# Patient Record
Sex: MALE | Race: WHITE | NOT HISPANIC OR LATINO | Employment: OTHER | URBAN - METROPOLITAN AREA
[De-identification: names, ages, dates, MRNs, and addresses within clinical notes are randomized per-mention and may not be internally consistent; named-entity substitution may affect disease eponyms.]

---

## 2017-01-04 ENCOUNTER — NURSE TRIAGE (OUTPATIENT)
Dept: ADMINISTRATIVE | Facility: CLINIC | Age: 82
End: 2017-01-04

## 2017-01-04 ENCOUNTER — TELEPHONE (OUTPATIENT)
Dept: INTERNAL MEDICINE | Facility: CLINIC | Age: 82
End: 2017-01-04

## 2017-01-04 ENCOUNTER — OFFICE VISIT (OUTPATIENT)
Dept: INTERNAL MEDICINE | Facility: CLINIC | Age: 82
End: 2017-01-04
Payer: MEDICARE

## 2017-01-04 VITALS
BODY MASS INDEX: 27.67 KG/M2 | SYSTOLIC BLOOD PRESSURE: 110 MMHG | DIASTOLIC BLOOD PRESSURE: 60 MMHG | HEART RATE: 50 BPM | HEIGHT: 68 IN | OXYGEN SATURATION: 98 % | WEIGHT: 182.56 LBS

## 2017-01-04 DIAGNOSIS — Z86.73 HISTORY OF STROKE: ICD-10-CM

## 2017-01-04 DIAGNOSIS — D69.6 THROMBOCYTOPENIA: ICD-10-CM

## 2017-01-04 DIAGNOSIS — E78.49 OTHER HYPERLIPIDEMIA: ICD-10-CM

## 2017-01-04 DIAGNOSIS — F33.42 RECURRENT MAJOR DEPRESSIVE DISORDER, IN FULL REMISSION: ICD-10-CM

## 2017-01-04 DIAGNOSIS — I25.10 CORONARY ARTERY DISEASE INVOLVING NATIVE CORONARY ARTERY OF NATIVE HEART WITHOUT ANGINA PECTORIS: ICD-10-CM

## 2017-01-04 DIAGNOSIS — Z79.01 ANTICOAGULATED BY ANTICOAGULATION TREATMENT: ICD-10-CM

## 2017-01-04 DIAGNOSIS — E53.8 VITAMIN B12 DEFICIENCY: ICD-10-CM

## 2017-01-04 DIAGNOSIS — I51.3 THROMBUS OF LEFT ATRIAL APPENDAGE: ICD-10-CM

## 2017-01-04 DIAGNOSIS — I70.0 AORTIC ATHEROSCLEROSIS: ICD-10-CM

## 2017-01-04 DIAGNOSIS — I50.22 CHRONIC SYSTOLIC HEART FAILURE: ICD-10-CM

## 2017-01-04 DIAGNOSIS — I10 ESSENTIAL HYPERTENSION: Primary | ICD-10-CM

## 2017-01-04 DIAGNOSIS — I48.0 PAROXYSMAL ATRIAL FIBRILLATION: ICD-10-CM

## 2017-01-04 DIAGNOSIS — N40.0 BENIGN NODULAR PROSTATIC HYPERPLASIA WITHOUT LOWER URINARY TRACT SYMPTOMS: ICD-10-CM

## 2017-01-04 PROBLEM — F32.5 MAJOR DEPRESSIVE DISORDER IN FULL REMISSION: Status: ACTIVE | Noted: 2017-01-04

## 2017-01-04 PROCEDURE — 3074F SYST BP LT 130 MM HG: CPT | Mod: S$GLB,,, | Performed by: INTERNAL MEDICINE

## 2017-01-04 PROCEDURE — 99499 UNLISTED E&M SERVICE: CPT | Mod: S$GLB,,, | Performed by: INTERNAL MEDICINE

## 2017-01-04 PROCEDURE — 1160F RVW MEDS BY RX/DR IN RCRD: CPT | Mod: S$GLB,,, | Performed by: INTERNAL MEDICINE

## 2017-01-04 PROCEDURE — 1157F ADVNC CARE PLAN IN RCRD: CPT | Mod: S$GLB,,, | Performed by: INTERNAL MEDICINE

## 2017-01-04 PROCEDURE — 1126F AMNT PAIN NOTED NONE PRSNT: CPT | Mod: S$GLB,,, | Performed by: INTERNAL MEDICINE

## 2017-01-04 PROCEDURE — 99999 PR PBB SHADOW E&M-EST. PATIENT-LVL III: CPT | Mod: PBBFAC,,, | Performed by: INTERNAL MEDICINE

## 2017-01-04 PROCEDURE — 1159F MED LIST DOCD IN RCRD: CPT | Mod: S$GLB,,, | Performed by: INTERNAL MEDICINE

## 2017-01-04 PROCEDURE — 99214 OFFICE O/P EST MOD 30 MIN: CPT | Mod: S$GLB,,, | Performed by: INTERNAL MEDICINE

## 2017-01-04 PROCEDURE — 3078F DIAST BP <80 MM HG: CPT | Mod: S$GLB,,, | Performed by: INTERNAL MEDICINE

## 2017-01-04 RX ORDER — CARVEDILOL 3.12 MG/1
3.12 TABLET ORAL 2 TIMES DAILY
Qty: 60 TABLET | Refills: 11 | Status: SHIPPED | OUTPATIENT
Start: 2017-01-04 | End: 2017-11-03 | Stop reason: SDUPTHER

## 2017-01-04 RX ORDER — CARVEDILOL 3.12 MG/1
3.12 TABLET ORAL 2 TIMES DAILY
Qty: 60 TABLET | Refills: 11 | Status: SHIPPED | OUTPATIENT
Start: 2017-01-04 | End: 2017-01-04 | Stop reason: SDUPTHER

## 2017-01-04 NOTE — TELEPHONE ENCOUNTER
----- Message from Venessa Madison sent at 1/4/2017 12:30 PM CST -----  Contact: pharmacy/leyla/[phone]  Pharmacy called in regards to getting clarification and directions on the pt Rx for carvedilol (COREG) 3.125 MG tablet.      [facility]  Please advise

## 2017-01-04 NOTE — PROGRESS NOTES
CHIEF COMPLAINT: Follow up of HTN, CAD, memory loss, hyperlipidemia, BPH    HISTORY OF PRESENT ILLNESS: This is a 82-year-old man who presents with his daughter for follow up of above. His daughter notes that he has been more tired lately. He will get up around 10 am then watch the news for several hours then take 1-2 hour nap. He is usually awake by 3 pm when his grandchildren return home.  HE stays awake until 9 pm. He sleeps well.    He is on Eliquis 5 mg twice daily, lisinopril 20 mg dialy, furosemide 20 mg daily, potassium chloride 10 meq 2 tablets every other day and coreg 12.5 mg 1/2 tablet twice daily. No chest pain, shortness of breath, palpitations     Appetite has been better. He is drinking a boost or ensure daily. No fever, chills, nausea, vomiting, constipation, diarrhea.       He continues to take Wellbutrin  mg daily. Memory has not worsened. He has some mild memory loss. Mood has been down a little as the anniversary of his wife's death is at the end of May. He continues to take atorvastatin 40 mg 1 tablet daily and Lovaza 1 g daily for her hyperlipidemia. He denies any neck pain from his history of cervical spine fracture.       He has chronic shoulder pain which has not changed     He has right sided back pain if he sits too long. Back pain improves with moving around.       Past Medical History:   1. Coronary artery disease status post CABG x1 in 1993, and STEMI 01/2010, status post stenting, refused cardiac rehab, followed by Dr. Aguilar.   2. Congestive heart failure. Ejection fraction was 30% Jan 2012  3. History of atrial fibrillation -- EKG Jan 2012 - sinus rhythm  4. Hyperlipidemia  5. Hypertension.   6. Osteoarthritis of the lumbar spine -- Had a flare in 2006 and had MRI at that time.   7. History of nephrolithiasis in 2005.   8. History of left thalamic stroke in the early 2000s per Dr. Matos's notes.   9. Depression -- followed by Dr. Bingham .  10. BPH with obstruction, had a  "cystoscope in 2010. Saw Urology 2011.  11. Right rotator cuff tear with arthropathy. He has pain from time to time. Saw Dr. Narvaez for an injection 2011.     PAST SURGICAL HISTORY:   1. CABG x1 in .   2. Bilateral cataract surgery, right 2005, left .   3. Excision of basal cell carcinoma of the left ear in .     SOCIAL HISTORY: He has never smoked, drinks alcohol once a month. . Has three children. Retired from the [employer].     FAMILY HISTORY: Mother  in her 60s of heart trouble. Father  in his 50s of some sort of abdominal malignancy. He has a half brother, which he is not sure of his health issues.     Screening PSA was 1016. Declines colonoscopy.     PHYSICAL EXAMINATION:    Visit Vitals    /60 (BP Location: Right arm, Patient Position: Sitting, BP Method: Manual)    Pulse (!) 50    Ht 5' 8" (1.727 m)    Wt 82.8 kg (182 lb 8.7 oz)    SpO2 98%    BMI 27.76 kg/m2         GENERAL: He is alert, oriented, no apparent distress. Affect within normal limits.   HEENT: Conjunctivae anicteric. Pupils are equal, round and reactive to   light. TM clear  Neck supple. Oropharynx -clear  CHEST: Respiratory effort normal. Lungs clear. Rales right base.    HEART: Regular rate and rhythm without murmurs, gallops or rubs. No lowerextremity edema.      ASSESSMENT AND PLAN:   1. Coronary artery disease, CHF and a fib and aortic atherosclerosis- on risk facto modification. -decrease coreg to 3.125 mg twice daily due to bradycardia. IF fatigue does not improve, then his daughter is to notify me.   2. Hypertension -stable  3. Hyperlipidemia -- controlled.    4.GERD - asx. Takes ranitidine 300 mg Nightly as needed    5. WEight loss- resolved   6. Major Depression -- in remission on Wellbutrin  7. Osteoarthritis of lumbar spine -- stable.   8. Benign prostatic hypertrophy -- stable.   9. History of skin cancer -- Saw Derm   10. Thyroid nodule - thyroid ultrasound stable "    11 Memory loss- intolerant of aricept. Saw Jade Chacon    12. Vitamin B12 deficiency - vitamin B12 1000 mcg orally daily  13. Thrombocytopenia - stable and chronic - repeat CBC at next lab  14. Tortuous aorta - BP is stable  15. Left atrial appendage thrombus-  On Eliquis  Screening -- PSA slightly elevated - will monitor at next lab draw. Will repeat at next lab draw. Declines colonoscopy.   I will see him back in 3 months, sooner if problems arise.

## 2017-01-04 NOTE — MR AVS SNAPSHOT
[facility]  [address]  [facility] [address]  Phone: [phone]  Fax: [phone]                  Jeremie Bradshaw   2017 11:30 AM   Office Visit    Description:  Male : 1934   Provider:  Rocio Casas MD   Department:  [facility] - Internal Medicine           Reason for Visit     Follow-up                To Do List           Future Appointments        Provider Department Dept Phone    2017 9:30 AM Rocio Casas MD [facility] - Internal Medicine [phone]      Goals (5 Years of Data)     None       These Medications        Disp Refills Start End    carvedilol (COREG) 3.125 MG tablet 60 tablet 11 2017     Take 1 tablet (3.125 mg total) by mouth 2 (two) times daily. Take a half tablet twice a day. - Oral    [facility] [address] [place], [place] [address] [address] [address] [facility] [address] Ph #: [phone]         [facility]     [facility] On Call Nurse Care Line -  Assistance  Registered nurses in the [facility] On Call Center provide clinical advisement, health education, appointment booking, and other advisory services.  Call for this free service at [phone].             Medications           Message regarding Medications     Verify the changes and/or additions to your medication regime listed below are the same as discussed with your clinician today.  If any of these changes or additions are incorrect, please notify your healthcare provider.        CHANGE how you are taking these medications     Start Taking Instead of    carvedilol (COREG) 3.125 MG tablet carvedilol (COREG) 12.5 MG tablet    Dosage:  Take 1 tablet (3.125 mg total) by mouth 2 (two) times daily. Take a half tablet twice a day. Dosage:  Take a half tablet twice a day.    Reason for Change:  Reorder            Verify that the below list of medications is an accurate representation of the medications you are currently taking.  If none  "reported, the list may be blank. If incorrect, please contact your healthcare provider. Carry this list with you in case of emergency.           Current Medications     apixaban 5 mg Tab Take 1 tablet (5 mg total) by mouth 2 (two) times daily.    aspirin (ECOTRIN) 81 MG EC tablet Take 81 mg by mouth once daily. Pt taken every other day with potasium    atorvastatin (LIPITOR) 40 MG tablet TAKE 1 TABLET BY MOUTH EVERY DAY    buPROPion (WELLBUTRIN XL) 300 MG 24 hr tablet TAKE 1 TABLET(300 MG) BY MOUTH EVERY MORNING    carvedilol (COREG) 3.125 MG tablet Take 1 tablet (3.125 mg total) by mouth 2 (two) times daily. Take a half tablet twice a day.    cyanocobalamin (VITAMIN B-12) 1000 MCG tablet Take 1 tablet (1,000 mcg total) by mouth once daily.    desonide (DESOWEN) 0.05 % cream AAA bid    desonide (DESOWEN) 0.05 % lotion APPLY EXTERNALLY TO THE AFFECTED AREA TWICE DAILY AS NEEDED FOR REDNESS AND ITCHING    docusate sodium (DOC-Q-LACE) 100 MG capsule Take 1 capsule (100 mg total) by mouth once daily.    furosemide (LASIX) 20 MG tablet Take 1 tablet (20 mg total) by mouth once daily. Plus extra as needed.    hydrocortisone (WESTCORT) 0.2 % cream Apply topically 2 (two) times daily.      lisinopril (PRINIVIL,ZESTRIL) 20 MG tablet Take 1 tablet (20 mg total) by mouth once daily.    nitroGLYCERIN (NITROSTAT) 0.4 MG SL tablet Place 0.4 mg under the tongue every 5 (five) minutes as needed.      omega-3 acid ethyl esters (LOVAZA) 1 gram capsule Take 2 capsules (2 g total) by mouth once daily.    potassium chloride (MICRO-K) 10 MEQ CpSR TAKE 2 CAPSULES BY MOUTH EVERY DAY FOR 5 DAYS, THEN TAKE 2 CAPSULES EVERY OTHER DAY. TAKE DAILY WHEN TAKING EXTRA LASIX           Clinical Reference Information           Vital Signs - Last Recorded  Most recent update: 1/4/2017 12:14 PM by Rocio Casas MD    BP Pulse Ht Wt SpO2 BMI    110/60 (BP Location: Right arm, Patient Position: Sitting, BP Method: Manual) (!) 50 5' 8" (1.727 m) 82.8 " kg (182 lb 8.7 oz) 98% 27.76 kg/m2      Blood Pressure          Most Recent Value    BP  110/60      Allergies as of 1/4/2017     Prednisone      Immunizations Administered on Date of Encounter - 1/4/2017     None

## 2017-01-05 ENCOUNTER — TELEPHONE (OUTPATIENT)
Dept: INTERNAL MEDICINE | Facility: CLINIC | Age: 82
End: 2017-01-05

## 2017-01-05 NOTE — TELEPHONE ENCOUNTER
"    Reason for Disposition   [1] Pointed object was inserted into the ear canal AND [2] now has bleeding or earache     (Exception: doctor's ear exam)     Jeremie 's ear was cleaned during an appointment in clinic today, as Galina (daughter) states he has a lot of wax produced in his ears.  Went home, and he could not hear later in the day; Galina noticed the hearing aid in his right ear was covered with blood, and blood was pooling in his ear.  She cleaned the hearing aid and dried his ear, but the bleeding continues, she states. He is on blood thinner Eliquis.  He cannot hear from that ear at present, "probably due to the blood in his ear", states his daughter.  Recommended she bring him to the ED now for evaluation.  Message to Rocio Casas .  Please contact caller directly with any additional care advice.    Answer Assessment - Initial Assessment Questions  1. MECHANISM: "How did the injury happen?"       He was getting his ear cleaned by PCP today, and tonight noticed blood all over the hearing aid, and it is still bleeding (he is on blood thinner Eliquis)  2. ONSET: "When did the injury happen?" (Minutes or hours ago)       About 1200 today in clinic.  3. LOCATION: "What part of the ear is injured?"       Hard to see inside the canal, and after cleaning out the blood, fresh blood keeps coming.  4. APPEARANCE: "What does the ear look like?"         5. HEARING: "Was the hearing damaged?"       He wears hearing aid, but he states he cannot hear even with the hearing aid in..  6. SIZE: For cuts, bruises, or swelling, ask: "How large is it?" (e.g., inches or centimeters)      Unknown.  7. PAIN: "Is it painful?" If so, ask: "How bad is the pain?"    (e.g., Scale 1-10; or mild, moderate, severe)      He is not complaining of any pain, but canal full of blood and he cannot hear.  8. TETANUS: For any breaks in the skin, ask: "When was the last tetanus booster?"        9. OTHER SYMPTOMS: "Do you have any other " "symptoms?" (e.g., neck pain, headache, loss of consciousness)      None mentioned to his daughter.  10. PREGNANCY: "Is there any chance you are pregnant?" "When was your last menstrual period?"        n/a    Protocols used: ST EAR INJURY-A-      "

## 2017-01-05 NOTE — TELEPHONE ENCOUNTER
----- Message from Beti Jones sent at 1/5/2017 11:40 AM CST -----  Contact: Galina daughter [phone]  Pt daughter would like to speak with the nurse regarding the pt was seen yesterday and the doctor washed out his ear and she states since then the pt has had dried up blood in his ear and having trouble hearing,Please call

## 2017-01-05 NOTE — TELEPHONE ENCOUNTER
Spoke with pt daughter, she states that after the pt was seen and pricked in the ear he got home and can't hear out of the ear and had a good bit of blood coming out. She called [facility] on call who told her to come in to er with pt but he refused. Pt daughter would like to get an apt so that she can have PCP revaluate the pt.

## 2017-01-06 ENCOUNTER — OFFICE VISIT (OUTPATIENT)
Dept: INTERNAL MEDICINE | Facility: CLINIC | Age: 82
End: 2017-01-06
Payer: MEDICARE

## 2017-01-06 DIAGNOSIS — H61.891 IRRITATION OF EXTERNAL AUDITORY CANAL, RIGHT: Primary | ICD-10-CM

## 2017-01-06 PROCEDURE — 1160F RVW MEDS BY RX/DR IN RCRD: CPT | Mod: S$GLB,,, | Performed by: INTERNAL MEDICINE

## 2017-01-06 PROCEDURE — 99213 OFFICE O/P EST LOW 20 MIN: CPT | Mod: S$GLB,,, | Performed by: INTERNAL MEDICINE

## 2017-01-06 PROCEDURE — 1157F ADVNC CARE PLAN IN RCRD: CPT | Mod: S$GLB,,, | Performed by: INTERNAL MEDICINE

## 2017-01-06 PROCEDURE — 99499 UNLISTED E&M SERVICE: CPT | Mod: S$GLB,,, | Performed by: INTERNAL MEDICINE

## 2017-01-06 PROCEDURE — 1159F MED LIST DOCD IN RCRD: CPT | Mod: S$GLB,,, | Performed by: INTERNAL MEDICINE

## 2017-01-06 PROCEDURE — 99999 PR PBB SHADOW E&M-EST. PATIENT-LVL I: CPT | Mod: PBBFAC,,, | Performed by: INTERNAL MEDICINE

## 2017-01-06 NOTE — PROGRESS NOTES
CHIEF COMPLAINT: Clogged right ear    HISTORY OF PRESENT ILLNESS: This is a 82-year-old man who presents due to clogged right ear. I removed some wax from his right external auditory canal 2 days ago with a cerumen spoon under direct visualization. When he got home the right ear canal was bleeding. HE now cannot hear out of the right ear.  NO pain.  HE takes eliquis due to a fib      Past Medical History:   1. Coronary artery disease status post CABG x1 in , and STEMI 2010, status post stenting, refused cardiac rehab, followed by Dr. Aguilar.   2. Congestive heart failure. Ejection fraction was 30% 2012  3. History of atrial fibrillation -- EKG 2012 - sinus rhythm  4. Hyperlipidemia  5. Hypertension.   6. Osteoarthritis of the lumbar spine -- Had a flare in  and had MRI at that time.   7. History of nephrolithiasis in .   8. History of left thalamic stroke in the early  per Dr. Matos's notes.   9. Depression -- followed by Dr. Bingham .  10. BPH with obstruction, had a cystoscope in 2010. Saw Urology 2011.  11. Right rotator cuff tear with arthropathy. He has pain from time to time. Saw Dr. Narvaez for an injection 2011.     PAST SURGICAL HISTORY:   1. CABG x1 in .   2. Bilateral cataract surgery, right 2005, left .   3. Excision of basal cell carcinoma of the left ear in .     SOCIAL HISTORY: He has never smoked, drinks alcohol once a month. . Has three children. Retired from the [employer].     FAMILY HISTORY: Mother  in her 60s of heart trouble. Father  in his 50s of some sort of abdominal malignancy. He has a half brother, which he is not sure of his health issues.     Screening PSA was 1016. Declines colonoscopy.     PHYSICAL EXAMINATION:         GENERAL: He is alert, oriented, no apparent distress. Affect within normal limits.   HEENT: Conjunctivae anicteric. Pupils are equal, round and reactive to   light. Right external auditory  canal with blood clot in it    Right external auditory canal irrigated with hydrogen peroxide. Clot removed. NO active signs of bleeding. NO erythema or induration in the ear canal. TM fine.    Bleeding of ear canal due to irritation from wax removal- stopped. Blood clot removed. Clean out ears with H2O2 weekly to prevent wax buildup.

## 2017-01-18 RX ORDER — OMEGA-3-ACID ETHYL ESTERS 1 G/1
CAPSULE, LIQUID FILLED ORAL
Qty: 180 CAPSULE | Refills: 0 | Status: SHIPPED | OUTPATIENT
Start: 2017-01-18 | End: 2017-04-19 | Stop reason: SDUPTHER

## 2017-02-16 RX ORDER — BUPROPION HYDROCHLORIDE 300 MG/1
TABLET ORAL
Qty: 30 TABLET | Refills: 0 | Status: SHIPPED | OUTPATIENT
Start: 2017-02-16 | End: 2017-04-03 | Stop reason: SDUPTHER

## 2017-02-16 RX ORDER — ATORVASTATIN CALCIUM 40 MG/1
TABLET, FILM COATED ORAL
Qty: 90 TABLET | Refills: 0 | Status: SHIPPED | OUTPATIENT
Start: 2017-02-16 | End: 2017-05-20 | Stop reason: SDUPTHER

## 2017-03-06 ENCOUNTER — OFFICE VISIT (OUTPATIENT)
Dept: INTERNAL MEDICINE | Facility: CLINIC | Age: 82
End: 2017-03-06
Payer: MEDICARE

## 2017-03-06 ENCOUNTER — HOSPITAL ENCOUNTER (OUTPATIENT)
Dept: RADIOLOGY | Facility: HOSPITAL | Age: 82
Discharge: HOME | End: 2017-03-06
Attending: INTERNAL MEDICINE
Payer: MEDICARE

## 2017-03-06 VITALS
HEIGHT: 68 IN | DIASTOLIC BLOOD PRESSURE: 60 MMHG | HEART RATE: 60 BPM | OXYGEN SATURATION: 97 % | WEIGHT: 177 LBS | SYSTOLIC BLOOD PRESSURE: 110 MMHG | BODY MASS INDEX: 26.83 KG/M2

## 2017-03-06 DIAGNOSIS — G89.29 CHRONIC RIGHT SHOULDER PAIN: ICD-10-CM

## 2017-03-06 DIAGNOSIS — E53.8 VITAMIN B12 DEFICIENCY: ICD-10-CM

## 2017-03-06 DIAGNOSIS — R97.20 ELEVATED PSA: ICD-10-CM

## 2017-03-06 DIAGNOSIS — M25.511 CHRONIC RIGHT SHOULDER PAIN: ICD-10-CM

## 2017-03-06 DIAGNOSIS — I50.22 CHRONIC SYSTOLIC HEART FAILURE: ICD-10-CM

## 2017-03-06 DIAGNOSIS — M25.511 CHRONIC RIGHT SHOULDER PAIN: Primary | ICD-10-CM

## 2017-03-06 DIAGNOSIS — E78.5 HYPERLIPIDEMIA, UNSPECIFIED HYPERLIPIDEMIA TYPE: ICD-10-CM

## 2017-03-06 DIAGNOSIS — G89.29 CHRONIC RIGHT SHOULDER PAIN: Primary | ICD-10-CM

## 2017-03-06 DIAGNOSIS — I10 ESSENTIAL HYPERTENSION: ICD-10-CM

## 2017-03-06 PROCEDURE — 99499 UNLISTED E&M SERVICE: CPT | Mod: S$GLB,,, | Performed by: INTERNAL MEDICINE

## 2017-03-06 PROCEDURE — 1157F ADVNC CARE PLAN IN RCRD: CPT | Mod: S$GLB,,, | Performed by: INTERNAL MEDICINE

## 2017-03-06 PROCEDURE — 3078F DIAST BP <80 MM HG: CPT | Mod: S$GLB,,, | Performed by: INTERNAL MEDICINE

## 2017-03-06 PROCEDURE — 99214 OFFICE O/P EST MOD 30 MIN: CPT | Mod: 25,S$GLB,, | Performed by: INTERNAL MEDICINE

## 2017-03-06 PROCEDURE — 1160F RVW MEDS BY RX/DR IN RCRD: CPT | Mod: S$GLB,,, | Performed by: INTERNAL MEDICINE

## 2017-03-06 PROCEDURE — 1159F MED LIST DOCD IN RCRD: CPT | Mod: S$GLB,,, | Performed by: INTERNAL MEDICINE

## 2017-03-06 PROCEDURE — 99999 PR PBB SHADOW E&M-EST. PATIENT-LVL III: CPT | Mod: PBBFAC,,, | Performed by: INTERNAL MEDICINE

## 2017-03-06 PROCEDURE — 96372 THER/PROPH/DIAG INJ SC/IM: CPT | Mod: S$GLB,,, | Performed by: INTERNAL MEDICINE

## 2017-03-06 PROCEDURE — 3074F SYST BP LT 130 MM HG: CPT | Mod: S$GLB,,, | Performed by: INTERNAL MEDICINE

## 2017-03-06 PROCEDURE — 73030 X-RAY EXAM OF SHOULDER: CPT | Mod: TC,RT

## 2017-03-06 PROCEDURE — 73030 X-RAY EXAM OF SHOULDER: CPT | Mod: 26,RT,, | Performed by: RADIOLOGY

## 2017-03-06 RX ORDER — CYANOCOBALAMIN 1000 UG/ML
1000 INJECTION, SOLUTION INTRAMUSCULAR; SUBCUTANEOUS
Status: COMPLETED | OUTPATIENT
Start: 2017-03-06 | End: 2017-03-06

## 2017-03-06 RX ADMIN — CYANOCOBALAMIN 1000 MCG: 1000 INJECTION, SOLUTION INTRAMUSCULAR; SUBCUTANEOUS at 12:03

## 2017-03-06 NOTE — PROGRESS NOTES
CHIEF COMPLAINT: Right shoulder pain    HISTORY OF PRESENT ILLNESS: This is a 82-year-old man who presents with his daughter due to worsened right shoulder pain for the last several weeks. He did not do much during the mardi gras season and laid on his right shoulder a lot. The right shouldr got stiff and he had decreased range of motion and worsened right shoulder pain. Since Mardi Gras is over, her daughter has more time to get him out and the shoulder is a little better. HE is using a heating pad and taking tylenol one in am and 2 in evening which helps.  He has chronic right shoulder issues and fracture his humerus in 2013     He is on Eliquis 5 mg twice daily, lisinopril 20 mg dialy, furosemide 20 mg daily, potassium chloride 10 meq 2 tablets every other day and coreg 3.125 mg one tablet twice daily. No chest pain, shortness of breath, palpitations      Appetite has been better. He is drinking a boost or ensure daily. No fever, chills, nausea, vomiting, constipation, diarrhea.       He continues to take Wellbutrin  mg daily. Memory has not worsened. He has some mild memory loss. Mood has been down a little as the anniversary of his wife's death is at the end of May. He continues to take atorvastatin 40 mg 1 tablet daily and Lovaza 1 g daily for her hyperlipidemia. He denies any neck pain from his history of cervical spine fracture.      He has right sided back pain if he sits too long. Back pain improves with moving around.       Past Medical History:   1. Coronary artery disease status post CABG x1 in 1993, and STEMI 01/2010, status post stenting, refused cardiac rehab, followed by Dr. Aguilar.   2. Congestive heart failure. Ejection fraction was 30% Jan 2012  3. History of atrial fibrillation -- EKG Jan 2012 - sinus rhythm  4. Hyperlipidemia  5. Hypertension.   6. Osteoarthritis of the lumbar spine -- Had a flare in 2006 and had MRI at that time.   7. History of nephrolithiasis in 2005.   8. History of  "left thalamic stroke in the early  per Dr. Matos's notes.   9. Depression -- followed by Dr. Bingham .  10. BPH with obstruction, had a cystoscope in 2010. Saw Urology 2011.  11. Right rotator cuff tear with arthropathy. He has pain from time to time. Saw Dr. Narvaez for an injection 2011.     PAST SURGICAL HISTORY:   1. CABG x1 in .   2. Bilateral cataract surgery, right 2005, left .   3. Excision of basal cell carcinoma of the left ear in .     SOCIAL HISTORY: He has never smoked, drinks alcohol once a month. . Has three children. Retired from the [employer].     FAMILY HISTORY: Mother  in her 60s of heart trouble. Father  in his 50s of some sort of abdominal malignancy. He has a half brother, which he is not sure of his health issues.     Screening PSA was 1016. Declines colonoscopy.     PHYSICAL EXAMINATION:          Visit Vitals    /60 (BP Location: Right arm, Patient Position: Sitting, BP Method: Manual)    Pulse (!) 50    Ht 5' 8" (1.727 m)    Wt 82.8 kg (182 lb 8.7 oz)    SpO2 98%    BMI 27.76 kg/m2          GENERAL: He is alert, oriented, no apparent distress. Affect within normal limits.   HEENT: Conjunctivae anicteric. Pupils are equal, round and reactive to   light. TM clear  Neck supple. Oropharynx -clear  CHEST: Respiratory effort normal. Lungs clear. Rales right base.    HEART: Regular rate and rhythm without murmurs, gallops or rubs. No lowerextremity edema.    Decreased range of motion of the right shoulder    ASSESSMENT AND PLAN:   1. Right shoulder pain- xray and to sports medicine  2. Coronary artery disease, CHF and a fib and aortic atherosclerosis- on risk facto modification. -decrease coreg to 3.125 mg twice daily due to bradycardia. IF fatigue does not improve, then his daughter is to notify me.   2. Hypertension -stable  3. Hyperlipidemia -- controlled.    4.GERD - asx. Takes ranitidine 300 mg Nightly as needed    5. WEight " loss- resolved   6. Major Depression -- controlled on Wellbutrin  7. Osteoarthritis of lumbar spine -- stable.   8. Benign prostatic hypertrophy -- stable.   9. History of skin cancer -- Saw Derm 12/16  10. Thyroid nodule - thyroid ultrasound stable    11 Memory loss- intolerant of aricept. Saw Jade Chacon    12. Vitamin B12 deficiency - level today then vitamin B12 1000 mcg IM  13. Thrombocytopenia - stable and chronic - repeat CBC at next lab  14. Tortuous aorta - BP is stable  15. Left atrial appendage thrombus- On Eliquis  Screening -- PSA slightly elevated -. Will repeat today  . Declines colonoscopy.   I will see him back in 1 months, sooner if problems arise.

## 2017-03-06 NOTE — PROGRESS NOTES
Two pt identifies used, Name and   Pt informed of possible adverse reaction and how to handle them. Asked pt to remain 15-20mins. Verbal consent given.

## 2017-03-07 RX ORDER — DOCUSATE SODIUM 100 MG/1
CAPSULE, LIQUID FILLED ORAL
Qty: 90 CAPSULE | Refills: 4 | Status: SHIPPED | OUTPATIENT
Start: 2017-03-07 | End: 2018-03-09 | Stop reason: SDUPTHER

## 2017-03-20 ENCOUNTER — PATIENT MESSAGE (OUTPATIENT)
Dept: CARDIOLOGY | Facility: CLINIC | Age: 82
End: 2017-03-20

## 2017-03-21 RX ORDER — NITROGLYCERIN 0.4 MG/1
0.4 TABLET SUBLINGUAL EVERY 5 MIN PRN
Qty: 25 TABLET | Refills: 3 | Status: SHIPPED | OUTPATIENT
Start: 2017-03-21 | End: 2018-12-03 | Stop reason: SDUPTHER

## 2017-03-29 RX ORDER — BUPROPION HYDROCHLORIDE 300 MG/1
TABLET ORAL
Qty: 30 TABLET | Refills: 0 | OUTPATIENT
Start: 2017-03-29

## 2017-04-03 RX ORDER — BUPROPION HYDROCHLORIDE 300 MG/1
TABLET ORAL
Qty: 30 TABLET | Refills: 1 | Status: SHIPPED | OUTPATIENT
Start: 2017-04-03 | End: 2017-05-30 | Stop reason: SDUPTHER

## 2017-04-05 ENCOUNTER — OFFICE VISIT (OUTPATIENT)
Dept: SPORTS MEDICINE | Facility: CLINIC | Age: 82
End: 2017-04-05
Payer: MEDICARE

## 2017-04-05 VITALS — HEIGHT: 68 IN | BODY MASS INDEX: 26.83 KG/M2 | WEIGHT: 177 LBS

## 2017-04-05 DIAGNOSIS — M19.011 PRIMARY OSTEOARTHRITIS, RIGHT SHOULDER: Primary | ICD-10-CM

## 2017-04-05 PROCEDURE — 1157F ADVNC CARE PLAN IN RCRD: CPT | Mod: S$GLB,,, | Performed by: ORTHOPAEDIC SURGERY

## 2017-04-05 PROCEDURE — 99999 PR PBB SHADOW E&M-EST. PATIENT-LVL III: CPT | Mod: PBBFAC,,, | Performed by: ORTHOPAEDIC SURGERY

## 2017-04-05 PROCEDURE — 1126F AMNT PAIN NOTED NONE PRSNT: CPT | Mod: S$GLB,,, | Performed by: ORTHOPAEDIC SURGERY

## 2017-04-05 PROCEDURE — 1159F MED LIST DOCD IN RCRD: CPT | Mod: S$GLB,,, | Performed by: ORTHOPAEDIC SURGERY

## 2017-04-05 PROCEDURE — 1160F RVW MEDS BY RX/DR IN RCRD: CPT | Mod: S$GLB,,, | Performed by: ORTHOPAEDIC SURGERY

## 2017-04-05 PROCEDURE — 99203 OFFICE O/P NEW LOW 30 MIN: CPT | Mod: S$GLB,,, | Performed by: ORTHOPAEDIC SURGERY

## 2017-04-05 PROCEDURE — 99499 UNLISTED E&M SERVICE: CPT | Mod: S$GLB,,, | Performed by: ORTHOPAEDIC SURGERY

## 2017-04-05 NOTE — LETTER
April 8, 2017      Rocio Casas MD  [address]  [facility] [address]           [facility]  [address]  [facility] [address]  Phone: [phone]          Patient: Jeremie Bradshaw   MR Number: [id number]   YOB: 1934   Date of Visit: 4/5/2017       Dear Dr. Rocio Casas:    Thank you for referring Jeremie Bradshaw to me for evaluation. Attached you will find relevant portions of my assessment and plan of care.    If you have questions, please do not hesitate to call me. I look forward to following Jeremie Bradshaw along with you.    Sincerely,    Elena Melendrez MD    Enclosure  CC:  No Recipients    If you would like to receive this communication electronically, please contact [email or web address] or [phone] to request more information on [employer] Link access.    For providers and/or their staff who would like to refer a patient to [facility], please contact us through our one-stop-shop provider referral line, [facility], at [phone].    If you feel you have received this communication in error or would no longer like to receive these types of communications, please e-mail [email or web address]

## 2017-04-05 NOTE — PROGRESS NOTES
CC: right shoulder pain, referred by Dr. Rocio Casas, retired     82 y.o. Male RHD reports that the pain is severe and not responding to any conservative care.      He reports that the pain is worse with overhead activity. It also bothers him at night.  Patients pain began around Mardi Gras, he notes no injury that he can recall to explain this flare up  Torn rotator cuff for 35 years with no surgical intervention   He notes that he attempted physical therapy 1-2 times but did not attend enough to see relief  He notes 2 previous shoulder injections but that the last one was many years ago  He describes his pain as anterior and posterior   He notes that he is feeling 90% better from when his pain first began    Is affecting ADLs.     He has chronic right shoulder issues and fracture his humerus in 2013  Patient has numbness in his hand but notes that this is from a stroke in the 1990's     Review of Systems   Constitution: Negative. Negative for chills, fever and night sweats.   HENT: Negative for congestion and headaches.    Eyes: Negative for blurred vision, left vision loss and right vision loss.   Cardiovascular: Negative for chest pain and syncope.   Respiratory: Negative for cough and shortness of breath.    Endocrine: Negative for polydipsia, polyphagia and polyuria.   Hematologic/Lymphatic: Negative for bleeding problem. Does not bruise/bleed easily.   Skin: Negative for dry skin, itching and rash.   Musculoskeletal: Negative for falls and muscle weakness.   Gastrointestinal: Negative for abdominal pain and bowel incontinence.   Genitourinary: Negative for bladder incontinence and nocturia.   Neurological: Negative for disturbances in coordination, loss of balance and seizures.   Psychiatric/Behavioral: Negative for depression. The patient does not have insomnia.    Allergic/Immunologic: Negative for hives and persistent infections.     PAST MEDICAL HISTORY:   Past Medical History:   Diagnosis Date     Basal cell carcinoma 7/2014    left medial canthus    Basal cell carcinoma 3/9/2015    right forehead    Basal cell carcinoma 09/08/2016    left neck (scoop shave)    BPH (benign prostatic hypertrophy) 8/17/2012    CAD (coronary artery disease) 8/17/2012    Cervical spine fracture 11/20/2012    CHF (congestive heart failure) 8/17/2012    Depression 8/17/2012    GERD (gastroesophageal reflux disease) 3/27/2014    Heart attack     History of atrial fibrillation 8/17/2012    Hyperlipidemia     Hypertension     Kidney stones 8/17/2012    Memory loss     Myocardial infarction     Osteoarthritis of lumbar spine 8/17/2012    Shoulder pain 8/17/2012    Stroke 8/17/2012     PAST SURGICAL HISTORY:   Past Surgical History:   Procedure Laterality Date    basal cell carcinoma left ear      CARDIAC SURGERY      CATARACT EXTRACTION W/  INTRAOCULAR LENS IMPLANT Bilateral     CATARACT EXTRACTION, BILATERAL      CORONARY ARTERY BYPASS GRAFT  1990    x1    TONSILLECTOMY       FAMILY HISTORY:   Family History   Problem Relation Age of Onset    Cancer Father     Heart failure Mother     Heart disease Mother     No Known Problems Sister     No Known Problems Brother     No Known Problems Maternal Aunt     No Known Problems Maternal Uncle     No Known Problems Paternal Aunt     No Known Problems Paternal Uncle     No Known Problems Maternal Grandmother     No Known Problems Maternal Grandfather     No Known Problems Paternal Grandmother     No Known Problems Paternal Grandfather     Amblyopia Neg Hx     Blindness Neg Hx     Cataracts Neg Hx     Diabetes Neg Hx     Glaucoma Neg Hx     Hypertension Neg Hx     Macular degeneration Neg Hx     Retinal detachment Neg Hx     Strabismus Neg Hx     Thyroid disease Neg Hx     Stroke Neg Hx     Melanoma Neg Hx     Psoriasis Neg Hx     Lupus Neg Hx     Eczema Neg Hx     Acne Neg Hx      SOCIAL HISTORY:   Social History     Social History     Marital status:      Spouse name: N/A    Number of children: N/A    Years of education: N/A     Occupational History    Retired Retired     Social History Main Topics    Smoking status: Never Smoker    Smokeless tobacco: Never Used    Alcohol use No      Comment: social drinker    Drug use: No    Sexual activity: Not Currently     Other Topics Concern    Not on file     Social History Narrative       MEDICATIONS:   Current Outpatient Prescriptions:     apixaban 5 mg Tab, Take 1 tablet (5 mg total) by mouth 2 (two) times daily., Disp: 180 tablet, Rfl: 3    aspirin (ECOTRIN) 81 MG EC tablet, Take 81 mg by mouth once daily. Pt taken every other day with potasium, Disp: , Rfl:     atorvastatin (LIPITOR) 40 MG tablet, TAKE 1 TABLET BY MOUTH EVERY DAY, Disp: 90 tablet, Rfl: 0    buPROPion (WELLBUTRIN XL) 300 MG 24 hr tablet, TAKE 1 TABLET(300 MG) BY MOUTH EVERY MORNING, Disp: 30 tablet, Rfl: 1    carvedilol (COREG) 3.125 MG tablet, Take 1 tablet (3.125 mg total) by mouth 2 (two) times daily., Disp: 60 tablet, Rfl: 11    cyanocobalamin (VITAMIN B-12) 1000 MCG tablet, Take 1 tablet (1,000 mcg total) by mouth once daily., Disp: , Rfl:     desonide (DESOWEN) 0.05 % cream, AAA bid, Disp: 1 Tube, Rfl: 3    desonide (DESOWEN) 0.05 % lotion, APPLY EXTERNALLY TO THE AFFECTED AREA TWICE DAILY AS NEEDED FOR REDNESS AND ITCHING, Disp: 1 Bottle, Rfl: 0     mg capsule, TAKE 1 CAPSULE BY MOUTH ONCE DAILY, Disp: 90 capsule, Rfl: 4    furosemide (LASIX) 20 MG tablet, Take 1 tablet (20 mg total) by mouth once daily. Plus extra as needed., Disp: 180 tablet, Rfl: 3    hydrocortisone (WESTCORT) 0.2 % cream, Apply topically 2 (two) times daily.  , Disp: , Rfl:     lisinopril (PRINIVIL,ZESTRIL) 20 MG tablet, Take 1 tablet (20 mg total) by mouth once daily., Disp: 30 tablet, Rfl: 11    nitroGLYCERIN (NITROSTAT) 0.4 MG SL tablet, Place 1 tablet (0.4 mg total) under the tongue every 5 (five) minutes as needed.,  "Disp: 25 tablet, Rfl: 3    omega-3 acid ethyl esters (LOVAZA) 1 gram capsule, TAKE 2 CAPSULES BY MOUTH EVERY DAY, Disp: 180 capsule, Rfl: 0    potassium chloride (MICRO-K) 10 MEQ CpSR, TAKE 2 CAPSULES BY MOUTH EVERY DAY FOR 5 DAYS, THEN TAKE 2 CAPSULES EVERY OTHER DAY. TAKE DAILY WHEN TAKING EXTRA LASIX, Disp: 180 capsule, Rfl: 0  ALLERGIES:   Review of patient's allergies indicates:   Allergen Reactions    Prednisone Other (See Comments)     hallucinations       VITAL SIGNS: Ht 5' 8" (1.727 m)  Wt 80.3 kg (177 lb)  BMI 26.91 kg/m2     PHYSICAL EXAMINATION:  General:  The patient is alert and oriented x 3.  Mood is pleasant.  Observation of ears, eyes and nose reveal no gross abnormalities.  No labored breathing observed.  Gait is coordinated. Patient can toe walk and heel walk without difficulty.      right Shoulder / Upper Extremity Exam    OBSERVATION:     Swelling  none  Deformity  none   Discoloration  none   Scapular winging none   Scars   none  Atrophy  none    TENDERNESS / CREPITUS (T/C):          T/C      T/C   Clavicle   -/-  SUPRAspinatus    -/-     AC Jt.    -/-  INFRAspinatus  -/-    SC Jt.    -/-  Deltoid    -/-      G. Tuberosity  -/-  LH BICEP groove  +/-   Acromion:  -/-  Midline Neck   -/-     Scapular Spine -/-  Trapezium   -/-   SMA Scapula  -/-  GH jt. line - post  -/-     Scapulothoracic  -/-  Pectoralis minor  +   Posterior shoulder +        ROM: (* = with pain)  Right shoulder   Left shoulder        AROM (PROM)   AROM (PROM)   FE    90°       160° (175°)     ER at 0°    neutral    60°  (65°)   ER at 90° ABD  NA     NA   IR at 90°  ABD   NA        NA       IR (spine level)   L2     T10    STRENGTH: (* = with pain) RIGHT SHOULDER  LEFT SHOULDER   SCAPTION at 0:  4/5    5/5   SCAPTION at 30:  5-/5    5/5    IR    5/5    5/5   ER    4+/5    5/5   BICEPS   5/5    5/5   Deltoid    5/5    5/5     SIGNS:  Painful side       NEER   +    OCARLAS  neg    MARS   +    SPEEDS  neg     DROP " ARM   neg   BELLY PRESS neg   Superior escape none    LIFT-OFF  neg   X-Body ADD    neg    MOVING VALGUS neg        STABILITY TESTING    RIGHT SHOULDER   LEFT SHOULDER     Translation     Anterior  up face     up face    Posterior  up face    up face    Sulcus   < 10mm    < 10 mm     Signs   Apprehension   neg      neg       Relocation   no change     no change      Jerk test  neg     neg    EXTREMITY NEURO-VASCULAR EXAM    Sensation grossly intact to light touch all dermatomal regions.    DTR 2+ Biceps, Triceps, BR and Negative Maus sign   Grossly intact motor function at Elbow, Wrist and Hand   Distal pulses radial and ulnar 2+, brisk cap refill, symmetric.      NECK:  Painless FROM and spinous processes non-tender. Negative Spurlings sign.      OTHER FINDINGS:  + scapular dyskinesia    XRAYS:  Shoulder trauma series right,  were obtained and reviewed  Results:Progressive deformity of the right shoulder compatible with advanced degenerative change with joint space loss and articular sclerosis and marginal osteophyte formation. Deformity of the humeral head which may be related to sequela of advanced degeneration remote fracture not excluded. There is no evidence for acute fracture dislocation right shoulder. Superior escape    ASSESSMENT:   right:  1. Shoulder pain, osteoarthritis   2.  Scapular dyskinesia    PLAN:    I recommended physical therapy but the patient states that he will not go, I encouraged him to think about it and contact us should he change his mind  We will not proceed with an injection today but will consider one in the future should his pain return   I also informed the patient that he may require a shoulder replacement in the future  All questions were answered, pt will contact us for questions or concerns in the interim.

## 2017-04-05 NOTE — MR AVS SNAPSHOT
[facility]  [address]  [facility] [address]  Phone: [phone]                  Jeremie Bradshaw   2017 1:15 PM   Appointment    Description:  Male : 1934   Provider:  Elena Melendrez MD   Department:  [facility]                To Do List           Future Appointments        Provider Department Dept Phone    2017 1:15 PM Elena Melendrez MD [facility] [phone]    2017 9:30 AM Rocio Casas MD [facility] - Internal Medicine [phone]    2017 1:40 PM Antionette Torres MD [facility] - Dermatology [phone]      Goals (5 Years of Data)     None      [facility]     [facility] On Call Nurse Care Line -  Assistance  Unless otherwise directed by your provider, please contact [facility], our nurse care line that is available for  assistance.     Registered nurses in the [facility] provide: appointment scheduling, clinical advisement, health education, and other advisory services.  Call: [phone] (toll free)               Medications           Message regarding Medications     Verify the changes and/or additions to your medication regime listed below are the same as discussed with your clinician today.  If any of these changes or additions are incorrect, please notify your healthcare provider.             Verify that the below list of medications is an accurate representation of the medications you are currently taking.  If none reported, the list may be blank. If incorrect, please contact your healthcare provider. Carry this list with you in case of emergency.           Current Medications     apixaban 5 mg Tab Take 1 tablet (5 mg total) by mouth 2 (two) times daily.    aspirin (ECOTRIN) 81 MG EC tablet Take 81 mg by mouth once daily. Pt taken every other day with potasium    atorvastatin (LIPITOR) 40 MG tablet TAKE 1 TABLET BY MOUTH EVERY DAY    buPROPion (WELLBUTRIN XL) 300 MG 24 hr tablet TAKE 1  TABLET(300 MG) BY MOUTH EVERY MORNING    carvedilol (COREG) 3.125 MG tablet Take 1 tablet (3.125 mg total) by mouth 2 (two) times daily.    cyanocobalamin (VITAMIN B-12) 1000 MCG tablet Take 1 tablet (1,000 mcg total) by mouth once daily.    desonide (DESOWEN) 0.05 % cream AAA bid    desonide (DESOWEN) 0.05 % lotion APPLY EXTERNALLY TO THE AFFECTED AREA TWICE DAILY AS NEEDED FOR REDNESS AND ITCHING     mg capsule TAKE 1 CAPSULE BY MOUTH ONCE DAILY    furosemide (LASIX) 20 MG tablet Take 1 tablet (20 mg total) by mouth once daily. Plus extra as needed.    hydrocortisone (WESTCORT) 0.2 % cream Apply topically 2 (two) times daily.      lisinopril (PRINIVIL,ZESTRIL) 20 MG tablet Take 1 tablet (20 mg total) by mouth once daily.    nitroGLYCERIN (NITROSTAT) 0.4 MG SL tablet Place 1 tablet (0.4 mg total) under the tongue every 5 (five) minutes as needed.    omega-3 acid ethyl esters (LOVAZA) 1 gram capsule TAKE 2 CAPSULES BY MOUTH EVERY DAY    potassium chloride (MICRO-K) 10 MEQ CpSR TAKE 2 CAPSULES BY MOUTH EVERY DAY FOR 5 DAYS, THEN TAKE 2 CAPSULES EVERY OTHER DAY. TAKE DAILY WHEN TAKING EXTRA LASIX           Clinical Reference Information           Allergies as of 4/5/2017     Prednisone      Immunizations Administered on Date of Encounter - 4/5/2017     None      Language Assistance Services     ATTENTION: Language assistance services are available, free of charge. Please call [phone].      ATENCIÓN: Si habla helen, tiene a olmos disposición servicios gratuitos de asistencia lingüística. Llame al [phone].     [facility] Ý: N?u b?n nói Ti?ng Vi?t, có các d?ch v? h? tr? ngôn ng? mi?n phí dành cho b?n. G?i s? [phone].         [facility] complies with applicable Federal civil rights laws and does not discriminate on the basis of race, color, national origin, age, disability, or sex.

## 2017-04-07 ENCOUNTER — OFFICE VISIT (OUTPATIENT)
Dept: INTERNAL MEDICINE | Facility: CLINIC | Age: 82
End: 2017-04-07
Payer: MEDICARE

## 2017-04-07 VITALS
DIASTOLIC BLOOD PRESSURE: 56 MMHG | HEIGHT: 68 IN | WEIGHT: 175.38 LBS | HEART RATE: 61 BPM | OXYGEN SATURATION: 95 % | BODY MASS INDEX: 26.58 KG/M2 | SYSTOLIC BLOOD PRESSURE: 110 MMHG

## 2017-04-07 DIAGNOSIS — K21.9 GASTROESOPHAGEAL REFLUX DISEASE WITHOUT ESOPHAGITIS: ICD-10-CM

## 2017-04-07 DIAGNOSIS — I25.10 CORONARY ARTERY DISEASE INVOLVING NATIVE CORONARY ARTERY OF NATIVE HEART WITHOUT ANGINA PECTORIS: ICD-10-CM

## 2017-04-07 DIAGNOSIS — I10 ESSENTIAL HYPERTENSION: Primary | ICD-10-CM

## 2017-04-07 DIAGNOSIS — R41.3 MEMORY LOSS: ICD-10-CM

## 2017-04-07 DIAGNOSIS — E78.5 HYPERLIPIDEMIA, UNSPECIFIED HYPERLIPIDEMIA TYPE: ICD-10-CM

## 2017-04-07 DIAGNOSIS — M25.519 SHOULDER PAIN, UNSPECIFIED CHRONICITY, UNSPECIFIED LATERALITY: ICD-10-CM

## 2017-04-07 PROCEDURE — 1157F ADVNC CARE PLAN IN RCRD: CPT | Mod: S$GLB,,, | Performed by: INTERNAL MEDICINE

## 2017-04-07 PROCEDURE — 99499 UNLISTED E&M SERVICE: CPT | Mod: S$GLB,,, | Performed by: INTERNAL MEDICINE

## 2017-04-07 PROCEDURE — 99999 PR PBB SHADOW E&M-EST. PATIENT-LVL II: CPT | Mod: PBBFAC,,, | Performed by: INTERNAL MEDICINE

## 2017-04-07 PROCEDURE — 99214 OFFICE O/P EST MOD 30 MIN: CPT | Mod: S$GLB,,, | Performed by: INTERNAL MEDICINE

## 2017-04-07 PROCEDURE — 3078F DIAST BP <80 MM HG: CPT | Mod: S$GLB,,, | Performed by: INTERNAL MEDICINE

## 2017-04-07 PROCEDURE — 1126F AMNT PAIN NOTED NONE PRSNT: CPT | Mod: S$GLB,,, | Performed by: INTERNAL MEDICINE

## 2017-04-07 PROCEDURE — 3074F SYST BP LT 130 MM HG: CPT | Mod: S$GLB,,, | Performed by: INTERNAL MEDICINE

## 2017-04-07 PROCEDURE — 1159F MED LIST DOCD IN RCRD: CPT | Mod: S$GLB,,, | Performed by: INTERNAL MEDICINE

## 2017-04-07 NOTE — PROGRESS NOTES
CHIEF COMPLAINT: Follow up of Right shoulder pain, fativue, hypertension, a fib, mood.    HISTORY OF PRESENT ILLNESS: This is a 82-year-old man who presents with his daughter for follow up of above.  He had had some decreased range of motion of the right shoulder. He saw Dr Melendrez which he was not happy about. He does not want to do physical therapy.      He is on Eliquis 5 mg twice daily, lisinopril 20 mg dialy, furosemide 20 mg daily, potassium chloride 10 meq 2 tablets every other day and coreg 3.125 mg one tablet twice daily. No chest pain, shortness of breath, palpitations. Fatigue is better with lowering the coreg.       Appetite has been better. He is drinking a boost or ensure daily. No fever, chills, nausea, vomiting, constipation, diarrhea.       He continues to take Wellbutrin  mg daily. Memory has not worsened. He has some mild memory loss. Mood has been down a little as the anniversary of his wife's death is at the end of May. He continues to take atorvastatin 40 mg 1 tablet daily and Lovaza 1 g daily for her hyperlipidemia. He denies any neck pain from his history of cervical spine fracture.      He has right sided back pain if he sits too long. Back pain improves with moving around.       Past Medical History:   1. Coronary artery disease status post CABG x1 in 1993, and STEMI 01/2010, status post stenting, refused cardiac rehab, followed by Dr. Aguilar.   2. Congestive heart failure. Ejection fraction was 30% Jan 2012  3. History of atrial fibrillation -- EKG Jan 2012 - sinus rhythm  4. Hyperlipidemia  5. Hypertension.   6. Osteoarthritis of the lumbar spine -- Had a flare in 2006 and had MRI at that time.   7. History of nephrolithiasis in 2005.   8. History of left thalamic stroke in the early 2000s per Dr. Matos's notes.   9. Depression -- followed by Dr. Bingham .  10. BPH with obstruction, had a cystoscope in 08/2010. Saw Urology 06/2011.  11. Right rotator cuff tear with arthropathy. He has  "pain from time to time. Saw Dr. Narvaez for an injection 2011.     PAST SURGICAL HISTORY:   1. CABG x1 in .   2. Bilateral cataract surgery, right 2005, left .   3. Excision of basal cell carcinoma of the left ear in .     SOCIAL HISTORY: He has never smoked, drinks alcohol once a month. . Has three children. Retired from the [employer].     FAMILY HISTORY: Mother  in her 60s of heart trouble. Father  in his 50s of some sort of abdominal malignancy. He has a half brother, which he is not sure of his health issues.     Screening PSA was 1016. Declines colonoscopy.     PHYSICAL EXAMINATION:    BP (!) 110/56 (BP Location: Right arm, Patient Position: Sitting, BP Method: Manual)  Pulse 61  Ht 5' 8" (1.727 m)  Wt 79.5 kg (175 lb 6 oz)  SpO2 95%  BMI 26.67 kg/m2    GENERAL: He is alert, oriented, no apparent distress. Affect within normal limits.   HEENT: Conjunctivae anicteric. Pupils are equal, round and reactive to   light. TM clear  Neck supple. Oropharynx -clear  CHEST: Respiratory effort normal. Lungs clear. Rales right base.    HEART: Regular rate and rhythm without murmurs, gallops or rubs. No lowerextremity edema.    Decreased range of motion of the right shoulder    Labs 3/6/17 reviewed -     ASSESSMENT AND PLAN:   1. Right shoulder OA - demonstrated range of motion exercises  2. Coronary artery disease, CHF and a fib and aortic atherosclerosis- on risk facto modification.    2. Hypertension -stable  3. Hyperlipidemia -- controlled.    4.GERD - asx. Takes ranitidine 300 mg Nightly as needed    5. WEight loss- resolved   6. Major Depression -- controlled on Wellbutrin  7. Osteoarthritis of lumbar spine -- stable.   8. Benign prostatic hypertrophy -- stable.   9. History of skin cancer -- Saw Derm   10. Thyroid nodule - thyroid ultrasound stable    11 Memory loss- intolerant of aricept. Saw Jade Chacon    12. Vitamin B12 deficiency - doing well  13. " Thrombocytopenia - stable and chronic - repeat CBC at next lab  14. Tortuous aorta - BP is stable  15. Left atrial appendage thrombus- On Eliquis  Screening -- PSA slightly elevated -. Will repeat in 3 months  . Declines colonoscopy.   I will see him back in 3 months, sooner if problems arise.

## 2017-04-19 RX ORDER — POTASSIUM CHLORIDE 750 MG/1
CAPSULE, EXTENDED RELEASE ORAL
Qty: 180 CAPSULE | Refills: 0 | Status: SHIPPED | OUTPATIENT
Start: 2017-04-19 | End: 2017-10-09 | Stop reason: SDUPTHER

## 2017-04-19 RX ORDER — OMEGA-3-ACID ETHYL ESTERS 1 G/1
CAPSULE, LIQUID FILLED ORAL
Qty: 180 CAPSULE | Refills: 0 | Status: SHIPPED | OUTPATIENT
Start: 2017-04-19 | End: 2017-07-31 | Stop reason: SDUPTHER

## 2017-04-24 ENCOUNTER — CLINICAL SUPPORT (OUTPATIENT)
Dept: AUDIOLOGY | Facility: CLINIC | Age: 82
End: 2017-04-24

## 2017-04-24 DIAGNOSIS — H90.3 SENSORINEURAL HEARING LOSS, BILATERAL: Primary | ICD-10-CM

## 2017-04-24 PROCEDURE — 99499 UNLISTED E&M SERVICE: CPT | Mod: S$GLB,,, | Performed by: OTOLARYNGOLOGY

## 2017-04-24 NOTE — PROGRESS NOTES
"Mr. Bradshaw was seen for a hearing aid follow up appointment.  I reran the feedback test since he was complaining of "beeping" in his hearing aid.  I also adjusted the high frequencies also.  I watched him place the aid in his ear and he is not getting the aid all the way in.      He will call if he has further problems.  "

## 2017-05-16 ENCOUNTER — OFFICE VISIT (OUTPATIENT)
Dept: DERMATOLOGY | Facility: CLINIC | Age: 82
End: 2017-05-16
Payer: MEDICARE

## 2017-05-16 DIAGNOSIS — L57.0 AK (ACTINIC KERATOSIS): ICD-10-CM

## 2017-05-16 DIAGNOSIS — L82.1 SK (SEBORRHEIC KERATOSIS): ICD-10-CM

## 2017-05-16 DIAGNOSIS — D48.9 NEOPLASM OF UNCERTAIN BEHAVIOR: Primary | ICD-10-CM

## 2017-05-16 DIAGNOSIS — L57.8 OTHER CHRONIC DERMATITIS DUE TO SOLAR RADIATION: ICD-10-CM

## 2017-05-16 DIAGNOSIS — L21.9 SEBORRHEIC DERMATITIS: ICD-10-CM

## 2017-05-16 DIAGNOSIS — Z85.828 PERSONAL HISTORY OF OTHER MALIGNANT NEOPLASM OF SKIN: ICD-10-CM

## 2017-05-16 PROCEDURE — 1160F RVW MEDS BY RX/DR IN RCRD: CPT | Mod: S$GLB,,, | Performed by: DERMATOLOGY

## 2017-05-16 PROCEDURE — 88305 TISSUE EXAM BY PATHOLOGIST: CPT | Performed by: PATHOLOGY

## 2017-05-16 PROCEDURE — 17003 DESTRUCT PREMALG LES 2-14: CPT | Mod: S$GLB,,, | Performed by: DERMATOLOGY

## 2017-05-16 PROCEDURE — 99213 OFFICE O/P EST LOW 20 MIN: CPT | Mod: 25,S$GLB,, | Performed by: DERMATOLOGY

## 2017-05-16 PROCEDURE — 1159F MED LIST DOCD IN RCRD: CPT | Mod: S$GLB,,, | Performed by: DERMATOLOGY

## 2017-05-16 PROCEDURE — 11100 PR BIOPSY OF SKIN LESION: CPT | Mod: 59,S$GLB,, | Performed by: DERMATOLOGY

## 2017-05-16 PROCEDURE — 17000 DESTRUCT PREMALG LESION: CPT | Mod: S$GLB,,, | Performed by: DERMATOLOGY

## 2017-05-16 PROCEDURE — 99999 PR PBB SHADOW E&M-EST. PATIENT-LVL II: CPT | Mod: PBBFAC,,, | Performed by: DERMATOLOGY

## 2017-05-16 NOTE — PROGRESS NOTES
Subjective:       Patient ID:  Jeremie Bradshaw is a 82 y.o. male who presents for   Chief Complaint   Patient presents with    Skin Check     UBSE     HPI Comments: History of Present Illness: The patient presents for follow up of skin check.    The patient was last seen on: 12/16 for cryosurgery to actinic keratoses which have resolved and excision of bcc left preauricular  This is a high risk patient here to check for the development of new lesions.      Other skin complaints: none    Using am lactin for arms and hands qohs - qhs.  Uses desonide lotion for seb derm on face qday - helping        Review of Systems   Skin: Negative for daily sunscreen use, activity-related sunscreen use, recent sunburn and wears hat.   Hematologic/Lymphatic: Does not bruise/bleed easily (asa qod).        Objective:    Physical Exam   Constitutional: He appears well-developed and well-nourished. No distress.   Neurological: He is alert and oriented to person, place, and time. He is not disoriented.   Psychiatric: He has a normal mood and affect.   Skin:   Areas Examined (abnormalities noted in diagram):   Scalp / Hair Palpated and Inspected  Head / Face Inspection Performed  Neck Inspection Performed  Chest / Axilla Inspection Performed  Abdomen Inspection Performed  Back Inspection Performed  RUE Inspected  LUE Inspection Performed  Nails and Digits Inspection Performed                       Diagram Legend     Erythematous scaling macule/papule c/w actinic keratosis       Vascular papule c/w angioma      Pigmented verrucoid papule/plaque c/w seborrheic keratosis      Yellow umbilicated papule c/w sebaceous hyperplasia      Irregularly shaped tan macule c/w lentigo     1-2 mm smooth white papules consistent with Milia      Movable subcutaneous cyst with punctum c/w epidermal inclusion cyst      Subcutaneous movable cyst c/w pilar cyst      Firm pink to brown papule c/w dermatofibroma      Pedunculated fleshy papule(s) c/w skin  tag(s)      Evenly pigmented macule c/w junctional nevus     Mildly variegated pigmented, slightly irregular-bordered macule c/w mildly atypical nevus      Flesh colored to evenly pigmented papule c/w intradermal nevus       Pink pearly papule/plaque c/w basal cell carcinoma      Erythematous hyperkeratotic cursted plaque c/w SCC      Surgical scar with no sign of skin cancer recurrence      Open and closed comedones      Inflammatory papules and pustules      Verrucoid papule consistent consistent with wart     Erythematous eczematous patches and plaques     Dystrophic onycholytic nail with subungual debris c/w onychomycosis     Umbilicated papule    Erythematous-base heme-crusted tan verrucoid plaque consistent with inflamed seborrheic keratosis     Erythematous Silvery Scaling Plaque c/w Psoriasis     See annotation          Assessment / Plan:      Pathology Orders:      Normal Orders This Visit    Tissue Specimen To Pathology, Dermatology     Questions:    Directional Terms:  Other(comment)    Clinical information:  r/o bcc    Specific Site:  right post ear        Neoplasm of uncertain behavior  -     Tissue Specimen To Pathology, Dermatology  Shave biopsy procedure note:    Shave biopsy performed after verbal consent including risk of infection, scar, recurrence, need for additional treatment of site. Area prepped with alcohol, anesthetized with approximately 1.0cc of 1% lidocaine with epinephrine. Lesional tissue shaved with razor blade. Hemostasis achieved with application of aluminum chloride followed by hyfrecation. No complications. Dressing applied. Wound care explained.    If biopsy positive for malignancy, refer to Dr. Ospina for Mohs surgery consultation.      AK (actinic keratosis)  Cryosurgery Procedure Note    Verbal consent from the patient is obtained and the patient is aware of the precancerous quality and need for treatment of these lesions. Liquid nitrogen cryosurgery is applied to the 4 actinic  keratoses, as detailed in the physical exam, to produce a freeze injury. The patient is aware that blisters may form and is instructed on wound care with gentle cleansing and use of vaseline ointment to keep moist until healed. The patient is supplied a handout on cryosurgery and is instructed to call if lesions do not completely resolve.    Wear hat!    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 Available over-the counter.    SK (seborrheic keratosis)   - stable and chronic      Seborrheic dermatitis - improved  Cont desonide as needed    Personal history of other malignant neoplasm of skin  Area(s) of previous NMSC evaluated with no signs of recurrence.    Upper body skin examination performed today including at least 6 points as noted in physical examination. Suspicious lesions noted.             Return in about 6 months (around 11/16/2017).

## 2017-05-16 NOTE — PATIENT INSTRUCTIONS
Shave Biopsy Wound Care    Your doctor has performed a shave biopsy today.  A band aid and vaseline ointment has been placed over the site.  This should remain in place for 24 hours.  It is recommended that you keep the area dry for the first 24 hours.  After 24 hours, you may remove the band aid and wash the area with warm soap and water and apply Vaseline jelly.  Many patients prefer to use Neosporin or Bacitracin ointment.  This is acceptable; however, know that you can develop an allergy to this medication even if you have used it safely for years.  It is important to keep the area moist.  Letting it dry out and get air slows healing time, and will worsen the scar.  Band aid is optional after first 24 hours.      If you notice increasing redness, tenderness, pain, or yellow drainage at the biopsy site, please notify your doctor.  These are signs of an infection.    If your biopsy site is bleeding, apply firm pressure for 15 minutes straight.  Repeat for another 15 minutes, if it is still bleeding.   If the surgical site continues to bleed, then please contact your doctor.      [facility] [place] [address]/ [phone] FAX/ [email or web address]      CRYOSURGERY      Your doctor has used a method called cryosurgery to treat your skin condition. Cryosurgery refers to the use of very cold substances to treat a variety of skin conditions such as warts, pre-skin cancers, molluscum contagiosum, sun spots, and several benign growths. The substance we use in cryosurgery is liquid nitrogen and is so cold (-195 degrees Celsius) that is burns when administered.     Following treatment in the office, the skin may immediately burn and become red. You may find the area around the lesion is affected as well. It is sometimes necessary to treat not only the lesion, but a small area of the surrounding normal skin to achieve a good response.     A blister, and even a blood filled blister, may form  after treatment.   This is a normal response. If the blister is painful, it is acceptable to sterilize a needle and with rubbing alcohol and gently pop the blister. It is important that you gently wash the area with soap and warm water as the blister fluid may contain wart virus if a wart was treated. Do no remove the roof of the blister.     The area treated can take anywhere from 1-3 weeks to heal. Healing time depends on the kind skin lesion treated, the location, and how aggressively the lesion was treated. It is recommended that the areas treated are covered with Vaseline or bacitracin ointment and a band-aid. If a band-aid is not practical, just ointment applied several times per day will do. Keeping these areas moist will speed the healing time.    Treatment with liquid nitrogen can leave a scar. In dark skin, it may be a light or dark scar, in light skin it may be a white or pink scar. These will generally fade with time, but may never go away completely.     If you have any concerns after your treatment, please feel free to call the office.       [facility] [place] [address]/ [phone] FAX/ [email or web address]

## 2017-05-22 RX ORDER — ATORVASTATIN CALCIUM 40 MG/1
TABLET, FILM COATED ORAL
Qty: 90 TABLET | Refills: 0 | Status: SHIPPED | OUTPATIENT
Start: 2017-05-22 | End: 2017-08-16 | Stop reason: SDUPTHER

## 2017-05-30 ENCOUNTER — OFFICE VISIT (OUTPATIENT)
Dept: PSYCHIATRY | Facility: CLINIC | Age: 82
End: 2017-05-30
Payer: MEDICARE

## 2017-05-30 VITALS
WEIGHT: 178 LBS | BODY MASS INDEX: 26.98 KG/M2 | HEIGHT: 68 IN | DIASTOLIC BLOOD PRESSURE: 78 MMHG | HEART RATE: 81 BPM | SYSTOLIC BLOOD PRESSURE: 134 MMHG

## 2017-05-30 DIAGNOSIS — F33.41 RECURRENT MAJOR DEPRESSION IN PARTIAL REMISSION: Primary | ICD-10-CM

## 2017-05-30 PROCEDURE — 1159F MED LIST DOCD IN RCRD: CPT | Mod: S$GLB,,, | Performed by: PSYCHIATRY & NEUROLOGY

## 2017-05-30 PROCEDURE — 99999 PR PBB SHADOW E&M-EST. PATIENT-LVL II: CPT | Mod: PBBFAC,,, | Performed by: PSYCHIATRY & NEUROLOGY

## 2017-05-30 PROCEDURE — 99214 OFFICE O/P EST MOD 30 MIN: CPT | Mod: S$GLB,,, | Performed by: PSYCHIATRY & NEUROLOGY

## 2017-05-30 RX ORDER — BUPROPION HYDROCHLORIDE 300 MG/1
TABLET ORAL
Qty: 90 TABLET | Refills: 3 | Status: SHIPPED | OUTPATIENT
Start: 2017-05-30 | End: 2018-06-01 | Stop reason: SDUPTHER

## 2017-05-30 NOTE — PROGRESS NOTES
"Outpatient Psychiatry Follow-Up Visit (MD/NP)    5/30/2017    Clinical Status of Patient:  Outpatient (Ambulatory)    Chief Complaint:  Jeremie Bradshaw is a 82 y.o. male who presents today for follow-up of depression.  Met with patient.      Interval History and Content of Current Session:  Interim Events/Subjective Report/Content of Current Session: He has done fairly well during the past year since his last visit.  He baby sits his grandchildren and watches sports.  He gets depressed "once in a while."  No complaints about Wellbutrin, no tremor or chest pain.    Psychotherapy:  · Target symptoms: depression  · Why chosen therapy is appropriate versus another modality: relevant to diagnosis  · Outcome monitoring methods: self-report, observation  · Therapeutic intervention type: supportive psychotherapy  · Topics discussed/themes: symptom recognition  · The patient's response to the intervention is accepting. The patient's progress toward treatment goals is good.   · Duration of intervention: 10 minutes.    Review of Systems   · PSYCHIATRIC: Pertinant items are noted in the narrative.    Past Medical, Family and Social History: The patient's past medical, family and social history have been reviewed and updated as appropriate within the electronic medical record - see encounter notes.    Compliance: yes    Side effects: None    Risk Parameters:  Patient reports no suicidal ideation  Patient reports no homicidal ideation  Patient reports no self-injurious behavior  Patient reports no violent behavior    Exam (detailed: at least 9 elements; comprehensive: all 15 elements)   Constitutional  Vitals:  Most recent vital signs, dated less than 90 days prior to this appointment, were reviewed.   Vitals:    05/30/17 0905   BP: 134/78   Pulse: 81   Weight: 80.7 kg (178 lb)   Height: 5' 8" (1.727 m)        General:  unremarkable, age appropriate     Musculoskeletal  Muscle Strength/Tone:  no tremor   Gait & Station:  " non-ataxic     Psychiatric  Speech:  no latency; no press   Mood & Affect:  euthymic  congruent and appropriate   Thought Process:  normal and logical   Associations:  intact   Thought Content:  normal, no suicidality, no homicidality, delusions, or paranoia   Insight:  intact   Judgement: behavior is adequate to circumstances   Orientation:  grossly intact   Memory: intact for content of interview   Language: grossly intact   Attention Span & Concentration:  able to focus   Fund of Knowledge:  intact and appropriate to age and level of education     Assessment and Diagnosis   Status/Progress: Based on the examination today, the patient's problem(s) is/are well controlled.  New problems have not been presented today.   Co-morbidities are not complicating management of the primary condition.  There are no active rule-out diagnoses for this patient at this time.     General Impression: Doing well    No diagnosis found.    Intervention/Counseling/Treatment Plan   · Medication Management: Continue current medications. The risks and benefits of medication were discussed with the patient.      Return to Clinic: 1 year

## 2017-06-14 ENCOUNTER — TELEPHONE (OUTPATIENT)
Dept: DERMATOLOGY | Facility: CLINIC | Age: 82
End: 2017-06-14

## 2017-06-14 ENCOUNTER — PROCEDURE VISIT (OUTPATIENT)
Dept: DERMATOLOGY | Facility: CLINIC | Age: 82
End: 2017-06-14
Payer: MEDICARE

## 2017-06-14 VITALS
WEIGHT: 178 LBS | SYSTOLIC BLOOD PRESSURE: 163 MMHG | DIASTOLIC BLOOD PRESSURE: 95 MMHG | BODY MASS INDEX: 26.98 KG/M2 | HEART RATE: 78 BPM | HEIGHT: 68 IN

## 2017-06-14 DIAGNOSIS — C44.212 BASAL CELL CARCINOMA, EAR, RIGHT: Primary | ICD-10-CM

## 2017-06-14 PROCEDURE — 17312 MOHS ADDL STAGE: CPT | Mod: S$GLB,,, | Performed by: DERMATOLOGY

## 2017-06-14 PROCEDURE — 17311 MOHS 1 STAGE H/N/HF/G: CPT | Mod: S$GLB,,, | Performed by: DERMATOLOGY

## 2017-06-14 PROCEDURE — 99499 UNLISTED E&M SERVICE: CPT | Mod: S$GLB,,, | Performed by: DERMATOLOGY

## 2017-06-14 RX ORDER — CEPHALEXIN 500 MG/1
500 CAPSULE ORAL 3 TIMES DAILY
Qty: 21 CAPSULE | Refills: 0 | Status: SHIPPED | OUTPATIENT
Start: 2017-06-14 | End: 2017-06-21

## 2017-06-14 RX ORDER — OXYCODONE AND ACETAMINOPHEN 5; 325 MG/1; MG/1
1 TABLET ORAL
Qty: 20 TABLET | Refills: 0 | Status: SHIPPED | OUTPATIENT
Start: 2017-06-14 | End: 2017-10-02

## 2017-06-14 NOTE — TELEPHONE ENCOUNTER
----- Message from Uma Beck sent at 6/14/2017 12:50 PM CDT -----  Contact: [facility] Haley Shaver- Haley is calling to speak with the nurse they need clarification on wound care they need to know what to do ect. Can you please call Haley at [phone] YD

## 2017-06-14 NOTE — TELEPHONE ENCOUNTER
Spoke to Haley from Home Health dept to inform her I am faxing wound care instructions for Jeremie Bradshaw.  Confirmation email received 6/14.

## 2017-06-14 NOTE — PROGRESS NOTES
PROCEDURE: Mohs' Micrographic Surgery    INDICATION: Location in mask areas of face including central face, nose, eyelids, eyebrows, lips, chin, preauricular, temple, and ear. Size > 1.0 cm on face. Biopsy-proven skin cancer of cosmetically and functionally important areas, including head, neck, genital, hand, foot, or areas known for having difficulty in healing, such as the lower anterior legs. Tumors with aggressive clinical behavior (rapidly growing, greater than 1 cm in diameter). Tumor with ill-defined borders.    REFERRING MD: Antionette Torres M.D.    CASE NUMBER:     ANESTHETIC: 7.5 cc 0.5% Lidocaine with Epi 1:200,000 mixed 1:1 with 0.5% Bupivacaine    SURGICAL PREP: Betadine    SURGEON: Tram Ospina MD    ASSISTANTS: Alyse Wilde PA-C and Shea Grewal, Surg Tech    PREOPERATIVE DIAGNOSIS: basal cell carcinoma    POSTOPERATIVE DIAGNOSIS: basal cell carcinoma    PATHOLOGIC DIAGNOSIS: basal cell carcinoma- nodular, superficial    HISTOLOGY OF SPECIMENS IN FIRST STAGE:   Tumor Type: Tumor seen. Superficial basal cell carcinoma: Foci of basaloid cells with peripheral palisading and focal retraction artifact arising along the dermoepidermal junction and extending into the papillary dermis.  Nodular basal cell carcinoma: Nodular tumor in dermis composed of basaloid cells exhibiting peripheral palisading and retraction artifact.   Depth of Invasion: epidermis, dermis and subcutaneous tissue  Perineural Invasion: No    HISTOLOGY OF SPECIMENS IN SUBSEQUENT STAGES:  Tumor Type: Tumor seen. Same as in first stage.   Depth of Invasion: epidermis, dermis, subcutaneous tissue and cartilage  · Perineural Invasion: No    STAGES OF MOHS' SURGERY PERFORMED: 4    TUMOR-FREE PLANE ACHIEVED: Yes - with removal of underlying cartilage    HEMOSTASIS: electrocoagulation     SPECIMENS: 11 (2 in stage A, 4 in stage B, 3 in stage C and 2 in stage D)    LOCATION: right posterior ear (R superior posterior ear surface). Patient  "and pt's daughter verified location.    INITIAL LESION SIZE: 1.0 x 1.5 cm    FINAL DEFECT SIZE: 2.0 x 3.6 cm    WOUND REPAIR/DISPOSITION: When the tumor was completely removed, repair options were discussed with the patient and a full thickness skin graft was recommended.  However, pt was tired after being here all morning , and it was decided by the patient to let the wound heal by second intention. The patient tolerated the procedure well and will consider delayed reconstruction or repair if necessary.  Offered rescheduling him to return for a full thickness skin graft on another day but patient refused.  Will send order to Home Health to help with bandage changes.     Gelfoam padding placed in wound area.    The area was cleaned and dressed appropriately, and the patient was given wound care instructions, as well as an appointment for follow-up evaluation. Patient was placed on Percocet 5 prn postop pain and Keflex 500 mg TID x 7 days.    Vitals:    06/14/17 0756 06/14/17 1220   BP: (!) 158/86 (!) 163/95   BP Location: Right arm Left arm   Patient Position: Sitting Sitting   BP Method: Automatic Automatic   Pulse: 77 78   Weight: 80.7 kg (178 lb)    Height: 5' 8" (1.727 m)             NOTE: Pt's daughter called at 4 pm today stating the right ear wound had started bleeding.  Pt returned to clinic this afternoon.  (+) bleeding from skin edges. Pt on aspirin and eliquis.  Etoh prep, 1% lidocaine plain no epi, 5 cc, electrocoagulation of all skin edges.  Replaced the gelfoam to skin edges.  Thicker pressure bandage applied with ace wrap around head x 2 days  Advised pt to stay off eliquis tonight and tomorrow.   Kathy cup bandage for extra protection at night.  Call if bleeding resumes.  "

## 2017-06-14 NOTE — TELEPHONE ENCOUNTER
----- Message from Uma Beck sent at 6/14/2017 12:50 PM CDT -----  Contact: [facility] Haley Shaver- Haley is calling to speak with the nurse they need clarification on wound care they need to know what to do ect. Can you please call Haley at [phone] ZT

## 2017-06-15 ENCOUNTER — TELEPHONE (OUTPATIENT)
Dept: DERMATOLOGY | Facility: CLINIC | Age: 82
End: 2017-06-15

## 2017-06-15 NOTE — TELEPHONE ENCOUNTER
----- Message from Kiha Boone sent at 6/15/2017 11:27 AM CDT -----  Contact: [facility]-lachelle  [phone]-please call home health on above patient has questions about orders for wound care waiting on a call from the nurse the person to speak with is lachelle

## 2017-06-15 NOTE — TELEPHONE ENCOUNTER
Spoke with Marilyn and clarified with her the wound care instructions for patients ear, 2nd intention healing without sutures, clean with vinegar solution and to change bandage every other day. She expressed verbal understanding.

## 2017-07-13 ENCOUNTER — OFFICE VISIT (OUTPATIENT)
Dept: DERMATOLOGY | Facility: CLINIC | Age: 82
End: 2017-07-13
Payer: MEDICARE

## 2017-07-13 ENCOUNTER — TELEPHONE (OUTPATIENT)
Dept: DERMATOLOGY | Facility: CLINIC | Age: 82
End: 2017-07-13

## 2017-07-13 DIAGNOSIS — C44.212 BASAL CELL CARCINOMA, EAR, RIGHT: Primary | ICD-10-CM

## 2017-07-13 PROCEDURE — 1159F MED LIST DOCD IN RCRD: CPT | Mod: S$GLB,,, | Performed by: DERMATOLOGY

## 2017-07-13 PROCEDURE — 99999 PR PBB SHADOW E&M-EST. PATIENT-LVL I: CPT | Mod: PBBFAC,,, | Performed by: DERMATOLOGY

## 2017-07-13 PROCEDURE — 99212 OFFICE O/P EST SF 10 MIN: CPT | Mod: S$GLB,,, | Performed by: DERMATOLOGY

## 2017-07-13 NOTE — PROGRESS NOTES
83 y.o. male patient is here for wound check after surgery.    Patient reports no problems. Home health doing bandage changes    WOUND PE:  The right posterior ear wound now with hypergranulation tissue. No signs of infection.    IMPRESSION:  Healing operative site from Mohs' surgery, BCC right posterior ear s/p Mohs with 2nd  Intention healing, postop week # 4, now with proud flesh.    PLAN:  Applied silver nitrate for hypergranulation tissue  Continue wound care with Aquaphor, telfa and gauze.  Pressure bandage today for 2 days.  If proud sarina regrows, advised to call back and have him seen sooner as we may need to shave it off at that point.    RTC:  In 1 month.

## 2017-07-13 NOTE — TELEPHONE ENCOUNTER
Spoke to Savi from [facility] to inform her they could see patient on 7/15 instead of 7/14 this week for wound care on right posterior ear 2nd intention.

## 2017-07-13 NOTE — TELEPHONE ENCOUNTER
----- Message from Unruly Aguirre sent at 7/13/2017  3:08 PM CDT -----  Contact: [phone]  Please follow up with Savi at [facility] in regard obtaining an order for the pt skipping care visit on Friday 7/14 to be seen on Saturday 7/15, then resuming the regular visit schedule next week.

## 2017-07-14 ENCOUNTER — OFFICE VISIT (OUTPATIENT)
Dept: INTERNAL MEDICINE | Facility: CLINIC | Age: 82
End: 2017-07-14
Payer: MEDICARE

## 2017-07-14 VITALS
SYSTOLIC BLOOD PRESSURE: 109 MMHG | BODY MASS INDEX: 27.23 KG/M2 | DIASTOLIC BLOOD PRESSURE: 60 MMHG | HEIGHT: 68 IN | WEIGHT: 179.69 LBS | HEART RATE: 66 BPM

## 2017-07-14 DIAGNOSIS — E78.5 HYPERLIPIDEMIA, UNSPECIFIED HYPERLIPIDEMIA TYPE: ICD-10-CM

## 2017-07-14 DIAGNOSIS — R97.20 ELEVATED PSA: ICD-10-CM

## 2017-07-14 DIAGNOSIS — I25.83 CORONARY ARTERY DISEASE DUE TO LIPID RICH PLAQUE: ICD-10-CM

## 2017-07-14 DIAGNOSIS — K21.9 GASTROESOPHAGEAL REFLUX DISEASE WITHOUT ESOPHAGITIS: ICD-10-CM

## 2017-07-14 DIAGNOSIS — I10 ESSENTIAL HYPERTENSION: Primary | ICD-10-CM

## 2017-07-14 DIAGNOSIS — I25.10 CORONARY ARTERY DISEASE DUE TO LIPID RICH PLAQUE: ICD-10-CM

## 2017-07-14 DIAGNOSIS — E53.8 VITAMIN B12 DEFICIENCY: ICD-10-CM

## 2017-07-14 PROCEDURE — 96372 THER/PROPH/DIAG INJ SC/IM: CPT | Mod: S$GLB,,, | Performed by: INTERNAL MEDICINE

## 2017-07-14 PROCEDURE — 99214 OFFICE O/P EST MOD 30 MIN: CPT | Mod: 25,S$GLB,, | Performed by: INTERNAL MEDICINE

## 2017-07-14 PROCEDURE — 1159F MED LIST DOCD IN RCRD: CPT | Mod: S$GLB,,, | Performed by: INTERNAL MEDICINE

## 2017-07-14 PROCEDURE — 1126F AMNT PAIN NOTED NONE PRSNT: CPT | Mod: S$GLB,,, | Performed by: INTERNAL MEDICINE

## 2017-07-14 PROCEDURE — 99999 PR PBB SHADOW E&M-EST. PATIENT-LVL III: CPT | Mod: PBBFAC,,, | Performed by: INTERNAL MEDICINE

## 2017-07-14 PROCEDURE — 99499 UNLISTED E&M SERVICE: CPT | Mod: S$GLB,,, | Performed by: INTERNAL MEDICINE

## 2017-07-14 RX ORDER — CYANOCOBALAMIN 1000 UG/ML
1000 INJECTION, SOLUTION INTRAMUSCULAR; SUBCUTANEOUS
Status: COMPLETED | OUTPATIENT
Start: 2017-07-14 | End: 2017-07-14

## 2017-07-14 RX ADMIN — CYANOCOBALAMIN 1000 MCG: 1000 INJECTION, SOLUTION INTRAMUSCULAR; SUBCUTANEOUS at 10:07

## 2017-07-14 NOTE — PROGRESS NOTES
CHIEF COMPLAINT: Follow up of Right shoulder pain, fativue, hypertension, a fib, mood.    HISTORY OF PRESENT ILLNESS: This is a 83-year-old man who presents with his daughter for follow up of above. He had a basal cell carcinoma removed from this right ear in June. The wound is healing slowly. He saw dermatology yesterday and some proud flesh was removed.    Right shoulder is doing well.  HE is working on his exercises for range of motion.      He is on Eliquis 5 mg twice daily, lisinopril 20 mg dialy, furosemide 20 mg daily, potassium chloride 10 meq 2 tablets every other day and coreg 3.125 mg one tablet twice daily. No chest pain, shortness of breath, palpitations. Fatigue is better with lowering the coreg.       Appetite has been good. He is drinking a boost or ensure daily. No fever, chills, nausea, vomiting, constipation, diarrhea.       He continues to take Wellbutrin  mg daily. Memory has not worsened. He has some mild memory loss. Mood has been down a little as the anniversary of his wife's death is at the end of May. He continues to take atorvastatin 40 mg 1 tablet daily and Lovaza 1 g daily for her hyperlipidemia. He denies any neck pain from his history of cervical spine fracture.      He has right sided back pain if he sits too long. Back pain improves with moving around.       Past Medical History:   1. Coronary artery disease status post CABG x1 in 1993, and STEMI 01/2010, status post stenting, refused cardiac rehab, followed by Dr. Aguilar.   2. Congestive heart failure. Ejection fraction was 30% Jan 2012  3. History of atrial fibrillation -- EKG Jan 2012 - sinus rhythm  4. Hyperlipidemia  5. Hypertension.   6. Osteoarthritis of the lumbar spine -- Had a flare in 2006 and had MRI at that time.   7. History of nephrolithiasis in 2005.   8. History of left thalamic stroke in the early 2000s per Dr. Matos's notes.   9. Depression -- followed by Dr. Bingham .  10. BPH with obstruction, had a  "cystoscope in 2010. Saw Urology 2011.  11. Right rotator cuff tear with arthropathy. He has pain from time to time. Saw Dr. Narvaez for an injection 2011.     PAST SURGICAL HISTORY:   1. CABG x1 in .   2. Bilateral cataract surgery, right 2005, left .   3. Excision of basal cell carcinoma of the left ear in .     SOCIAL HISTORY: He has never smoked, drinks alcohol once a month. . Has three children. Retired from the [employer].     FAMILY HISTORY: Mother  in her 60s of heart trouble. Father  in his 50s of some sort of abdominal malignancy. He has a half brother, which he is not sure of his health issues.     Screening PSA was 1016. Declines colonoscopy.     PHYSICAL EXAMINATION:  /60 (BP Location: Left arm, Patient Position: Sitting, BP Method: Manual)   Pulse 66   Ht 5' 8" (1.727 m)   Wt 81.5 kg (179 lb 10.8 oz)   BMI 27.32 kg/m²       GENERAL: He is alert, oriented, no apparent distress. Affect within normal limits.   HEENT: Conjunctivae anicteric. Pupils are equal, round and reactive to   light. TM clear  Neck supple. Oropharynx -clear  CHEST: Respiratory effort normal. Lungs clear. Rales right base.    HEART: Regular rate and rhythm without murmurs, gallops or rubs. No lowerextremity edema.           ASSESSMENT AND PLAN:   1. Right shoulder OA -better  2. Coronary artery disease, CHF and a fib and aortic atherosclerosis- on risk facto modification.    2. Hypertension -stable  3. Hyperlipidemia -- controlled.    4.GERD - asx. Takes ranitidine 300 mg Nightly as needed    5. WEight loss- resolved   6. Major Depression -- controlled on Wellbutrin  7. Osteoarthritis of lumbar spine -- stable.   8. Benign prostatic hypertrophy -- stable.   9. History of skin cancer -- Saw Derm   10. Thyroid nodule - thyroid ultrasound stable    11 Memory loss- intolerant of aricept. Saw Jade Chacon    12. Vitamin B12 deficiency - injection today  13. Thrombocytopenia - " stable and chronic - repeat CBC at next lab  14. Tortuous aorta - BP is stable  15. Left atrial appendage thrombus- On Eliquis  Screening -- PSA slightly elevated -. Will repeat in 3 months  . Declines colonoscopy.   I will see him back in 3 months, sooner if problems arise.        Answers for HPI/ROS submitted by the patient on 7/12/2017   activity change: No  unexpected weight change: No  neck pain: No  hearing loss: No  rhinorrhea: No  trouble swallowing: No  eye discharge: No  visual disturbance: No  chest tightness: No  wheezing: No  chest pain: No  palpitations: No  blood in stool: No  constipation: No  vomiting: No  diarrhea: No  polydipsia: No  polyuria: No  difficulty urinating: No  urgency: No  hematuria: No  joint swelling: No  arthralgias: No  headaches: No  weakness: No  confusion: No  dysphoric mood: No

## 2017-08-01 RX ORDER — OMEGA-3-ACID ETHYL ESTERS 1 G/1
CAPSULE, LIQUID FILLED ORAL
Qty: 180 CAPSULE | Refills: 0 | Status: SHIPPED | OUTPATIENT
Start: 2017-08-01 | End: 2017-10-30 | Stop reason: SDUPTHER

## 2017-08-03 ENCOUNTER — PATIENT MESSAGE (OUTPATIENT)
Dept: DERMATOLOGY | Facility: CLINIC | Age: 82
End: 2017-08-03

## 2017-08-03 ENCOUNTER — PATIENT MESSAGE (OUTPATIENT)
Dept: CARDIOLOGY | Facility: CLINIC | Age: 82
End: 2017-08-03

## 2017-08-15 ENCOUNTER — OFFICE VISIT (OUTPATIENT)
Dept: DERMATOLOGY | Facility: CLINIC | Age: 82
End: 2017-08-15
Payer: MEDICARE

## 2017-08-15 DIAGNOSIS — C44.212 BASAL CELL CARCINOMA, EAR, RIGHT: Primary | ICD-10-CM

## 2017-08-15 PROCEDURE — 3008F BODY MASS INDEX DOCD: CPT | Mod: S$GLB,,, | Performed by: DERMATOLOGY

## 2017-08-15 PROCEDURE — 1159F MED LIST DOCD IN RCRD: CPT | Mod: S$GLB,,, | Performed by: DERMATOLOGY

## 2017-08-15 PROCEDURE — 99999 PR PBB SHADOW E&M-EST. PATIENT-LVL II: CPT | Mod: PBBFAC,,, | Performed by: DERMATOLOGY

## 2017-08-15 PROCEDURE — 1126F AMNT PAIN NOTED NONE PRSNT: CPT | Mod: S$GLB,,, | Performed by: DERMATOLOGY

## 2017-08-15 PROCEDURE — 99212 OFFICE O/P EST SF 10 MIN: CPT | Mod: S$GLB,,, | Performed by: DERMATOLOGY

## 2017-08-15 NOTE — PROGRESS NOTES
83 y.o. male patient is here for wound check after surgery.    Patient's daughter reports the shiny red tissue started coming back this weekend. Getting frustrated b/c he cannot wear his hearing aid (goes over back of ear).     WOUND PE:  The right posterior ear wound again has hypergranulation tissue. No signs of infection     IMPRESSION:  Healing operative site from Mohs' surgery BCC, right posterior ear s/p Mohs with 2nd intention healing, postop week # 9, now with persistent proud flesh despite silver nitrate application at last visit.    PLAN:  Disc options of shave removal of proud flesh vs topical silver nitrate application again today. Rec shave removal since silver nitrate did not do much last time.   Shave removal of hypergranulation tissue after verbal consent.  Etoh prep, 0.5% lido with epi 1:200,000 2 cc, shave with razor blade and 15 blade down to healthy tissue/cartilage, light curettage of wound edges, hyfrecation, Alcl, pressure bandage.  Continue wound care. Keep moist and covered with Aquaphor.  Will recheck in 2 weeks and consider continuing with 2nd intention or pursuing full thickness skin graft.  Pressure bandage x 2 days, then smaller one and okay to put hearing aid on top of bandage.     RTC:  In 2 weeks.

## 2017-08-17 RX ORDER — ATORVASTATIN CALCIUM 40 MG/1
TABLET, FILM COATED ORAL
Qty: 90 TABLET | Refills: 0 | Status: SHIPPED | OUTPATIENT
Start: 2017-08-17 | End: 2017-11-03 | Stop reason: SDUPTHER

## 2017-08-31 ENCOUNTER — OFFICE VISIT (OUTPATIENT)
Dept: DERMATOLOGY | Facility: CLINIC | Age: 82
End: 2017-08-31
Payer: MEDICARE

## 2017-08-31 DIAGNOSIS — C44.212 BASAL CELL CARCINOMA, EAR, RIGHT: Primary | ICD-10-CM

## 2017-08-31 PROCEDURE — 99999 PR PBB SHADOW E&M-EST. PATIENT-LVL II: CPT | Mod: PBBFAC,,, | Performed by: DERMATOLOGY

## 2017-08-31 PROCEDURE — 3008F BODY MASS INDEX DOCD: CPT | Mod: S$GLB,,, | Performed by: DERMATOLOGY

## 2017-08-31 PROCEDURE — 1159F MED LIST DOCD IN RCRD: CPT | Mod: S$GLB,,, | Performed by: DERMATOLOGY

## 2017-08-31 PROCEDURE — 1126F AMNT PAIN NOTED NONE PRSNT: CPT | Mod: S$GLB,,, | Performed by: DERMATOLOGY

## 2017-08-31 PROCEDURE — 99212 OFFICE O/P EST SF 10 MIN: CPT | Mod: S$GLB,,, | Performed by: DERMATOLOGY

## 2017-08-31 NOTE — PROGRESS NOTES
83 y.o. male patient is here for wound check after surgery.    Patient reports no problems.    WOUND PE:  The right posterior ear wound with 50% healing. Good granulation tissue. No evidence of hypergranulation tissue/proud flesh at this time.    IMPRESSION:  Healing operative site from Mohs' surgery, BCC right posterior ear s/p Mohs with 2nd  Intention healing, postop week # 11 s/p shave removal of proud flesh,.    PLAN:  Continue wound care.  Keep moist with Aquaphor.   Disc continuation of 2nd intention or doing FTSG.  Pt declined skin graft and will keep doing wound care for now.  If proud flesh returns, call and let us know.  Otherwise, recheck in 1 month.  Pt and daughter agreed with current management.    RTC:  In 1 month.

## 2017-09-05 ENCOUNTER — PATIENT MESSAGE (OUTPATIENT)
Dept: CARDIOLOGY | Facility: CLINIC | Age: 82
End: 2017-09-05

## 2017-09-07 RX ORDER — APIXABAN 5 MG/1
TABLET, FILM COATED ORAL
Qty: 180 TABLET | Refills: 0 | Status: SHIPPED | OUTPATIENT
Start: 2017-09-07 | End: 2017-11-03 | Stop reason: SDUPTHER

## 2017-09-14 ENCOUNTER — PATIENT MESSAGE (OUTPATIENT)
Dept: DERMATOLOGY | Facility: CLINIC | Age: 82
End: 2017-09-14

## 2017-09-18 ENCOUNTER — OFFICE VISIT (OUTPATIENT)
Dept: DERMATOLOGY | Facility: CLINIC | Age: 82
End: 2017-09-18
Payer: MEDICARE

## 2017-09-18 DIAGNOSIS — C44.212 BASAL CELL CARCINOMA, EAR, RIGHT: Primary | ICD-10-CM

## 2017-09-18 PROCEDURE — 99212 OFFICE O/P EST SF 10 MIN: CPT | Mod: S$GLB,,, | Performed by: DERMATOLOGY

## 2017-09-18 PROCEDURE — 1159F MED LIST DOCD IN RCRD: CPT | Mod: S$GLB,,, | Performed by: DERMATOLOGY

## 2017-09-18 PROCEDURE — 1126F AMNT PAIN NOTED NONE PRSNT: CPT | Mod: S$GLB,,, | Performed by: DERMATOLOGY

## 2017-09-18 PROCEDURE — 99999 PR PBB SHADOW E&M-EST. PATIENT-LVL II: CPT | Mod: PBBFAC,,, | Performed by: DERMATOLOGY

## 2017-09-18 PROCEDURE — 3008F BODY MASS INDEX DOCD: CPT | Mod: S$GLB,,, | Performed by: DERMATOLOGY

## 2017-09-18 NOTE — PROGRESS NOTES
83 y.o. male patient is here for wound check after surgery.    Patient reports no problems..    WOUND PE:  Right posterior ear with 70% re-epithelialization. Small 2 mm through and through hole at superolateral edge. No signs of infection      IMPRESSION:  Healing operative site from Mohs' surgery BCC right posterior ear s/p Mohs with 2nd intention healing, postop week # 14, now with some breakdown causing full thickness tear.    PLAN:  Hesitant to manipulate at this time given tissue fragility and prior h/o hypergranulation tissue.  Hold off on re-suturing or repairing at this time.  Will re-assess once fully healed.   Continue wound care. Daily SPF.  Regular skin checks.   Call if tear worsens.    RTC:  In 2 weeks.

## 2017-10-02 ENCOUNTER — OFFICE VISIT (OUTPATIENT)
Dept: DERMATOLOGY | Facility: CLINIC | Age: 82
End: 2017-10-02
Payer: MEDICARE

## 2017-10-02 DIAGNOSIS — C44.219 BASAL CELL CARCINOMA OF SKIN OF LEFT EAR: Primary | ICD-10-CM

## 2017-10-02 PROCEDURE — 1126F AMNT PAIN NOTED NONE PRSNT: CPT | Mod: S$GLB,,, | Performed by: DERMATOLOGY

## 2017-10-02 PROCEDURE — 3008F BODY MASS INDEX DOCD: CPT | Mod: S$GLB,,, | Performed by: DERMATOLOGY

## 2017-10-02 PROCEDURE — 99212 OFFICE O/P EST SF 10 MIN: CPT | Mod: S$GLB,,, | Performed by: DERMATOLOGY

## 2017-10-02 PROCEDURE — 99999 PR PBB SHADOW E&M-EST. PATIENT-LVL II: CPT | Mod: PBBFAC,,, | Performed by: DERMATOLOGY

## 2017-10-02 PROCEDURE — 1159F MED LIST DOCD IN RCRD: CPT | Mod: S$GLB,,, | Performed by: DERMATOLOGY

## 2017-10-02 NOTE — PROGRESS NOTES
83 y.o. male patient is here for wound check after surgery.    Patient reports no problems.    WOUND PE:  Right posterior ear with 75% healing. (+) small area of recurrent hypergranulation tissue laterally, adjacent to tissue breakdown and full thickness tear inferior to helical rim. Full thickness tear slightly larger than at last visit.         IMPRESSION:  Healing operative site from Mohs' surgery BCC, right posterior ear with 2nd intention healing, postop week # 16, now with proud flesh.    PLAN:    Applied Silver Nitrate for hypergranulation tissue.  Continue wound care. Keep moist with aquaphor.  Disc difficulty in grafting now given adjacent tear due to difficulty securing it.    RTC:  In 2 weeks.

## 2017-10-10 RX ORDER — POTASSIUM CHLORIDE 750 MG/1
CAPSULE, EXTENDED RELEASE ORAL
Qty: 180 CAPSULE | Refills: 0 | Status: SHIPPED | OUTPATIENT
Start: 2017-10-10 | End: 2017-11-03 | Stop reason: SDUPTHER

## 2017-10-17 ENCOUNTER — OFFICE VISIT (OUTPATIENT)
Dept: DERMATOLOGY | Facility: CLINIC | Age: 82
End: 2017-10-17
Payer: MEDICARE

## 2017-10-17 DIAGNOSIS — C44.212 BASAL CELL CARCINOMA OF SKIN OF RIGHT EAR: Primary | ICD-10-CM

## 2017-10-17 PROCEDURE — 99999 PR PBB SHADOW E&M-EST. PATIENT-LVL III: CPT | Mod: PBBFAC,,, | Performed by: DERMATOLOGY

## 2017-10-17 PROCEDURE — 99212 OFFICE O/P EST SF 10 MIN: CPT | Mod: S$GLB,,, | Performed by: DERMATOLOGY

## 2017-10-17 NOTE — PROGRESS NOTES
83 y.o. male patient is here for wound check after surgery.    Patient reports no problems.    WOUND PE:  The R posterior ear wound is now all healed with 100% re-epithelialization.  (+) skin breakdown with full thickness hole on the scaphoid fossa adjacent to rim.        IMPRESSION:  Healing operative site from Mohs' surgery, BCC R posterior ear s/p Mohs with 2nd intention healing, postop week #12, now finally healed in.    PLAN:  d/c wound care.  Disc hole in ear with daughter - strictly aesthetic concern only.  No functional problems.   Disc referral to ENT for reconstruction - they declined at this time.  If hole worsens or gets larger, let us know.    For now, d/c all manipulation and wound care.    Daily SPF.  Regular skin checks.   Call if any other issues arise in site.    RTC:  In 3-6 months with Antionette Torres M.D. for skin check or sooner if new concern arises.

## 2017-10-19 ENCOUNTER — HOSPITAL ENCOUNTER (OUTPATIENT)
Dept: CARDIOLOGY | Facility: CLINIC | Age: 82
Discharge: HOME | End: 2017-10-19
Payer: MEDICARE

## 2017-10-19 DIAGNOSIS — I25.10 CORONARY ARTERY DISEASE DUE TO LIPID RICH PLAQUE: ICD-10-CM

## 2017-10-19 DIAGNOSIS — I10 ESSENTIAL HYPERTENSION: ICD-10-CM

## 2017-10-19 DIAGNOSIS — I25.83 CORONARY ARTERY DISEASE DUE TO LIPID RICH PLAQUE: ICD-10-CM

## 2017-10-19 DIAGNOSIS — I50.42 HEART FAILURE, SYSTOLIC AND DIASTOLIC, CHRONIC: Chronic | ICD-10-CM

## 2017-10-19 DIAGNOSIS — Z95.1 S/P CABG (CORONARY ARTERY BYPASS GRAFT): ICD-10-CM

## 2017-10-19 LAB
AORTIC VALVE REGURGITATION: ABNORMAL
AORTIC VALVE STENOSIS: ABNORMAL
DIASTOLIC DYSFUNCTION: YES
ESTIMATED PA SYSTOLIC PRESSURE: 33.69
MITRAL VALVE REGURGITATION: ABNORMAL
RETIRED EF AND QEF - SEE NOTES: 28 (ref 55–65)
TRICUSPID VALVE REGURGITATION: ABNORMAL

## 2017-10-19 PROCEDURE — 93000 ELECTROCARDIOGRAM COMPLETE: CPT | Mod: S$GLB,,, | Performed by: INTERNAL MEDICINE

## 2017-10-19 PROCEDURE — 93306 TTE W/DOPPLER COMPLETE: CPT | Mod: S$GLB,,, | Performed by: INTERNAL MEDICINE

## 2017-10-20 ENCOUNTER — LAB VISIT (OUTPATIENT)
Dept: LAB | Facility: HOSPITAL | Age: 82
End: 2017-10-20
Attending: INTERNAL MEDICINE
Payer: MEDICARE

## 2017-10-20 DIAGNOSIS — R97.20 ELEVATED PSA: ICD-10-CM

## 2017-10-20 DIAGNOSIS — I10 ESSENTIAL HYPERTENSION: ICD-10-CM

## 2017-10-20 LAB
ALBUMIN SERPL BCP-MCNC: 3 G/DL
ALP SERPL-CCNC: 74 U/L
ALT SERPL W/O P-5'-P-CCNC: 14 U/L
ANION GAP SERPL CALC-SCNC: 9 MMOL/L
AST SERPL-CCNC: 21 U/L
BASOPHILS # BLD AUTO: 0.03 K/UL
BASOPHILS NFR BLD: 0.5 %
BILIRUB SERPL-MCNC: 0.6 MG/DL
BUN SERPL-MCNC: 16 MG/DL
CALCIUM SERPL-MCNC: 9 MG/DL
CHLORIDE SERPL-SCNC: 106 MMOL/L
CHOLEST SERPL-MCNC: 99 MG/DL
CHOLEST/HDLC SERPL: 2.8 {RATIO}
CO2 SERPL-SCNC: 26 MMOL/L
COMPLEXED PSA SERPL-MCNC: 6.4 NG/ML
CREAT SERPL-MCNC: 1.2 MG/DL
DIFFERENTIAL METHOD: ABNORMAL
EOSINOPHIL # BLD AUTO: 0.2 K/UL
EOSINOPHIL NFR BLD: 3.4 %
ERYTHROCYTE [DISTWIDTH] IN BLOOD BY AUTOMATED COUNT: 13.4 %
EST. GFR  (AFRICAN AMERICAN): >60 ML/MIN/1.73 M^2
EST. GFR  (NON AFRICAN AMERICAN): 55.6 ML/MIN/1.73 M^2
GLUCOSE SERPL-MCNC: 96 MG/DL
HCT VFR BLD AUTO: 39.8 %
HDLC SERPL-MCNC: 36 MG/DL
HDLC SERPL: 36.4 %
HGB BLD-MCNC: 12.9 G/DL
LDLC SERPL CALC-MCNC: 51.6 MG/DL
LYMPHOCYTES # BLD AUTO: 2 K/UL
LYMPHOCYTES NFR BLD: 31.2 %
MCH RBC QN AUTO: 30.4 PG
MCHC RBC AUTO-ENTMCNC: 32.4 G/DL
MCV RBC AUTO: 94 FL
MONOCYTES # BLD AUTO: 0.9 K/UL
MONOCYTES NFR BLD: 14 %
NEUTROPHILS # BLD AUTO: 3.3 K/UL
NEUTROPHILS NFR BLD: 50.4 %
NONHDLC SERPL-MCNC: 63 MG/DL
NRBC BLD-RTO: 0 /100 WBC
PLATELET # BLD AUTO: 176 K/UL
PMV BLD AUTO: 11.5 FL
POTASSIUM SERPL-SCNC: 4.3 MMOL/L
PROT SERPL-MCNC: 6.7 G/DL
RBC # BLD AUTO: 4.25 M/UL
SODIUM SERPL-SCNC: 141 MMOL/L
TRIGL SERPL-MCNC: 57 MG/DL
TSH SERPL DL<=0.005 MIU/L-ACNC: 0.77 UIU/ML
WBC # BLD AUTO: 6.45 K/UL

## 2017-10-20 PROCEDURE — 80053 COMPREHEN METABOLIC PANEL: CPT

## 2017-10-20 PROCEDURE — 84153 ASSAY OF PSA TOTAL: CPT

## 2017-10-20 PROCEDURE — 80061 LIPID PANEL: CPT

## 2017-10-20 PROCEDURE — 84443 ASSAY THYROID STIM HORMONE: CPT

## 2017-10-20 PROCEDURE — 36415 COLL VENOUS BLD VENIPUNCTURE: CPT

## 2017-10-20 PROCEDURE — 85025 COMPLETE CBC W/AUTO DIFF WBC: CPT

## 2017-10-24 PROBLEM — E66.3 OVERWEIGHT (BMI 25.0-29.9): Status: ACTIVE | Noted: 2017-10-24

## 2017-10-25 NOTE — PROGRESS NOTES
Subjective:   Patient ID:  Jeremie Bradshaw is a 83 y.o. male who presents for follow-up of CVD    HPI: The patient is here for CAD.Patient is here for congestive heart failure.Patient is here for valvular heart disease.He also has AF.    The patient has no chest pain, SOB, TIA, palpitations, syncope or pre-syncope.Patient does not exercise much.        Review of Systems   Constitution: Positive for weakness and malaise/fatigue. Negative for chills, decreased appetite, diaphoresis, fever, night sweats, weight gain and weight loss.   HENT: Negative for congestion, hoarse voice, nosebleeds, sore throat and tinnitus.    Eyes: Negative for blurred vision, double vision, vision loss in left eye, vision loss in right eye, visual disturbance and visual halos.   Cardiovascular: Negative for chest pain, claudication, cyanosis, dyspnea on exertion, irregular heartbeat, leg swelling, near-syncope, orthopnea, palpitations, paroxysmal nocturnal dyspnea and syncope.   Respiratory: Negative for cough, hemoptysis, shortness of breath, sleep disturbances due to breathing, snoring, sputum production and wheezing.    Endocrine: Negative for cold intolerance, heat intolerance, polydipsia, polyphagia and polyuria.   Hematologic/Lymphatic: Negative for adenopathy and bleeding problem. Does not bruise/bleed easily.   Skin: Negative for color change, dry skin, flushing, itching, nail changes, poor wound healing, rash, skin cancer, suspicious lesions and unusual hair distribution.   Musculoskeletal: Negative for arthritis, back pain, falls, gout, joint pain, joint swelling, muscle cramps, muscle weakness, myalgias and stiffness.   Gastrointestinal: Negative for abdominal pain, anorexia, change in bowel habit, constipation, diarrhea, dysphagia, heartburn, hematemesis, hematochezia, melena and vomiting.   Genitourinary: Negative for decreased libido, dysuria, hematuria, hesitancy and urgency.   Neurological: Negative for excessive daytime  "sleepiness, dizziness, focal weakness, headaches, light-headedness, loss of balance, numbness, paresthesias, seizures, sensory change, tremors and vertigo.   Psychiatric/Behavioral: Negative for altered mental status, depression, hallucinations, memory loss, substance abuse and suicidal ideas. The patient does not have insomnia and is not nervous/anxious.    Allergic/Immunologic: Negative for environmental allergies and hives.       Objective: /62 (BP Location: Left arm, Patient Position: Sitting, BP Method: Large (Automatic))   Pulse 74   Ht 5' 7" (1.702 m)   Wt 80.6 kg (177 lb 11.1 oz)   BMI 27.83 kg/m²      Physical Exam   Constitutional: He is oriented to person, place, and time. He appears well-developed and well-nourished. No distress.   HENT:   Head: Normocephalic.   Eyes: EOM are normal. Pupils are equal, round, and reactive to light.   Neck: Normal range of motion. No thyromegaly present.   Cardiovascular: Normal rate and intact distal pulses.  An irregularly irregular rhythm present. Exam reveals no gallop and no friction rub.    Murmur heard.   Systolic murmur is present with a grade of 1/6   Pulses:       Carotid pulses are 3+ on the right side, and 3+ on the left side.       Radial pulses are 3+ on the right side, and 3+ on the left side.        Femoral pulses are 3+ on the right side, and 3+ on the left side.       Popliteal pulses are 3+ on the right side, and 3+ on the left side.        Dorsalis pedis pulses are 3+ on the right side, and 3+ on the left side.        Posterior tibial pulses are 3+ on the right side, and 3+ on the left side.   Pulmonary/Chest: Effort normal and breath sounds normal. No respiratory distress. He has no wheezes. He has no rales. He exhibits no tenderness.   Abdominal: Soft. He exhibits no distension and no mass. There is no tenderness.   Musculoskeletal: Normal range of motion.   Lymphadenopathy:     He has no cervical adenopathy.   Neurological: He is alert and " oriented to person, place, and time.   Skin: Skin is warm. He is not diaphoretic. No cyanosis. Nails show no clubbing.   Psychiatric: He has a normal mood and affect. His speech is normal and behavior is normal. Judgment and thought content normal. Cognition and memory are normal.       Assessment:     1. Coronary artery disease involving native coronary artery of native heart without angina pectoris    2. Aortic atherosclerosis    3. Nonrheumatic aortic valve stenosis    4. Anticoagulated by anticoagulation treatment    5. Erectile dysfunction due to arterial insufficiency    6. History of atrial fibrillation    7. MCI (mild cognitive impairment) with memory loss    8. Overweight (BMI 25.0-29.9)    9. Old MI (myocardial infarction)    10. Chronic systolic heart failure    11. Venous insufficiency of both lower extremities    12. Thrombocytopenia    13. Syncope due to orthostatic hypotension    14. Major depressive disorder with single episode, in full remission    15. Vitamin B12 deficiency    16. History of stroke    17. Chronic combined systolic and diastolic congestive heart failure        Plan:   Discussed diet , achieving and maintaining ideal body weight, and exercise.   Reassured-discussed goals, options, plans  We reviewed meds in detail.  Discussed options with aortic valve    Jeremie was seen today for congestive heart failure.    Diagnoses and all orders for this visit:    Coronary artery disease involving native coronary artery of native heart without angina pectoris  -     Lipid panel; Future; Expected date: 10/27/2018  -     TSH; Future; Expected date: 10/27/2018  -     2D echo with color flow doppler; Future; Expected date: 10/27/2018    Aortic atherosclerosis  -     Lipid panel; Future; Expected date: 10/27/2018  -     Comprehensive metabolic panel; Future; Expected date: 10/27/2018  -     TSH; Future; Expected date: 10/27/2018    Nonrheumatic aortic valve stenosis  -     2D echo with color flow  doppler; Future; Expected date: 10/27/2018    Anticoagulated by anticoagulation treatment    Erectile dysfunction due to arterial insufficiency  -     TSH; Future; Expected date: 10/27/2018    History of atrial fibrillation  -     TSH; Future; Expected date: 10/27/2018    MCI (mild cognitive impairment) with memory loss    Overweight (BMI 25.0-29.9)  -     Comprehensive metabolic panel; Future; Expected date: 10/27/2018  -     TSH; Future; Expected date: 10/27/2018  -     2D echo with color flow doppler; Future; Expected date: 10/27/2018    Old MI (myocardial infarction)  -     Lipid panel; Future; Expected date: 10/27/2018  -     TSH; Future; Expected date: 10/27/2018  -     2D echo with color flow doppler; Future; Expected date: 10/27/2018    Chronic systolic heart failure  -     Comprehensive metabolic panel; Future; Expected date: 10/27/2018  -     TSH; Future; Expected date: 10/27/2018  -     2D echo with color flow doppler; Future; Expected date: 10/27/2018    Venous insufficiency of both lower extremities    Thrombocytopenia    Syncope due to orthostatic hypotension    Major depressive disorder with single episode, in full remission    Vitamin B12 deficiency    History of stroke    Chronic combined systolic and diastolic congestive heart failure  -     TSH; Future; Expected date: 10/27/2018  -     2D echo with color flow doppler; Future; Expected date: 10/27/2018            Return in about 1 year (around 10/27/2018) for with ECG, Labs,CFD Jeff Aguilar to read.

## 2017-10-27 ENCOUNTER — OFFICE VISIT (OUTPATIENT)
Dept: CARDIOLOGY | Facility: CLINIC | Age: 82
End: 2017-10-27
Payer: MEDICARE

## 2017-10-27 VITALS
HEART RATE: 74 BPM | WEIGHT: 177.69 LBS | SYSTOLIC BLOOD PRESSURE: 116 MMHG | HEIGHT: 67 IN | DIASTOLIC BLOOD PRESSURE: 62 MMHG | BODY MASS INDEX: 27.89 KG/M2

## 2017-10-27 DIAGNOSIS — I25.10 CORONARY ARTERY DISEASE INVOLVING NATIVE CORONARY ARTERY OF NATIVE HEART WITHOUT ANGINA PECTORIS: Primary | ICD-10-CM

## 2017-10-27 DIAGNOSIS — Z79.01 ANTICOAGULATED BY ANTICOAGULATION TREATMENT: ICD-10-CM

## 2017-10-27 DIAGNOSIS — I70.0 AORTIC ATHEROSCLEROSIS: ICD-10-CM

## 2017-10-27 DIAGNOSIS — G31.84 MCI (MILD COGNITIVE IMPAIRMENT) WITH MEMORY LOSS: ICD-10-CM

## 2017-10-27 DIAGNOSIS — Z86.79 HISTORY OF ATRIAL FIBRILLATION: ICD-10-CM

## 2017-10-27 DIAGNOSIS — I50.22 CHRONIC SYSTOLIC HEART FAILURE: ICD-10-CM

## 2017-10-27 DIAGNOSIS — I50.42 CHRONIC COMBINED SYSTOLIC AND DIASTOLIC CONGESTIVE HEART FAILURE: Chronic | ICD-10-CM

## 2017-10-27 DIAGNOSIS — D69.6 THROMBOCYTOPENIA: ICD-10-CM

## 2017-10-27 DIAGNOSIS — E66.3 OVERWEIGHT (BMI 25.0-29.9): ICD-10-CM

## 2017-10-27 DIAGNOSIS — I87.2 VENOUS INSUFFICIENCY OF BOTH LOWER EXTREMITIES: ICD-10-CM

## 2017-10-27 DIAGNOSIS — N52.01 ERECTILE DYSFUNCTION DUE TO ARTERIAL INSUFFICIENCY: ICD-10-CM

## 2017-10-27 DIAGNOSIS — I35.0 NONRHEUMATIC AORTIC VALVE STENOSIS: ICD-10-CM

## 2017-10-27 DIAGNOSIS — I95.1 SYNCOPE DUE TO ORTHOSTATIC HYPOTENSION: ICD-10-CM

## 2017-10-27 DIAGNOSIS — E53.8 VITAMIN B12 DEFICIENCY: ICD-10-CM

## 2017-10-27 DIAGNOSIS — F32.5 MAJOR DEPRESSIVE DISORDER WITH SINGLE EPISODE, IN FULL REMISSION: ICD-10-CM

## 2017-10-27 DIAGNOSIS — Z86.73 HISTORY OF STROKE: ICD-10-CM

## 2017-10-27 DIAGNOSIS — I25.2 OLD MI (MYOCARDIAL INFARCTION): ICD-10-CM

## 2017-10-27 PROCEDURE — 99215 OFFICE O/P EST HI 40 MIN: CPT | Mod: S$GLB,,, | Performed by: INTERNAL MEDICINE

## 2017-10-27 PROCEDURE — 99499 UNLISTED E&M SERVICE: CPT | Mod: S$GLB,,, | Performed by: INTERNAL MEDICINE

## 2017-10-27 PROCEDURE — 99999 PR PBB SHADOW E&M-EST. PATIENT-LVL IV: CPT | Mod: PBBFAC,,, | Performed by: INTERNAL MEDICINE

## 2017-10-27 NOTE — PATIENT INSTRUCTIONS
Discussed diet , achieving and maintaining ideal body weight, and exercise.   Reassured-discussed goals, options, plans  We reviewed meds in detail.  Discussed options with aortic valve

## 2017-10-31 RX ORDER — OMEGA-3-ACID ETHYL ESTERS 1 G/1
CAPSULE, LIQUID FILLED ORAL
Qty: 180 CAPSULE | Refills: 0 | Status: SHIPPED | OUTPATIENT
Start: 2017-10-31 | End: 2018-02-06 | Stop reason: SDUPTHER

## 2017-11-03 ENCOUNTER — OFFICE VISIT (OUTPATIENT)
Dept: INTERNAL MEDICINE | Facility: CLINIC | Age: 82
End: 2017-11-03
Payer: MEDICARE

## 2017-11-03 ENCOUNTER — IMMUNIZATION (OUTPATIENT)
Dept: INTERNAL MEDICINE | Facility: CLINIC | Age: 82
End: 2017-11-03
Payer: MEDICARE

## 2017-11-03 VITALS
HEIGHT: 68 IN | DIASTOLIC BLOOD PRESSURE: 70 MMHG | OXYGEN SATURATION: 96 % | WEIGHT: 175 LBS | HEART RATE: 64 BPM | SYSTOLIC BLOOD PRESSURE: 120 MMHG | BODY MASS INDEX: 26.52 KG/M2

## 2017-11-03 DIAGNOSIS — I25.5 ISCHEMIC CARDIOMYOPATHY: ICD-10-CM

## 2017-11-03 DIAGNOSIS — E78.5 HYPERLIPIDEMIA, UNSPECIFIED HYPERLIPIDEMIA TYPE: Primary | ICD-10-CM

## 2017-11-03 DIAGNOSIS — L21.9 SEBORRHEIC DERMATITIS: ICD-10-CM

## 2017-11-03 DIAGNOSIS — K21.9 GASTROESOPHAGEAL REFLUX DISEASE WITHOUT ESOPHAGITIS: ICD-10-CM

## 2017-11-03 DIAGNOSIS — E53.8 VITAMIN B12 DEFICIENCY: ICD-10-CM

## 2017-11-03 DIAGNOSIS — I10 ESSENTIAL HYPERTENSION: ICD-10-CM

## 2017-11-03 DIAGNOSIS — D64.9 ANEMIA, UNSPECIFIED TYPE: ICD-10-CM

## 2017-11-03 DIAGNOSIS — R41.3 MEMORY LOSS: ICD-10-CM

## 2017-11-03 DIAGNOSIS — I48.0 PAROXYSMAL ATRIAL FIBRILLATION: ICD-10-CM

## 2017-11-03 PROCEDURE — G0008 ADMIN INFLUENZA VIRUS VAC: HCPCS | Mod: S$GLB,,, | Performed by: INTERNAL MEDICINE

## 2017-11-03 PROCEDURE — 99214 OFFICE O/P EST MOD 30 MIN: CPT | Mod: 25,S$GLB,, | Performed by: INTERNAL MEDICINE

## 2017-11-03 PROCEDURE — 96372 THER/PROPH/DIAG INJ SC/IM: CPT | Mod: S$GLB,,, | Performed by: INTERNAL MEDICINE

## 2017-11-03 PROCEDURE — 99999 PR PBB SHADOW E&M-EST. PATIENT-LVL II: CPT | Mod: PBBFAC,,, | Performed by: INTERNAL MEDICINE

## 2017-11-03 PROCEDURE — 90662 IIV NO PRSV INCREASED AG IM: CPT | Mod: S$GLB,,, | Performed by: INTERNAL MEDICINE

## 2017-11-03 PROCEDURE — 99499 UNLISTED E&M SERVICE: CPT | Mod: S$GLB,,, | Performed by: INTERNAL MEDICINE

## 2017-11-03 RX ORDER — CYANOCOBALAMIN 1000 UG/ML
1000 INJECTION, SOLUTION INTRAMUSCULAR; SUBCUTANEOUS
Status: DISCONTINUED | OUTPATIENT
Start: 2017-11-03 | End: 2019-01-05 | Stop reason: HOSPADM

## 2017-11-03 RX ORDER — FUROSEMIDE 20 MG/1
20 TABLET ORAL DAILY
Qty: 60 TABLET | Refills: 11 | Status: SHIPPED | OUTPATIENT
Start: 2017-11-03 | End: 2018-02-07

## 2017-11-03 RX ORDER — CARVEDILOL 3.12 MG/1
3.12 TABLET ORAL 2 TIMES DAILY
Qty: 60 TABLET | Refills: 11 | Status: SHIPPED | OUTPATIENT
Start: 2017-11-03 | End: 2018-08-28 | Stop reason: SDUPTHER

## 2017-11-03 RX ORDER — ATORVASTATIN CALCIUM 40 MG/1
40 TABLET, FILM COATED ORAL DAILY
Qty: 30 TABLET | Refills: 11 | Status: SHIPPED | OUTPATIENT
Start: 2017-11-03 | End: 2018-08-28 | Stop reason: SDUPTHER

## 2017-11-03 RX ORDER — POTASSIUM CHLORIDE 750 MG/1
CAPSULE, EXTENDED RELEASE ORAL
Qty: 60 CAPSULE | Refills: 11 | Status: SHIPPED | OUTPATIENT
Start: 2017-11-03 | End: 2018-08-28

## 2017-11-03 RX ORDER — LISINOPRIL 20 MG/1
20 TABLET ORAL DAILY
Qty: 30 TABLET | Refills: 11 | Status: SHIPPED | OUTPATIENT
Start: 2017-11-03 | End: 2018-08-28 | Stop reason: SDUPTHER

## 2017-11-03 RX ORDER — DESONIDE 0.5 MG/G
CREAM TOPICAL
Qty: 1 TUBE | Refills: 3 | Status: SHIPPED | OUTPATIENT
Start: 2017-11-03 | End: 2018-08-28 | Stop reason: SDUPTHER

## 2017-11-03 RX ADMIN — CYANOCOBALAMIN 1000 MCG: 1000 INJECTION, SOLUTION INTRAMUSCULAR; SUBCUTANEOUS at 11:11

## 2017-11-03 NOTE — PROGRESS NOTES
CHIEF COMPLAINT: Follow up of Right shoulder pain, fativue, hypertension, a fib, mood.    HISTORY OF PRESENT ILLNESS: This is a 83-year-old man who presents with his daughter for follow up of above.  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 Wound has healed.      Right shoulder is doing well.  HE is working on his exercises for range of motion.       He is on Eliquis 5 mg twice daily, lisinopril 20 mg dialy, furosemide 20 mg daily, potassium chloride 10 meq 2 tablets every other day and coreg 3.125 mg one tablet twice daily. No chest pain, shortness of breath, palpitations. Fatigue is better with lowering the coreg.       Appetite has been good. He is drinking a boost or ensure daily. No fever, chills, nausea, vomiting, constipation, diarrhea.       He continues to take Wellbutrin  mg daily. Memory has not worsened. He has some mild memory loss. Mood has been down a little as the anniversary of his wife's death is at the end of May. He continues to take atorvastatin 40 mg 1 tablet daily and Lovaza 1 g daily for her hyperlipidemia. He denies any neck pain from his history of cervical spine fracture.    Back bothers him from time to time - worse with prolonged sitting.       Past Medical History:   1. Coronary artery disease status post CABG x1 in 1993, and STEMI 01/2010, status post stenting, refused cardiac rehab, followed by Dr. Aguilar.   2. Congestive heart failure. Ejection fraction was 30% Jan 2012  3. History of atrial fibrillation -- EKG Jan 2012 - sinus rhythm  4. Hyperlipidemia  5. Hypertension.   6. Osteoarthritis of the lumbar spine -- Had a flare in 2006 and had MRI at that time.   7. History of nephrolithiasis in 2005.   8. History of left thalamic stroke in the early 2000s per Dr. Matos's notes.   9. Depression -- followed by Dr. Bingham .  10. BPH with obstruction, had a cystoscope in 08/2010. Saw Urology 06/2011.  11. Right rotator cuff tear with arthropathy. He has pain from time to  "time. Saw Dr. Narvaez for an injection 2011.     PAST SURGICAL HISTORY:   1. CABG x1 in .   2. Bilateral cataract surgery, right 2005, left .   3. Excision of basal cell carcinoma of the left ear in .     SOCIAL HISTORY: He has never smoked, drinks alcohol once a month. . Has three children. Retired from the [employer].     FAMILY HISTORY: Mother  in her 60s of heart trouble. Father  in his 50s of some sort of abdominal malignancy. He has a half brother, which he is not sure of his health issues.     Screening PSA was 1016. Declines colonoscopy.     PHYSICAL EXAMINATION:     /70   Pulse 64   Ht 5' 8" (1.727 m)   Wt 79.4 kg (175 lb)   SpO2 96%   BMI 26.61 kg/m²       GENERAL: He is alert, oriented, no apparent distress. Affect within normal limits.   HEENT: Conjunctivae anicteric. . TM clear  Neck supple. Oropharynx -clear  CHEST: Respiratory effort normal. Lungs clear. Rales right base.    HEART: Regular rate and rhythm without murmurs, gallops or rubs. No lowerextremity edema.        Labs 10/17 reviewed    ASSESSMENT AND PLAN:   1. Right shoulder OA -better  2. Coronary artery disease, CHF and a fib and aortic atherosclerosis- on risk facto modification.    2. Hypertension -stable  3. Hyperlipidemia -- controlled.    4.GERD - asx. Takes ranitidine 300 mg Nightly as needed    5.   6. Major Depression -- controlled on Wellbutrin  7. Osteoarthritis of lumbar spine -- stable.   8. Benign prostatic hypertrophy -- stable.   9. History of skin cancer -- Saw Derm   10. Thyroid nodule - thyroid ultrasound stable    11 Memory loss- intolerant of aricept. Saw Jade Chacon    12. Vitamin B12 deficiency - injection today  13. Thrombocytopenia - stable and chronic - repeat CBC at next lab  14. Tortuous aorta - BP is stable  15. Left atrial appendage thrombus- On Eliquis  Screening -- PSA slightly elevated -. Will repeat in 3 months  . Declines colonoscopy.   I will see him " back in 3 months, sooner if problems arise.

## 2017-12-07 DIAGNOSIS — I25.5 ISCHEMIC CARDIOMYOPATHY: ICD-10-CM

## 2017-12-08 RX ORDER — FUROSEMIDE 20 MG/1
TABLET ORAL
Qty: 180 TABLET | Refills: 0 | Status: SHIPPED | OUTPATIENT
Start: 2017-12-08 | End: 2018-08-28 | Stop reason: SDUPTHER

## 2017-12-27 ENCOUNTER — PATIENT MESSAGE (OUTPATIENT)
Dept: INTERNAL MEDICINE | Facility: CLINIC | Age: 82
End: 2017-12-27

## 2017-12-27 RX ORDER — OSELTAMIVIR PHOSPHATE 75 MG/1
75 CAPSULE ORAL DAILY
Qty: 10 CAPSULE | Refills: 0 | Status: SHIPPED | OUTPATIENT
Start: 2017-12-27 | End: 2018-01-06

## 2018-02-06 RX ORDER — OMEGA-3-ACID ETHYL ESTERS 1 G/1
CAPSULE, LIQUID FILLED ORAL
Qty: 180 CAPSULE | Refills: 0 | Status: SHIPPED | OUTPATIENT
Start: 2018-02-06 | End: 2018-05-08 | Stop reason: SDUPTHER

## 2018-02-07 ENCOUNTER — OFFICE VISIT (OUTPATIENT)
Dept: INTERNAL MEDICINE | Facility: CLINIC | Age: 83
End: 2018-02-07
Payer: MEDICARE

## 2018-02-07 VITALS
BODY MASS INDEX: 26.48 KG/M2 | HEART RATE: 81 BPM | TEMPERATURE: 99 F | WEIGHT: 174.19 LBS | SYSTOLIC BLOOD PRESSURE: 140 MMHG | OXYGEN SATURATION: 97 % | DIASTOLIC BLOOD PRESSURE: 80 MMHG

## 2018-02-07 DIAGNOSIS — I48.0 PAROXYSMAL ATRIAL FIBRILLATION: ICD-10-CM

## 2018-02-07 DIAGNOSIS — J01.00 ACUTE NON-RECURRENT MAXILLARY SINUSITIS: Primary | ICD-10-CM

## 2018-02-07 DIAGNOSIS — I10 ESSENTIAL HYPERTENSION: ICD-10-CM

## 2018-02-07 PROCEDURE — 99999 PR PBB SHADOW E&M-EST. PATIENT-LVL III: CPT | Mod: PBBFAC,,, | Performed by: INTERNAL MEDICINE

## 2018-02-07 PROCEDURE — 1159F MED LIST DOCD IN RCRD: CPT | Mod: S$GLB,,, | Performed by: INTERNAL MEDICINE

## 2018-02-07 PROCEDURE — 99214 OFFICE O/P EST MOD 30 MIN: CPT | Mod: S$GLB,,, | Performed by: INTERNAL MEDICINE

## 2018-02-07 PROCEDURE — 3008F BODY MASS INDEX DOCD: CPT | Mod: S$GLB,,, | Performed by: INTERNAL MEDICINE

## 2018-02-07 PROCEDURE — 1126F AMNT PAIN NOTED NONE PRSNT: CPT | Mod: S$GLB,,, | Performed by: INTERNAL MEDICINE

## 2018-02-07 PROCEDURE — 99499 UNLISTED E&M SERVICE: CPT | Mod: S$GLB,,, | Performed by: INTERNAL MEDICINE

## 2018-02-07 RX ORDER — AMOXICILLIN AND CLAVULANATE POTASSIUM 875; 125 MG/1; MG/1
1 TABLET, FILM COATED ORAL 2 TIMES DAILY
Qty: 14 TABLET | Refills: 0 | Status: SHIPPED | OUTPATIENT
Start: 2018-02-07 | End: 2018-02-14

## 2018-02-07 NOTE — PATIENT INSTRUCTIONS
Sinusitis (Antibiotic Treatment)    The sinuses are air-filled spaces within the bones of the face. They connect to the inside of the nose. Sinusitis is an inflammation of the tissue lining the sinus cavity. Sinus inflammation can occur during a cold. It can also be due to allergies to pollens and other particles in the air. Sinusitis can cause symptoms of sinus congestion and fullness. A sinus infection causes fever, headache and facial pain. There is often green or yellow drainage from the nose or into the back of the throat (post-nasal drip). You have been given antibiotics to treat this condition.  Home care:  · Take the full course of antibiotics as instructed. Do not stop taking them, even if you feel better.  · Drink plenty of water, hot tea, and other liquids. This may help thin mucus. It also may promote sinus drainage.  · Heat may help soothe painful areas of the face. Use a towel soaked in hot water. Or,  the shower and direct the hot spray onto your face. Using a vaporizer along with a menthol rub at night may also help.   · An expectorant containing guaifenesin may help thin the mucus and promote drainage from the sinuses.  · Over-the-counter decongestants may be used unless a similar medicine was prescribed. Nasal sprays work the fastest. Use one that contains phenylephrine or oxymetazoline. First blow the nose gently. Then use the spray. Do not use these medicines more often than directed on the label or symptoms may get worse. You may also use tablets containing pseudoephedrine. Avoid products that combine ingredients, because side effects may be increased. Read labels. You can also ask the pharmacist for help. (NOTE: Persons with high blood pressure should not use decongestants. They can raise blood pressure.)  · Over-the-counter antihistamines may help if allergies contributed to your sinusitis.    · Do not use nasal rinses or irrigation during an acute sinus infection, unless told to by  your health care provider. Rinsing may spread the infection to other sinuses.  · Use acetaminophen or ibuprofen to control pain, unless another pain medicine was prescribed. (If you have chronic liver or kidney disease or ever had a stomach ulcer, talk with your doctor before using these medicines. Aspirin should never be used in anyone under 18 years of age who is ill with a fever. It may cause severe liver damage.)  · Don't smoke. This can worsen symptoms.  Follow-up care  Follow up with your healthcare provider or our staff if you are not improving within the next week.  When to seek medical advice  Call your healthcare provider if any of these occur:  · Facial pain or headache becoming more severe  · Stiff neck  · Unusual drowsiness or confusion  · Swelling of the forehead or eyelids  · Vision problems, including blurred or double vision  · Fever of 100.4ºF (38ºC) or higher, or as directed by your healthcare provider  · Seizure  · Breathing problems  · Symptoms not resolving within 10 days  Date Last Reviewed: 4/13/2015  © 4988-6943 [employer]. [address] [place] [address]. All rights reserved. This information is not intended as a substitute for professional medical care. Always follow your healthcare professional's instructions.        Understanding Your Sinuses  Your sinuses are air-filled spaces between the bones in your head. They have small openings that connect to the nasal cavity. The sinuses make mucus that drains into the nose. This helps keep the nose moist and free of dust and germs.      Parts of the nasal cavity  · The septum is the wall of cartilage and bone in the center of the nasal cavity.  · The middle meatus is the intersection between the sinuses.  · Turbinates are ridges on the sides of the nasal cavity.  Cilia keep sinuses clear    Air circulates freely though healthy sinuses. Tiny, hairlike structures called cilia line the sinuses. Cilia move the thin, watery  mucus through the sinuses and into the nose. Sinuses are healthy when they drain freely. Sinus drainage can be blocked if the sinus lining is swollen or if mucus is too thick. Cilia that are damaged or dont work correctly can also lead to problems with drainage.  Date Last Reviewed: 10/1/2016  © 0110-6546 [employer]. [address] [place] [address], [place] [address]. All rights reserved. This information is not intended as a substitute for professional medical care. Always follow your healthcare professional's instructions.        Causes of Sinusitis  Mucus helps keep your sinuses clean. But mucus may build up in the sinuses because of colds, allergies, or blockages. These things get in the way of the natural drainage of mucus. This may lead to sinusitis. Sinusitis means sinus inflammation and infection.  · Acute sinusitis comes on suddenly. It often happens right after an upper respiratory infection, such as a cold. Viruses cause most acute sinus infections.  · Chronic sinusitis is ongoing swelling of the sinus lining. Doctors don't know what causes chronic sinusitis.      Colds and other infections  A cold or flu may cause your sinus and nasal linings to swell. Sinus openings can become blocked. This causes mucus to back up. This backed-up mucus becomes an ideal place for bacteria to grow. Thick, yellow, or discolored mucus is one sign of infection.  Allergic reactions  You may be sensitive to certain substances. This causes the release of histamine in the body. Histamine makes your sinus and nasal linings swell. Long-term swelling clogs your sinuses. It prevents the tiny hairs (cilia) in the nasal lining from sweeping away mucus. Allergy symptoms can continue over time. But theyre less severe than with colds.  Blockages  · A polyp is a sac of swollen tissue. It can be the result of an allergy or infection. It may block the opening where most of your sinuses drain (middle meatus). It may even grow large  enough to block your nose.  · A deviated septum is when the thin wall inside your nose is pushed to one side. It is often the result of injury. This can block your middle meatus.  People with chronic nasal problems or allergies are more likely to get acute sinusitis. Sinusitis is also more common if you have a weakened immune system, such as with HIV. You are also more likely to get sinusitis if you have cystic fibrosis or another condition that causes your body to make extra mucus.  Date Last Reviewed: 10/1/2016  © 3577-1008 [employer]. [address] [place] [address]. All rights reserved. This information is not intended as a substitute for professional medical care. Always follow your healthcare professional's instructions.

## 2018-02-07 NOTE — PROGRESS NOTES
Subjective:       Patient ID: Jeremie Bradshaw is a 83 y.o. male.    Chief Complaint: Cough    [facility] appt, daughter Galina with him.  Dr Casas patient.    URI sx for about 2 weeks.  Somewhat productive.  Slightly grey in color.   No fever or chest pain.  No SOB.   Some sinus pressure.  No ear or throat pain.      Grandkids had flu and he was given Tamiflu preventively.    No nausea, vomiting or diarrhea.  No rash or myalgias.    Never smoked.  Prone to sinus issues.    Patient Active Problem List:     Bronchitis     Cough     Coronary artery disease involving native coronary artery of native heart without angina pectoris     Hyperlipidemia     Depression     Osteoarthritis of lumbar spine     Shoulder pain     BPH (benign prostatic hypertrophy)     History of atrial fibrillation     Kidney stones     Failure to thrive in childhood     Hypotension, postural     Chronic combined systolic and diastolic congestive heart failure     Fx humeral neck     Memory loss     S/P CABG (coronary artery bypass graft)     Systolic HF (heart failure)     Old MI (myocardial infarction)     Sleep disorder     ED (erectile dysfunction)     Lack of exercise     Ischemic cardiomyopathy     AS (aortic stenosis)     Gastroesophageal reflux disease without esophagitis     MCI (mild cognitive impairment) with memory loss     ADHF (acute decompensated heart failure)     Syncope due to orthostatic hypotension     Paroxysmal atrial fibrillation     Coronary artery disease due to lipid rich plaque     Essential hypertension     Venous insufficiency of both lower extremities     Varicose veins     Thrombus of left atrial appendage     Anticoagulated by anticoagulation treatment     Vitamin B12 deficiency     Thrombocytopenia     Aortic atherosclerosis     Major depressive disorder in full remission     History of stroke     Overweight (BMI 25.0-29.9)        Cough   Associated symptoms include postnasal drip. Pertinent negatives include no chills,  ear pain, fever, shortness of breath or wheezing.     Review of Systems   Constitutional: Negative for chills, fatigue and fever.   HENT: Positive for congestion and postnasal drip. Negative for ear pain.    Eyes: Negative.    Respiratory: Positive for cough. Negative for chest tightness, shortness of breath and wheezing.    Cardiovascular: Negative.    Gastrointestinal: Negative.        Objective:      Physical Exam   Constitutional: He is oriented to person, place, and time. He appears well-developed and well-nourished.   HENT:   Head: Normocephalic and atraumatic.   Right Ear: External ear normal.   Left Ear: External ear normal.   Mouth/Throat: Oropharynx is clear and moist.   Hard of hearing  Wax in ears  Frontal sinus tenderness R > L   Neck: Normal range of motion. Neck supple. No thyromegaly present.   [address] R side anterior cervical chain   Cardiovascular: Normal rate and normal heart sounds.    IRR   Pulmonary/Chest: No respiratory distress. He has no wheezes. He has no rales.   Abdominal: Soft. Bowel sounds are normal. He exhibits no distension. There is no tenderness.   Musculoskeletal: He exhibits no edema.   Neurological: He is alert and oriented to person, place, and time.   Skin: Skin is warm and dry. No rash noted. No erythema.   Actinic changes   Psychiatric: He has a normal mood and affect.       Assessment:       1. Acute non-recurrent maxillary sinusitis    2. Paroxysmal atrial fibrillation    3. Essential hypertension        Plan:         Acute non-recurrent maxillary sinusitis    Paroxysmal atrial fibrillation    Essential hypertension    Other orders  -     amoxicillin-clavulanate 875-125mg (AUGMENTIN) 875-125 mg per tablet; Take 1 tablet by mouth 2 (two) times daily.  Dispense: 14 tablet; Refill: 0    Continue meds  Alarm symptoms reviewed, he is having none  Keep appointment with PCP later this month; if symptoms persist or worsen may need chest x-ray and/or sinus CT    Rest, fluids,  acetaminophen, mucinex and follow up poor results.

## 2018-02-19 ENCOUNTER — LAB VISIT (OUTPATIENT)
Dept: LAB | Facility: HOSPITAL | Age: 83
End: 2018-02-19
Attending: INTERNAL MEDICINE
Payer: MEDICARE

## 2018-02-19 DIAGNOSIS — D64.9 ANEMIA, UNSPECIFIED TYPE: ICD-10-CM

## 2018-02-19 LAB
ALBUMIN SERPL BCP-MCNC: 3 G/DL
ALP SERPL-CCNC: 68 U/L
ALT SERPL W/O P-5'-P-CCNC: 13 U/L
ANION GAP SERPL CALC-SCNC: 8 MMOL/L
AST SERPL-CCNC: 21 U/L
BASOPHILS # BLD AUTO: 0.04 K/UL
BASOPHILS NFR BLD: 0.7 %
BILIRUB SERPL-MCNC: 0.7 MG/DL
BUN SERPL-MCNC: 17 MG/DL
CALCIUM SERPL-MCNC: 8.8 MG/DL
CHLORIDE SERPL-SCNC: 105 MMOL/L
CO2 SERPL-SCNC: 26 MMOL/L
CREAT SERPL-MCNC: 1.1 MG/DL
DIFFERENTIAL METHOD: ABNORMAL
EOSINOPHIL # BLD AUTO: 0.2 K/UL
EOSINOPHIL NFR BLD: 3 %
ERYTHROCYTE [DISTWIDTH] IN BLOOD BY AUTOMATED COUNT: 14.1 %
EST. GFR  (AFRICAN AMERICAN): >60 ML/MIN/1.73 M^2
EST. GFR  (NON AFRICAN AMERICAN): >60 ML/MIN/1.73 M^2
FERRITIN SERPL-MCNC: 57 NG/ML
GLUCOSE SERPL-MCNC: 112 MG/DL
HCT VFR BLD AUTO: 38.1 %
HGB BLD-MCNC: 12.5 G/DL
LYMPHOCYTES # BLD AUTO: 2.1 K/UL
LYMPHOCYTES NFR BLD: 37.1 %
MCH RBC QN AUTO: 30.2 PG
MCHC RBC AUTO-ENTMCNC: 32.8 G/DL
MCV RBC AUTO: 92 FL
MONOCYTES # BLD AUTO: 0.7 K/UL
MONOCYTES NFR BLD: 12.9 %
NEUTROPHILS # BLD AUTO: 2.6 K/UL
NEUTROPHILS NFR BLD: 46.1 %
NRBC BLD-RTO: 0 /100 WBC
PLATELET # BLD AUTO: 172 K/UL
PMV BLD AUTO: 11.2 FL
POTASSIUM SERPL-SCNC: 4.3 MMOL/L
PROT SERPL-MCNC: 6.3 G/DL
RBC # BLD AUTO: 4.14 M/UL
SODIUM SERPL-SCNC: 139 MMOL/L
WBC # BLD AUTO: 5.68 K/UL

## 2018-02-19 PROCEDURE — 82728 ASSAY OF FERRITIN: CPT

## 2018-02-19 PROCEDURE — 85025 COMPLETE CBC W/AUTO DIFF WBC: CPT

## 2018-02-19 PROCEDURE — 36415 COLL VENOUS BLD VENIPUNCTURE: CPT

## 2018-02-19 PROCEDURE — 80053 COMPREHEN METABOLIC PANEL: CPT

## 2018-02-23 ENCOUNTER — OFFICE VISIT (OUTPATIENT)
Dept: INTERNAL MEDICINE | Facility: CLINIC | Age: 83
End: 2018-02-23
Payer: MEDICARE

## 2018-02-23 VITALS
DIASTOLIC BLOOD PRESSURE: 60 MMHG | HEIGHT: 68 IN | HEART RATE: 60 BPM | WEIGHT: 170.63 LBS | BODY MASS INDEX: 25.86 KG/M2 | SYSTOLIC BLOOD PRESSURE: 100 MMHG

## 2018-02-23 DIAGNOSIS — E78.5 HYPERLIPIDEMIA, UNSPECIFIED HYPERLIPIDEMIA TYPE: ICD-10-CM

## 2018-02-23 DIAGNOSIS — I25.10 CORONARY ARTERY DISEASE INVOLVING NATIVE CORONARY ARTERY OF NATIVE HEART WITHOUT ANGINA PECTORIS: ICD-10-CM

## 2018-02-23 DIAGNOSIS — I25.10 CORONARY ARTERY DISEASE DUE TO LIPID RICH PLAQUE: ICD-10-CM

## 2018-02-23 DIAGNOSIS — I25.83 CORONARY ARTERY DISEASE DUE TO LIPID RICH PLAQUE: ICD-10-CM

## 2018-02-23 DIAGNOSIS — I10 ESSENTIAL HYPERTENSION: ICD-10-CM

## 2018-02-23 DIAGNOSIS — I70.0 AORTIC ATHEROSCLEROSIS: ICD-10-CM

## 2018-02-23 DIAGNOSIS — I50.42 CHRONIC COMBINED SYSTOLIC AND DIASTOLIC CONGESTIVE HEART FAILURE: Primary | Chronic | ICD-10-CM

## 2018-02-23 DIAGNOSIS — D69.6 THROMBOCYTOPENIA: ICD-10-CM

## 2018-02-23 DIAGNOSIS — K21.9 GASTROESOPHAGEAL REFLUX DISEASE WITHOUT ESOPHAGITIS: ICD-10-CM

## 2018-02-23 DIAGNOSIS — I48.0 PAROXYSMAL ATRIAL FIBRILLATION: ICD-10-CM

## 2018-02-23 DIAGNOSIS — R97.20 ELEVATED PSA: ICD-10-CM

## 2018-02-23 PROCEDURE — 99214 OFFICE O/P EST MOD 30 MIN: CPT | Mod: S$GLB,,, | Performed by: INTERNAL MEDICINE

## 2018-02-23 PROCEDURE — 3008F BODY MASS INDEX DOCD: CPT | Mod: S$GLB,,, | Performed by: INTERNAL MEDICINE

## 2018-02-23 PROCEDURE — 99999 PR PBB SHADOW E&M-EST. PATIENT-LVL II: CPT | Mod: PBBFAC,,, | Performed by: INTERNAL MEDICINE

## 2018-02-23 PROCEDURE — 1159F MED LIST DOCD IN RCRD: CPT | Mod: S$GLB,,, | Performed by: INTERNAL MEDICINE

## 2018-02-23 PROCEDURE — 99499 UNLISTED E&M SERVICE: CPT | Mod: S$GLB,,, | Performed by: INTERNAL MEDICINE

## 2018-02-23 RX ORDER — AMOXICILLIN AND CLAVULANATE POTASSIUM 875; 125 MG/1; MG/1
1 TABLET, FILM COATED ORAL 2 TIMES DAILY
Qty: 20 TABLET | Refills: 0 | Status: SHIPPED | OUTPATIENT
Start: 2018-02-23 | End: 2018-03-05

## 2018-02-23 NOTE — PROGRESS NOTES
CHIEF COMPLAINT: Follow up of Right shoulder pain, fativue, hypertension, a fib, mood.    HISTORY OF PRESENT ILLNESS: This is a 83-year-old man who presents with his daughter for follow up of above.      HE had a sinus infection in early Feb. He took a course of Augmentin but has not gotten over it. He continues to have drip and a wet cough. No shortness of breath, fever, chills.      Right shoulder is doing well.  HE is working on his exercises for range of motion.  He has pain when he sleeps on the right shoulder.      He is on Eliquis 5 mg twice daily, lisinopril 20 mg dialy, furosemide 20 mg daily, potassium chloride 10 meq 2 tablets every other day and coreg 3.125 mg one tablet twice daily. No chest pain, shortness of breath, palpitations. Fatigue is better with lowering the coreg.       Appetite has been so so. He is drinking a boost or ensure daily. No fever, chills, nausea, vomiting, constipation, diarrhea.       He continues to take Wellbutrin  mg daily. Memory has not worsened. He has some mild memory loss. Mood has been down a little as the anniversary of his wife's death is at the end of May. He continues to take atorvastatin 40 mg 1 tablet daily and Lovaza 1 g daily for her hyperlipidemia. He denies any neck pain from his history of cervical spine fracture.     Back bothers him from time to time - worse with prolonged sitting.       Past Medical History:   1. Coronary artery disease status post CABG x1 in 1993, and STEMI 01/2010, status post stenting, refused cardiac rehab, followed by Dr. Aguilar.   2. Congestive heart failure. Ejection fraction was 30% Jan 2012  3. History of atrial fibrillation -- EKG Jan 2012 - sinus rhythm  4. Hyperlipidemia  5. Hypertension.   6. Osteoarthritis of the lumbar spine -- Had a flare in 2006 and had MRI at that time.   7. History of nephrolithiasis in 2005.   8. History of left thalamic stroke in the early 2000s per Dr. Matos's notes.   9. Depression -- followed  "by Dr. Bingham .  10. BPH with obstruction, had a cystoscope in 2010. Saw Urology 2011.  11. Right rotator cuff tear with arthropathy. He has pain from time to time. Saw Dr. Narvaez for an injection 2011.     PAST SURGICAL HISTORY:   1. CABG x1 in .   2. Bilateral cataract surgery, right 2005, left .   3. Excision of basal cell carcinoma of the left ear in .     SOCIAL HISTORY: He has never smoked, drinks alcohol once a month. . Has three children. Retired from the [employer].     FAMILY HISTORY: Mother  in her 60s of heart trouble. Father  in his 50s of some sort of abdominal malignancy. He has a half brother, which he is not sure of his health issues.     Screening PSA was 1016. Declines colonoscopy.     PHYSICAL EXAMINATION:   /60   Pulse 60   Ht 5' 8" (1.727 m)   Wt 77.4 kg (170 lb 10.2 oz)   BMI 25.95 kg/m²     GENERAL: He is alert, oriented, no apparent distress. Affect within normal limits.   HEENT: Conjunctivae anicteric. . TM clear  Neck supple. Oropharynx -clear  CHEST: Respiratory effort normal. Lungs clear. Rales right base.    HEART: Regular rate and rhythm without murmurs, gallops or rubs. No lowerextremity edema.       Labs 18 reveiwed    ASSESSMENT AND PLAN:   1. Acute bacterial sinusitis - augmentin  2. Coronary artery disease, CHF and a fib and aortic atherosclerosis- on risk facto modification.    3. Hypertension -stable  4. Hyperlipidemia -- controlled.    5.GERD - asx. Takes ranitidine 300 mg Nightly as needed    6.  Major Depression -- controlled on Wellbutrin  7. Osteoarthritis of lumbar spine -- stable.   8. Benign prostatic hypertrophy -- stable.   9. History of skin cancer -- Saw Derm   10. Thyroid nodule - thyroid ultrasound stable    11 Memory loss- intolerant of aricept. Saw Jade Chacon    12. Vitamin B12 deficiency -on oral vitamin B12  13. Thrombocytopenia -better.  and chronic - repeat CBC at next lab  14. Tortuous " aorta - BP is stable  15. Left atrial appendage thrombus- On Eliquis  Screening -- PSA slightly elevated -PSA upon return  . Declines colonoscopy.   I will see him back in 3 months, sooner if problems arise.

## 2018-03-09 RX ORDER — DOCUSATE SODIUM 100 MG/1
CAPSULE, LIQUID FILLED ORAL
Qty: 90 CAPSULE | Refills: 0 | Status: SHIPPED | OUTPATIENT
Start: 2018-03-09 | End: 2018-06-17 | Stop reason: SDUPTHER

## 2018-04-09 RX ORDER — POTASSIUM CHLORIDE 750 MG/1
CAPSULE, EXTENDED RELEASE ORAL
Qty: 180 CAPSULE | Refills: 0 | Status: SHIPPED | OUTPATIENT
Start: 2018-04-09 | End: 2018-08-28

## 2018-04-22 ENCOUNTER — HOSPITAL ENCOUNTER (OUTPATIENT)
Facility: HOSPITAL | Age: 83
LOS: 1 days | Discharge: HOME | End: 2018-04-23
Attending: EMERGENCY MEDICINE | Admitting: EMERGENCY MEDICINE
Payer: MEDICARE

## 2018-04-22 DIAGNOSIS — R79.89 ELEVATED TROPONIN: ICD-10-CM

## 2018-04-22 DIAGNOSIS — S22.42XA CLOSED FRACTURE OF MULTIPLE RIBS OF LEFT SIDE, INITIAL ENCOUNTER: Primary | ICD-10-CM

## 2018-04-22 DIAGNOSIS — I42.9 CARDIOMYOPATHY: ICD-10-CM

## 2018-04-22 DIAGNOSIS — R07.81 RIB PAIN ON LEFT SIDE: ICD-10-CM

## 2018-04-22 DIAGNOSIS — R07.9 CHEST PAIN: ICD-10-CM
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
## 2018-04-22 LAB
ALBUMIN SERPL BCP-MCNC: 3.2 G/DL
ALP SERPL-CCNC: 79 U/L
ALT SERPL W/O P-5'-P-CCNC: 16 U/L
ANION GAP SERPL CALC-SCNC: 11 MMOL/L
AST SERPL-CCNC: 29 U/L
BASOPHILS # BLD AUTO: 0.02 K/UL
BASOPHILS NFR BLD: 0.3 %
BILIRUB SERPL-MCNC: 0.8 MG/DL
BNP SERPL-MCNC: 993 PG/ML
BUN SERPL-MCNC: 17 MG/DL
CALCIUM SERPL-MCNC: 9.4 MG/DL
CHLORIDE SERPL-SCNC: 105 MMOL/L
CO2 SERPL-SCNC: 23 MMOL/L
CREAT SERPL-MCNC: 1.1 MG/DL
DIFFERENTIAL METHOD: ABNORMAL
EOSINOPHIL # BLD AUTO: 0.1 K/UL
EOSINOPHIL NFR BLD: 1.5 %
ERYTHROCYTE [DISTWIDTH] IN BLOOD BY AUTOMATED COUNT: 14.2 %
EST. GFR  (AFRICAN AMERICAN): >60 ML/MIN/1.73 M^2
EST. GFR  (NON AFRICAN AMERICAN): >60 ML/MIN/1.73 M^2
GLUCOSE SERPL-MCNC: 79 MG/DL
HCT VFR BLD AUTO: 40.4 %
HGB BLD-MCNC: 12.6 G/DL
IMM GRANULOCYTES # BLD AUTO: 0.02 K/UL
IMM GRANULOCYTES NFR BLD AUTO: 0.3 %
INR PPP: 1.2
LYMPHOCYTES # BLD AUTO: 1.8 K/UL
LYMPHOCYTES NFR BLD: 22.3 %
MCH RBC QN AUTO: 29.9 PG
MCHC RBC AUTO-ENTMCNC: 31.2 G/DL
MCV RBC AUTO: 96 FL
MONOCYTES # BLD AUTO: 1 K/UL
MONOCYTES NFR BLD: 13.1 %
NEUTROPHILS # BLD AUTO: 4.9 K/UL
NEUTROPHILS NFR BLD: 62.5 %
NRBC BLD-RTO: 0 /100 WBC
PLATELET # BLD AUTO: 148 K/UL
PMV BLD AUTO: 11.2 FL
POTASSIUM SERPL-SCNC: 4.7 MMOL/L
PROT SERPL-MCNC: 7 G/DL
PROTHROMBIN TIME: 12.4 SEC
RBC # BLD AUTO: 4.21 M/UL
SODIUM SERPL-SCNC: 139 MMOL/L
TROPONIN I SERPL DL<=0.01 NG/ML-MCNC: 0.08 NG/ML
TROPONIN I SERPL DL<=0.01 NG/ML-MCNC: 0.09 NG/ML
WBC # BLD AUTO: 7.85 K/UL

## 2018-04-22 PROCEDURE — 99285 EMERGENCY DEPT VISIT HI MDM: CPT | Mod: 25

## 2018-04-22 PROCEDURE — 99285 EMERGENCY DEPT VISIT HI MDM: CPT | Mod: ,,, | Performed by: EMERGENCY MEDICINE

## 2018-04-22 PROCEDURE — 83880 ASSAY OF NATRIURETIC PEPTIDE: CPT

## 2018-04-22 PROCEDURE — 84484 ASSAY OF TROPONIN QUANT: CPT | Mod: 91

## 2018-04-22 PROCEDURE — 99900035 HC TECH TIME PER 15 MIN (STAT)

## 2018-04-22 PROCEDURE — 99220 PR INITIAL OBSERVATION CARE,LEVL III: CPT | Mod: ,,, | Performed by: PHYSICIAN ASSISTANT

## 2018-04-22 PROCEDURE — 84484 ASSAY OF TROPONIN QUANT: CPT

## 2018-04-22 PROCEDURE — 25000003 PHARM REV CODE 250: Performed by: EMERGENCY MEDICINE

## 2018-04-22 PROCEDURE — 80053 COMPREHEN METABOLIC PANEL: CPT

## 2018-04-22 PROCEDURE — 63600175 PHARM REV CODE 636 W HCPCS: Performed by: PHYSICIAN ASSISTANT

## 2018-04-22 PROCEDURE — 85025 COMPLETE CBC W/AUTO DIFF WBC: CPT

## 2018-04-22 PROCEDURE — 94761 N-INVAS EAR/PLS OXIMETRY MLT: CPT

## 2018-04-22 PROCEDURE — G0378 HOSPITAL OBSERVATION PER HR: HCPCS

## 2018-04-22 PROCEDURE — 93010 ELECTROCARDIOGRAM REPORT: CPT | Mod: ,,, | Performed by: INTERNAL MEDICINE

## 2018-04-22 PROCEDURE — 25000003 PHARM REV CODE 250: Performed by: PHYSICIAN ASSISTANT

## 2018-04-22 PROCEDURE — 93005 ELECTROCARDIOGRAM TRACING: CPT

## 2018-04-22 PROCEDURE — 85610 PROTHROMBIN TIME: CPT

## 2018-04-22 PROCEDURE — 94799 UNLISTED PULMONARY SVC/PX: CPT

## 2018-04-22 RX ORDER — ACETAMINOPHEN 500 MG
1000 TABLET ORAL 3 TIMES DAILY
Status: DISCONTINUED | OUTPATIENT
Start: 2018-04-23 | End: 2018-04-23 | Stop reason: HOSPADM

## 2018-04-22 RX ORDER — ALBUTEROL SULFATE 0.83 MG/ML
2.5 SOLUTION RESPIRATORY (INHALATION) EVERY 4 HOURS PRN
Status: DISCONTINUED | OUTPATIENT
Start: 2018-04-22 | End: 2018-04-23 | Stop reason: HOSPADM

## 2018-04-22 RX ORDER — SODIUM CHLORIDE 0.9 % (FLUSH) 0.9 %
5 SYRINGE (ML) INJECTION
Status: DISCONTINUED | OUTPATIENT
Start: 2018-04-22 | End: 2018-04-23 | Stop reason: HOSPADM

## 2018-04-22 RX ORDER — IBUPROFEN 600 MG/1
600 TABLET ORAL EVERY 6 HOURS PRN
Status: DISCONTINUED | OUTPATIENT
Start: 2018-04-22 | End: 2018-04-22

## 2018-04-22 RX ORDER — NAPROXEN 500 MG/1
500 TABLET ORAL
Status: COMPLETED | OUTPATIENT
Start: 2018-04-22 | End: 2018-04-22

## 2018-04-22 RX ORDER — ACETAMINOPHEN 325 MG/1
650 TABLET ORAL EVERY 8 HOURS PRN
Status: DISCONTINUED | OUTPATIENT
Start: 2018-04-22 | End: 2018-04-23 | Stop reason: HOSPADM

## 2018-04-22 RX ORDER — ASPIRIN 325 MG
325 TABLET ORAL
Status: COMPLETED | OUTPATIENT
Start: 2018-04-22 | End: 2018-04-22

## 2018-04-22 RX ORDER — ATORVASTATIN CALCIUM 20 MG/1
40 TABLET, FILM COATED ORAL DAILY
Status: DISCONTINUED | OUTPATIENT
Start: 2018-04-23 | End: 2018-04-23 | Stop reason: HOSPADM

## 2018-04-22 RX ORDER — IBUPROFEN 200 MG
24 TABLET ORAL
Status: DISCONTINUED | OUTPATIENT
Start: 2018-04-22 | End: 2018-04-23 | Stop reason: HOSPADM

## 2018-04-22 RX ORDER — FUROSEMIDE 20 MG/1
20 TABLET ORAL DAILY
Status: DISCONTINUED | OUTPATIENT
Start: 2018-04-23 | End: 2018-04-23 | Stop reason: HOSPADM

## 2018-04-22 RX ORDER — CARVEDILOL 3.12 MG/1
3.12 TABLET ORAL 2 TIMES DAILY
Status: DISCONTINUED | OUTPATIENT
Start: 2018-04-22 | End: 2018-04-23 | Stop reason: HOSPADM

## 2018-04-22 RX ORDER — BUPROPION HYDROCHLORIDE 150 MG/1
300 TABLET ORAL DAILY
Status: DISCONTINUED | OUTPATIENT
Start: 2018-04-23 | End: 2018-04-23 | Stop reason: HOSPADM

## 2018-04-22 RX ORDER — OXYCODONE HYDROCHLORIDE 5 MG/1
10 TABLET ORAL EVERY 6 HOURS PRN
Status: DISCONTINUED | OUTPATIENT
Start: 2018-04-22 | End: 2018-04-22

## 2018-04-22 RX ORDER — IBUPROFEN 200 MG
16 TABLET ORAL
Status: DISCONTINUED | OUTPATIENT
Start: 2018-04-22 | End: 2018-04-23 | Stop reason: HOSPADM

## 2018-04-22 RX ORDER — ONDANSETRON 8 MG/1
8 TABLET, ORALLY DISINTEGRATING ORAL EVERY 8 HOURS PRN
Status: DISCONTINUED | OUTPATIENT
Start: 2018-04-22 | End: 2018-04-23 | Stop reason: HOSPADM

## 2018-04-22 RX ORDER — AMOXICILLIN 250 MG
1 CAPSULE ORAL 2 TIMES DAILY
Status: DISCONTINUED | OUTPATIENT
Start: 2018-04-23 | End: 2018-04-23 | Stop reason: HOSPADM

## 2018-04-22 RX ORDER — ASPIRIN 81 MG/1
81 TABLET ORAL DAILY
Status: DISCONTINUED | OUTPATIENT
Start: 2018-04-23 | End: 2018-04-23 | Stop reason: HOSPADM

## 2018-04-22 RX ORDER — FUROSEMIDE 10 MG/ML
20 INJECTION INTRAMUSCULAR; INTRAVENOUS ONCE
Status: COMPLETED | OUTPATIENT
Start: 2018-04-22 | End: 2018-04-22

## 2018-04-22 RX ORDER — GLUCAGON 1 MG
1 KIT INJECTION
Status: DISCONTINUED | OUTPATIENT
Start: 2018-04-22 | End: 2018-04-23 | Stop reason: HOSPADM

## 2018-04-22 RX ORDER — ACETAMINOPHEN 325 MG/1
650 TABLET ORAL
Status: COMPLETED | OUTPATIENT
Start: 2018-04-22 | End: 2018-04-22

## 2018-04-22 RX ORDER — LISINOPRIL 20 MG/1
20 TABLET ORAL DAILY
Status: DISCONTINUED | OUTPATIENT
Start: 2018-04-23 | End: 2018-04-23 | Stop reason: HOSPADM

## 2018-04-22 RX ORDER — OXYCODONE HYDROCHLORIDE 5 MG/1
5 TABLET ORAL EVERY 6 HOURS PRN
Status: DISCONTINUED | OUTPATIENT
Start: 2018-04-22 | End: 2018-04-23 | Stop reason: HOSPADM

## 2018-04-22 RX ORDER — IBUPROFEN 400 MG/1
800 TABLET ORAL EVERY 6 HOURS PRN
Status: DISCONTINUED | OUTPATIENT
Start: 2018-04-22 | End: 2018-04-23 | Stop reason: HOSPADM

## 2018-04-22 RX ADMIN — ASPIRIN 325 MG ORAL TABLET 325 MG: 325 PILL ORAL at 10:04

## 2018-04-22 RX ADMIN — APIXABAN 5 MG: 5 TABLET, FILM COATED ORAL at 10:04

## 2018-04-22 RX ADMIN — CARVEDILOL 3.12 MG: 3.12 TABLET, FILM COATED ORAL at 10:04

## 2018-04-22 RX ADMIN — FUROSEMIDE 20 MG: 10 INJECTION, SOLUTION INTRAMUSCULAR; INTRAVENOUS at 11:04

## 2018-04-22 RX ADMIN — NAPROXEN 500 MG: 500 TABLET ORAL at 06:04

## 2018-04-22 RX ADMIN — ACETAMINOPHEN 650 MG: 325 TABLET ORAL at 07:04

## 2018-04-22 NOTE — ED PROVIDER NOTES
Encounter Date: 4/22/2018    SCRIBE #1 NOTE: I, Carlos Alberto Anaya, am scribing for, and in the presence of,  Dr. Robin. I have scribed the following portions of the note - the APC attestation.       History     Chief Complaint   Patient presents with    Fall     Tripped and fell onto grass. Denies hitting head.      Mr Bradshaw is a 83YOWM who presents s/p mechanical fall with pain to L ribs, pertinent PMHx Eliquis use for cardiac Hx. Patient states he was wearing slippers in the front yard when he tripped over some grass. He fell on his left side and reports immediate pain to lower left ribcage with no associated radiation. He denies SOB, CP, abdominal pain, headache, AMS. Denies pain to L hip, L shoulder, L wrist. He is compliant with Eliquis Rx.          Review of patient's allergies indicates:   Allergen Reactions    Prednisone Other (See Comments)     hallucinations     Past Medical History:   Diagnosis Date    Anemia     Basal cell carcinoma 7/2014    left medial canthus    Basal cell carcinoma 3/9/2015    right forehead    Basal cell carcinoma 09/08/2016    left neck (scoop shave)    Basal cell carcinoma 12/09/2016    left preauricular    BPH (benign prostatic hypertrophy) 8/17/2012    CAD (coronary artery disease) 8/17/2012    Cervical spine fracture 11/20/2012    CHF (congestive heart failure) 8/17/2012    Depression 8/17/2012    GERD (gastroesophageal reflux disease) 3/27/2014    Heart attack     History of atrial fibrillation 8/17/2012    Hyperlipidemia     Hypertension     Kidney stones 8/17/2012    Memory loss     Myocardial infarction     Osteoarthritis of lumbar spine 8/17/2012    Shoulder pain 8/17/2012    Stroke 8/17/2012     Past Surgical History:   Procedure Laterality Date    basal cell carcinoma left ear      CARDIAC SURGERY      CATARACT EXTRACTION W/  INTRAOCULAR LENS IMPLANT Bilateral     CATARACT EXTRACTION, BILATERAL      CORONARY ARTERY BYPASS GRAFT  1990    x1     TONSILLECTOMY       Family History   Problem Relation Age of Onset    Cancer Father     Heart failure Mother     Heart disease Mother     No Known Problems Sister     No Known Problems Brother     No Known Problems Maternal Aunt     No Known Problems Maternal Uncle     No Known Problems Paternal Aunt     No Known Problems Paternal Uncle     No Known Problems Maternal Grandmother     No Known Problems Maternal Grandfather     No Known Problems Paternal Grandmother     No Known Problems Paternal Grandfather     Amblyopia Neg Hx     Blindness Neg Hx     Cataracts Neg Hx     Diabetes Neg Hx     Glaucoma Neg Hx     Hypertension Neg Hx     Macular degeneration Neg Hx     Retinal detachment Neg Hx     Strabismus Neg Hx     Thyroid disease Neg Hx     Stroke Neg Hx     Melanoma Neg Hx     Psoriasis Neg Hx     Lupus Neg Hx     Eczema Neg Hx     Acne Neg Hx      Social History   Substance Use Topics    Smoking status: Never Smoker    Smokeless tobacco: Never Used    Alcohol use No      Comment: social drinker     Review of Systems   Constitutional: Negative for chills, diaphoresis, fatigue and fever.   HENT: Negative for congestion, ear pain, facial swelling, hearing loss, nosebleeds and sore throat.    Eyes: Negative for photophobia and visual disturbance.   Respiratory: Negative for cough, shortness of breath and wheezing.    Cardiovascular: Negative for chest pain, palpitations and leg swelling.   Gastrointestinal: Negative for abdominal pain, constipation, diarrhea, nausea and vomiting.   Genitourinary: Negative for dysuria, flank pain, frequency, hematuria and urgency.   Musculoskeletal: Positive for myalgias (over L lateral ribcage).   Skin: Negative for color change, rash and wound.   Neurological: Negative for dizziness, syncope, weakness, light-headedness, numbness and headaches.   Psychiatric/Behavioral: Negative for agitation, confusion and decreased concentration. The patient is  "not nervous/anxious.        Physical Exam     Initial Vitals [04/22/18 1804]   BP Pulse Resp Temp SpO2   (!) 147/70 76 16 97.7 °F (36.5 °C) (!) 94 %      MAP       95.67         Physical Exam    Nursing note and vitals reviewed.  Constitutional: He appears well-developed and well-nourished. He is not diaphoretic. No distress.   Initial sat 94% on RA. Improved to 100% on RA on reeval.   HENT:   Head: Normocephalic and atraumatic.   Right Ear: External ear normal.   Left Ear: External ear normal.   Mucous membranes dry, patient breaths with mouth open at baseline.   Eyes: Conjunctivae and EOM are normal. Pupils are equal, round, and reactive to light.   Neck: Normal range of motion.   Cardiovascular: Normal rate, regular rhythm, normal heart sounds and intact distal pulses.   Pulmonary/Chest: Breath sounds normal. No respiratory distress. He has no wheezes. He exhibits tenderness.       TTP over left ribs lateral and inferior to left breast. No focal area indicated, but states the whole area "feels sore". No skin laceration, obvious deformity, ecchymosis, rash noted. No increase work of breathing noted, no cyanosis present. No crepitus appreciated.   Abdominal: Soft. Bowel sounds are normal.   Musculoskeletal: Normal range of motion. He exhibits tenderness. He exhibits no edema.   Neurological: He is alert and oriented to person, place, and time. He has normal strength. No cranial nerve deficit or sensory deficit.   Skin: Skin is warm and dry. Capillary refill takes less than 2 seconds. No rash and no abscess noted.   Psychiatric: He has a normal mood and affect. His behavior is normal. Thought content normal.         ED Course   Procedures  Labs Reviewed   CBC W/ AUTO DIFFERENTIAL - Abnormal; Notable for the following:        Result Value    RBC 4.21 (*)     Hemoglobin 12.6 (*)     MCHC 31.2 (*)     Platelets 148 (*)     All other components within normal limits    Narrative:     ULISES SHARED    COMPREHENSIVE " METABOLIC PANEL - Abnormal; Notable for the following:     Albumin 3.2 (*)     All other components within normal limits    Narrative:     LAVENDER SHARED    B-TYPE NATRIURETIC PEPTIDE - Abnormal; Notable for the following:      (*)     All other components within normal limits    Narrative:     LAVENDER SHARED    TROPONIN I - Abnormal; Notable for the following:     Troponin I 0.083 (*)     All other components within normal limits    Narrative:     LAVENDER SHARED    TROPONIN I - Abnormal; Notable for the following:     Troponin I 0.086 (*)     All other components within normal limits   PROTIME-INR    Narrative:     LAVENDER SHARED          Imaging Results          CT Thorax Without Contrast (Final result)     Abnormal  Result time 04/22/18 21:28:36    Final result by Nabeel Anne MD (04/22/18 21:28:36)                 Impression:      Mildly displaced fractures of the left anterior 5th and 6th ribs.  No pneumothorax or significant underlying pulmonary contusion.  The appearance of the left lung base on recent chest radiograph is explained by pleural fluid and passive atelectasis.    Bilateral small pleural effusions, right greater than left with associated dependent atelectatic change.    Dense coronary and aortic atherosclerosis with postsurgical change of CABG.    Degenerative change of the spine with mild compression deformity of the L1 vertebral body, new since 09/06/2012.    Additional findings include a small hiatal hernia, left lower lobe calcified granuloma, and right thyroid hypodensity, similar to CT 09/06/2012.    This report was flagged in Epic as abnormal.    Electronically signed by resident: Arlette Hernandez  Date:    04/22/2018  Time:    21:00    Electronically signed by: Nabeel Anne MD  Date:    04/22/2018  Time:    21:28             Narrative:    EXAMINATION:  CT CHEST WITHOUT CONTRAST    CLINICAL HISTORY:  fall , left sided rib pain, hypoxia    TECHNIQUE:  Low dose axial  images, sagittal and coronal reformations were obtained from the thoracic inlet to the lung bases. Contrast was not administered.    COMPARISON:  CT chest without contrast 09/06/2012, chest radiograph 04/22/2018, thyroid ultrasound 09/13/2012, 10/16/2013    FINDINGS:  Evaluation is limited by extensive streak artifact due to the patient's arms overlying the field of view.    Base of Neck: There is a 1.6 cm right thyroid hypodensity, previously seen on CT and dedicated thyroid ultrasound in 2012, shown to be stable on thyroid ultrasound in 2013.    Aorta: Left-sided aortic arch with 3 branch vessels.  No aneurysm, however there is severe calcific atherosclerosis.    Heart: Mildly enlarged without pericardial effusion.  There is dense calcific coronary artery atherosclerosis with postsurgical change of cardiac bypass.    Pulmonary vasculature: Pulmonary arteries distribute normally.  There are 5 pulmonary veins.    Shital/Mediastinum: No pathologic bright enlargement.    Trachea and Proximal airways: Small amount of dependent fluid or secretions noted in the lower trachea.    Lungs/Pleura: Symmetrically expanded without significant consolidation, pneumothorax, or mass.  There is bilateral small volume pleural fluid, right greater than left with associated dependent atelectatic change.  There is a calcified granuloma within the lateral basal segment of the left lower lobe.  No pleural thickening.    Esophagus: Normal course and caliber, noting a small hiatal hernia.    Upper Abdomen: No significant abnormality of the partially imaged upper abdomen.    Bones: Minimally displaced fractures of the anterior left 5th and 6th ribs.  No suspicious lytic or sclerotic lesions.  Postsurgical change of sternotomy.  There is degenerative change of the spine with mild compression deformity of the L1 vertebral body, new since 09/06/2012. Advanced degenerative changes involving the right shoulder joint.    Thoracic soft tissues:  Normal.                               CT Head Without Contrast (Final result)  Result time 04/22/18 20:42:36    Final result by Lawrence Chapman MD (04/22/18 20:42:36)                 Impression:      1. Motion limited exam, no convincing acute intracranial abnormalities.  2. Stable left basal ganglia and left thalamic encephalomalacia.      Electronically signed by: Lawrence Chapman MD  Date:    04/22/2018  Time:    20:42             Narrative:    EXAMINATION:  CT HEAD WITHOUT CONTRAST    CLINICAL HISTORY:  fall on elaquis;    TECHNIQUE:  Low dose axial images were obtained through the head.  Coronal and sagittal reformations were also performed. Contrast was not administered.    COMPARISON:  06/01/2015, MRI 06/12/2015    FINDINGS:  Examination is limited secondary to patient motion.    There is generalized cerebral volume loss.  There is hypoattenuation in a periventricular fashion, likely sequela of chronic microvascular ischemic change.There is stable encephalomalacia involving the left basal ganglia and left thalamus.  There is no evidence of acute major vascular territory infarct, hemorrhage, or mass.  There is no hydrocephalus.  There are no abnormal extra-axial fluid collections.  The paranasal sinuses and mastoid air cells are clear, and there is no evidence of calvarial fracture.  The visualized soft tissues are unremarkable.                               X-Ray Chest PA And Lateral (Final result)  Result time 04/22/18 19:28:13    Final result by Nabeel Anne MD (04/22/18 19:28:13)                 Impression:      Mild left basilar airspace disease and/or subsegmental atelectasis.  Findings could represent pulmonary contusion in the setting of trauma versus pneumonia, asymmetric pulmonary edema, or subsegmental atelectasis.  Correlation recommended.      Electronically signed by: Nabeel Anne MD  Date:    04/22/2018  Time:    19:28             Narrative:    EXAMINATION:  XR CHEST PA AND  LATERAL    CLINICAL HISTORY:  Pleurodynia    TECHNIQUE:  Frontal and lateral views of the chest were performed.    COMPARISON:  09/06/2015.    FINDINGS:  Cardiac silhouette is mildly enlarged but stable in size.  Tortuous thoracic aorta with atherosclerotic calcifications.  Mild central vascular congestion and left basilar airspace disease.  Mild right basilar subsegmental atelectasis.  Possible trace bilateral pleural effusions and/or pleural scarring.  No pneumothorax.  Lucency under the right hemidiaphragm is favored to represent colonic interposition, as the appearance is stable when compared with 09/06/2015.                              Labs Reviewed   CBC W/ AUTO DIFFERENTIAL - Abnormal; Notable for the following:        Result Value    RBC 4.21 (*)     Hemoglobin 12.6 (*)     MCHC 31.2 (*)     Platelets 148 (*)     All other components within normal limits    Narrative:     ULISES SHARED    COMPREHENSIVE METABOLIC PANEL - Abnormal; Notable for the following:     Albumin 3.2 (*)     All other components within normal limits    Narrative:     LAVENDER SHARED    B-TYPE NATRIURETIC PEPTIDE - Abnormal; Notable for the following:      (*)     All other components within normal limits    Narrative:     LAVENDER SHARED    TROPONIN I - Abnormal; Notable for the following:     Troponin I 0.083 (*)     All other components within normal limits    Narrative:     LAVENDER SHARED    TROPONIN I - Abnormal; Notable for the following:     Troponin I 0.086 (*)     All other components within normal limits   PROTIME-INR    Narrative:     LAVENDER SHARED          X-Rays:   Independently Interpreted Readings:   Head CT: No hemorrhage.  No skull fracture.  No acute stroke.     Medical Decision Making:   History:   Old Medical Records: I decided to obtain old medical records.  Initial Assessment:   Patient presents s/p mechanical fall with rib pain on his left side. TTP over left lateral ribs. No increased work of  "breathing, sat appropriately on room air. No crepitus appreciated. No AMS, L hip, L wrist, L shoulder pain. Patient is on Eliquis.  Differential Diagnosis:   DDX rib contusion vs fracture vs intercostal muscle strain. Physical exam and history taking decrease clinical suspicion for syncope, ACS, PE, atelectasis, splenic rupture.   Clinical Tests:   Radiological Study: Ordered and Reviewed  ED Management:  Naproxen and tylenol given in ED for pain control. Plain films reveal "Mild left basilar airspace disease and/or subsegmental atelectasis.  Findings could represent pulmonary contusion in the setting of trauma versus pneumonia, asymmetric pulmonary edema, or subsegmental atelectasis". We will CT chest to further differentiate imaging results. Patient and family continue to deny SOB, AMS. Chest CT reveals rib 5 and 6 fractures with no evidence of acute atelectasis, pulmonary contusion or worsening pulmonary effusions. Troponin is elevated, will conduct serial trops. Initial EKG with no STEMI/NSTEMI, will repeat. Patient given ASA and placed on telemetry. Inpatient admission requested. Patient and family agreed with plan of care and voiced understanding.             Scribe Attestation:   Scribe #1: I performed the above scribed service and the documentation accurately describes the services I performed. I attest to the accuracy of the note.    Attending Attestation:     Physician Attestation Statement for NP/PA:   I have conducted a face to face encounter with this patient in addition to the NP/PA, due to Medical Complexity          Attending ED Notes:   Evaluation of fall, sounds mechanical not syncopal. Has left sided chest tenderness. On eliquis. XR concerning for pulmonary contusion. Chest CT obtained which demonstrated chronic pleural effusion. Head CT negative. Initial Trop negative. Given age and risk factors will admit for serial troponins, incentive spirometry             Clinical Impression:   The primary " encounter diagnosis was Closed fracture of multiple ribs of left side, initial encounter. Diagnoses of Rib pain on left side, Chest pain, Elevated troponin, and Cardiomyopathy were also pertinent to this visit.    Disposition:   Disposition: Admitted  Condition: Stable    Cinthia Robin MD  04/24/2018                      Maira Chong PA-C  04/22/18 2208       Cinthia Robin MD  04/24/18 2325

## 2018-04-22 NOTE — ED NOTES
Patient identifiers verified and correct for Mr Bradshaw  C/C: Fall with pain to left ribs   APPEARANCE: awake and alert in NAD.  SKIN: warm, dry and intact. No breakdown or bruising.  MUSCULOSKELETAL: Patient moving all extremities spontaneously, no obvious swelling or deformities noted. Ambulates independently.  RESPIRATORY: Denies shortness of breath.Respirations unlabored. Denies cough , denies SOB.   CARDIAC: Denies CP, 2+ distal pulses; no peripheral edema  ABDOMEN: S/ND/NT, Denies nausea  : voids spontaneously, denies difficulty  Neurologic: AAO x 4; follows commands equal strength in all extremities; denies numbness/tingling. Denies dizziness Pain to left rib/chest area after fall, no bruising noted.

## 2018-04-22 NOTE — ED TRIAGE NOTES
Patient state she tripped and fell on grass with pain to left ribs, states he tripped. Daughter states he was wearing slippers.

## 2018-04-23 VITALS
BODY MASS INDEX: 25.76 KG/M2 | DIASTOLIC BLOOD PRESSURE: 76 MMHG | HEIGHT: 68 IN | RESPIRATION RATE: 15 BRPM | SYSTOLIC BLOOD PRESSURE: 119 MMHG | HEART RATE: 74 BPM | OXYGEN SATURATION: 97 % | WEIGHT: 170 LBS | TEMPERATURE: 98 F

## 2018-04-23 LAB
ANION GAP SERPL CALC-SCNC: 11 MMOL/L
AORTIC VALVE REGURGITATION: ABNORMAL
AORTIC VALVE STENOSIS: ABNORMAL
BASOPHILS # BLD AUTO: 0.02 K/UL
BASOPHILS NFR BLD: 0.3 %
BUN SERPL-MCNC: 18 MG/DL
CALCIUM SERPL-MCNC: 8.7 MG/DL
CHLORIDE SERPL-SCNC: 106 MMOL/L
CO2 SERPL-SCNC: 22 MMOL/L
CREAT SERPL-MCNC: 0.9 MG/DL
DIASTOLIC DYSFUNCTION: YES
DIFFERENTIAL METHOD: ABNORMAL
EOSINOPHIL # BLD AUTO: 0.2 K/UL
EOSINOPHIL NFR BLD: 3.8 %
ERYTHROCYTE [DISTWIDTH] IN BLOOD BY AUTOMATED COUNT: 14 %
EST. GFR  (AFRICAN AMERICAN): >60 ML/MIN/1.73 M^2
EST. GFR  (NON AFRICAN AMERICAN): >60 ML/MIN/1.73 M^2
ESTIMATED PA SYSTOLIC PRESSURE: 70.41
GLUCOSE SERPL-MCNC: 77 MG/DL
HCT VFR BLD AUTO: 35.7 %
HGB BLD-MCNC: 11.7 G/DL
IMM GRANULOCYTES # BLD AUTO: 0.01 K/UL
IMM GRANULOCYTES NFR BLD AUTO: 0.2 %
LYMPHOCYTES # BLD AUTO: 2.2 K/UL
LYMPHOCYTES NFR BLD: 36 %
MAGNESIUM SERPL-MCNC: 1.6 MG/DL
MCH RBC QN AUTO: 30.5 PG
MCHC RBC AUTO-ENTMCNC: 32.8 G/DL
MCV RBC AUTO: 93 FL
MITRAL VALVE MOBILITY: NORMAL
MITRAL VALVE REGURGITATION: ABNORMAL
MONOCYTES # BLD AUTO: 1 K/UL
MONOCYTES NFR BLD: 15.5 %
NEUTROPHILS # BLD AUTO: 2.7 K/UL
NEUTROPHILS NFR BLD: 44.2 %
NRBC BLD-RTO: 0 /100 WBC
PHOSPHATE SERPL-MCNC: 3.5 MG/DL
PLATELET # BLD AUTO: 131 K/UL
PMV BLD AUTO: 11.1 FL
POTASSIUM SERPL-SCNC: 4.1 MMOL/L
RBC # BLD AUTO: 3.84 M/UL
RETIRED EF AND QEF - SEE NOTES: 25 (ref 55–65)
SODIUM SERPL-SCNC: 139 MMOL/L
TRICUSPID VALVE REGURGITATION: ABNORMAL
TROPONIN I SERPL DL<=0.01 NG/ML-MCNC: 0.07 NG/ML
WBC # BLD AUTO: 6.11 K/UL

## 2018-04-23 PROCEDURE — G0378 HOSPITAL OBSERVATION PER HR: HCPCS

## 2018-04-23 PROCEDURE — 85025 COMPLETE CBC W/AUTO DIFF WBC: CPT

## 2018-04-23 PROCEDURE — 99217 PR OBSERVATION CARE DISCHARGE: CPT | Mod: ,,, | Performed by: PHYSICIAN ASSISTANT

## 2018-04-23 PROCEDURE — 93306 TTE W/DOPPLER COMPLETE: CPT | Mod: 26,,, | Performed by: INTERNAL MEDICINE

## 2018-04-23 PROCEDURE — 83735 ASSAY OF MAGNESIUM: CPT

## 2018-04-23 PROCEDURE — 84100 ASSAY OF PHOSPHORUS: CPT

## 2018-04-23 PROCEDURE — 93306 TTE W/DOPPLER COMPLETE: CPT

## 2018-04-23 PROCEDURE — 84484 ASSAY OF TROPONIN QUANT: CPT

## 2018-04-23 PROCEDURE — 80048 BASIC METABOLIC PNL TOTAL CA: CPT

## 2018-04-23 PROCEDURE — 25000003 PHARM REV CODE 250: Performed by: PHYSICIAN ASSISTANT

## 2018-04-23 RX ADMIN — ACETAMINOPHEN 1000 MG: 500 TABLET ORAL at 09:04

## 2018-04-23 RX ADMIN — STANDARDIZED SENNA CONCENTRATE AND DOCUSATE SODIUM 1 TABLET: 8.6; 5 TABLET, FILM COATED ORAL at 09:04

## 2018-04-23 RX ADMIN — BUPROPION HYDROCHLORIDE 300 MG: 150 TABLET, EXTENDED RELEASE ORAL at 09:04

## 2018-04-23 RX ADMIN — APIXABAN 5 MG: 5 TABLET, FILM COATED ORAL at 09:04

## 2018-04-23 RX ADMIN — ASPIRIN 81 MG: 81 TABLET, COATED ORAL at 09:04

## 2018-04-23 RX ADMIN — ATORVASTATIN CALCIUM 40 MG: 20 TABLET, FILM COATED ORAL at 09:04

## 2018-04-23 RX ADMIN — CARVEDILOL 3.12 MG: 3.12 TABLET, FILM COATED ORAL at 09:04

## 2018-04-23 RX ADMIN — FUROSEMIDE 20 MG: 20 TABLET ORAL at 09:04

## 2018-04-23 RX ADMIN — LISINOPRIL 20 MG: 20 TABLET ORAL at 09:04

## 2018-04-23 NOTE — ASSESSMENT & PLAN NOTE
- continue home meds as listed above  Systolic (24hrs), Av , Min:119 , Max:151   Diastolic (24hrs), Av, Min:69, Max:94

## 2018-04-23 NOTE — SUBJECTIVE & OBJECTIVE
Past Medical History:   Diagnosis Date    Anemia     Basal cell carcinoma 7/2014    left medial canthus    Basal cell carcinoma 3/9/2015    right forehead    Basal cell carcinoma 09/08/2016    left neck (scoop shave)    Basal cell carcinoma 12/09/2016    left preauricular    BPH (benign prostatic hypertrophy) 8/17/2012    CAD (coronary artery disease) 8/17/2012    Cervical spine fracture 11/20/2012    CHF (congestive heart failure) 8/17/2012    Depression 8/17/2012    GERD (gastroesophageal reflux disease) 3/27/2014    Heart attack     History of atrial fibrillation 8/17/2012    Hyperlipidemia     Hypertension     Kidney stones 8/17/2012    Memory loss     Myocardial infarction     Osteoarthritis of lumbar spine 8/17/2012    Shoulder pain 8/17/2012    Stroke 8/17/2012       Past Surgical History:   Procedure Laterality Date    basal cell carcinoma left ear      CARDIAC SURGERY      CATARACT EXTRACTION W/  INTRAOCULAR LENS IMPLANT Bilateral     CATARACT EXTRACTION, BILATERAL      CORONARY ARTERY BYPASS GRAFT  1990    x1    TONSILLECTOMY         Review of patient's allergies indicates:   Allergen Reactions    Prednisone Other (See Comments)     hallucinations       Current Facility-Administered Medications on File Prior to Encounter   Medication    cyanocobalamin injection 1,000 mcg     Current Outpatient Prescriptions on File Prior to Encounter   Medication Sig    apixaban (ELIQUIS) 5 mg Tab TAKE 1 TABLET(5 MG) BY MOUTH TWICE DAILY    aspirin (ECOTRIN) 81 MG EC tablet Take 81 mg by mouth. Pt taken every other day with potasium    atorvastatin (LIPITOR) 40 MG tablet Take 1 tablet (40 mg total) by mouth once daily.    buPROPion (WELLBUTRIN XL) 300 MG 24 hr tablet TAKE 1 TABLET(300 MG) BY MOUTH EVERY MORNING    carvedilol (COREG) 3.125 MG tablet Take 1 tablet (3.125 mg total) by mouth 2 (two) times daily.    furosemide (LASIX) 20 MG tablet TAKE 1 TABLET BY MOUTH ONCE DAILY PLUS ONE  EXTRA AS NEEDED    lisinopril (PRINIVIL,ZESTRIL) 20 MG tablet Take 1 tablet (20 mg total) by mouth once daily.    potassium chloride (MICRO-K) 10 MEQ CpSR TAKE 2 CAPSULES BY MOUTH EVERY DAY FOR 5 DAYS, THEN TAKE 2 CAPSULES EVERY OTHER DAY. TAKE DAILY WHEN TAKING EXTRA LASIX    cyanocobalamin (VITAMIN B-12) 1000 MCG tablet Take 1 tablet (1,000 mcg total) by mouth once daily.    desonide (DESOWEN) 0.05 % cream AAA bid    desonide (DESOWEN) 0.05 % lotion APPLY EXTERNALLY TO THE AFFECTED AREA TWICE DAILY AS NEEDED FOR REDNESS AND ITCHING     mg capsule TAKE 1 CAPSULE BY MOUTH ONCE DAILY    hydrocortisone (WESTCORT) 0.2 % cream Apply topically 2 (two) times daily.      nitroGLYCERIN (NITROSTAT) 0.4 MG SL tablet Place 1 tablet (0.4 mg total) under the tongue every 5 (five) minutes as needed.    omega-3 acid ethyl esters (LOVAZA) 1 gram capsule TAKE 2 CAPSULES BY MOUTH EVERY DAY    potassium chloride (MICRO-K) 10 MEQ CpSR TAKE 2 CAPSULES BY MOUTH EVERY DAY FOR 5 DAYS, THEN TAKE 2 CAPSULES EVERY OTHER DAY. TAKE DAILY WHEN TAKING EXTRA LASIX     Family History     Problem Relation (Age of Onset)    Cancer Father    Heart disease Mother    Heart failure Mother    No Known Problems Sister, Brother, Maternal Aunt, Maternal Uncle, Paternal Aunt, Paternal Uncle, Maternal Grandmother, Maternal Grandfather, Paternal Grandmother, Paternal Grandfather        Social History Main Topics    Smoking status: Never Smoker    Smokeless tobacco: Never Used    Alcohol use No      Comment: social drinker    Drug use: No    Sexual activity: Not Currently     Review of Systems   Constitutional: Negative for chills and fever.   Respiratory: Negative for cough, shortness of breath and wheezing.    Cardiovascular: Positive for chest pain. Negative for palpitations and leg swelling.   Gastrointestinal: Negative for abdominal pain, nausea and vomiting.   Genitourinary: Negative for difficulty urinating and dysuria.    Musculoskeletal: Negative for arthralgias, back pain and gait problem.   Skin: Negative for rash and wound.   Neurological: Negative for dizziness, syncope, weakness and headaches.   Psychiatric/Behavioral: Negative for agitation and confusion.     Objective:     Vital Signs (Most Recent):  Temp: 97.9 °F (36.6 °C) (04/22/18 2051)  Pulse: 72 (04/22/18 2222)  Resp: 16 (04/22/18 2222)  BP: 124/72 (04/22/18 2222)  SpO2: 97 % (04/22/18 2222) Vital Signs (24h Range):  Temp:  [97.7 °F (36.5 °C)-97.9 °F (36.6 °C)] 97.9 °F (36.6 °C)  Pulse:  [72-88] 72  Resp:  [13-18] 16  SpO2:  [94 %-100 %] 97 %  BP: (124-151)/(70-94) 124/72     Weight: 77.1 kg (170 lb)  Body mass index is 25.85 kg/m².    Physical Exam   Constitutional: He is oriented to person, place, and time. He appears well-developed and well-nourished. No distress.   HENT:   Head: Normocephalic and atraumatic.   Eyes: EOM are normal. Pupils are equal, round, and reactive to light.   Neck: Normal range of motion. Neck supple.   Cardiovascular: Normal rate and regular rhythm.    Murmur heard.  Pulmonary/Chest: Breath sounds normal. No respiratory distress. He has no wheezes.   Abdominal: Soft. Bowel sounds are normal. He exhibits no distension. There is no tenderness.   Musculoskeletal: Normal range of motion. He exhibits tenderness (L chest wall). He exhibits no edema or deformity.   No TTP or pain with ROM of neck, shoulder, elbow, wrist, hips, pelvis, knees, ankle. No bruising to chest wall   Neurological: He is alert and oriented to person, place, and time. No cranial nerve deficit.   Skin: Skin is warm and dry. He is not diaphoretic.   Psychiatric: He has a normal mood and affect. His behavior is normal. Judgment and thought content normal.   Nursing note and vitals reviewed.        CRANIAL NERVES     CN III, IV, VI   Pupils are equal, round, and reactive to light.  Extraocular motions are normal.        Significant Labs:   CBC:   Recent Labs  Lab 04/22/18 1957    WBC 7.85   HGB 12.6*   HCT 40.4   *     CMP:   Recent Labs  Lab 04/22/18 1957      K 4.7      CO2 23   GLU 79   BUN 17   CREATININE 1.1   CALCIUM 9.4   PROT 7.0   ALBUMIN 3.2*   BILITOT 0.8   ALKPHOS 79   AST 29   ALT 16   ANIONGAP 11   EGFRNONAA >60.0     Cardiac Markers:   Recent Labs  Lab 04/22/18 1957   *     Troponin:   Recent Labs  Lab 04/22/18 1957   TROPONINI 0.083*     TSH: No results for input(s): TSH in the last 4320 hours.  Urine Culture: No results for input(s): LABURIN in the last 48 hours.  Urine Studies: No results for input(s): COLORU, APPEARANCEUA, PHUR, SPECGRAV, PROTEINUA, GLUCUA, KETONESU, BILIRUBINUA, OCCULTUA, NITRITE, UROBILINOGEN, LEUKOCYTESUR, RBCUA, WBCUA, BACTERIA, SQUAMEPITHEL, HYALINECASTS in the last 48 hours.    Invalid input(s): TRELL    Significant Imaging: CT: I have reviewed all pertinent results/findings within the past 24 hours and my personal findings are:  rib fxr  EKG: I have reviewed all pertinent results/findings within the past 24 hours and my personal findings are: afib

## 2018-04-23 NOTE — HOSPITAL COURSE
Patient admitted to observation for evaluation of fall with rib fracture. Imaging showed stable left 5 & 6 rib fracture. Patient stable. Family refusing PT/OT. Patient requesting discharge, stable for discharge with pain control.

## 2018-04-23 NOTE — PLAN OF CARE
Problem: Patient Care Overview  Goal: Plan of Care Review  Outcome: Ongoing (interventions implemented as appropriate)  SR up x2, call bell in reach, bed in low locked position, skid proof socks on. Patient AAOx4. VS stable WNL.  Patient remained free of fall and injuries during the shift.  Care plan explained to patient. No concerns, will continue to monitor.

## 2018-04-23 NOTE — NURSING
Patient given discharge instructions. Patient understand discharge instructions, where to get prescriptions, and follow up appointments. All questions answered. IV removed. Patient will leave once transport arrives.

## 2018-04-23 NOTE — HPI
83M with afib on eliquis, HTN, combined CHF, CAD who presents for evaluation of left sided chest pain after a mechanical fall earlier today. He tripped over his slippers, fell onto his left side and experienced immediate left lower rib pain. He denies SOB, NVD, pre-syncopal symptoms prior to fall, confusion, slurred speech, numbness/tingling, pain in shoulders, elbow, neck, back, hips, pelvis, knees, ankles. Rib pain controlled with NSAIDs and tylenol.    On admit, CTH negative, CT chest showing multiple left rib fractures, trop mildly elevated at 0.08, EKG in afib without ischemic changes.

## 2018-04-23 NOTE — ASSESSMENT & PLAN NOTE
- continue coreg 3.125 BID, lasix 20 mg daily, lisinopril 20 mg daily  - BNP slightly above baseline, mildly volume overloaded  - CT with small volume pleural fluid, right greater than left   - lasix 20 mg IVP x1  - daily weights, I/Os

## 2018-04-23 NOTE — PLAN OF CARE
04/23/18 0950   Discharge Assessment   Assessment Type Discharge Planning Assessment   Confirmed/corrected address and phone number on facesheet? Yes   Assessment information obtained from? Patient   Expected Length of Stay (days) 1   Communicated expected length of stay with patient/caregiver yes   Prior to hospitilization cognitive status: Alert/Oriented   Prior to hospitalization functional status: Independent   Current cognitive status: Alert/Oriented   Current Functional Status: Independent   Lives With child(amy), adult  (daughter, Galina Bradshaw ([phone]))   Able to Return to Prior Arrangements yes   Is patient able to care for self after discharge? Yes   Patient's perception of discharge disposition home or selfcare   Readmission Within The Last 30 Days no previous admission in last 30 days   Patient currently being followed by outpatient case management? No   Patient currently receives any other outside agency services? No   Equipment Currently Used at Home none   Do you have any problems affording any of your prescribed medications? No   Is the patient taking medications as prescribed? yes   Does the patient have transportation home? Yes   Transportation Available family or friend will provide  (daughter)   Does the patient receive services at the [facility]? No   Discharge Plan A Home with family   Discharge Plan B Home Health   Patient/Family In Agreement With Plan yes     Dx: mulitple closed fractures of ribs to left side following mechanical fall  Pharm: [facility]  Hosp f/u appt: Dr. Rocio Ledesma on 5/4/18 at 1000

## 2018-04-23 NOTE — ASSESSMENT & PLAN NOTE
- continue home meds as listed above  Systolic (24hrs), Av , Min:124 , Max:151   Diastolic (24hrs), Av, Min:70, Max:94

## 2018-04-23 NOTE — ASSESSMENT & PLAN NOTE
Fall    - mechanical fall on eliquis, CTH negative  - family politely declines PT/OT  - pain control, albuterol q4 for PNA prevention, IS q4h

## 2018-04-23 NOTE — ASSESSMENT & PLAN NOTE
CAD    - doubt ACS, CP from rib fracture, EKG without ischemic changes  - likely elevated from demand, CHF, aortic stenosis  - trend, 0.083>  - continue asa, statin, BB

## 2018-04-23 NOTE — DISCHARGE SUMMARY
[facility] Hospital Medicine  Discharge Summary      Patient Name: Jeremie Bradshaw  MRN: [id number]  Admission Date: 2018  Hospital Length of Stay: 1 days  Discharge Date and Time: 2018 11:50 AM  Attending Physician: No att. providers found   Discharging Provider: Aida Lange PA-C  Primary Care Provider: Rocio Casas MD  [facility] Medicine Team: [facility] [place] [place] Aida Lange PA-C    HPI:   83M with afib on eliquis, HTN, combined CHF, CAD who presents for evaluation of left sided chest pain after a mechanical fall earlier today. He tripped over his slippers, fell onto his left side and experienced immediate left lower rib pain. He denies SOB, NVD, pre-syncopal symptoms prior to fall, confusion, slurred speech, numbness/tingling, pain in shoulders, elbow, neck, back, hips, pelvis, knees, ankles. Rib pain controlled with NSAIDs and tylenol.    On admit, CTH negative, CT chest showing multiple left rib fractures, trop mildly elevated at 0.08, EKG in afib without ischemic changes.    * No surgery found *      Hospital Course:   Patient admitted to observation for evaluation of fall with rib fracture. Imaging showed stable left 5 & 6 rib fracture. Patient stable. Family refusing PT/OT. Patient requesting discharge, stable for discharge with pain control.     Consults:     * Multiple closed fractures of ribs of left side    Fall    - mechanical fall on eliquis, CTH negative  - family politely declines PT/OT  - pain control, albuterol q4 for PNA prevention, IS q4h  - stable for discharge        Essential hypertension    - continue home meds as listed above  Systolic (24hrs), Av , Min:119 , Max:151   Diastolic (24hrs), Av, Min:69, Max:94          Paroxysmal atrial fibrillation    - afib on admit, rate controlled  - eliquis        Chronic combined systolic and diastolic congestive heart failure    - continue coreg 3.125 BID, lasix 20 mg daily, lisinopril 20 mg daily  - BNP  slightly above baseline, mildly volume overloaded  - CT with small volume pleural fluid, right greater than left   - lasix 20 mg IVP x1  - daily weights, I/Os        Depression    - welbutrin          Final Active Diagnoses:    Diagnosis Date Noted POA    PRINCIPAL PROBLEM:  Multiple closed fractures of ribs of left side [S22.42XA] 04/22/2018 Yes    Fall [W19.XXXA] 04/22/2018 Yes    Elevated troponin [R74.8] 04/22/2018 Yes    Essential hypertension [I10] 06/01/2015 Yes    Paroxysmal atrial fibrillation [I48.0] 06/01/2015 Yes    Chronic combined systolic and diastolic congestive heart failure [I50.42] 11/20/2012 Yes     Chronic    Coronary artery disease involving native coronary artery of native heart without angina pectoris [I25.10] 08/17/2012 Yes    Depression [F32.9] 08/17/2012 Yes      Problems Resolved During this Admission:    Diagnosis Date Noted Date Resolved POA       Discharged Condition: good    Disposition: Home or Self Care    Follow Up:  Follow-up Information     Rocio Casas MD On 5/4/2018.    Specialty:  Internal Medicine  Why:  at 10:00 AM  Contact information:  [address] [facility] [address]  [phone]                 Patient Instructions:     Diet Cardiac     Activity as tolerated     Notify your health care provider if you experience any of the following:  temperature >100.4     Notify your health care provider if you experience any of the following:  persistent nausea and vomiting or diarrhea     Notify your health care provider if you experience any of the following:  difficulty breathing or increased cough     Notify your health care provider if you experience any of the following:  severe persistent headache     Notify your health care provider if you experience any of the following:  persistent dizziness, light-headedness, or visual disturbances         Significant Diagnostic Studies: Labs:   BMP:   Recent Labs  Lab 04/22/18 1957 04/23/18  0602   GLU 79 77     139   K 4.7 4.1    106   CO2 23 22*   BUN 17 18   CREATININE 1.1 0.9   CALCIUM 9.4 8.7   MG  --  1.6    and CBC   Recent Labs  Lab 04/22/18 1957 04/23/18  0601   WBC 7.85 6.11   HGB 12.6* 11.7*   HCT 40.4 35.7*   * 131*       Pending Diagnostic Studies:     Procedure Component Value Units Date/Time    Troponin I [[id number]]     Order Status:  Sent Lab Status:  No result     Specimen:  Blood from Blood          Medications:  Reconciled Home Medications:      Medication List      CONTINUE taking these medications    apixaban 5 mg Tab  Commonly known as:  ELIQUIS  TAKE 1 TABLET(5 MG) BY MOUTH TWICE DAILY     aspirin 81 MG EC tablet  Commonly known as:  ECOTRIN  Take 81 mg by mouth. Pt taken every other day with potasium     atorvastatin 40 MG tablet  Commonly known as:  LIPITOR  Take 1 tablet (40 mg total) by mouth once daily.     buPROPion 300 MG 24 hr tablet  Commonly known as:  WELLBUTRIN XL  TAKE 1 TABLET(300 MG) BY MOUTH EVERY MORNING     carvedilol 3.125 MG tablet  Commonly known as:  COREG  Take 1 tablet (3.125 mg total) by mouth 2 (two) times daily.     cyanocobalamin 1000 MCG tablet  Commonly known as:  VITAMIN B-12  Take 1 tablet (1,000 mcg total) by mouth once daily.     * desonide 0.05 % lotion  Commonly known as:  DESOWEN  APPLY EXTERNALLY TO THE AFFECTED AREA TWICE DAILY AS NEEDED FOR REDNESS AND ITCHING     * desonide 0.05 % cream  Commonly known as:  DESOWEN  AAA bid      MG capsule  Generic drug:  docusate sodium  TAKE 1 CAPSULE BY MOUTH ONCE DAILY     furosemide 20 MG tablet  Commonly known as:  LASIX  TAKE 1 TABLET BY MOUTH ONCE DAILY PLUS ONE EXTRA AS NEEDED     hydrocortisone 0.2 % cream  Commonly known as:  WESTCORT  Apply topically 2 (two) times daily.     lisinopril 20 MG tablet  Commonly known as:  PRINIVIL,ZESTRIL  Take 1 tablet (20 mg total) by mouth once daily.     nitroGLYCERIN 0.4 MG SL tablet  Commonly known as:  NITROSTAT  Place 1 tablet (0.4 mg total) under the  tongue every 5 (five) minutes as needed.     omega-3 acid ethyl esters 1 gram capsule  Commonly known as:  LOVAZA  TAKE 2 CAPSULES BY MOUTH EVERY DAY     * potassium chloride 10 MEQ Cpsr  Commonly known as:  MICRO-K  TAKE 2 CAPSULES BY MOUTH EVERY DAY FOR 5 DAYS, THEN TAKE 2 CAPSULES EVERY OTHER DAY. TAKE DAILY WHEN TAKING EXTRA LASIX     * potassium chloride 10 MEQ Cpsr  Commonly known as:  MICRO-K  TAKE 2 CAPSULES BY MOUTH EVERY DAY FOR 5 DAYS, THEN TAKE 2 CAPSULES EVERY OTHER DAY. TAKE DAILY WHEN TAKING EXTRA LASIX        * This list has 4 medication(s) that are the same as other medications prescribed for you. Read the directions carefully, and ask your doctor or other care provider to review them with you.                Indwelling Lines/Drains at time of discharge:   Lines/Drains/Airways          No matching active lines, drains, or airways          Time spent on the discharge of patient: 37 minutes  Patient was seen and examined on the date of discharge and determined to be suitable for discharge.         Aida Lange PA-C  Department of Hospital Medicine  [facility]

## 2018-04-23 NOTE — ASSESSMENT & PLAN NOTE
Fall    - mechanical fall on eliquis, CTH negative  - family politely declines PT/OT  - pain control, albuterol q4 for PNA prevention, IS q4h  - stable for discharge

## 2018-04-23 NOTE — PLAN OF CARE
04/23/18 1312   Final Note   Assessment Type Final Discharge Note     Patient discharged home with no needs 4/23/18.

## 2018-04-23 NOTE — H&P
[facility] Medicine  History & Physical    Patient Name: Jeremie Bradshaw  MRN: [id number]  Admission Date: 4/22/2018  Attending Physician: Bhavani Nascimento MD   Primary Care Provider: Rocio Casas MD    [facility] Medicine Team: [facility] MED E Branden Amin PA-C     Patient information was obtained from patient and ER records.     Subjective:     Principal Problem:Multiple closed fractures of ribs of left side    Chief Complaint:   Chief Complaint   Patient presents with    Fall     Tripped and fell onto grass. Denies hitting head.         HPI: 83M with afib on eliquis, HTN, combined CHF, CAD who presents for evaluation of left sided chest pain after a mechanical fall earlier today. He tripped over his slippers, fell onto his left side and experienced immediate left lower rib pain. He denies SOB, NVD, pre-syncopal symptoms prior to fall, confusion, slurred speech, numbness/tingling, pain in shoulders, elbow, neck, back, hips, pelvis, knees, ankles. Rib pain controlled with NSAIDs and tylenol.    On admit, CTH negative, CT chest showing multiple left rib fractures, trop mildly elevated at 0.08, EKG in afib without ischemic changes.    Past Medical History:   Diagnosis Date    Anemia     Basal cell carcinoma 7/2014    left medial canthus    Basal cell carcinoma 3/9/2015    right forehead    Basal cell carcinoma 09/08/2016    left neck (scoop shave)    Basal cell carcinoma 12/09/2016    left preauricular    BPH (benign prostatic hypertrophy) 8/17/2012    CAD (coronary artery disease) 8/17/2012    Cervical spine fracture 11/20/2012    CHF (congestive heart failure) 8/17/2012    Depression 8/17/2012    GERD (gastroesophageal reflux disease) 3/27/2014    Heart attack     History of atrial fibrillation 8/17/2012    Hyperlipidemia     Hypertension     Kidney stones 8/17/2012    Memory loss     Myocardial infarction     Osteoarthritis of lumbar spine 8/17/2012    Shoulder  pain 8/17/2012    Stroke 8/17/2012       Past Surgical History:   Procedure Laterality Date    basal cell carcinoma left ear      CARDIAC SURGERY      CATARACT EXTRACTION W/  INTRAOCULAR LENS IMPLANT Bilateral     CATARACT EXTRACTION, BILATERAL      CORONARY ARTERY BYPASS GRAFT  1990    x1    TONSILLECTOMY         Review of patient's allergies indicates:   Allergen Reactions    Prednisone Other (See Comments)     hallucinations       Current Facility-Administered Medications on File Prior to Encounter   Medication    cyanocobalamin injection 1,000 mcg     Current Outpatient Prescriptions on File Prior to Encounter   Medication Sig    apixaban (ELIQUIS) 5 mg Tab TAKE 1 TABLET(5 MG) BY MOUTH TWICE DAILY    aspirin (ECOTRIN) 81 MG EC tablet Take 81 mg by mouth. Pt taken every other day with potasium    atorvastatin (LIPITOR) 40 MG tablet Take 1 tablet (40 mg total) by mouth once daily.    buPROPion (WELLBUTRIN XL) 300 MG 24 hr tablet TAKE 1 TABLET(300 MG) BY MOUTH EVERY MORNING    carvedilol (COREG) 3.125 MG tablet Take 1 tablet (3.125 mg total) by mouth 2 (two) times daily.    furosemide (LASIX) 20 MG tablet TAKE 1 TABLET BY MOUTH ONCE DAILY PLUS ONE EXTRA AS NEEDED    lisinopril (PRINIVIL,ZESTRIL) 20 MG tablet Take 1 tablet (20 mg total) by mouth once daily.    potassium chloride (MICRO-K) 10 MEQ CpSR TAKE 2 CAPSULES BY MOUTH EVERY DAY FOR 5 DAYS, THEN TAKE 2 CAPSULES EVERY OTHER DAY. TAKE DAILY WHEN TAKING EXTRA LASIX    cyanocobalamin (VITAMIN B-12) 1000 MCG tablet Take 1 tablet (1,000 mcg total) by mouth once daily.    desonide (DESOWEN) 0.05 % cream AAA bid    desonide (DESOWEN) 0.05 % lotion APPLY EXTERNALLY TO THE AFFECTED AREA TWICE DAILY AS NEEDED FOR REDNESS AND ITCHING     mg capsule TAKE 1 CAPSULE BY MOUTH ONCE DAILY    hydrocortisone (WESTCORT) 0.2 % cream Apply topically 2 (two) times daily.      nitroGLYCERIN (NITROSTAT) 0.4 MG SL tablet Place 1 tablet (0.4 mg total) under  the tongue every 5 (five) minutes as needed.    omega-3 acid ethyl esters (LOVAZA) 1 gram capsule TAKE 2 CAPSULES BY MOUTH EVERY DAY    potassium chloride (MICRO-K) 10 MEQ CpSR TAKE 2 CAPSULES BY MOUTH EVERY DAY FOR 5 DAYS, THEN TAKE 2 CAPSULES EVERY OTHER DAY. TAKE DAILY WHEN TAKING EXTRA LASIX     Family History     Problem Relation (Age of Onset)    Cancer Father    Heart disease Mother    Heart failure Mother    No Known Problems Sister, Brother, Maternal Aunt, Maternal Uncle, Paternal Aunt, Paternal Uncle, Maternal Grandmother, Maternal Grandfather, Paternal Grandmother, Paternal Grandfather        Social History Main Topics    Smoking status: Never Smoker    Smokeless tobacco: Never Used    Alcohol use No      Comment: social drinker    Drug use: No    Sexual activity: Not Currently     Review of Systems   Constitutional: Negative for chills and fever.   Respiratory: Negative for cough, shortness of breath and wheezing.    Cardiovascular: Positive for chest pain. Negative for palpitations and leg swelling.   Gastrointestinal: Negative for abdominal pain, nausea and vomiting.   Genitourinary: Negative for difficulty urinating and dysuria.   Musculoskeletal: Negative for arthralgias, back pain and gait problem.   Skin: Negative for rash and wound.   Neurological: Negative for dizziness, syncope, weakness and headaches.   Psychiatric/Behavioral: Negative for agitation and confusion.     Objective:     Vital Signs (Most Recent):  Temp: 97.9 °F (36.6 °C) (04/22/18 2051)  Pulse: 72 (04/22/18 2222)  Resp: 16 (04/22/18 2222)  BP: 124/72 (04/22/18 2222)  SpO2: 97 % (04/22/18 2222) Vital Signs (24h Range):  Temp:  [97.7 °F (36.5 °C)-97.9 °F (36.6 °C)] 97.9 °F (36.6 °C)  Pulse:  [72-88] 72  Resp:  [13-18] 16  SpO2:  [94 %-100 %] 97 %  BP: (124-151)/(70-94) 124/72     Weight: 77.1 kg (170 lb)  Body mass index is 25.85 kg/m².    Physical Exam   Constitutional: He is oriented to person, place, and time. He appears  well-developed and well-nourished. No distress.   HENT:   Head: Normocephalic and atraumatic.   Eyes: EOM are normal. Pupils are equal, round, and reactive to light.   Neck: Normal range of motion. Neck supple.   Cardiovascular: Normal rate and regular rhythm.    Murmur heard.  Pulmonary/Chest: Breath sounds normal. No respiratory distress. He has no wheezes.   Abdominal: Soft. Bowel sounds are normal. He exhibits no distension. There is no tenderness.   Musculoskeletal: Normal range of motion. He exhibits tenderness (L chest wall). He exhibits no edema or deformity.   No TTP or pain with ROM of neck, shoulder, elbow, wrist, hips, pelvis, knees, ankle. No bruising to chest wall   Neurological: He is alert and oriented to person, place, and time. No cranial nerve deficit.   Skin: Skin is warm and dry. He is not diaphoretic.   Psychiatric: He has a normal mood and affect. His behavior is normal. Judgment and thought content normal.   Nursing note and vitals reviewed.        CRANIAL NERVES     CN III, IV, VI   Pupils are equal, round, and reactive to light.  Extraocular motions are normal.        Significant Labs:   CBC:   Recent Labs  Lab 04/22/18 1957   WBC 7.85   HGB 12.6*   HCT 40.4   *     CMP:   Recent Labs  Lab 04/22/18 1957      K 4.7      CO2 23   GLU 79   BUN 17   CREATININE 1.1   CALCIUM 9.4   PROT 7.0   ALBUMIN 3.2*   BILITOT 0.8   ALKPHOS 79   AST 29   ALT 16   ANIONGAP 11   EGFRNONAA >60.0     Cardiac Markers:   Recent Labs  Lab 04/22/18 1957   *     Troponin:   Recent Labs  Lab 04/22/18 1957   TROPONINI 0.083*     TSH: No results for input(s): TSH in the last 4320 hours.  Urine Culture: No results for input(s): LABURIN in the last 48 hours.  Urine Studies: No results for input(s): COLORU, APPEARANCEUA, PHUR, SPECGRAV, PROTEINUA, GLUCUA, KETONESU, BILIRUBINUA, OCCULTUA, NITRITE, UROBILINOGEN, LEUKOCYTESUR, RBCUA, WBCUA, BACTERIA, SQUAMEPITHEL, HYALINECASTS in the last 48  hours.    Invalid input(s): TRELL    Significant Imaging: CT: I have reviewed all pertinent results/findings within the past 24 hours and my personal findings are:  rib fxr  EKG: I have reviewed all pertinent results/findings within the past 24 hours and my personal findings are: afib    Assessment/Plan:     * Multiple closed fractures of ribs of left side    Fall    - mechanical fall on eliquis, CTH negative  - family politely declines PT/OT  - pain control, albuterol q4 for PNA prevention, IS q4h        Elevated troponin    CAD    - doubt ACS, CP from rib fracture, EKG without ischemic changes  - likely elevated from demand, CHF, aortic stenosis  - trend, 0.083>  - continue asa, statin, BB        Chronic combined systolic and diastolic congestive heart failure    - continue coreg 3.125 BID, lasix 20 mg daily, lisinopril 20 mg daily  - BNP slightly above baseline, mildly volume overloaded  - CT with small volume pleural fluid, right greater than left   - lasix 20 mg IVP x1  - daily weights, I/Os        Essential hypertension    - continue home meds as listed above  Systolic (24hrs), Av , Min:124 , Max:151   Diastolic (24hrs), Av, Min:70, Max:94          Paroxysmal atrial fibrillation    - afib on admit, rate controlled  - eliquis        Depression    - welbutrin          VTE Risk Mitigation         Ordered     apixaban tablet 5 mg  2 times daily      [id number]             Branden Amin PA-C  Department of Hospital Medicine   [facility]

## 2018-04-30 ENCOUNTER — LAB VISIT (OUTPATIENT)
Dept: LAB | Facility: HOSPITAL | Age: 83
End: 2018-04-30
Attending: INTERNAL MEDICINE
Payer: MEDICARE

## 2018-04-30 DIAGNOSIS — I50.42 CHRONIC COMBINED SYSTOLIC AND DIASTOLIC CONGESTIVE HEART FAILURE: Chronic | ICD-10-CM

## 2018-04-30 DIAGNOSIS — R97.20 ELEVATED PSA: ICD-10-CM

## 2018-04-30 LAB
ALBUMIN SERPL BCP-MCNC: 3.1 G/DL
ALP SERPL-CCNC: 67 U/L
ALT SERPL W/O P-5'-P-CCNC: 15 U/L
ANION GAP SERPL CALC-SCNC: 9 MMOL/L
AST SERPL-CCNC: 23 U/L
BASOPHILS # BLD AUTO: 0.02 K/UL
BASOPHILS NFR BLD: 0.3 %
BILIRUB SERPL-MCNC: 0.9 MG/DL
BUN SERPL-MCNC: 19 MG/DL
CALCIUM SERPL-MCNC: 9.7 MG/DL
CHLORIDE SERPL-SCNC: 106 MMOL/L
CO2 SERPL-SCNC: 25 MMOL/L
COMPLEXED PSA SERPL-MCNC: 7.3 NG/ML
CREAT SERPL-MCNC: 1.2 MG/DL
DIFFERENTIAL METHOD: ABNORMAL
EOSINOPHIL # BLD AUTO: 0.2 K/UL
EOSINOPHIL NFR BLD: 2.5 %
ERYTHROCYTE [DISTWIDTH] IN BLOOD BY AUTOMATED COUNT: 14.7 %
EST. GFR  (AFRICAN AMERICAN): >60 ML/MIN/1.73 M^2
EST. GFR  (NON AFRICAN AMERICAN): 56 ML/MIN/1.73 M^2
GLUCOSE SERPL-MCNC: 107 MG/DL
HCT VFR BLD AUTO: 38.9 %
HGB BLD-MCNC: 12.8 G/DL
LYMPHOCYTES # BLD AUTO: 1.9 K/UL
LYMPHOCYTES NFR BLD: 30.4 %
MCH RBC QN AUTO: 30.5 PG
MCHC RBC AUTO-ENTMCNC: 32.9 G/DL
MCV RBC AUTO: 93 FL
MONOCYTES # BLD AUTO: 0.8 K/UL
MONOCYTES NFR BLD: 12.8 %
NEUTROPHILS # BLD AUTO: 3.4 K/UL
NEUTROPHILS NFR BLD: 53.7 %
PLATELET # BLD AUTO: 158 K/UL
PMV BLD AUTO: 11.1 FL
POTASSIUM SERPL-SCNC: 4.6 MMOL/L
PROT SERPL-MCNC: 6.5 G/DL
RBC # BLD AUTO: 4.19 M/UL
SODIUM SERPL-SCNC: 140 MMOL/L
TSH SERPL DL<=0.005 MIU/L-ACNC: 0.77 UIU/ML
WBC # BLD AUTO: 6.31 K/UL

## 2018-04-30 PROCEDURE — 84153 ASSAY OF PSA TOTAL: CPT

## 2018-04-30 PROCEDURE — 80053 COMPREHEN METABOLIC PANEL: CPT

## 2018-04-30 PROCEDURE — 84443 ASSAY THYROID STIM HORMONE: CPT

## 2018-04-30 PROCEDURE — 36415 COLL VENOUS BLD VENIPUNCTURE: CPT

## 2018-04-30 PROCEDURE — 85025 COMPLETE CBC W/AUTO DIFF WBC: CPT

## 2018-05-04 ENCOUNTER — OFFICE VISIT (OUTPATIENT)
Dept: INTERNAL MEDICINE | Facility: CLINIC | Age: 83
End: 2018-05-04
Payer: MEDICARE

## 2018-05-04 VITALS
DIASTOLIC BLOOD PRESSURE: 60 MMHG | HEIGHT: 68 IN | OXYGEN SATURATION: 97 % | SYSTOLIC BLOOD PRESSURE: 110 MMHG | WEIGHT: 166.25 LBS | BODY MASS INDEX: 25.2 KG/M2 | HEART RATE: 72 BPM

## 2018-05-04 DIAGNOSIS — E78.5 HYPERLIPIDEMIA, UNSPECIFIED HYPERLIPIDEMIA TYPE: ICD-10-CM

## 2018-05-04 DIAGNOSIS — I25.10 CORONARY ARTERY DISEASE INVOLVING NATIVE CORONARY ARTERY OF NATIVE HEART WITHOUT ANGINA PECTORIS: ICD-10-CM

## 2018-05-04 DIAGNOSIS — R41.3 MEMORY LOSS: ICD-10-CM

## 2018-05-04 DIAGNOSIS — I50.42 CHRONIC COMBINED SYSTOLIC AND DIASTOLIC CONGESTIVE HEART FAILURE: Chronic | ICD-10-CM

## 2018-05-04 DIAGNOSIS — I10 ESSENTIAL HYPERTENSION: Primary | ICD-10-CM

## 2018-05-04 DIAGNOSIS — K21.9 GASTROESOPHAGEAL REFLUX DISEASE WITHOUT ESOPHAGITIS: ICD-10-CM

## 2018-05-04 PROCEDURE — 99214 OFFICE O/P EST MOD 30 MIN: CPT | Mod: S$GLB,,, | Performed by: INTERNAL MEDICINE

## 2018-05-04 PROCEDURE — 99499 UNLISTED E&M SERVICE: CPT | Mod: S$PBB,,, | Performed by: INTERNAL MEDICINE

## 2018-05-04 PROCEDURE — 3078F DIAST BP <80 MM HG: CPT | Mod: CPTII,S$GLB,, | Performed by: INTERNAL MEDICINE

## 2018-05-04 PROCEDURE — 3074F SYST BP LT 130 MM HG: CPT | Mod: CPTII,S$GLB,, | Performed by: INTERNAL MEDICINE

## 2018-05-04 PROCEDURE — 99999 PR PBB SHADOW E&M-EST. PATIENT-LVL II: CPT | Mod: PBBFAC,,, | Performed by: INTERNAL MEDICINE

## 2018-05-04 NOTE — PROGRESS NOTES
CHIEF COMPLAINT: Follow up of Right shoulder pain, hypertension, a fib, mood.    HISTORY OF PRESENT ILLNESS: This is a 83-year-old man who presents with his daughter for follow up of above.      He fell on 4/22/18 in the grass and broke 2 ribs (left anterior 5th and 6th ribs). Pain is worse when he gets out of bed or up from a seated position.  He is generally not taking any medication for pain. He will take ibuprofen 200 mg one at a time (only taken 3 in the last weeks).  He came to the ED and stayed overnight for observation. He had some pleural effusions. He was given IV lasix.  He did fine and went home the next day.      Right shoulder is doing well.  HE is working on his exercises for range of motion.  He has pain when he sleeps on the right shoulder.      He is on Eliquis 5 mg twice daily, lisinopril 20 mg dialy, furosemide 20 mg daily, potassium chloride 10 meq 2 tablets every other day and coreg 3.125 mg one tablet twice daily. No chest pain, shortness of breath, palpitations.       Appetite has not been that great. He may have been a little weak when he fell. His weight is down 5-6 pounds. . He is not drinking a boost or ensure daily. No fever, chills, nausea, vomiting, constipation, diarrhea.       He continues to take Wellbutrin  mg daily. Memory has not worsened. He has some mild memory loss.Mood has been stable.    Baseball season started which helps his mood.      He continues to take atorvastatin 40 mg 1 tablet daily and Lovaza 1 g daily for her hyperlipidemia. He denies any neck pain from his history of cervical spine fracture.     Back bothers him from time to time - worse with prolonged sitting.       Past Medical History:   1. Coronary artery disease status post CABG x1 in 1993, and STEMI 01/2010, status post stenting, refused cardiac rehab, followed by Dr. Aguilar.   2. Congestive heart failure. Ejection fraction was 30% Jan 2012  3. History of atrial fibrillation -- EKG Jan 2012 - sinus  "rhythm  4. Hyperlipidemia  5. Hypertension.   6. Osteoarthritis of the lumbar spine -- Had a flare in  and had MRI at that time.   7. History of nephrolithiasis in .   8. History of left thalamic stroke in the early  per Dr. Matos's notes.   9. Depression -- followed by Dr. Bingham .  10. BPH with obstruction, had a cystoscope in 2010. Saw Urology 2011.  11. Right rotator cuff tear with arthropathy. He has pain from time to time. Saw Dr. Narvaez for an injection 2011.     PAST SURGICAL HISTORY:   1. CABG x1 in .   2. Bilateral cataract surgery, right 2005, left .   3. Excision of basal cell carcinoma of the left ear in .     SOCIAL HISTORY: He has never smoked, drinks alcohol once a month. . Has three children. Retired from the [employer].     FAMILY HISTORY: Mother  in her 60s of heart trouble. Father  in his 50s of some sort of abdominal malignancy. He has a half brother, which he is not sure of his health issues.     Screening PSA was 1016. Declines colonoscopy.     PHYSICAL EXAMINATION:   /60   Pulse 72   Ht 5' 8" (1.727 m)   Wt 75.4 kg (166 lb 3.6 oz)   SpO2 97%   BMI 25.27 kg/m²        GENERAL: He is alert, oriented, no apparent distress. Affect within normal limits.   HEENT: Conjunctivae anicteric. . TM clear  Neck supple. Oropharynx -clear  CHEST: Respiratory effort normal. Lungs clear. Rales right base.    HEART: Regular rate and rhythm without murmurs, gallops or rubs. No lowerextremity edema.       Labs 18 reveiwed    ASSESSMENT AND PLAN:   1. Fall with rib fractures- appears to be doing well. Discussed physical therapy. He declined. To use walker when outside of the home.   2. Coronary artery disease, CHF and a fib and aortic atherosclerosis- on risk facto modification.    3. Hypertension -stable  4. Hyperlipidemia -- controlled.    5.GERD - asx. Takes ranitidine 300 mg Nightly as needed    6.  Major Depression -- controlled " on Wellbutrin  7. Osteoarthritis of lumbar spine -- stable.   8. Benign prostatic hypertrophy -- stable.   9. History of skin cancer -- Saw Derm   10. Thyroid nodule - thyroid ultrasound stable    11 Memory loss- intolerant of aricept. Saw Jade Chacon    12. Vitamin B12 deficiency -on oral vitamin B12  13. Thrombocytopenia -better.  and chronic - repeat CBC at next lab  14. Tortuous aorta - BP is stable  15. Left atrial appendage thrombus- On Eliquis  Screening -- PSA slightly elevated -PSA upon return  . Declines colonoscopy.   I will see him back in 3 months, sooner if problems arise.

## 2018-05-08 RX ORDER — OMEGA-3-ACID ETHYL ESTERS 1 G/1
CAPSULE, LIQUID FILLED ORAL
Qty: 180 CAPSULE | Refills: 0 | Status: SHIPPED | OUTPATIENT
Start: 2018-05-08 | End: 2018-08-07 | Stop reason: SDUPTHER

## 2018-06-02 RX ORDER — BUPROPION HYDROCHLORIDE 300 MG/1
TABLET ORAL
Qty: 90 TABLET | Refills: 0 | Status: SHIPPED | OUTPATIENT
Start: 2018-06-02 | End: 2018-08-28 | Stop reason: SDUPTHER

## 2018-06-18 RX ORDER — DOCUSATE SODIUM 100 MG/1
CAPSULE, LIQUID FILLED ORAL
Qty: 90 CAPSULE | Refills: 4 | Status: ON HOLD | OUTPATIENT
Start: 2018-06-18 | End: 2019-01-05 | Stop reason: HOSPADM

## 2018-07-05 ENCOUNTER — PES CALL (OUTPATIENT)
Dept: ADMINISTRATIVE | Facility: CLINIC | Age: 83
End: 2018-07-05

## 2018-08-08 RX ORDER — BUPROPION HYDROCHLORIDE 300 MG/1
TABLET ORAL
Qty: 90 TABLET | Refills: 0 | OUTPATIENT
Start: 2018-08-08

## 2018-08-08 RX ORDER — OMEGA-3-ACID ETHYL ESTERS 1 G/1
CAPSULE, LIQUID FILLED ORAL
Qty: 180 CAPSULE | Refills: 0 | Status: SHIPPED | OUTPATIENT
Start: 2018-08-08 | End: 2018-08-28 | Stop reason: SDUPTHER

## 2018-08-28 ENCOUNTER — OFFICE VISIT (OUTPATIENT)
Dept: INTERNAL MEDICINE | Facility: CLINIC | Age: 83
End: 2018-08-28
Payer: MEDICARE

## 2018-08-28 VITALS
DIASTOLIC BLOOD PRESSURE: 70 MMHG | SYSTOLIC BLOOD PRESSURE: 110 MMHG | WEIGHT: 166.38 LBS | HEIGHT: 68 IN | HEART RATE: 57 BPM | OXYGEN SATURATION: 99 % | BODY MASS INDEX: 25.22 KG/M2

## 2018-08-28 DIAGNOSIS — I25.83 CORONARY ARTERY DISEASE DUE TO LIPID RICH PLAQUE: ICD-10-CM

## 2018-08-28 DIAGNOSIS — E78.5 HYPERLIPIDEMIA, UNSPECIFIED HYPERLIPIDEMIA TYPE: ICD-10-CM

## 2018-08-28 DIAGNOSIS — E53.8 VITAMIN B12 DEFICIENCY: ICD-10-CM

## 2018-08-28 DIAGNOSIS — I50.42 CHRONIC COMBINED SYSTOLIC AND DIASTOLIC CONGESTIVE HEART FAILURE: Primary | Chronic | ICD-10-CM

## 2018-08-28 DIAGNOSIS — I25.10 CORONARY ARTERY DISEASE DUE TO LIPID RICH PLAQUE: ICD-10-CM

## 2018-08-28 DIAGNOSIS — L21.9 SEBORRHEIC DERMATITIS: ICD-10-CM

## 2018-08-28 DIAGNOSIS — G31.84 MCI (MILD COGNITIVE IMPAIRMENT) WITH MEMORY LOSS: ICD-10-CM

## 2018-08-28 DIAGNOSIS — I25.5 ISCHEMIC CARDIOMYOPATHY: ICD-10-CM

## 2018-08-28 PROCEDURE — 96372 THER/PROPH/DIAG INJ SC/IM: CPT | Mod: S$GLB,,, | Performed by: INTERNAL MEDICINE

## 2018-08-28 PROCEDURE — 99214 OFFICE O/P EST MOD 30 MIN: CPT | Mod: 25,S$GLB,, | Performed by: INTERNAL MEDICINE

## 2018-08-28 PROCEDURE — 99999 PR PBB SHADOW E&M-EST. PATIENT-LVL III: CPT | Mod: PBBFAC,,, | Performed by: INTERNAL MEDICINE

## 2018-08-28 PROCEDURE — 3078F DIAST BP <80 MM HG: CPT | Mod: CPTII,S$GLB,, | Performed by: INTERNAL MEDICINE

## 2018-08-28 PROCEDURE — 3074F SYST BP LT 130 MM HG: CPT | Mod: CPTII,S$GLB,, | Performed by: INTERNAL MEDICINE

## 2018-08-28 RX ORDER — CARVEDILOL 3.12 MG/1
3.12 TABLET ORAL 2 TIMES DAILY
Qty: 60 TABLET | Refills: 11 | Status: ON HOLD | OUTPATIENT
Start: 2018-08-28 | End: 2019-01-05 | Stop reason: HOSPADM

## 2018-08-28 RX ORDER — LISINOPRIL 20 MG/1
20 TABLET ORAL DAILY
Qty: 30 TABLET | Refills: 11 | Status: SHIPPED | OUTPATIENT
Start: 2018-08-28 | End: 2018-12-14

## 2018-08-28 RX ORDER — TAMSULOSIN HYDROCHLORIDE 0.4 MG/1
0.4 CAPSULE ORAL NIGHTLY
Qty: 30 CAPSULE | Refills: 11 | Status: SHIPPED | OUTPATIENT
Start: 2018-08-28 | End: 2019-08-28

## 2018-08-28 RX ORDER — FUROSEMIDE 20 MG/1
TABLET ORAL
Qty: 60 TABLET | Refills: 11 | Status: ON HOLD | OUTPATIENT
Start: 2018-08-28 | End: 2019-01-05 | Stop reason: SDUPTHER

## 2018-08-28 RX ORDER — OMEGA-3-ACID ETHYL ESTERS 1 G/1
2 CAPSULE, LIQUID FILLED ORAL DAILY
Qty: 180 CAPSULE | Refills: 4 | Status: ON HOLD | OUTPATIENT
Start: 2018-08-28 | End: 2019-02-01 | Stop reason: HOSPADM

## 2018-08-28 RX ORDER — POTASSIUM CHLORIDE 750 MG/1
CAPSULE, EXTENDED RELEASE ORAL
Qty: 60 CAPSULE | Refills: 11 | Status: SHIPPED | OUTPATIENT
Start: 2018-08-28 | End: 2018-12-03 | Stop reason: SDUPTHER

## 2018-08-28 RX ORDER — DESONIDE 0.5 MG/G
CREAM TOPICAL
Qty: 1 TUBE | Refills: 3 | Status: ON HOLD | OUTPATIENT
Start: 2018-08-28 | End: 2019-01-05 | Stop reason: HOSPADM

## 2018-08-28 RX ORDER — ATORVASTATIN CALCIUM 40 MG/1
40 TABLET, FILM COATED ORAL DAILY
Qty: 30 TABLET | Refills: 11 | Status: ON HOLD | OUTPATIENT
Start: 2018-08-28 | End: 2019-01-28 | Stop reason: HOSPADM

## 2018-08-28 RX ORDER — BUPROPION HYDROCHLORIDE 300 MG/1
300 TABLET ORAL EVERY MORNING
Qty: 30 TABLET | Refills: 11 | Status: SHIPPED | OUTPATIENT
Start: 2018-08-28

## 2018-08-28 RX ADMIN — CYANOCOBALAMIN 1000 MCG: 1000 INJECTION, SOLUTION INTRAMUSCULAR; SUBCUTANEOUS at 02:08

## 2018-08-28 NOTE — PROGRESS NOTES
CHIEF COMPLAINT: Follow up of Right shoulder pain, hypertension, a fib, mood.    HISTORY OF PRESENT ILLNESS: This is a 83-year-old man who presents with his daughter for follow up of above.       No pain from left rib fractures. He gets short of breath at times - no change in breathing. No chest pain, co ugh. He has occasional post nasal drip.      Right shoulder is doing well.  No problems with neck, back and shoulder.      He is on Eliquis 5 mg twice daily, lisinopril 20 mg dialy, furosemide 20 mg daily, potassium chloride 10 meq 2 tablets every other day and coreg 3.125 mg one tablet twice daily. No chest pain, shortness of breath, palpitations.       Appetite is so so. No change.  He is not drinking a boost or ensure daily. No fever, chills, nausea, vomiting, constipation, diarrhea.       He continues to take Wellbutrin  mg daily. Memory has not worsened. He has some mild memory loss. Mood has been stable.     He continues to take atorvastatin 40 mg 1 tablet daily and Lovaza 1 g daily for her hyperlipidemia.          Past Medical History:   1. Coronary artery disease status post CABG x1 in 1993, and STEMI 01/2010, status post stenting, refused cardiac rehab, followed by Dr. Aguilar.   2. Congestive heart failure. Ejection fraction was 30% Jan 2012  3. History of atrial fibrillation -- EKG Jan 2012 - sinus rhythm  4. Hyperlipidemia  5. Hypertension.   6. Osteoarthritis of the lumbar spine -- Had a flare in 2006 and had MRI at that time.   7. History of nephrolithiasis in 2005.   8. History of left thalamic stroke in the early 2000s per Dr. Matos's notes.   9. Depression -- followed by Dr. Bingham .  10. BPH with obstruction, had a cystoscope in 08/2010. Saw Urology 06/2011.  11. Right rotator cuff tear with arthropathy. He has pain from time to time. Saw Dr. Narvaez for an injection 03/2011.     PAST SURGICAL HISTORY:   1. CABG x1 in 1990.   2. Bilateral cataract surgery, right 06/2005, left 1998.   3.  "Excision of basal cell carcinoma of the left ear in .     SOCIAL HISTORY: He has never smoked, drinks alcohol once a month. . Has three children. Retired from the [employer].     FAMILY HISTORY: Mother  in her 60s of heart trouble. Father  in his 50s of some sort of abdominal malignancy. He has a half brother, which he is not sure of his health issues.     Screening PSA was 1016. Declines colonoscopy.     PHYSICAL EXAMINATION:  /70 (BP Location: Right arm, Patient Position: Sitting, BP Method: Medium (Manual))   Pulse (!) 57   Ht 5' 8" (1.727 m)   Wt 75.5 kg (166 lb 6.4 oz)   SpO2 99%   BMI 25.30 kg/m²      GENERAL: He is alert, oriented, no apparent distress. Affect within normal limits.   HEENT: Conjunctivae anicteric. . TM clear  Neck supple. Oropharynx -clear  CHEST: Respiratory effort normal. Lungs clear. Rales right base.    HEART: Regular rate and rhythm without murmurs, gallops or rubs. No lower extremity edema.        ASSESSMENT AND PLAN:   1.  Coronary artery disease, CHF and a fib and aortic atherosclerosis- on risk facto modification.    3. Hypertension -stable  4. Hyperlipidemia -- controlled.    5. GERD - asx. Takes ranitidine 300 mg Nightly as needed    6.  Major Depression -- controlled on Wellbutrin  7. Osteoarthritis of lumbar spine -- stable.   8. Benign prostatic hypertrophy -- stable. Not treating elevated PSA  9. History of skin cancer -- Saw Derm   10. Thyroid nodule - thyroid ultrasound stable    11 Memory loss- intolerant of aricept. Saw Jade Chacon    12. Vitamin B12 deficiency -on oral vitamin B12  13. Thrombocytopenia -better.  and chronic - repeat CBC at next lab  14. Tortuous aorta - BP is stable  15. Left atrial appendage thrombus- On Eliquis    . Declines colonoscopy.   I will see him back in 3 months, sooner if problems arise.   "

## 2018-09-27 RX ORDER — POTASSIUM CHLORIDE 750 MG/1
CAPSULE, EXTENDED RELEASE ORAL
Qty: 180 CAPSULE | Refills: 0 | Status: SHIPPED | OUTPATIENT
Start: 2018-09-27 | End: 2018-12-03 | Stop reason: SDUPTHER

## 2018-11-07 RX ORDER — CARVEDILOL 3.12 MG/1
TABLET ORAL
Qty: 60 TABLET | Refills: 11 | Status: ON HOLD | OUTPATIENT
Start: 2018-11-07 | End: 2019-01-05 | Stop reason: HOSPADM

## 2018-11-15 RX ORDER — LISINOPRIL 20 MG/1
TABLET ORAL
Qty: 30 TABLET | Refills: 5 | Status: SHIPPED | OUTPATIENT
Start: 2018-11-15 | End: 2018-12-14

## 2018-11-24 ENCOUNTER — OFFICE VISIT (OUTPATIENT)
Dept: URGENT CARE | Facility: CLINIC | Age: 83
End: 2018-11-24
Payer: MEDICARE

## 2018-11-24 VITALS
SYSTOLIC BLOOD PRESSURE: 117 MMHG | OXYGEN SATURATION: 98 % | HEIGHT: 68 IN | HEART RATE: 86 BPM | WEIGHT: 165 LBS | RESPIRATION RATE: 18 BRPM | TEMPERATURE: 96 F | BODY MASS INDEX: 25.01 KG/M2 | DIASTOLIC BLOOD PRESSURE: 77 MMHG

## 2018-11-24 DIAGNOSIS — Z23 INFLUENZA VACCINE ADMINISTERED: ICD-10-CM

## 2018-11-24 DIAGNOSIS — J06.9 UPPER RESPIRATORY TRACT INFECTION, UNSPECIFIED TYPE: Primary | ICD-10-CM

## 2018-11-24 PROCEDURE — 1100F PTFALLS ASSESS-DOCD GE2>/YR: CPT | Mod: CPTII,S$GLB,, | Performed by: FAMILY MEDICINE

## 2018-11-24 PROCEDURE — 3288F FALL RISK ASSESSMENT DOCD: CPT | Mod: CPTII,S$GLB,, | Performed by: FAMILY MEDICINE

## 2018-11-24 PROCEDURE — 99214 OFFICE O/P EST MOD 30 MIN: CPT | Mod: 25,S$GLB,, | Performed by: FAMILY MEDICINE

## 2018-11-24 PROCEDURE — 90662 IIV NO PRSV INCREASED AG IM: CPT | Mod: S$GLB,,, | Performed by: FAMILY MEDICINE

## 2018-11-24 PROCEDURE — 3074F SYST BP LT 130 MM HG: CPT | Mod: CPTII,S$GLB,, | Performed by: FAMILY MEDICINE

## 2018-11-24 PROCEDURE — 3078F DIAST BP <80 MM HG: CPT | Mod: CPTII,S$GLB,, | Performed by: FAMILY MEDICINE

## 2018-11-24 PROCEDURE — G0008 ADMIN INFLUENZA VIRUS VAC: HCPCS | Mod: S$GLB,,, | Performed by: FAMILY MEDICINE

## 2018-11-24 NOTE — PROGRESS NOTES
"Subjective:       Patient ID: Jeremie Bradshaw is a 84 y.o. male.    Vitals:  height is 5' 8" (1.727 m) and weight is 74.8 kg (165 lb). His tympanic temperature is 96.1 °F (35.6 °C). His blood pressure is 117/77 and his pulse is 86. His respiration is 18 and oxygen saturation is 98%.     Chief Complaint: Sinus Problem    This is a 84 y.o. male who presents today with a chief complaint of   Sinus, congestion, cough and SOB. Sx started a few days ago the SOB started today. Taking Mucinex      Sinus Problem   This is a new problem. The current episode started in the past 7 days. The problem has been gradually worsening since onset. There has been no fever. His pain is at a severity of 0/10. He is experiencing no pain. Associated symptoms include congestion, coughing, ear pain, shortness of breath and sinus pressure. Pertinent negatives include no chills, diaphoresis or sore throat. Treatments tried: mucinex. The treatment provided mild relief.       Constitution: Negative for chills, sweating, fatigue and fever.   HENT: Positive for ear pain, congestion and sinus pressure. Negative for sinus pain, sore throat and voice change.    Neck: Negative for painful lymph nodes.   Eyes: Negative for eye redness.   Respiratory: Positive for cough and shortness of breath. Negative for chest tightness, sputum production, bloody sputum, COPD, stridor, wheezing and asthma.    Gastrointestinal: Negative for nausea and vomiting.   Musculoskeletal: Negative for muscle ache.   Skin: Negative for rash.   Allergic/Immunologic: Negative for seasonal allergies and asthma.   Hematologic/Lymphatic: Negative for swollen lymph nodes.       Objective:      Physical Exam   Constitutional: He appears well-developed and well-nourished.   HENT:   Head: Normocephalic and atraumatic.   Nose: Mucosal edema and rhinorrhea present. Right sinus exhibits no maxillary sinus tenderness and no frontal sinus tenderness. Left sinus exhibits no maxillary sinus " tenderness and no frontal sinus tenderness.   Eyes: EOM are normal. Pupils are equal, round, and reactive to light.   Neck: Normal range of motion. Neck supple.   Cardiovascular: Normal rate, regular rhythm and normal heart sounds.   Pulmonary/Chest: Effort normal and breath sounds normal.   Abdominal: Soft.   Nursing note and vitals reviewed.      Assessment:       1. Upper respiratory tract infection, unspecified type    2. Influenza vaccine administered        Plan:         Upper respiratory tract infection, unspecified type    Influenza vaccine administered  -     Influenza - High Dose (65+) (PF) (IM)    OTC cold remedies

## 2018-11-24 NOTE — PATIENT INSTRUCTIONS
Viral Upper Respiratory Illness (Adult)  You have a viral upper respiratory illness (URI), which is another term for the common cold. This illness is contagious during the first few days. It is spread through the air by coughing and sneezing. It may also be spread by direct contact (touching the sick person and then touching your own eyes, nose, or mouth). Frequent handwashing will decrease risk of spread. Most viral illnesses go away within 7 to 10 days with rest and simple home remedies. Sometimes the illness may last for several weeks. Antibiotics will not kill a virus, and they are generally not prescribed for this condition.    Home care  · If symptoms are severe, rest at home for the first 2 to 3 days. When you resume activity, don't let yourself get too tired.  · Avoid being exposed to cigarette smoke (yours or others).  · You may use acetaminophen or ibuprofen to control pain and fever, unless another medicine was prescribed. (Note: If you have chronic liver or kidney disease, have ever had a stomach ulcer or gastrointestinal bleeding, or are taking blood-thinning medicines, talk with your healthcare provider before using these medicines.) Aspirin should never be given to anyone under 18 years of age who is ill with a viral infection or fever. It may cause severe liver or brain damage.  · Your appetite may be poor, so a light diet is fine. Avoid dehydration by drinking 6 to 8 glasses of fluids per day (water, soft drinks, juices, tea, or soup). Extra fluids will help loosen secretions in the nose and lungs.  · Over-the-counter cold medicines will not shorten the length of time youre sick, but they may be helpful for the following symptoms: cough, sore throat, and nasal and sinus congestion. (Note: Do not use decongestants if you have high blood pressure.)  Follow-up care  Follow up with your healthcare provider, or as advised.  When to seek medical advice  Call your healthcare provider right away if any  of these occur:  · Cough with lots of colored sputum (mucus)  · Severe headache; face, neck, or ear pain  · Difficulty swallowing due to throat pain  · Fever of 100.4°F (38°C)  Call 911, or get immediate medical care  Call emergency services right away if any of these occur:  · Chest pain, shortness of breath, wheezing, or difficulty breathing  · Coughing up blood  · Inability to swallow due to throat pain  Date Last Reviewed: 9/13/2015  © 9816-7273 [employer]. [address] [place] [id number]. All rights reserved. This information is not intended as a substitute for professional medical care. Always follow your healthcare professional's instructions.

## 2018-12-03 ENCOUNTER — TELEPHONE (OUTPATIENT)
Dept: INTERNAL MEDICINE | Facility: CLINIC | Age: 83
End: 2018-12-03

## 2018-12-03 RX ORDER — NITROGLYCERIN 0.4 MG/1
0.4 TABLET SUBLINGUAL EVERY 5 MIN PRN
Qty: 25 TABLET | Refills: 3 | Status: SHIPPED | OUTPATIENT
Start: 2018-12-03

## 2018-12-03 RX ORDER — POTASSIUM CHLORIDE 750 MG/1
CAPSULE, EXTENDED RELEASE ORAL
Qty: 180 CAPSULE | Refills: 3 | Status: SHIPPED | OUTPATIENT
Start: 2018-12-03 | End: 2018-12-03

## 2018-12-03 RX ORDER — POTASSIUM CHLORIDE 750 MG/1
CAPSULE, EXTENDED RELEASE ORAL
Qty: 30 CAPSULE | Refills: 11 | Status: ON HOLD | OUTPATIENT
Start: 2018-12-03 | End: 2019-01-28 | Stop reason: HOSPADM

## 2018-12-03 NOTE — TELEPHONE ENCOUNTER
----- Message from Estefany Means sent at 12/3/2018  4:10 PM CST -----  Contact: [facility] [place] - [phone]  Pharmacy stated she needs a clarification for directions for medication potassium chloride (MICRO-K) 10 MEQ CpSR.    Please advise, thanks

## 2018-12-03 NOTE — TELEPHONE ENCOUNTER
----- Message from Estefany Means sent at 12/3/2018  9:12 AM CST -----  Contact: [facility] [place] - [phone]  RX request - refill or new RX.  Is this a refill or new RX:  Refill  RX name and strength: nitroGLYCERIN (NITROSTAT) 0.4 MG SL tablet and potassium chloride (MICRO-K) 10 MEQ CpSR     Local pharmacy or mail order pharmacy: [facility]  Phone: [phone]    Comments:  Please advise, thanks                        nitroGLYCERIN (NITROSTAT) 0.4 MG SL tablet potassium chloride (MICRO-K) 10 MEQ CpSR

## 2018-12-03 NOTE — TELEPHONE ENCOUNTER
Please advise    ----- Message from Estefany Means sent at 12/3/2018  4:10 PM CST -----  Contact: [facility] [place] - [phone]  Pharmacy stated she needs a clarification for directions for medication potassium chloride (MICRO-K) 10 MEQ CpSR.    Please advise, thanks

## 2018-12-04 ENCOUNTER — TELEPHONE (OUTPATIENT)
Dept: INTERNAL MEDICINE | Facility: CLINIC | Age: 83
End: 2018-12-04

## 2018-12-04 NOTE — TELEPHONE ENCOUNTER
----- Message from Carolina Myles sent at 12/4/2018  9:37 AM CST -----  Contact: [facility] / [phone] (Phone)  Pharmacist would like to verify new script received for potassium chloride (MICRO-K) 10 MEQ CpSR.

## 2018-12-12 ENCOUNTER — LAB VISIT (OUTPATIENT)
Dept: LAB | Facility: HOSPITAL | Age: 83
End: 2018-12-12
Attending: INTERNAL MEDICINE
Payer: MEDICARE

## 2018-12-12 DIAGNOSIS — E78.5 HYPERLIPIDEMIA, UNSPECIFIED HYPERLIPIDEMIA TYPE: ICD-10-CM

## 2018-12-12 LAB
ALBUMIN SERPL BCP-MCNC: 3.1 G/DL
ALP SERPL-CCNC: 63 U/L
ALT SERPL W/O P-5'-P-CCNC: 12 U/L
ANION GAP SERPL CALC-SCNC: 8 MMOL/L
AST SERPL-CCNC: 18 U/L
BASOPHILS # BLD AUTO: 0.02 K/UL
BASOPHILS NFR BLD: 0.4 %
BILIRUB SERPL-MCNC: 0.8 MG/DL
BUN SERPL-MCNC: 23 MG/DL
CALCIUM SERPL-MCNC: 9 MG/DL
CHLORIDE SERPL-SCNC: 106 MMOL/L
CHOLEST SERPL-MCNC: 89 MG/DL
CHOLEST/HDLC SERPL: 2.5 {RATIO}
CO2 SERPL-SCNC: 24 MMOL/L
CREAT SERPL-MCNC: 1.2 MG/DL
DIFFERENTIAL METHOD: ABNORMAL
EOSINOPHIL # BLD AUTO: 0.3 K/UL
EOSINOPHIL NFR BLD: 4.7 %
ERYTHROCYTE [DISTWIDTH] IN BLOOD BY AUTOMATED COUNT: 15 %
EST. GFR  (AFRICAN AMERICAN): >60 ML/MIN/1.73 M^2
EST. GFR  (NON AFRICAN AMERICAN): 55 ML/MIN/1.73 M^2
GLUCOSE SERPL-MCNC: 103 MG/DL
HCT VFR BLD AUTO: 37.3 %
HDLC SERPL-MCNC: 35 MG/DL
HDLC SERPL: 39.3 %
HGB BLD-MCNC: 11.7 G/DL
LDLC SERPL CALC-MCNC: 36.4 MG/DL
LYMPHOCYTES # BLD AUTO: 2.1 K/UL
LYMPHOCYTES NFR BLD: 37.8 %
MCH RBC QN AUTO: 29.3 PG
MCHC RBC AUTO-ENTMCNC: 31.4 G/DL
MCV RBC AUTO: 94 FL
MONOCYTES # BLD AUTO: 0.8 K/UL
MONOCYTES NFR BLD: 14.4 %
NEUTROPHILS # BLD AUTO: 2.4 K/UL
NEUTROPHILS NFR BLD: 42.5 %
NONHDLC SERPL-MCNC: 54 MG/DL
PLATELET # BLD AUTO: 137 K/UL
PMV BLD AUTO: 11.2 FL
POTASSIUM SERPL-SCNC: 4.4 MMOL/L
PROT SERPL-MCNC: 6.4 G/DL
RBC # BLD AUTO: 3.99 M/UL
SODIUM SERPL-SCNC: 138 MMOL/L
TRIGL SERPL-MCNC: 88 MG/DL
TSH SERPL DL<=0.005 MIU/L-ACNC: 0.56 UIU/ML
WBC # BLD AUTO: 5.56 K/UL

## 2018-12-12 PROCEDURE — 80053 COMPREHEN METABOLIC PANEL: CPT | Mod: HCNC

## 2018-12-12 PROCEDURE — 36415 COLL VENOUS BLD VENIPUNCTURE: CPT | Mod: HCNC

## 2018-12-12 PROCEDURE — 85025 COMPLETE CBC W/AUTO DIFF WBC: CPT | Mod: HCNC

## 2018-12-12 PROCEDURE — 80061 LIPID PANEL: CPT | Mod: HCNC

## 2018-12-12 PROCEDURE — 84443 ASSAY THYROID STIM HORMONE: CPT | Mod: HCNC

## 2018-12-14 ENCOUNTER — OFFICE VISIT (OUTPATIENT)
Dept: INTERNAL MEDICINE | Facility: CLINIC | Age: 83
End: 2018-12-14
Payer: MEDICARE

## 2018-12-14 ENCOUNTER — TELEPHONE (OUTPATIENT)
Dept: INTERNAL MEDICINE | Facility: CLINIC | Age: 83
End: 2018-12-14

## 2018-12-14 VITALS
OXYGEN SATURATION: 98 % | BODY MASS INDEX: 25.11 KG/M2 | HEART RATE: 69 BPM | SYSTOLIC BLOOD PRESSURE: 100 MMHG | HEIGHT: 68 IN | WEIGHT: 165.69 LBS | DIASTOLIC BLOOD PRESSURE: 60 MMHG

## 2018-12-14 DIAGNOSIS — I10 ESSENTIAL HYPERTENSION: ICD-10-CM

## 2018-12-14 DIAGNOSIS — Z00.00 ROUTINE GENERAL MEDICAL EXAMINATION AT A HEALTH CARE FACILITY: ICD-10-CM

## 2018-12-14 DIAGNOSIS — Z23 NEED FOR PNEUMOCOCCAL VACCINATION: Primary | ICD-10-CM

## 2018-12-14 PROCEDURE — 3078F DIAST BP <80 MM HG: CPT | Mod: CPTII,HCNC,S$GLB, | Performed by: INTERNAL MEDICINE

## 2018-12-14 PROCEDURE — 90732 PPSV23 VACC 2 YRS+ SUBQ/IM: CPT | Mod: HCNC,S$GLB,, | Performed by: INTERNAL MEDICINE

## 2018-12-14 PROCEDURE — 99397 PER PM REEVAL EST PAT 65+ YR: CPT | Mod: HCNC,25,S$GLB, | Performed by: INTERNAL MEDICINE

## 2018-12-14 PROCEDURE — G0009 ADMIN PNEUMOCOCCAL VACCINE: HCPCS | Mod: HCNC,S$GLB,, | Performed by: INTERNAL MEDICINE

## 2018-12-14 PROCEDURE — 99999 PR PBB SHADOW E&M-EST. PATIENT-LVL III: CPT | Mod: PBBFAC,HCNC,, | Performed by: INTERNAL MEDICINE

## 2018-12-14 PROCEDURE — 3074F SYST BP LT 130 MM HG: CPT | Mod: CPTII,HCNC,S$GLB, | Performed by: INTERNAL MEDICINE

## 2018-12-14 RX ORDER — LOSARTAN POTASSIUM 25 MG/1
25 TABLET ORAL DAILY
Qty: 30 TABLET | Refills: 11 | Status: ON HOLD | OUTPATIENT
Start: 2018-12-14 | End: 2019-01-28 | Stop reason: HOSPADM

## 2018-12-14 NOTE — TELEPHONE ENCOUNTER
----- Message from Tati Hernandez sent at 12/14/2018 10:02 AM CST -----  Contact: [facility] [place] [phone]  Pharmacy is calling to clarify an RX.  RX name:  Losartan  What do they need to clarify:  Should patient be on both medications  losartan (COZAAR) 25 MG tablet & lisinopril (PRINIVIL,ZESTRIL) 20 MG tablet. Should  Lisinopril be discontinued.     [facility] - [address] [phone] (Phone)  [phone] (Fax)    Please call and advise  Thank you

## 2018-12-14 NOTE — PROGRESS NOTES
CHIEF COMPLAINT: Annual exam and Follow up of Right shoulder pain, hypertension, a fib, mood.    HISTORY OF PRESENT ILLNESS: This is a 84-year-old man who presents with his daughter for follow up of above.        He gets short of breath at times - no change in breathing. No chest pain, cough. He has occasional post nasal drip. Occasional sinus headache     Right shoulder is not giving him pain. He had decreased range of motion of the right shoulder.  No problems with neck, back and shoulder.      He is on Eliquis 5 mg twice daily, lisinopril 20 mg dialy, furosemide 20 mg daily, potassium chloride 10 meq 2 tablets every other day and coreg 3.125 mg one tablet twice daily. No chest pain, shortness of breath, palpitations.       Appetite is so so. No change.  He is not drinking a boost or ensure daily. No fever, chills, nausea, vomiting, constipation, diarrhea.       He continues to take Wellbutrin  mg daily. Memory has not worsened. He has some mild memory loss. Mood has been stable.     He continues to take atorvastatin 40 mg 1 tablet daily and Lovaza 1 g daily for her hyperlipidemia.          Past Medical History:   1. Coronary artery disease status post CABG x1 in 1993, and STEMI 01/2010, status post stenting, refused cardiac rehab, followed by Dr. Aguilar.   2. Congestive heart failure. Ejection fraction was 30% Jan 2012  3. History of atrial fibrillation -- EKG Jan 2012 - sinus rhythm  4. Hyperlipidemia  5. Hypertension.   6. Osteoarthritis of the lumbar spine -- Had a flare in 2006 and had MRI at that time.   7. History of nephrolithiasis in 2005.   8. History of left thalamic stroke in the early 2000s per Dr. Matos's notes.   9. Depression -- followed by Dr. Bingham .  10. BPH with obstruction, had a cystoscope in 08/2010. Saw Urology 06/2011.  11. Right rotator cuff tear with arthropathy. He has pain from time to time. Saw Dr. Narvaez for an injection 03/2011.     PAST SURGICAL HISTORY:   1. CABG x1 in  .   2. Bilateral cataract surgery, right 2005, left .   3. Excision of basal cell carcinoma of the left ear in .     SOCIAL HISTORY: He has never smoked, drinks alcohol once a month. . Has three children. Retired from the [employer].     FAMILY HISTORY: Mother  in her 60s of heart trouble. Father  in his 50s of some sort of abdominal malignancy. He has a half brother, which he is not sure of his health issues.     Screening PSA was 1016. Declines colonoscopy.     PHYSICAL EXAMINATION:  GENERAL: He is alert, oriented, no apparent distress. Affect within normal limits.   HEENT: Conjunctivae anicteric. . TM clear  Neck supple. Oropharynx -clear  CHEST: Respiratory effort normal. Lungs clear. Rales right base.    HEART: Regular rate and rhythm without murmurs, gallops or rubs. No lower extremity edema.      Labs 18       ASSESSMENT AND PLAN:     Annual exam - discussed diet, exercise and safety issues.    1.  Coronary artery disease, CHF and a fib and aortic atherosclerosis- on risk facto modification.    3. Hypertension - change lisinopril to losartan 25 mg daily.   4. Hyperlipidemia -- controlled.    5. GERD - asx. Takes ranitidine 300 mg Nightly as needed    6.  Major Depression -- controlled on Wellbutrin  7. Osteoarthritis of lumbar spine -- stable.   8. Benign prostatic hypertrophy -- stable. Not treating elevated PSA  9. History of skin cancer -- Saw Derm   10. Thyroid nodule - thyroid ultrasound stable    11 Memory loss- intolerant of aricept. Saw Jade Chacon    12. Vitamin B12 deficiency -on oral vitamin B12  13. Thrombocytopenia -better.  and chronic - repeat CBC at next lab  14. Tortuous aorta - BP is stable  15. Left atrial appendage thrombus- On Eliquis     . Declines colonoscopy.   I will see him back in 4 months, sooner if problems arise.

## 2018-12-27 ENCOUNTER — OFFICE VISIT (OUTPATIENT)
Dept: URGENT CARE | Facility: CLINIC | Age: 83
End: 2018-12-27
Payer: MEDICARE

## 2018-12-27 VITALS
HEART RATE: 61 BPM | HEIGHT: 68 IN | RESPIRATION RATE: 18 BRPM | TEMPERATURE: 97 F | DIASTOLIC BLOOD PRESSURE: 68 MMHG | WEIGHT: 165 LBS | OXYGEN SATURATION: 97 % | SYSTOLIC BLOOD PRESSURE: 108 MMHG | BODY MASS INDEX: 25.01 KG/M2

## 2018-12-27 DIAGNOSIS — J06.9 UPPER RESPIRATORY TRACT INFECTION, UNSPECIFIED TYPE: Primary | ICD-10-CM

## 2018-12-27 PROCEDURE — 3074F SYST BP LT 130 MM HG: CPT | Mod: CPTII,S$GLB,, | Performed by: NURSE PRACTITIONER

## 2018-12-27 PROCEDURE — 99213 OFFICE O/P EST LOW 20 MIN: CPT | Mod: S$GLB,,, | Performed by: NURSE PRACTITIONER

## 2018-12-27 PROCEDURE — 3288F FALL RISK ASSESSMENT DOCD: CPT | Mod: CPTII,S$GLB,, | Performed by: NURSE PRACTITIONER

## 2018-12-27 PROCEDURE — 3078F DIAST BP <80 MM HG: CPT | Mod: CPTII,S$GLB,, | Performed by: NURSE PRACTITIONER

## 2018-12-27 PROCEDURE — 1100F PTFALLS ASSESS-DOCD GE2>/YR: CPT | Mod: CPTII,S$GLB,, | Performed by: NURSE PRACTITIONER

## 2018-12-27 RX ORDER — ALBUTEROL SULFATE 90 UG/1
2 AEROSOL, METERED RESPIRATORY (INHALATION) EVERY 6 HOURS PRN
Qty: 1 INHALER | Refills: 0 | Status: SHIPPED | OUTPATIENT
Start: 2018-12-27

## 2018-12-27 RX ORDER — LEVOCETIRIZINE DIHYDROCHLORIDE 5 MG/1
5 TABLET, FILM COATED ORAL DAILY
Qty: 30 TABLET | Refills: 0 | Status: ON HOLD | OUTPATIENT
Start: 2018-12-27 | End: 2019-01-28 | Stop reason: HOSPADM

## 2018-12-27 RX ORDER — GUAIFENESIN/DEXTROMETHORPHAN 100-10MG/5
5 SYRUP ORAL 4 TIMES DAILY PRN
Qty: 236 ML | Refills: 0 | Status: SHIPPED | OUTPATIENT
Start: 2018-12-27 | End: 2018-12-27 | Stop reason: CLARIF

## 2018-12-27 RX ORDER — DOXYCYCLINE 100 MG/1
100 CAPSULE ORAL 2 TIMES DAILY
Qty: 14 CAPSULE | Refills: 0 | Status: ON HOLD | OUTPATIENT
Start: 2018-12-27 | End: 2019-01-05 | Stop reason: HOSPADM

## 2018-12-27 RX ORDER — GUAIFENESIN 600 MG/1
600 TABLET, EXTENDED RELEASE ORAL 2 TIMES DAILY
Qty: 60 TABLET | Refills: 0 | Status: ON HOLD | OUTPATIENT
Start: 2018-12-27 | End: 2019-01-05 | Stop reason: HOSPADM

## 2018-12-27 RX ORDER — BENZONATATE 100 MG/1
200 CAPSULE ORAL 3 TIMES DAILY PRN
Qty: 20 CAPSULE | Refills: 0 | Status: ON HOLD | OUTPATIENT
Start: 2018-12-27 | End: 2019-01-28 | Stop reason: HOSPADM

## 2018-12-28 NOTE — PROGRESS NOTES
"Subjective:       Patient ID: Jeremie Bradshaw is a 84 y.o. male.    Vitals:  height is 5' 8" (1.727 m) and weight is 74.8 kg (165 lb). His tympanic temperature is 97.3 °F (36.3 °C). His blood pressure is 108/68 and his pulse is 61. His respiration is 18 and oxygen saturation is 97%.     Chief Complaint: Cough    This is a 84 y.o. male who presents today with a chief complaint of   Cough, chest tightness for 4 -5 days. Some SOB taking Robitussun DM      Cough   This is a new problem. The current episode started in the past 7 days. The problem has been gradually worsening. The problem occurs every few minutes. Associated symptoms include shortness of breath and wheezing. Pertinent negatives include no chills, ear pain, eye redness, fever, hemoptysis, myalgias, rash or sore throat. The symptoms are aggravated by lying down. He has tried OTC cough suppressant (robitussin DM ) for the symptoms. The treatment provided mild relief.       Constitution: Negative for chills, sweating, fatigue and fever.   HENT: Positive for congestion. Negative for ear pain, sinus pain, sinus pressure, sore throat and voice change.    Neck: Negative for painful lymph nodes.   Eyes: Negative for eye redness.   Respiratory: Positive for chest tightness, cough, shortness of breath and wheezing. Negative for sputum production, bloody sputum, COPD, stridor and asthma.    Gastrointestinal: Negative for nausea and vomiting.   Musculoskeletal: Negative for muscle ache.   Skin: Negative for rash.   Allergic/Immunologic: Negative for seasonal allergies and asthma.   Hematologic/Lymphatic: Negative for swollen lymph nodes.       Objective:      Physical Exam   Constitutional: He is oriented to person, place, and time. He appears well-developed and well-nourished. He is cooperative.  Non-toxic appearance. He does not appear ill. No distress.   HENT:   Head: Normocephalic and atraumatic.   Right Ear: Hearing, tympanic membrane, external ear and ear canal " normal.   Left Ear: Hearing, tympanic membrane, external ear and ear canal normal.   Nose: Sinus tenderness present. No mucosal edema, rhinorrhea or nasal deformity. No epistaxis. Right sinus exhibits frontal sinus tenderness. Right sinus exhibits no maxillary sinus tenderness. Left sinus exhibits maxillary sinus tenderness. Left sinus exhibits no frontal sinus tenderness.   Mouth/Throat: Uvula is midline, oropharynx is clear and moist and mucous membranes are normal. No trismus in the jaw. Normal dentition. No uvula swelling. No posterior oropharyngeal erythema.   Eyes: Conjunctivae and lids are normal. Right eye exhibits no discharge. Left eye exhibits no discharge. No scleral icterus.   Sclera clear bilat   Neck: Trachea normal, normal range of motion, full passive range of motion without pain and phonation normal. Neck supple.   Cardiovascular: Normal rate, regular rhythm, normal heart sounds, intact distal pulses and normal pulses.   Pulmonary/Chest: Effort normal. No stridor. No respiratory distress. He has no decreased breath sounds. He has no wheezes. He has rhonchi in the right upper field. He has no rales.   Abdominal: Soft. Normal appearance and bowel sounds are normal. He exhibits no distension, no pulsatile midline mass and no mass. There is no tenderness.   Musculoskeletal: Normal range of motion. He exhibits no edema or deformity.   Neurological: He is alert and oriented to person, place, and time. He exhibits normal muscle tone. Coordination normal.   Skin: Skin is warm, dry and intact. He is not diaphoretic. No pallor.   Psychiatric: He has a normal mood and affect. His speech is normal and behavior is normal. Judgment and thought content normal. Cognition and memory are normal.   Nursing note and vitals reviewed.      Assessment:       1. Upper respiratory tract infection, unspecified type        Plan:         Upper respiratory tract infection, unspecified type  -     doxycycline (VIBRAMYCIN) 100  MG Cap; Take 1 capsule (100 mg total) by mouth 2 (two) times daily. for 7 days  Dispense: 14 capsule; Refill: 0  -     benzonatate (TESSALON PERLES) 100 MG capsule; Take 2 capsules (200 mg total) by mouth 3 (three) times daily as needed for Cough.  Dispense: 20 capsule; Refill: 0  -     levocetirizine (XYZAL) 5 MG tablet; Take 1 tablet (5 mg total) by mouth once daily.  Dispense: 30 tablet; Refill: 0  -     Discontinue: dextromethorphan-guaifenesin  mg/5 ml (ROBITUSSIN-DM)  mg/5 mL liquid; Take 5 mLs by mouth 4 (four) times daily as needed.  Dispense: 236 mL; Refill: 0    Other orders  -     guaiFENesin (MUCINEX) 600 mg 12 hr tablet; Take 1 tablet (600 mg total) by mouth 2 (two) times daily.  Dispense: 60 tablet; Refill: 0    Please follow up with your Primary care provider within 2-5 days if your signs and symptoms have not resolved or worsen.     If your condition worsens or fails to improve we recommend that you receive another evaluation at the emergency room immediately or contact your primary medical clinic to discuss your concerns.   You must understand that you have received an Urgent Care treatment only and that you may be released before all of your medical problems are known or treated. You, the patient, will arrange for follow up care as instructed.     RED FLAGS/WARNING SYMPTOMS DISCUSSED WITH PATIENT THAT WOULD WARRANT EMERGENT MEDICAL ATTENTION. PATIENT VERBALIZED UNDERSTANDING.       Viral Upper Respiratory Illness (Adult)  You have a viral upper respiratory illness (URI), which is another term for the common cold. This illness is contagious during the first few days. It is spread through the air by coughing and sneezing. It may also be spread by direct contact (touching the sick person and then touching your own eyes, nose, or mouth). Frequent handwashing will decrease risk of spread. Most viral illnesses go away within 7 to 10 days with rest and simple home remedies. Sometimes the  illness may last for several weeks. Antibiotics will not kill a virus, and they are generally not prescribed for this condition.    Home care  · If symptoms are severe, rest at home for the first 2 to 3 days. When you resume activity, don't let yourself get too tired.  · Avoid being exposed to cigarette smoke (yours or others).  · You may use acetaminophen or ibuprofen to control pain and fever, unless another medicine was prescribed. (Note: If you have chronic liver or kidney disease, have ever had a stomach ulcer or gastrointestinal bleeding, or are taking blood-thinning medicines, talk with your healthcare provider before using these medicines.) Aspirin should never be given to anyone under 18 years of age who is ill with a viral infection or fever. It may cause severe liver or brain damage.  · Your appetite may be poor, so a light diet is fine. Avoid dehydration by drinking 6 to 8 glasses of fluids per day (water, soft drinks, juices, tea, or soup). Extra fluids will help loosen secretions in the nose and lungs.  · Over-the-counter cold medicines will not shorten the length of time youre sick, but they may be helpful for the following symptoms: cough, sore throat, and nasal and sinus congestion. (Note: Do not use decongestants if you have high blood pressure.)  Follow-up care  Follow up with your healthcare provider, or as advised.  When to seek medical advice  Call your healthcare provider right away if any of these occur:  · Cough with lots of colored sputum (mucus)  · Severe headache; face, neck, or ear pain  · Difficulty swallowing due to throat pain  · Fever of 100.4°F (38°C)  Call 911, or get immediate medical care  Call emergency services right away if any of these occur:  · Chest pain, shortness of breath, wheezing, or difficulty breathing  · Coughing up blood  · Inability to swallow due to throat pain  Date Last Reviewed: 9/13/2015  © 9082-3397 [employer]. [address] [place] [address],  [place] [address]. All rights reserved. This information is not intended as a substitute for professional medical care. Always follow your healthcare professional's instructions.

## 2018-12-28 NOTE — PATIENT INSTRUCTIONS
Please follow up with your Primary care provider within 2-5 days if your signs and symptoms have not resolved or worsen.     If your condition worsens or fails to improve we recommend that you receive another evaluation at the emergency room immediately or contact your primary medical clinic to discuss your concerns.   You must understand that you have received an Urgent Care treatment only and that you may be released before all of your medical problems are known or treated. You, the patient, will arrange for follow up care as instructed.     RED FLAGS/WARNING SYMPTOMS DISCUSSED WITH PATIENT THAT WOULD WARRANT EMERGENT MEDICAL ATTENTION. PATIENT VERBALIZED UNDERSTANDING.       Viral Upper Respiratory Illness (Adult)  You have a viral upper respiratory illness (URI), which is another term for the common cold. This illness is contagious during the first few days. It is spread through the air by coughing and sneezing. It may also be spread by direct contact (touching the sick person and then touching your own eyes, nose, or mouth). Frequent handwashing will decrease risk of spread. Most viral illnesses go away within 7 to 10 days with rest and simple home remedies. Sometimes the illness may last for several weeks. Antibiotics will not kill a virus, and they are generally not prescribed for this condition.    Home care  · If symptoms are severe, rest at home for the first 2 to 3 days. When you resume activity, don't let yourself get too tired.  · Avoid being exposed to cigarette smoke (yours or others).  · You may use acetaminophen or ibuprofen to control pain and fever, unless another medicine was prescribed. (Note: If you have chronic liver or kidney disease, have ever had a stomach ulcer or gastrointestinal bleeding, or are taking blood-thinning medicines, talk with your healthcare provider before using these medicines.) Aspirin should never be given to anyone under 18 years of age who is ill with a viral infection  or fever. It may cause severe liver or brain damage.  · Your appetite may be poor, so a light diet is fine. Avoid dehydration by drinking 6 to 8 glasses of fluids per day (water, soft drinks, juices, tea, or soup). Extra fluids will help loosen secretions in the nose and lungs.  · Over-the-counter cold medicines will not shorten the length of time youre sick, but they may be helpful for the following symptoms: cough, sore throat, and nasal and sinus congestion. (Note: Do not use decongestants if you have high blood pressure.)  Follow-up care  Follow up with your healthcare provider, or as advised.  When to seek medical advice  Call your healthcare provider right away if any of these occur:  · Cough with lots of colored sputum (mucus)  · Severe headache; face, neck, or ear pain  · Difficulty swallowing due to throat pain  · Fever of 100.4°F (38°C)  Call 911, or get immediate medical care  Call emergency services right away if any of these occur:  · Chest pain, shortness of breath, wheezing, or difficulty breathing  · Coughing up blood  · Inability to swallow due to throat pain  Date Last Reviewed: 9/13/2015  © 9974-2694 [employer]. [address] [place] [address], [place] [address]. All rights reserved. This information is not intended as a substitute for professional medical care. Always follow your healthcare professional's instructions.

## 2018-12-31 ENCOUNTER — HOSPITAL ENCOUNTER (INPATIENT)
Facility: HOSPITAL | Age: 83
LOS: 6 days | Discharge: HOME HEALTH | DRG: 291 | End: 2019-01-06
Attending: EMERGENCY MEDICINE | Admitting: EMERGENCY MEDICINE
Payer: MEDICARE

## 2018-12-31 ENCOUNTER — PATIENT MESSAGE (OUTPATIENT)
Dept: INTERNAL MEDICINE | Facility: CLINIC | Age: 83
End: 2018-12-31

## 2018-12-31 DIAGNOSIS — N18.30 CKD (CHRONIC KIDNEY DISEASE), STAGE III: Chronic | ICD-10-CM

## 2018-12-31 DIAGNOSIS — I50.43 ACUTE ON CHRONIC COMBINED SYSTOLIC AND DIASTOLIC CONGESTIVE HEART FAILURE: ICD-10-CM

## 2018-12-31 DIAGNOSIS — R10.13 EPIGASTRIC ABDOMINAL PAIN: ICD-10-CM

## 2018-12-31 DIAGNOSIS — I25.5 ISCHEMIC CARDIOMYOPATHY: ICD-10-CM

## 2018-12-31 DIAGNOSIS — R06.02 SHORTNESS OF BREATH: ICD-10-CM

## 2018-12-31 DIAGNOSIS — I50.23 ACUTE ON CHRONIC SYSTOLIC CONGESTIVE HEART FAILURE: Primary | ICD-10-CM

## 2018-12-31 DIAGNOSIS — Z95.1 S/P CABG (CORONARY ARTERY BYPASS GRAFT): Chronic | ICD-10-CM

## 2018-12-31 DIAGNOSIS — I48.0 PAROXYSMAL ATRIAL FIBRILLATION: Chronic | ICD-10-CM

## 2018-12-31 DIAGNOSIS — R06.00 DYSPNEA: ICD-10-CM

## 2018-12-31 DIAGNOSIS — I25.10 CORONARY ARTERY DISEASE INVOLVING NATIVE CORONARY ARTERY OF NATIVE HEART WITHOUT ANGINA PECTORIS: Chronic | ICD-10-CM

## 2018-12-31 PROCEDURE — 96374 THER/PROPH/DIAG INJ IV PUSH: CPT | Mod: HCNC

## 2018-12-31 PROCEDURE — 99285 EMERGENCY DEPT VISIT HI MDM: CPT | Mod: 25,HCNC

## 2018-12-31 PROCEDURE — 99285 EMERGENCY DEPT VISIT HI MDM: CPT | Mod: HCNC,,, | Performed by: EMERGENCY MEDICINE

## 2018-12-31 PROCEDURE — 12000002 HC ACUTE/MED SURGE SEMI-PRIVATE ROOM: Mod: HCNC

## 2018-12-31 PROCEDURE — 99285 PR EMERGENCY DEPT VISIT,LEVEL V: ICD-10-PCS | Mod: HCNC,,, | Performed by: EMERGENCY MEDICINE

## 2019-01-01 PROBLEM — N18.30 CKD (CHRONIC KIDNEY DISEASE), STAGE III: Chronic | Status: ACTIVE | Noted: 2019-01-01

## 2019-01-01 PROBLEM — S22.42XA MULTIPLE CLOSED FRACTURES OF RIBS OF LEFT SIDE: Status: RESOLVED | Noted: 2018-04-22 | Resolved: 2019-01-01
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
## 2019-01-01 PROBLEM — I50.23 ACUTE ON CHRONIC SYSTOLIC CONGESTIVE HEART FAILURE: Status: ACTIVE | Noted: 2019-01-01

## 2019-01-01 PROBLEM — R79.89 ELEVATED TROPONIN: Chronic | Status: ACTIVE | Noted: 2018-04-22

## 2019-01-01 PROBLEM — R19.7 DIARRHEA: Status: ACTIVE | Noted: 2019-01-01

## 2019-01-01 PROBLEM — I48.21 PERMANENT ATRIAL FIBRILLATION: Status: ACTIVE | Noted: 2019-01-01

## 2019-01-01 PROBLEM — I50.43 ACUTE ON CHRONIC COMBINED SYSTOLIC AND DIASTOLIC CONGESTIVE HEART FAILURE: Status: ACTIVE | Noted: 2019-01-01

## 2019-01-01 PROBLEM — E87.1 HYPONATREMIA: Status: ACTIVE | Noted: 2019-01-01

## 2019-01-01 PROBLEM — J06.9 UPPER RESPIRATORY INFECTION, VIRAL: Status: ACTIVE | Noted: 2019-01-01

## 2019-01-01 LAB
ALBUMIN SERPL BCP-MCNC: 3.1 G/DL
ALP SERPL-CCNC: 70 U/L
ALT SERPL W/O P-5'-P-CCNC: 16 U/L
ANION GAP SERPL CALC-SCNC: 10 MMOL/L
ANION GAP SERPL CALC-SCNC: 11 MMOL/L
AST SERPL-CCNC: 31 U/L
BASOPHILS # BLD AUTO: 0.04 K/UL
BASOPHILS NFR BLD: 0.5 %
BILIRUB SERPL-MCNC: 0.9 MG/DL
BNP SERPL-MCNC: 2372 PG/ML
BUN SERPL-MCNC: 32 MG/DL
BUN SERPL-MCNC: 33 MG/DL
CALCIUM SERPL-MCNC: 9.1 MG/DL
CALCIUM SERPL-MCNC: 9.2 MG/DL
CHLORIDE SERPL-SCNC: 103 MMOL/L
CHLORIDE SERPL-SCNC: 105 MMOL/L
CHOLEST SERPL-MCNC: 82 MG/DL
CHOLEST/HDLC SERPL: 2.6 {RATIO}
CO2 SERPL-SCNC: 18 MMOL/L
CO2 SERPL-SCNC: 19 MMOL/L
CREAT SERPL-MCNC: 1.4 MG/DL
CREAT SERPL-MCNC: 1.4 MG/DL
DIFFERENTIAL METHOD: ABNORMAL
EOSINOPHIL # BLD AUTO: 0.2 K/UL
EOSINOPHIL NFR BLD: 2.8 %
ERYTHROCYTE [DISTWIDTH] IN BLOOD BY AUTOMATED COUNT: 15.4 %
EST. GFR  (AFRICAN AMERICAN): 53 ML/MIN/1.73 M^2
EST. GFR  (AFRICAN AMERICAN): 53 ML/MIN/1.73 M^2
EST. GFR  (NON AFRICAN AMERICAN): 45.8 ML/MIN/1.73 M^2
EST. GFR  (NON AFRICAN AMERICAN): 45.8 ML/MIN/1.73 M^2
ESTIMATED AVG GLUCOSE: 105 MG/DL
GLUCOSE SERPL-MCNC: 100 MG/DL
GLUCOSE SERPL-MCNC: 101 MG/DL
HBA1C MFR BLD HPLC: 5.3 %
HCT VFR BLD AUTO: 37.5 %
HDLC SERPL-MCNC: 31 MG/DL
HDLC SERPL: 37.8 %
HGB BLD-MCNC: 11.8 G/DL
IMM GRANULOCYTES # BLD AUTO: 0.02 K/UL
IMM GRANULOCYTES NFR BLD AUTO: 0.3 %
INR PPP: 1.5
LDLC SERPL CALC-MCNC: 40 MG/DL
LIPASE SERPL-CCNC: 32 U/L
LYMPHOCYTES # BLD AUTO: 2.4 K/UL
LYMPHOCYTES NFR BLD: 31.9 %
MAGNESIUM SERPL-MCNC: 1.6 MG/DL
MCH RBC QN AUTO: 29.2 PG
MCHC RBC AUTO-ENTMCNC: 31.5 G/DL
MCV RBC AUTO: 93 FL
MONOCYTES # BLD AUTO: 1.1 K/UL
MONOCYTES NFR BLD: 15.1 %
NEUTROPHILS # BLD AUTO: 3.7 K/UL
NEUTROPHILS NFR BLD: 49.4 %
NONHDLC SERPL-MCNC: 51 MG/DL
NRBC BLD-RTO: 0 /100 WBC
OSMOLALITY SERPL: 295 MOSM/KG
PHOSPHATE SERPL-MCNC: 4.8 MG/DL
PLATELET # BLD AUTO: 150 K/UL
PMV BLD AUTO: 11.7 FL
POTASSIUM SERPL-SCNC: 4.5 MMOL/L
POTASSIUM SERPL-SCNC: 4.8 MMOL/L
PROT SERPL-MCNC: 6.9 G/DL
PROTHROMBIN TIME: 14.5 SEC
RBC # BLD AUTO: 4.04 M/UL
SODIUM SERPL-SCNC: 132 MMOL/L
SODIUM SERPL-SCNC: 134 MMOL/L
TRIGL SERPL-MCNC: 55 MG/DL
TROPONIN I SERPL DL<=0.01 NG/ML-MCNC: 0.11 NG/ML
TROPONIN I SERPL DL<=0.01 NG/ML-MCNC: 0.15 NG/ML
TROPONIN I SERPL DL<=0.01 NG/ML-MCNC: 0.21 NG/ML
WBC # BLD AUTO: 7.44 K/UL

## 2019-01-01 PROCEDURE — 25000242 PHARM REV CODE 250 ALT 637 W/ HCPCS: Mod: HCNC | Performed by: INTERNAL MEDICINE

## 2019-01-01 PROCEDURE — 99223 1ST HOSP IP/OBS HIGH 75: CPT | Mod: HCNC,AI,, | Performed by: PHYSICIAN ASSISTANT

## 2019-01-01 PROCEDURE — 25000242 PHARM REV CODE 250 ALT 637 W/ HCPCS: Mod: HCNC | Performed by: HOSPITALIST

## 2019-01-01 PROCEDURE — 93010 ELECTROCARDIOGRAM REPORT: CPT | Mod: HCNC,,, | Performed by: INTERNAL MEDICINE

## 2019-01-01 PROCEDURE — A4216 STERILE WATER/SALINE, 10 ML: HCPCS | Mod: HCNC | Performed by: PHYSICIAN ASSISTANT

## 2019-01-01 PROCEDURE — 84484 ASSAY OF TROPONIN QUANT: CPT | Mod: 91,HCNC

## 2019-01-01 PROCEDURE — 63600175 PHARM REV CODE 636 W HCPCS: Mod: HCNC | Performed by: PHYSICIAN ASSISTANT

## 2019-01-01 PROCEDURE — 80048 BASIC METABOLIC PNL TOTAL CA: CPT | Mod: HCNC

## 2019-01-01 PROCEDURE — 25500020 PHARM REV CODE 255: Mod: HCNC | Performed by: EMERGENCY MEDICINE

## 2019-01-01 PROCEDURE — 25000003 PHARM REV CODE 250: Mod: HCNC | Performed by: EMERGENCY MEDICINE

## 2019-01-01 PROCEDURE — 94761 N-INVAS EAR/PLS OXIMETRY MLT: CPT | Mod: HCNC

## 2019-01-01 PROCEDURE — 36415 COLL VENOUS BLD VENIPUNCTURE: CPT | Mod: HCNC

## 2019-01-01 PROCEDURE — 63600175 PHARM REV CODE 636 W HCPCS: Mod: HCNC | Performed by: HOSPITALIST

## 2019-01-01 PROCEDURE — 63600175 PHARM REV CODE 636 W HCPCS: Mod: HCNC | Performed by: EMERGENCY MEDICINE

## 2019-01-01 PROCEDURE — 11000001 HC ACUTE MED/SURG PRIVATE ROOM: Mod: HCNC

## 2019-01-01 PROCEDURE — 25000003 PHARM REV CODE 250: Mod: HCNC | Performed by: PHYSICIAN ASSISTANT

## 2019-01-01 PROCEDURE — 83690 ASSAY OF LIPASE: CPT | Mod: HCNC

## 2019-01-01 PROCEDURE — 25000003 PHARM REV CODE 250: Mod: HCNC | Performed by: INTERNAL MEDICINE

## 2019-01-01 PROCEDURE — 83036 HEMOGLOBIN GLYCOSYLATED A1C: CPT | Mod: HCNC

## 2019-01-01 PROCEDURE — 94799 UNLISTED PULMONARY SVC/PX: CPT | Mod: HCNC

## 2019-01-01 PROCEDURE — 99223 PR INITIAL HOSPITAL CARE,LEVL III: ICD-10-PCS | Mod: HCNC,AI,, | Performed by: PHYSICIAN ASSISTANT

## 2019-01-01 PROCEDURE — 93010 EKG 12-LEAD: ICD-10-PCS | Mod: HCNC,,, | Performed by: INTERNAL MEDICINE

## 2019-01-01 PROCEDURE — 85025 COMPLETE CBC W/AUTO DIFF WBC: CPT | Mod: HCNC

## 2019-01-01 PROCEDURE — 84100 ASSAY OF PHOSPHORUS: CPT | Mod: HCNC

## 2019-01-01 PROCEDURE — 83880 ASSAY OF NATRIURETIC PEPTIDE: CPT | Mod: HCNC

## 2019-01-01 PROCEDURE — 85610 PROTHROMBIN TIME: CPT | Mod: HCNC

## 2019-01-01 PROCEDURE — 83735 ASSAY OF MAGNESIUM: CPT | Mod: HCNC

## 2019-01-01 PROCEDURE — 83930 ASSAY OF BLOOD OSMOLALITY: CPT | Mod: HCNC

## 2019-01-01 PROCEDURE — 80061 LIPID PANEL: CPT | Mod: HCNC

## 2019-01-01 PROCEDURE — 63600175 PHARM REV CODE 636 W HCPCS: Mod: HCNC | Performed by: INTERNAL MEDICINE

## 2019-01-01 PROCEDURE — 93005 ELECTROCARDIOGRAM TRACING: CPT | Mod: HCNC

## 2019-01-01 PROCEDURE — 80053 COMPREHEN METABOLIC PANEL: CPT | Mod: HCNC

## 2019-01-01 PROCEDURE — 84484 ASSAY OF TROPONIN QUANT: CPT | Mod: HCNC

## 2019-01-01 PROCEDURE — 94640 AIRWAY INHALATION TREATMENT: CPT | Mod: HCNC

## 2019-01-01 RX ORDER — ASPIRIN 325 MG
325 TABLET ORAL ONCE
Status: DISCONTINUED | OUTPATIENT
Start: 2019-01-01 | End: 2019-01-02

## 2019-01-01 RX ORDER — GUAIFENESIN 600 MG/1
600 TABLET, EXTENDED RELEASE ORAL 2 TIMES DAILY
Status: DISCONTINUED | OUTPATIENT
Start: 2019-01-01 | End: 2019-01-06 | Stop reason: HOSPADM

## 2019-01-01 RX ORDER — SODIUM CHLORIDE 0.9 % (FLUSH) 0.9 %
3 SYRINGE (ML) INJECTION EVERY 8 HOURS
Status: DISCONTINUED | OUTPATIENT
Start: 2019-01-01 | End: 2019-01-06 | Stop reason: HOSPADM

## 2019-01-01 RX ORDER — FUROSEMIDE 10 MG/ML
80 INJECTION INTRAMUSCULAR; INTRAVENOUS 2 TIMES DAILY
Status: DISCONTINUED | OUTPATIENT
Start: 2019-01-01 | End: 2019-01-02

## 2019-01-01 RX ORDER — BENZONATATE 100 MG/1
200 CAPSULE ORAL 3 TIMES DAILY PRN
Status: DISCONTINUED | OUTPATIENT
Start: 2019-01-01 | End: 2019-01-06 | Stop reason: HOSPADM

## 2019-01-01 RX ORDER — PROMETHAZINE HYDROCHLORIDE 12.5 MG/1
12.5 TABLET ORAL EVERY 6 HOURS PRN
Status: DISCONTINUED | OUTPATIENT
Start: 2019-01-01 | End: 2019-01-06 | Stop reason: HOSPADM

## 2019-01-01 RX ORDER — LOSARTAN POTASSIUM 25 MG/1
25 TABLET ORAL DAILY
Status: DISCONTINUED | OUTPATIENT
Start: 2019-01-01 | End: 2019-01-06 | Stop reason: HOSPADM

## 2019-01-01 RX ORDER — CETIRIZINE HYDROCHLORIDE 5 MG/1
10 TABLET ORAL DAILY
Status: DISCONTINUED | OUTPATIENT
Start: 2019-01-01 | End: 2019-01-06 | Stop reason: HOSPADM

## 2019-01-01 RX ORDER — FUROSEMIDE 10 MG/ML
40 INJECTION INTRAMUSCULAR; INTRAVENOUS 2 TIMES DAILY
Status: DISCONTINUED | OUTPATIENT
Start: 2019-01-01 | End: 2019-01-01

## 2019-01-01 RX ORDER — POTASSIUM CHLORIDE 750 MG/1
20 CAPSULE, EXTENDED RELEASE ORAL EVERY OTHER DAY
Status: DISCONTINUED | OUTPATIENT
Start: 2019-01-01 | End: 2019-01-06 | Stop reason: HOSPADM

## 2019-01-01 RX ORDER — CARVEDILOL 3.12 MG/1
3.12 TABLET ORAL 2 TIMES DAILY
Status: DISCONTINUED | OUTPATIENT
Start: 2019-01-01 | End: 2019-01-05

## 2019-01-01 RX ORDER — DOXYCYCLINE HYCLATE 100 MG
100 TABLET ORAL EVERY 12 HOURS
Status: DISCONTINUED | OUTPATIENT
Start: 2019-01-01 | End: 2019-01-03

## 2019-01-01 RX ORDER — LANOLIN ALCOHOL/MO/W.PET/CERES
1000 CREAM (GRAM) TOPICAL DAILY
Status: DISCONTINUED | OUTPATIENT
Start: 2019-01-01 | End: 2019-01-06 | Stop reason: HOSPADM

## 2019-01-01 RX ORDER — RAMELTEON 8 MG/1
8 TABLET ORAL NIGHTLY PRN
Status: DISCONTINUED | OUTPATIENT
Start: 2019-01-01 | End: 2019-01-06 | Stop reason: HOSPADM

## 2019-01-01 RX ORDER — ASPIRIN 81 MG/1
81 TABLET ORAL DAILY
Status: DISCONTINUED | OUTPATIENT
Start: 2019-01-01 | End: 2019-01-06 | Stop reason: HOSPADM

## 2019-01-01 RX ORDER — CETIRIZINE HYDROCHLORIDE 10 MG/1
10 TABLET ORAL DAILY
Status: DISCONTINUED | OUTPATIENT
Start: 2019-01-01 | End: 2019-01-01

## 2019-01-01 RX ORDER — ATORVASTATIN CALCIUM 20 MG/1
40 TABLET, FILM COATED ORAL DAILY
Status: DISCONTINUED | OUTPATIENT
Start: 2019-01-01 | End: 2019-01-06 | Stop reason: HOSPADM

## 2019-01-01 RX ORDER — IPRATROPIUM BROMIDE AND ALBUTEROL SULFATE 2.5; .5 MG/3ML; MG/3ML
3 SOLUTION RESPIRATORY (INHALATION) ONCE
Status: COMPLETED | OUTPATIENT
Start: 2019-01-01 | End: 2019-01-01

## 2019-01-01 RX ORDER — IPRATROPIUM BROMIDE AND ALBUTEROL SULFATE 2.5; .5 MG/3ML; MG/3ML
3 SOLUTION RESPIRATORY (INHALATION)
Status: DISCONTINUED | OUTPATIENT
Start: 2019-01-01 | End: 2019-01-05

## 2019-01-01 RX ORDER — BUPROPION HYDROCHLORIDE 150 MG/1
300 TABLET ORAL EVERY MORNING
Status: DISCONTINUED | OUTPATIENT
Start: 2019-01-01 | End: 2019-01-06 | Stop reason: HOSPADM

## 2019-01-01 RX ORDER — ONDANSETRON 4 MG/1
4 TABLET, ORALLY DISINTEGRATING ORAL EVERY 8 HOURS PRN
Status: DISCONTINUED | OUTPATIENT
Start: 2019-01-01 | End: 2019-01-06 | Stop reason: HOSPADM

## 2019-01-01 RX ORDER — IPRATROPIUM BROMIDE AND ALBUTEROL SULFATE 2.5; .5 MG/3ML; MG/3ML
3 SOLUTION RESPIRATORY (INHALATION) EVERY 4 HOURS PRN
Status: DISCONTINUED | OUTPATIENT
Start: 2019-01-01 | End: 2019-01-06 | Stop reason: HOSPADM

## 2019-01-01 RX ORDER — TAMSULOSIN HYDROCHLORIDE 0.4 MG/1
0.4 CAPSULE ORAL NIGHTLY
Status: DISCONTINUED | OUTPATIENT
Start: 2019-01-01 | End: 2019-01-06 | Stop reason: HOSPADM

## 2019-01-01 RX ORDER — OMEGA-3-ACID ETHYL ESTERS 1 G/1
2 CAPSULE, LIQUID FILLED ORAL DAILY
Status: DISCONTINUED | OUTPATIENT
Start: 2019-01-01 | End: 2019-01-06 | Stop reason: HOSPADM

## 2019-01-01 RX ORDER — METHYLPREDNISOLONE SOD SUCC 125 MG
80 VIAL (EA) INJECTION ONCE
Status: COMPLETED | OUTPATIENT
Start: 2019-01-01 | End: 2019-01-01

## 2019-01-01 RX ORDER — DEXAMETHASONE 1 MG/1
2 TABLET ORAL EVERY 12 HOURS
Status: COMPLETED | OUTPATIENT
Start: 2019-01-01 | End: 2019-01-05

## 2019-01-01 RX ORDER — FUROSEMIDE 10 MG/ML
40 INJECTION INTRAMUSCULAR; INTRAVENOUS
Status: COMPLETED | OUTPATIENT
Start: 2019-01-01 | End: 2019-01-01

## 2019-01-01 RX ADMIN — IPRATROPIUM BROMIDE AND ALBUTEROL SULFATE 3 ML: .5; 3 SOLUTION RESPIRATORY (INHALATION) at 09:01

## 2019-01-01 RX ADMIN — GUAIFENESIN 600 MG: 600 TABLET, EXTENDED RELEASE ORAL at 10:01

## 2019-01-01 RX ADMIN — BUPROPION HYDROCHLORIDE 300 MG: 300 TABLET, FILM COATED, EXTENDED RELEASE ORAL at 10:01

## 2019-01-01 RX ADMIN — CETIRIZINE HYDROCHLORIDE 10 MG: 10 TABLET, FILM COATED ORAL at 10:01

## 2019-01-01 RX ADMIN — Medication 3 ML: at 09:01

## 2019-01-01 RX ADMIN — DOXYCYCLINE HYCLATE 100 MG: 100 TABLET, COATED ORAL at 10:01

## 2019-01-01 RX ADMIN — IOHEXOL 75 ML: 350 INJECTION, SOLUTION INTRAVENOUS at 02:01

## 2019-01-01 RX ADMIN — APIXABAN 5 MG: 5 TABLET, FILM COATED ORAL at 09:01

## 2019-01-01 RX ADMIN — METHYLPREDNISOLONE SODIUM SUCCINATE 80 MG: 125 INJECTION, POWDER, FOR SOLUTION INTRAMUSCULAR; INTRAVENOUS at 10:01

## 2019-01-01 RX ADMIN — CYANOCOBALAMIN TAB 1000 MCG 1000 MCG: 1000 TAB at 10:01

## 2019-01-01 RX ADMIN — IPRATROPIUM BROMIDE AND ALBUTEROL SULFATE 3 ML: .5; 3 SOLUTION RESPIRATORY (INHALATION) at 04:01

## 2019-01-01 RX ADMIN — DEXAMETHASONE 2 MG: 1 TABLET ORAL at 09:01

## 2019-01-01 RX ADMIN — TAMSULOSIN HYDROCHLORIDE 0.4 MG: 0.4 CAPSULE ORAL at 09:01

## 2019-01-01 RX ADMIN — Medication 3 ML: at 10:01

## 2019-01-01 RX ADMIN — Medication 3 ML: at 05:01

## 2019-01-01 RX ADMIN — CARVEDILOL 3.12 MG: 3.12 TABLET, FILM COATED ORAL at 09:01

## 2019-01-01 RX ADMIN — ALUMINUM HYDROXIDE, MAGNESIUM HYDROXIDE, AND SIMETHICONE 50 ML: 200; 200; 20 SUSPENSION ORAL at 01:01

## 2019-01-01 RX ADMIN — CETIRIZINE HYDROCHLORIDE 10 MG: 10 TABLET, FILM COATED ORAL at 05:01

## 2019-01-01 RX ADMIN — ATORVASTATIN CALCIUM 40 MG: 20 TABLET, FILM COATED ORAL at 10:01

## 2019-01-01 RX ADMIN — IPRATROPIUM BROMIDE AND ALBUTEROL SULFATE 3 ML: .5; 3 SOLUTION RESPIRATORY (INHALATION) at 08:01

## 2019-01-01 RX ADMIN — POTASSIUM CHLORIDE 20 MEQ: 750 CAPSULE, EXTENDED RELEASE ORAL at 10:01

## 2019-01-01 RX ADMIN — OMEGA-3-ACID ETHYL ESTERS 2 G: 1 CAPSULE, LIQUID FILLED ORAL at 10:01

## 2019-01-01 RX ADMIN — APIXABAN 5 MG: 5 TABLET, FILM COATED ORAL at 10:01

## 2019-01-01 RX ADMIN — FUROSEMIDE 40 MG: 10 INJECTION, SOLUTION INTRAVENOUS at 10:01

## 2019-01-01 RX ADMIN — GUAIFENESIN 600 MG: 600 TABLET, EXTENDED RELEASE ORAL at 09:01

## 2019-01-01 RX ADMIN — ASPIRIN 81 MG: 81 TABLET, COATED ORAL at 10:01

## 2019-01-01 RX ADMIN — CARVEDILOL 3.12 MG: 3.12 TABLET, FILM COATED ORAL at 10:01

## 2019-01-01 RX ADMIN — FUROSEMIDE 80 MG: 10 INJECTION, SOLUTION INTRAMUSCULAR; INTRAVENOUS at 05:01

## 2019-01-01 RX ADMIN — FUROSEMIDE 40 MG: 10 INJECTION, SOLUTION INTRAVENOUS at 03:01

## 2019-01-01 NOTE — SUBJECTIVE & OBJECTIVE
Past Medical History:   Diagnosis Date    Anemia     Basal cell carcinoma 7/2014    left medial canthus    Basal cell carcinoma 3/9/2015    right forehead    Basal cell carcinoma 09/08/2016    left neck (scoop shave)    Basal cell carcinoma 12/09/2016    left preauricular    BPH (benign prostatic hypertrophy) 8/17/2012    CAD (coronary artery disease) 8/17/2012    Cervical spine fracture 11/20/2012    CHF (congestive heart failure) 8/17/2012    Depression 8/17/2012    GERD (gastroesophageal reflux disease) 3/27/2014    Heart attack     History of atrial fibrillation 8/17/2012    Hyperlipidemia     Hypertension     Kidney stones 8/17/2012    Memory loss     Myocardial infarction     Osteoarthritis of lumbar spine 8/17/2012    Shoulder pain 8/17/2012    Stroke 8/17/2012       Past Surgical History:   Procedure Laterality Date    basal cell carcinoma left ear      CARDIAC SURGERY      CATARACT EXTRACTION W/  INTRAOCULAR LENS IMPLANT Bilateral     CATARACT EXTRACTION, BILATERAL      CORONARY ARTERY BYPASS GRAFT  1990    x1    ECHOCARDIOGRAM-TRANSESOPHAGEAL N/A 6/2/2015    [facility] at [facility]    TONSILLECTOMY         Review of patient's allergies indicates:   Allergen Reactions    Prednisone Other (See Comments)     hallucinations       Current Facility-Administered Medications on File Prior to Encounter   Medication    cyanocobalamin injection 1,000 mcg     Current Outpatient Medications on File Prior to Encounter   Medication Sig    albuterol (PROVENTIL/VENTOLIN HFA) 90 mcg/actuation inhaler Inhale 2 puffs into the lungs every 6 (six) hours as needed for Wheezing or Shortness of Breath. Rescue    apixaban (ELIQUIS) 5 mg Tab TAKE 1 TABLET(5 MG) BY MOUTH TWICE DAILY    aspirin (ECOTRIN) 81 MG EC tablet Take 81 mg by mouth. Pt taken every other day with potasium    atorvastatin (LIPITOR) 40 MG tablet Take 1 tablet (40 mg total) by mouth once daily.    benzonatate  (TESSALON PERLES) 100 MG capsule Take 2 capsules (200 mg total) by mouth 3 (three) times daily as needed for Cough.    buPROPion (WELLBUTRIN XL) 300 MG 24 hr tablet Take 1 tablet (300 mg total) by mouth every morning.    carvedilol (COREG) 3.125 MG tablet Take 1 tablet (3.125 mg total) by mouth 2 (two) times daily.    carvedilol (COREG) 3.125 MG tablet TAKE 1 TABLET(3.125 MG) BY MOUTH TWICE DAILY    cyanocobalamin (VITAMIN B-12) 1000 MCG tablet Take 1 tablet (1,000 mcg total) by mouth once daily.    desonide (DESOWEN) 0.05 % cream AAA bid    desonide (DESOWEN) 0.05 % lotion APPLY EXTERNALLY TO THE AFFECTED AREA TWICE DAILY AS NEEDED FOR REDNESS AND ITCHING     mg capsule TAKE 1 CAPSULE BY MOUTH ONCE DAILY    doxycycline (VIBRAMYCIN) 100 MG Cap Take 1 capsule (100 mg total) by mouth 2 (two) times daily. for 7 days    furosemide (LASIX) 20 MG tablet TAKE 1 TABLET BY MOUTH ONCE DAILY PLUS ONE EXTRA AS NEEDED    guaiFENesin (MUCINEX) 600 mg 12 hr tablet Take 1 tablet (600 mg total) by mouth 2 (two) times daily.    hydrocortisone (WESTCORT) 0.2 % cream Apply topically 2 (two) times daily.      levocetirizine (XYZAL) 5 MG tablet Take 1 tablet (5 mg total) by mouth once daily.    losartan (COZAAR) 25 MG tablet Take 1 tablet (25 mg total) by mouth once daily.    nitroGLYCERIN (NITROSTAT) 0.4 MG SL tablet Place 1 tablet (0.4 mg total) under the tongue every 5 (five) minutes as needed.    omega-3 acid ethyl esters (LOVAZA) 1 gram capsule Take 2 capsules (2 g total) by mouth once daily.    potassium chloride (MICRO-K) 10 MEQ CpSR TAKE 2 CAPSULES BY MOUTH EVERY OTHER DAY    tamsulosin (FLOMAX) 0.4 mg Cap Take 1 capsule (0.4 mg total) by mouth every evening.     Family History     Problem Relation (Age of Onset)    Cancer Father    Heart disease Mother    Heart failure Mother    No Known Problems Sister, Brother, Maternal Aunt, Maternal Uncle, Paternal Aunt, Paternal Uncle, Maternal Grandmother, Maternal  Grandfather, Paternal Grandmother, Paternal Grandfather        Tobacco Use    Smoking status: Never Smoker    Smokeless tobacco: Never Used   Substance and Sexual Activity    Alcohol use: No     Comment: social drinker    Drug use: No    Sexual activity: Not Currently     Review of Systems   Constitutional: Negative for activity change, appetite change, chills, diaphoresis, fatigue, fever and unexpected weight change.   HENT: Positive for congestion. Negative for rhinorrhea, sore throat, trouble swallowing and voice change.    Eyes: Negative for visual disturbance.   Respiratory: Positive for cough, shortness of breath and wheezing. Negative for choking and chest tightness.    Cardiovascular: Negative for chest pain, palpitations and leg swelling.   Gastrointestinal: Positive for abdominal pain and diarrhea. Negative for abdominal distention, anal bleeding, blood in stool, constipation, nausea and vomiting.   Endocrine: Negative for cold intolerance, heat intolerance, polydipsia and polyuria.   Genitourinary: Negative for dysuria, flank pain, frequency, hematuria and urgency.   Musculoskeletal: Negative for arthralgias, back pain, joint swelling and myalgias.   Skin: Negative for color change and rash.   Neurological: Negative for dizziness, seizures, syncope, facial asymmetry, speech difficulty, weakness, light-headedness, numbness and headaches.   Hematological: Negative for adenopathy. Does not bruise/bleed easily.   Psychiatric/Behavioral: Negative for agitation, confusion, hallucinations and suicidal ideas.     Objective:     Vital Signs (Most Recent):  Temp: 97.7 °F (36.5 °C) (12/31/18 2308)  Pulse: 92 (01/01/19 0502)  Resp: 18 (01/01/19 0502)  BP: 111/69 (01/01/19 0502)  SpO2: (!) 93 % (01/01/19 0502) Vital Signs (24h Range):  Temp:  [97.7 °F (36.5 °C)] 97.7 °F (36.5 °C)  Pulse:  [78-92] 92  Resp:  [18-20] 18  SpO2:  [93 %-99 %] 93 %  BP: (101-125)/(58-77) 111/69     Weight: 74.8 kg (164 lb 14.5  oz)  Body mass index is 25.07 kg/m².    Physical Exam   Constitutional: He is oriented to person, place, and time. He appears well-developed and well-nourished. No distress.   HENT:   Head: Normocephalic and atraumatic.   Eyes: Pupils are equal, round, and reactive to light.   Neck: Neck supple. Carotid bruit is not present. No thyromegaly present.   Cardiovascular: Normal rate and regular rhythm. Exam reveals no gallop.   No murmur heard.  Pulmonary/Chest: Effort normal and breath sounds normal. No respiratory distress. He has no wheezes.   Abdominal: Bowel sounds are normal. He exhibits no distension. There is no splenomegaly or hepatomegaly. There is tenderness in the epigastric area.   Musculoskeletal: Normal range of motion. He exhibits edema (2+ BLE edema).   Neurological: He is alert and oriented to person, place, and time. No cranial nerve deficit or sensory deficit.   Skin: Skin is warm and dry. No rash noted.   Psychiatric: He has a normal mood and affect. His behavior is normal.         CRANIAL NERVES     CN III, IV, VI   Pupils are equal, round, and reactive to light.       Significant Labs:   CBC:   Recent Labs   Lab 12/30/18 2350 01/01/19 0047   WBC 6.03 7.44   HGB 11.6* 11.8*   HCT 36.2* 37.5*   * 150     CMP:   Recent Labs   Lab 12/30/18 2350 01/01/19 0047   * 132*   K 4.4 4.8    103   CO2 22* 19*   GLU 96 101   BUN 32* 32*   CREATININE 1.4 1.4   CALCIUM 9.2 9.2   PROT 6.7 6.9   ALBUMIN 3.1* 3.1*   BILITOT 0.8 0.9   ALKPHOS 72 70   AST 20 31   ALT 10 16   ANIONGAP 9 10   EGFRNONAA 45.8* 45.8*     Cardiac Markers:   Recent Labs   Lab 01/01/19 0047   BNP 2,372*     Troponin:   Recent Labs   Lab 12/30/18 2350 01/01/19 0047   TROPONINI 0.058* 0.154*       Significant Imaging: I have reviewed all pertinent imaging results/findings within the past 24 hours.

## 2019-01-01 NOTE — ASSESSMENT & PLAN NOTE
- Troponin 0.154 up from baseline of 0.058.  - No evidence of STEMI on ECG.  - Patient noted to have acute CHF exacerbation and CKD.  Epigastric pain reproducible on exam.    - Will give ASA, monitor on telemetry, and trend troponin.  - High risk HEART score.  - Patient on ASA, BB, statin, ARB.

## 2019-01-01 NOTE — ASSESSMENT & PLAN NOTE
Epigastric abdominal pain    - CT abdomen/pelvis showed only diverticulosis coli without evidence of acute diverticulitis.  - Patient recently placed on antibiotics.  - Will check C.diff.  - Supportive care.

## 2019-01-01 NOTE — HPI
Patient is an 84 year old gentleman with a h/o CAD s/p CABG with history of remote MI (~8 years ago), HTN, A Fib on Eliquis, chronic systolic and diastolic HF, pulmonary HTN, aortic stenosis, and BPH.  Daughter at bedside assists with HPI.  He presents with worsening productive cough and SOB.  Patient also complains of diarrhea and mid-epigastric pain.  Patient notes that he initially began feeling poorly last week.  He was seen by Urgent Care on 12/27/2018 with complaints of congestion, cough, SOB, wheezing, and chest tightness.  He was started on Doxycycline 100mg BID, PRN tessalon perles, Mucinex 600mg BID, and Xyzal 5mg daily.  Symptoms continued to worsen, so his daughter brought him to the ED on 12/30/2018.  Admission for observation and diuresis was recommended at that time, but patient refused and left AMA.  Daughter states that symptoms are worse today.  She notes SOB is worse when he lies flat and reports BLE.  She gave him an extra dose of Lasix, but this has not helped.  Patient denies chest pain, dizziness, palpitations, fever/chills, N/V, dysuria.  Abdominal pain is worse with eating.    ED Course:  BNP increased to 2372 from 1464 two days ago.  CT and CXR noted moderate-sized bilateral pleural effusions and cardiomegaly.  Patient received Lasix 40mg IV x1.

## 2019-01-01 NOTE — ASSESSMENT & PLAN NOTE
- Patient seen by Urgent Care on 12/27/2018.  - Started on doxycycline 100mg BID (last dose to be 1/3/2018), PRN tessalon perles, Mucinex 600mg BID, and Xyzal 5mg daily.  - Will continue.

## 2019-01-01 NOTE — ASSESSMENT & PLAN NOTE
Old MI (myocardial infarction)  S/P CABG (coronary artery bypass graft)    - See above.  Continue secondary prevention.

## 2019-01-01 NOTE — ASSESSMENT & PLAN NOTE
Anticoagulated by anticoagulation treatment    - Rate controlled in A Fib.  - Continue BB, Eliquis 5mg BID.  - [id number] VASc score of at least 7 for CHF, HTN, age, CVA, and CAD.

## 2019-01-01 NOTE — PLAN OF CARE
Problem: Adult Inpatient Plan of Care  Goal: Plan of Care Review  Outcome: Ongoing (interventions implemented as appropriate)  Pt A&O x 4.  VSS on 2 L O2 per NC.  Pt receiving IV lasix.  Intake and output recorded.  Pt tolerating regular diet.  Telemetry monitoring in place.  No complaints of pain. Will continue to monitor pt.

## 2019-01-01 NOTE — ED NOTES
"Pt who was seen in ED around 0300 this morning, presents to ED complaining of abdominal pain. Pt states "the more I eat and drink, my stomach was hurting." Pt reports minor diarrhea. Pt reports 3 episodes of diarrhea, but denies vomiting. Pt also reports SOB when he "moves around". Pt reports taking Imodium for abdominal symptoms and states that he isnt sure if it helped.   "

## 2019-01-01 NOTE — ED PROVIDER NOTES
Encounter Date: 12/31/2018    SCRIBE #1 NOTE: I, Veronica Rodarte, am scribing for, and in the presence of,  Dr. Mclaughlin. I have scribed the entire note.       History     Chief Complaint   Patient presents with    Abdominal Pain     Patient reports abdominal pain for the past several days. Was seen yesterday.      Time patient was seen by the provider: 12:08 AM      The patient is a 84 y.o. male with co-morbidities including: CAD s/p CABG, HLD, Afib on Eliquis, CHF, HTN, who presents to the ED with a complaint of diarrhea and mid-epigastric abdominal pain for 2 days. The patient also reports of worsening cough and SOB. Patient with dyspnea on exertion. SOB worsens when lying flat. He was seen here in the ED yesterday for this, was told it would be best to be admitted for observation, but he refused. Per daughter, symptoms are worse today then yesterday. Denies fever, vomiting, dysuria. A ten point review of systems was completed and is negative except as documented above.  Patient denies any other acute medical complaint.  The patients available PMH, PSH, medications, allergies, and triage vital signs were reviewed just prior to their medical evaluation.        The history is provided by the patient, medical records and a relative.     Review of patient's allergies indicates:   Allergen Reactions    Prednisone Other (See Comments)     hallucinations     Past Medical History:   Diagnosis Date    Anemia     Basal cell carcinoma 7/2014    left medial canthus    Basal cell carcinoma 3/9/2015    right forehead    Basal cell carcinoma 09/08/2016    left neck (scoop shave)    Basal cell carcinoma 12/09/2016    left preauricular    BPH (benign prostatic hypertrophy) 8/17/2012    CAD (coronary artery disease) 8/17/2012    Cervical spine fracture 11/20/2012    CHF (congestive heart failure) 8/17/2012    Depression 8/17/2012    GERD (gastroesophageal reflux disease) 3/27/2014    Heart attack     History of  atrial fibrillation 8/17/2012    Hyperlipidemia     Hypertension     Kidney stones 8/17/2012    Memory loss     Myocardial infarction     Osteoarthritis of lumbar spine 8/17/2012    Shoulder pain 8/17/2012    Stroke 8/17/2012     Past Surgical History:   Procedure Laterality Date    basal cell carcinoma left ear      CARDIAC SURGERY      CATARACT EXTRACTION W/  INTRAOCULAR LENS IMPLANT Bilateral     CATARACT EXTRACTION, BILATERAL      CORONARY ARTERY BYPASS GRAFT  1990    x1    ECHOCARDIOGRAM-TRANSESOPHAGEAL N/A 6/2/2015    Performed by [facility] Surgeon at [facility]    TONSILLECTOMY       Family History   Problem Relation Age of Onset    Cancer Father     Heart failure Mother     Heart disease Mother     No Known Problems Sister     No Known Problems Brother     No Known Problems Maternal Aunt     No Known Problems Maternal Uncle     No Known Problems Paternal Aunt     No Known Problems Paternal Uncle     No Known Problems Maternal Grandmother     No Known Problems Maternal Grandfather     No Known Problems Paternal Grandmother     No Known Problems Paternal Grandfather     Amblyopia Neg Hx     Blindness Neg Hx     Cataracts Neg Hx     Diabetes Neg Hx     Glaucoma Neg Hx     Hypertension Neg Hx     Macular degeneration Neg Hx     Retinal detachment Neg Hx     Strabismus Neg Hx     Thyroid disease Neg Hx     Stroke Neg Hx     Melanoma Neg Hx     Psoriasis Neg Hx     Lupus Neg Hx     Eczema Neg Hx     Acne Neg Hx      Social History     Tobacco Use    Smoking status: Never Smoker    Smokeless tobacco: Never Used   Substance Use Topics    Alcohol use: No     Comment: social drinker    Drug use: No     Review of Systems   Constitutional: Negative for fever and unexpected weight change.   HENT: Negative for sore throat.    Eyes: Negative for visual disturbance.   Respiratory: Positive for cough and shortness of breath.    Cardiovascular: Positive for leg swelling. Negative for  chest pain.   Gastrointestinal: Positive for abdominal pain, diarrhea and nausea.   Genitourinary: Negative for dysuria.   Musculoskeletal: Negative for back pain.   Skin: Negative for rash.   Neurological: Negative for weakness.   Hematological: Does not bruise/bleed easily.       Physical Exam     Initial Vitals [12/31/18 2308]   BP Pulse Resp Temp SpO2   113/77 78 20 97.7 °F (36.5 °C) 95 %      MAP       --         Physical Exam    Nursing note and vitals reviewed.  Constitutional: He appears well-developed and well-nourished. He is not diaphoretic.  Non-toxic appearance. He does not appear ill. No distress.   HENT:   Head: Normocephalic and atraumatic.   Nose: Nose normal.   Eyes: Conjunctivae are normal. Right eye exhibits no discharge. Left eye exhibits no discharge.   Neck: Normal range of motion. Neck supple.   nontender   Cardiovascular: Normal rate, regular rhythm, normal heart sounds and intact distal pulses. Exam reveals no gallop and no friction rub.    No murmur heard.  Pulmonary/Chest: Breath sounds normal. No respiratory distress. He has no wheezes. He has no rhonchi. He has no rales.   Abdominal: Soft. He exhibits no distension. There is no tenderness. There is no rebound and no guarding.   Musculoskeletal: Normal range of motion. He exhibits edema. He exhibits no tenderness.   Trace edema to bilateral LE's.   Neurological: He is alert and oriented to person, place, and time. He has normal strength. GCS score is 15. GCS eye subscore is 4. GCS verbal subscore is 5. GCS motor subscore is 6.   Skin: Skin is warm and dry. No rash noted. No erythema.   Psychiatric: He has a normal mood and affect. His behavior is normal. Judgment and thought content normal.         ED Course   Procedures  Labs Reviewed   CBC W/ AUTO DIFFERENTIAL - Abnormal; Notable for the following components:       Result Value    RBC 4.04 (*)     Hemoglobin 11.8 (*)     Hematocrit 37.5 (*)     MCHC 31.5 (*)     RDW 15.4 (*)     Mono #  1.1 (*)     Mono% 15.1 (*)     All other components within normal limits   COMPREHENSIVE METABOLIC PANEL - Abnormal; Notable for the following components:    Sodium 132 (*)     CO2 19 (*)     BUN, Bld 32 (*)     Albumin 3.1 (*)     eGFR if  53.0 (*)     eGFR if non  45.8 (*)     All other components within normal limits   TROPONIN I - Abnormal; Notable for the following components:    Troponin I 0.154 (*)     All other components within normal limits   B-TYPE NATRIURETIC PEPTIDE - Abnormal; Notable for the following components:    BNP 2,372 (*)     All other components within normal limits   PROTIME-INR - Abnormal; Notable for the following components:    Prothrombin Time 14.5 (*)     INR 1.5 (*)     All other components within normal limits   LIPASE   PHOSPHORUS   BASIC METABOLIC PANEL   HEMOGLOBIN A1C   LIPID PANEL   MAGNESIUM   TROPONIN I     EKG Readings: (Independently Interpreted)   Initial Reading: No STEMI. Rhythm: Atrial Fibrillation. Heart Rate: 81. Conduction: LBBB.   1 PVC. No hypertrophy.          Imaging Results          X-Ray Chest PA And Lateral (Final result)  Result time 01/01/19 02:32:09    Final result by Yue Gu MD (01/01/19 02:32:09)                 Impression:      Cardiomegaly.  Bilateral pleural effusions.  Prominent interstitial lung markings.      Electronically signed by: Yue Gu  Date:    01/01/2019  Time:    02:32             Narrative:    EXAMINATION:  XR CHEST PA AND LATERAL    CLINICAL HISTORY:  Dyspnea, unspecified    TECHNIQUE:  PA and lateral views of the chest were performed.    COMPARISON:  12/30/2018    FINDINGS:  PA and lateral chest radiograph demonstrates median sternotomy changes.  The cardiac silhouette is enlarged.  There is blunting of both costophrenic angles dose bilateral pleural effusions.  There is no pneumothorax.  The bones are diffusely osteopenic and kyphotic.                               CT Abdomen Pelvis  With Contrast (Final result)  Result time 01/01/19 03:01:11    Final result by Nabeel Anne MD (01/01/19 03:01:11)                 Impression:      Motion limited exam with suboptimal bolus timing.  No acute finding identified.    Moderate-sized bilateral pleural effusions, somewhat larger when compared with the prior chest CT.    Cardiomegaly with reflux of contrast into the IVC and hepatic vessels, in keeping with cardiac dysfunction.    Extensive atherosclerosis as above.  No aneurysm.    Diverticulosis coli without evidence of acute diverticulitis.    Small hiatal hernia.    Additional findings as above.    Electronically signed by resident: Ambrose Hubbard  Date:    01/01/2019  Time:    02:24    Electronically signed by: Nabeel Anne MD  Date:    01/01/2019  Time:    03:01             Narrative:    EXAMINATION:  CT ABDOMEN PELVIS WITH CONTRAST    CLINICAL HISTORY:  Abdominal pain, unspecified;    TECHNIQUE:  Low dose axial images, sagittal and coronal reformations were obtained from the lung bases to the pubic symphysis following the IV administration of 75 mL of Omnipaque 350 .  Oral contrast was not given. Postcontrast images were also obtained in the delayed phase.    COMPARISON:  CT chest, 04/22/2018.    FINDINGS:  Evaluation is somewhat limited by extensive motion in the upper abdomen.  Poor contrast bolus timing also limits evaluation of the solid abdominal organs.    Heart: Cardiomegaly.  Dense coronary atherosclerosis.  No pericardial effusion.    Lung Bases: Moderate volume bilateral dependent pleural fluid with mild adjacent atelectatic changes.  Pleural effusions are mildly worse when compared with the prior chest CT.  Mild ground-glass attenuation in the aerated lower lung fields which may reflect mild edema.    Liver: Normal in size and attenuation, with no focal hepatic lesions.  Suboptimal bolus timing limits evaluation of the hepatic parenchyma.  There is reflux of contrast into the  IVC and hepatic veins suggestive of cardiac dysfunction.    Gallbladder: No calcified gallstones.    Bile Ducts: No evidence of dilated ducts.    Pancreas: No mass or peripancreatic fat stranding.    Spleen: Unremarkable.    Adrenals: Unremarkable.    Kidneys/ Ureters: Mildly decreased in size.  Normal location.  Mild bilateral perinephric fat stranding, may represent medical renal disease.  No hydronephrosis.  Symmetric delayed excretion of contrast, which may be related to medical renal disease and/or decreased cardiac output.  No ureteral dilatation.    Bladder: No evidence of wall thickening.    Reproductive organs: Prostate is enlarged, measuring up to 5.3 cm in transverse width.    GI Tract/Mesentery: Small hiatal hernia.  Diverticulosis coli without evidence acute diverticulitis.  No evidence of bowel obstruction or inflammation. Appendix visualized and unremarkable.    Peritoneal Space: No ascites. No free air.    Retroperitoneum:  No significant adenopathy.    Abdominal wall:  Unremarkable.    Vasculature: Ectatic abdominal aorta with dense atherosclerotic calcifications including major branch vessels.  No aneurysm.    Bones: Minimal superior endplate compression deformity at L1, similar to prior chest CT.  Age-appropriate degenerative changes.  Minimal anterolisthesis of L4 with respect to L5.                              X-Rays:   Independently Interpreted Readings:   Other Readings:  Reviewed    Medical Decision Making:   History:   I obtained history from: someone other than patient.  Old Medical Records: I decided to obtain old medical records.  Old Records Summarized: records from previous admission(s).  Independently Interpreted Test(s):   I have ordered and independently interpreted EKG Reading(s) - see prior notes  Clinical Tests:   Lab Tests: Ordered and Reviewed  Radiological Study: Ordered and Reviewed  Medical Tests: Ordered and Reviewed  ED Management:  A 4-year-old male presents with dyspnea  on exertion, cough, and midepigastric abdominal pain. Symptoms worsening over the last 3 weeks.  Vitals unremarkable. No hypoxia or respiratory distress. Physical exam benign.  Labs consistent with acute CHF exacerbation.  Chest x-ray with pulmonary edema. Will admit for diuresis.  Given IV Lasix in the ED.  CT unremarkable. Doubt pancreatitis or acute cholecystitis.  Gastritis more likely.  Did bedside teaching.  All questions answered.  Patient acknowledges understanding.  Other:   I have discussed this case with another health care provider.       <> Summary of the Discussion: LEEANN Machado Attestation:   Scribe #1: I performed the above scribed service and the documentation accurately describes the services I performed. I attest to the accuracy of the note.               Clinical Impression:   The primary encounter diagnosis was Acute on chronic systolic congestive heart failure. Diagnoses of Shortness of breath, Dyspnea, Epigastric abdominal pain, and Acute on chronic combined systolic and diastolic congestive heart failure were also pertinent to this visit.      Disposition:   Disposition: Admitted  Condition: Stable                        Reymundo Mclaughlin MD  01/01/19 0515

## 2019-01-01 NOTE — MEDICAL/APP STUDENT
Hospital Medicine  Progress note    Patient Name: Jeremie Bradshaw  MRN: [id number]  Team: [facility] D Tabatha Mc MD  Admit Date: 12/31/2018  ONEAL 1/3/2019  Code status: Full Code    Principal Problem:  Acute on chronic combined systolic and diastolic congestive heart failure    Interval hx:  No new complaints. Breathing improved.     ROS   Respiratory: no cough or shortness of breath  Cardiovascular: no chest pain or palpitations  Gastrointestinal: no nausea or vomiting, no abdominal pain or change in bowel habits  Behavioral/Psych: no depression or anxiety      PEx  Temp:  [97.7 °F (36.5 °C)]   Pulse:  [62-92]   Resp:  [18-30]   BP: (101-125)/(58-77)   SpO2:  [88 %-99 %]     Intake/Output Summary (Last 24 hours) at 1/1/2019 0821  Last data filed at 1/1/2019 0512  Gross per 24 hour   Intake --   Output 400 ml   Net -400 ml       General Appearance: no acute distress   Heart: regular rate and rhythm  Respiratory: Normal respiratory effort, no crackles. Decreased air movement.   Abdomen: Soft, non-tender; bowel sounds active  Skin: intact.  Neurologic:  No focal numbness or weakness  Mental status: Alert, oriented x 4, affect appropriate     Recent Labs   Lab 12/30/18 2350 01/01/19  0047   WBC 6.03 7.44   HGB 11.6* 11.8*   HCT 36.2* 37.5*   * 150     Recent Labs   Lab 12/30/18 2350 01/01/19  0047   * 132*   K 4.4 4.8    103   CO2 22* 19*   BUN 32* 32*   CREATININE 1.4 1.4   GLU 96 101   CALCIUM 9.2 9.2   LIPASE 39 32     Recent Labs   Lab 12/30/18 2350 01/01/19  0047   ALKPHOS 72 70   ALT 10 16   AST 20 31   ALBUMIN 3.1* 3.1*   PROT 6.7 6.9   BILITOT 0.8 0.9   INR  --  1.5*      Recent Labs   Lab 12/30/18 2354   POCTGLUCOSE 117*     Recent Labs     12/30/18 2350 01/01/19  0047   TROPONINI 0.058* 0.154*       Scheduled Meds:   apixaban  5 mg Oral BID    aspirin  81 mg Oral Daily    aspirin  325 mg Oral Once    atorvastatin  40 mg Oral Daily    buPROPion  300 mg Oral QAM    carvedilol   3.125 mg Oral BID    cetirizine  10 mg Oral Daily    cyanocobalamin  1,000 mcg Oral Daily    doxycycline  100 mg Oral Q12H    furosemide  40 mg Intravenous BID    guaiFENesin  600 mg Oral BID    losartan  25 mg Oral Daily    omega-3 acid ethyl esters  2 g Oral Daily    potassium chloride  20 mEq Oral Every other day    sodium chloride 0.9%  3 mL Intravenous Q8H    tamsulosin  0.4 mg Oral QHS     Continuous Infusions:  As Needed:  albuterol-ipratropium, benzonatate, GI cocktail (mylanta 30 mL, lidocaine 2 % viscous 10 mL, dicyclomine 10 mL) 50 mL, ondansetron, promethazine, ramelteon    Active Hospital Problems    Diagnosis  POA    *Acute on chronic combined systolic and diastolic congestive heart failure [I50.43]  Yes    CKD (chronic kidney disease), stage III [N18.3]  Yes     Chronic    Hyponatremia [E87.1]  Yes    Upper respiratory infection, viral [J06.9]  Yes    Diarrhea [R19.7]  Yes    Elevated troponin [R74.8]  Yes     Chronic    Paroxysmal atrial fibrillation [I48.0]  Yes     Chronic    Essential hypertension [I10]  Yes     Chronic    Coronary artery disease involving native coronary artery of native heart without angina pectoris [I25.10]  Yes     Chronic    Hyperlipidemia [E78.5]  Yes     Chronic      Resolved Hospital Problems   No resolved problems to display.       Overview  84 year old gentleman with a h/o CAD s/p CABG with history of remote MI (~8 years ago), HTN, A Fib on Eliquis, chronic systolic and diastolic HF, pulmonary HTN, aortic stenosis, and BPH admitted for acute on chronic combined systolic and diastolic congestive heart failure.     Assessment and Plan for Problems addressed today:    Acute on chronic combined systolic and diastolic congestive heart failure  AS (aortic stenosis)  Atrial fibrillation   Severe Pulmonary hypertension  Elevated troponin  Acute hypoxic respiratory failure  · BNP elevated to 2372 from 1464 two days ago.  · Troponin 0.154 > 0.207, baseline of  0.058. No evidence of STEMI on ECG. (1/1)  · CT abdomen/pelvis and CXR showed moderate bilateral pleural effusions and cardiomegaly.  · Echo (4/2018): EF 25% with diastolic dysfunction, moderate MR, moderate to severe AS, PASP 70.41mmHg.   · Increasing Lasix 40mg IV BID to 80mg IV BID.  Heart failure pathway initiated.  · Continue BB, ARB, ASA, statin.   · TTE ordered. Tn trending up; Consulting cardiology. (1/1)  · Wean oxygen as tolerated    Acute bronchitis/Viral URI  · Patient seen by Urgent Care on 12/27/2018.  · Started on doxycycline 100mg BID (last dose to be 1/3/2018), PRN tessalon perles, Mucinex 600mg BID, and Xyzal 5mg daily; continuing current management.   · Received IV methylprednisolone in ED. Continuing with dexamethasone to complete 5 day course. Ordered Duonebs & cetirizine. (1/1)    Diarrhea  Epigastric abdominal pain  · CT abdomen/pelvis showed only diverticulosis coli without evidence of acute diverticulitis.  · Patient recently placed on antibiotics.  · C. Diff pending.     Hyponatremia  · Na 132 > 134 on admit. Osmolality wnl.     CKD (chronic kidney disease), stage III  · Stable with creatinine 1.4, GFR 45.8.  · Avoid nephrotoxic medications.    Coronary artery disease involving native coronary artery of native heart without angina pectoris  S/p CABG   Essential hypertension  Hyperlipidemia  · Continue carvedilol 3.125mg BID, losartan 25mg daily.  · Continue Lipitor 40mg daily    Paroxysmal atrial fibrillation  Anticoagulated by anticoagulation treatment  · Rate controlled in A Fib.  · Continue BB, Eliquis 5mg BID.  · [id number] VASc score of at least 7 for CHF, HTN, age, CVA, and CAD    Diet: Diet Adult Regular (IDDSI Level 7) Fluid - 2000mL  DVT Prophylaxis:   Anticoagulants   Medication Route Frequency    apixaban tablet 5 mg Oral BID       L/D/A:  PIV    Discharge plan and follow up  Still a Patient      Provider  Tabatha Mc MD  [facility] [place] [facility] MED D   Department [facility]  Medicine    Scribe Attestation: I personally scribed for Tabatha Mc MD on 01/01/2019 at 8:21 AM. Electronically signed by brandon Hernandez on 01/01/2019 at 8:21 AM.

## 2019-01-01 NOTE — H&P
[facility] Hospital Medicine  History & Physical    Patient Name: Jeremie Bradshaw  MRN: [id number]  Admission Date: 12/31/2018  Attending Physician: Tabatha Mc MD   Primary Care Provider: Rocio Casas MD    [facility] Medicine Team: [facility] [place] [facility] [place] Marge Bhagat PA-C     Patient information was obtained from patient, relative(s), past medical records and ER records.     Subjective:     Principal Problem:Acute on chronic combined systolic and diastolic congestive heart failure    Chief Complaint:   Chief Complaint   Patient presents with    Abdominal Pain     Patient reports abdominal pain for the past several days. Was seen yesterday.         HPI: Patient is an 84 year old gentleman with a h/o CAD s/p CABG with history of remote MI (~8 years ago), HTN, A Fib on Eliquis, chronic systolic and diastolic HF, pulmonary HTN, aortic stenosis, and BPH.  Daughter at bedside assists with HPI.  He presents with worsening productive cough and SOB.  Patient also complains of diarrhea and mid-epigastric pain.  Patient notes that he initially began feeling poorly last week.  He was seen by Urgent Care on 12/27/2018 with complaints of congestion, cough, SOB, wheezing, and chest tightness.  He was started on Doxycycline 100mg BID, PRN tessalon perles, Mucinex 600mg BID, and Xyzal 5mg daily.  Symptoms continued to worsen, so his daughter brought him to the ED on 12/30/2018.  Admission for observation and diuresis was recommended at that time, but patient refused and left AMA.  Daughter states that symptoms are worse today.  She notes SOB is worse when he lies flat and reports BLE.  She gave him an extra dose of Lasix, but this has not helped.  Patient denies chest pain, dizziness, palpitations, fever/chills, N/V, dysuria.  Abdominal pain is worse with eating.    ED Course:  BNP increased to 2372 from 1464 two days ago.  CT and CXR noted moderate-sized bilateral pleural effusions and cardiomegaly.   Patient received Lasix 40mg IV x1.    Past Medical History:   Diagnosis Date    Anemia     Basal cell carcinoma 7/2014    left medial canthus    Basal cell carcinoma 3/9/2015    right forehead    Basal cell carcinoma 09/08/2016    left neck (scoop shave)    Basal cell carcinoma 12/09/2016    left preauricular    BPH (benign prostatic hypertrophy) 8/17/2012    CAD (coronary artery disease) 8/17/2012    Cervical spine fracture 11/20/2012    CHF (congestive heart failure) 8/17/2012    Depression 8/17/2012    GERD (gastroesophageal reflux disease) 3/27/2014    Heart attack     History of atrial fibrillation 8/17/2012    Hyperlipidemia     Hypertension     Kidney stones 8/17/2012    Memory loss     Myocardial infarction     Osteoarthritis of lumbar spine 8/17/2012    Shoulder pain 8/17/2012    Stroke 8/17/2012       Past Surgical History:   Procedure Laterality Date    basal cell carcinoma left ear      CARDIAC SURGERY      CATARACT EXTRACTION W/  INTRAOCULAR LENS IMPLANT Bilateral     CATARACT EXTRACTION, BILATERAL      CORONARY ARTERY BYPASS GRAFT  1990    x1    ECHOCARDIOGRAM-TRANSESOPHAGEAL N/A 6/2/2015    Performed by [facility]    TONSILLECTOMY         Review of patient's allergies indicates:   Allergen Reactions    Prednisone Other (See Comments)     hallucinations       Current Facility-Administered Medications on File Prior to Encounter   Medication    cyanocobalamin injection 1,000 mcg     Current Outpatient Medications on File Prior to Encounter   Medication Sig    albuterol (PROVENTIL/VENTOLIN HFA) 90 mcg/actuation inhaler Inhale 2 puffs into the lungs every 6 (six) hours as needed for Wheezing or Shortness of Breath. Rescue    apixaban (ELIQUIS) 5 mg Tab TAKE 1 TABLET(5 MG) BY MOUTH TWICE DAILY    aspirin (ECOTRIN) 81 MG EC tablet Take 81 mg by mouth. Pt taken every other day with potasium    atorvastatin (LIPITOR) 40 MG tablet Take 1 tablet (40 mg total) by  mouth once daily.    benzonatate (TESSALON PERLES) 100 MG capsule Take 2 capsules (200 mg total) by mouth 3 (three) times daily as needed for Cough.    buPROPion (WELLBUTRIN XL) 300 MG 24 hr tablet Take 1 tablet (300 mg total) by mouth every morning.    carvedilol (COREG) 3.125 MG tablet Take 1 tablet (3.125 mg total) by mouth 2 (two) times daily.    carvedilol (COREG) 3.125 MG tablet TAKE 1 TABLET(3.125 MG) BY MOUTH TWICE DAILY    cyanocobalamin (VITAMIN B-12) 1000 MCG tablet Take 1 tablet (1,000 mcg total) by mouth once daily.    desonide (DESOWEN) 0.05 % cream AAA bid    desonide (DESOWEN) 0.05 % lotion APPLY EXTERNALLY TO THE AFFECTED AREA TWICE DAILY AS NEEDED FOR REDNESS AND ITCHING     mg capsule TAKE 1 CAPSULE BY MOUTH ONCE DAILY    doxycycline (VIBRAMYCIN) 100 MG Cap Take 1 capsule (100 mg total) by mouth 2 (two) times daily. for 7 days    furosemide (LASIX) 20 MG tablet TAKE 1 TABLET BY MOUTH ONCE DAILY PLUS ONE EXTRA AS NEEDED    guaiFENesin (MUCINEX) 600 mg 12 hr tablet Take 1 tablet (600 mg total) by mouth 2 (two) times daily.    hydrocortisone (WESTCORT) 0.2 % cream Apply topically 2 (two) times daily.      levocetirizine (XYZAL) 5 MG tablet Take 1 tablet (5 mg total) by mouth once daily.    losartan (COZAAR) 25 MG tablet Take 1 tablet (25 mg total) by mouth once daily.    nitroGLYCERIN (NITROSTAT) 0.4 MG SL tablet Place 1 tablet (0.4 mg total) under the tongue every 5 (five) minutes as needed.    omega-3 acid ethyl esters (LOVAZA) 1 gram capsule Take 2 capsules (2 g total) by mouth once daily.    potassium chloride (MICRO-K) 10 MEQ CpSR TAKE 2 CAPSULES BY MOUTH EVERY OTHER DAY    tamsulosin (FLOMAX) 0.4 mg Cap Take 1 capsule (0.4 mg total) by mouth every evening.     Family History     Problem Relation (Age of Onset)    Cancer Father    Heart disease Mother    Heart failure Mother    No Known Problems Sister, Brother, Maternal Aunt, Maternal Uncle, Paternal Aunt, Paternal  Uncle, Maternal Grandmother, Maternal Grandfather, Paternal Grandmother, Paternal Grandfather        Tobacco Use    Smoking status: Never Smoker    Smokeless tobacco: Never Used   Substance and Sexual Activity    Alcohol use: No     Comment: social drinker    Drug use: No    Sexual activity: Not Currently     Review of Systems   Constitutional: Negative for activity change, appetite change, chills, diaphoresis, fatigue, fever and unexpected weight change.   HENT: Positive for congestion. Negative for rhinorrhea, sore throat, trouble swallowing and voice change.    Eyes: Negative for visual disturbance.   Respiratory: Positive for cough, shortness of breath and wheezing. Negative for choking and chest tightness.    Cardiovascular: Negative for chest pain, palpitations and leg swelling.   Gastrointestinal: Positive for abdominal pain and diarrhea. Negative for abdominal distention, anal bleeding, blood in stool, constipation, nausea and vomiting.   Endocrine: Negative for cold intolerance, heat intolerance, polydipsia and polyuria.   Genitourinary: Negative for dysuria, flank pain, frequency, hematuria and urgency.   Musculoskeletal: Negative for arthralgias, back pain, joint swelling and myalgias.   Skin: Negative for color change and rash.   Neurological: Negative for dizziness, seizures, syncope, facial asymmetry, speech difficulty, weakness, light-headedness, numbness and headaches.   Hematological: Negative for adenopathy. Does not bruise/bleed easily.   Psychiatric/Behavioral: Negative for agitation, confusion, hallucinations and suicidal ideas.     Objective:     Vital Signs (Most Recent):  Temp: 97.7 °F (36.5 °C) (12/31/18 2308)  Pulse: 92 (01/01/19 0502)  Resp: 18 (01/01/19 0502)  BP: 111/69 (01/01/19 0502)  SpO2: (!) 93 % (01/01/19 0502) Vital Signs (24h Range):  Temp:  [97.7 °F (36.5 °C)] 97.7 °F (36.5 °C)  Pulse:  [78-92] 92  Resp:  [18-20] 18  SpO2:  [93 %-99 %] 93 %  BP: (101-125)/(58-77) 111/69      Weight: 74.8 kg (164 lb 14.5 oz)  Body mass index is 25.07 kg/m².    Physical Exam   Constitutional: He is oriented to person, place, and time. He appears well-developed and well-nourished. No distress.   HENT:   Head: Normocephalic and atraumatic.   Eyes: Pupils are equal, round, and reactive to light.   Neck: Neck supple. Carotid bruit is not present. No thyromegaly present.   Cardiovascular: Normal rate and regular rhythm. Exam reveals no gallop.   No murmur heard.  Pulmonary/Chest: Effort normal and breath sounds normal. No respiratory distress. He has no wheezes.   Abdominal: Bowel sounds are normal. He exhibits no distension. There is no splenomegaly or hepatomegaly. There is tenderness in the epigastric area.   Musculoskeletal: Normal range of motion. He exhibits edema (2+ BLE edema).   Neurological: He is alert and oriented to person, place, and time. No cranial nerve deficit or sensory deficit.   Skin: Skin is warm and dry. No rash noted.   Psychiatric: He has a normal mood and affect. His behavior is normal.         CRANIAL NERVES     CN III, IV, VI   Pupils are equal, round, and reactive to light.       Significant Labs:   CBC:   Recent Labs   Lab 12/30/18 2350 01/01/19  0047   WBC 6.03 7.44   HGB 11.6* 11.8*   HCT 36.2* 37.5*   * 150     CMP:   Recent Labs   Lab 12/30/18 2350 01/01/19 0047   * 132*   K 4.4 4.8    103   CO2 22* 19*   GLU 96 101   BUN 32* 32*   CREATININE 1.4 1.4   CALCIUM 9.2 9.2   PROT 6.7 6.9   ALBUMIN 3.1* 3.1*   BILITOT 0.8 0.9   ALKPHOS 72 70   AST 20 31   ALT 10 16   ANIONGAP 9 10   EGFRNONAA 45.8* 45.8*     Cardiac Markers:   Recent Labs   Lab 01/01/19 0047   BNP 2,372*     Troponin:   Recent Labs   Lab 12/30/18 2350 01/01/19 0047   TROPONINI 0.058* 0.154*       Significant Imaging: I have reviewed all pertinent imaging results/findings within the past 24 hours.    Assessment/Plan:     * Acute on chronic combined systolic and diastolic congestive heart  failure    AS (aortic stenosis)  Pulmonary hypertension    - BNP elevated to 2372 from 1464 two days ago.  - CT abdomen/pelvis and CXR showed moderate bilateral pleural effusions and cardiomegaly.  - Echo in April 2018 showed EF 25% with diastolic dysfunction, moderate mitral regurg, moderate to severe aortic stenosis, and PAP 70.41mmHg.  Will repeat.  - Will start patient on Lasix 40mg IV BID.  Heart failure pathway initiated.  - Continue BB, ARB.     Elevated troponin    - Troponin 0.154 up from baseline of 0.058.  - No evidence of STEMI on ECG.  - Patient noted to have acute CHF exacerbation and CKD.  Epigastric pain reproducible on exam.    - Will give ASA, monitor on telemetry, and trend troponin.  - High risk HEART score.  - Patient on ASA, BB, statin, ARB.     Diarrhea    Epigastric abdominal pain    - CT abdomen/pelvis showed only diverticulosis coli without evidence of acute diverticulitis.  - Patient recently placed on antibiotics.  - Will check C.diff.  - Supportive care.       Upper respiratory infection, viral    - Patient seen by Urgent Care on 12/27/2018.  - Started on doxycycline 100mg BID (last dose to be 1/3/2018), PRN tessalon perles, Mucinex 600mg BID, and Xyzal 5mg daily.  - Will continue.       Hyponatremia    - Na 132, down from 134 on 12/30 and 138 on 12/12.  - Will check osmolality and continue to monitor.       CKD (chronic kidney disease), stage III    - Stable with creatinine 1.4, GFR 45.8.  - Avoid nephrotoxic medications.       Essential hypertension    - Continue Coreg 3.125mg BID, losartan 25mg daily.       Paroxysmal atrial fibrillation    Anticoagulated by anticoagulation treatment    - Rate controlled in A Fib.  - Continue BB, Eliquis 5mg BID.  - [id number] VASc score of at least 7 for CHF, HTN, age, CVA, and CAD.     Hyperlipidemia    - Continue Lipitor 40mg daily.  - Will check lipid panel.       Coronary artery disease involving native coronary artery of native heart without angina  pectoris    Old MI (myocardial infarction)  S/P CABG (coronary artery bypass graft)    - See above.  Continue secondary prevention.       VTE Risk Mitigation (From admission, onward)        Ordered     apixaban tablet 5 mg  2 times daily      01/01/19 0509     IP VTE HIGH RISK PATIENT  Once      01/01/19 0509     Place sequential compression device  Until discontinued      01/01/19 0509     Place VIRAJ hose  Until discontinued      01/01/19 0509             Marge Bhagat PA-C  Department of Hospital Medicine   [facility]

## 2019-01-01 NOTE — ASSESSMENT & PLAN NOTE
AS (aortic stenosis)  Pulmonary hypertension    - BNP elevated to 2372 from 1464 two days ago.  - CT abdomen/pelvis and CXR showed moderate bilateral pleural effusions and cardiomegaly.  - Echo in April 2018 showed EF 25% with diastolic dysfunction, moderate mitral regurg, moderate to severe aortic stenosis, and PAP 70.41mmHg.  Will repeat.  - Will start patient on Lasix 40mg IV BID.  Heart failure pathway initiated.  - Continue BB, ARB.

## 2019-01-01 NOTE — ASSESSMENT & PLAN NOTE
- Na 132, down from 134 on 12/30 and 138 on 12/12.  - Will check osmolality and continue to monitor.

## 2019-01-02 LAB
ANION GAP SERPL CALC-SCNC: 13 MMOL/L
ASCENDING AORTA: 3.11 CM
AV INDEX (PROSTH): 0.28
AV MEAN GRADIENT: 8.26 MMHG
AV PEAK GRADIENT: 16.48 MMHG
AV VALVE AREA: 0.9 CM2
BASOPHILS # BLD AUTO: 0 K/UL
BASOPHILS NFR BLD: 0 %
BSA FOR ECHO PROCEDURE: 1.92 M2
BUN SERPL-MCNC: 45 MG/DL
CALCIUM SERPL-MCNC: 9.4 MG/DL
CHLORIDE SERPL-SCNC: 104 MMOL/L
CO2 SERPL-SCNC: 19 MMOL/L
CREAT SERPL-MCNC: 1.8 MG/DL
CV ECHO LV RWT: 0.2 CM
DIFFERENTIAL METHOD: ABNORMAL
DOP CALC AO PEAK VEL: 2.03 M/S
DOP CALC AO VTI: 32.78 CM
DOP CALC LVOT AREA: 3.17 CM2
DOP CALC LVOT DIAMETER: 2.01 CM
DOP CALC LVOT STROKE VOLUME: 29.37 CM3
DOP CALCLVOT PEAK VEL VTI: 9.26 CM
ECHO LV POSTERIOR WALL: 0.7 CM (ref 0.6–1.1)
EOSINOPHIL # BLD AUTO: 0 K/UL
EOSINOPHIL NFR BLD: 0 %
ERYTHROCYTE [DISTWIDTH] IN BLOOD BY AUTOMATED COUNT: 15.4 %
EST. GFR  (AFRICAN AMERICAN): 39.1 ML/MIN/1.73 M^2
EST. GFR  (NON AFRICAN AMERICAN): 33.8 ML/MIN/1.73 M^2
FRACTIONAL SHORTENING: 4 % (ref 28–44)
GLUCOSE SERPL-MCNC: 125 MG/DL
HCT VFR BLD AUTO: 36.2 %
HGB BLD-MCNC: 11.7 G/DL
IMM GRANULOCYTES # BLD AUTO: 0.04 K/UL
IMM GRANULOCYTES NFR BLD AUTO: 0.5 %
INFLUENZA A, MOLECULAR: NEGATIVE
INFLUENZA B, MOLECULAR: NEGATIVE
INTERVENTRICULAR SEPTUM: 0.7 CM (ref 0.6–1.1)
LA MAJOR: 6.93 CM
LA MINOR: 6.44 CM
LA WIDTH: 4.8 CM
LEFT ATRIUM SIZE: 4.65 CM
LEFT ATRIUM VOLUME INDEX: 66.2 ML/M2
LEFT ATRIUM VOLUME: 126.66 CM3
LEFT INTERNAL DIMENSION IN SYSTOLE: 6.8 CM (ref 2.1–4)
LEFT VENTRICLE DIASTOLIC VOLUME INDEX: 125.46 ML/M2
LEFT VENTRICLE DIASTOLIC VOLUME: 240.04 ML
LEFT VENTRICLE MASS INDEX: 111.7 G/M2
LEFT VENTRICLE SYSTOLIC VOLUME INDEX: 120 ML/M2
LEFT VENTRICLE SYSTOLIC VOLUME: 229.56 ML
LEFT VENTRICULAR INTERNAL DIMENSION IN DIASTOLE: 7.1 CM (ref 3.5–6)
LEFT VENTRICULAR MASS: 213.77 G
LYMPHOCYTES # BLD AUTO: 1.2 K/UL
LYMPHOCYTES NFR BLD: 15.2 %
MCH RBC QN AUTO: 29.9 PG
MCHC RBC AUTO-ENTMCNC: 32.3 G/DL
MCV RBC AUTO: 93 FL
MONOCYTES # BLD AUTO: 0.8 K/UL
MONOCYTES NFR BLD: 10.4 %
NEUTROPHILS # BLD AUTO: 5.8 K/UL
NEUTROPHILS NFR BLD: 73.9 %
NRBC BLD-RTO: 0 /100 WBC
PISA TR MAX VEL: 3.94 M/S
PLATELET # BLD AUTO: 140 K/UL
PMV BLD AUTO: 12 FL
POTASSIUM SERPL-SCNC: 4.8 MMOL/L
RA MAJOR: 5.93 CM
RA PRESSURE: 15 MMHG
RA WIDTH: 4.08 CM
RBC # BLD AUTO: 3.91 M/UL
RIGHT VENTRICULAR END-DIASTOLIC DIMENSION: 4.02 CM
SINUS: 3.1 CM
SODIUM SERPL-SCNC: 136 MMOL/L
SPECIMEN SOURCE: NORMAL
STJ: 2.91 CM
TDI LATERAL: 0.07
TDI SEPTAL: 0.04
TDI: 0.06
TR MAX PG: 62.09 MMHG
TRICUSPID ANNULAR PLANE SYSTOLIC EXCURSION: 1.56 CM
TROPONIN I SERPL DL<=0.01 NG/ML-MCNC: 0.17 NG/ML
TV REST PULMONARY ARTERY PRESSURE: 77 MMHG
WBC # BLD AUTO: 7.85 K/UL

## 2019-01-02 PROCEDURE — 25000003 PHARM REV CODE 250: Mod: HCNC | Performed by: INTERNAL MEDICINE

## 2019-01-02 PROCEDURE — 63600175 PHARM REV CODE 636 W HCPCS: Mod: HCNC | Performed by: INTERNAL MEDICINE

## 2019-01-02 PROCEDURE — 99233 PR SUBSEQUENT HOSPITAL CARE,LEVL III: ICD-10-PCS | Mod: HCNC,,, | Performed by: INTERNAL MEDICINE

## 2019-01-02 PROCEDURE — 99233 SBSQ HOSP IP/OBS HIGH 50: CPT | Mod: HCNC,,, | Performed by: INTERNAL MEDICINE

## 2019-01-02 PROCEDURE — 20600001 HC STEP DOWN PRIVATE ROOM: Mod: HCNC

## 2019-01-02 PROCEDURE — 80048 BASIC METABOLIC PNL TOTAL CA: CPT | Mod: HCNC

## 2019-01-02 PROCEDURE — 25000242 PHARM REV CODE 250 ALT 637 W/ HCPCS: Mod: HCNC | Performed by: INTERNAL MEDICINE

## 2019-01-02 PROCEDURE — 94640 AIRWAY INHALATION TREATMENT: CPT | Mod: HCNC

## 2019-01-02 PROCEDURE — 94761 N-INVAS EAR/PLS OXIMETRY MLT: CPT | Mod: HCNC

## 2019-01-02 PROCEDURE — 84484 ASSAY OF TROPONIN QUANT: CPT | Mod: HCNC

## 2019-01-02 PROCEDURE — A4216 STERILE WATER/SALINE, 10 ML: HCPCS | Mod: HCNC | Performed by: PHYSICIAN ASSISTANT

## 2019-01-02 PROCEDURE — 25000003 PHARM REV CODE 250: Mod: HCNC | Performed by: PHYSICIAN ASSISTANT

## 2019-01-02 PROCEDURE — 36415 COLL VENOUS BLD VENIPUNCTURE: CPT | Mod: HCNC

## 2019-01-02 PROCEDURE — 87502 INFLUENZA DNA AMP PROBE: CPT | Mod: HCNC

## 2019-01-02 PROCEDURE — 25000242 PHARM REV CODE 250 ALT 637 W/ HCPCS: Mod: HCNC | Performed by: PHYSICIAN ASSISTANT

## 2019-01-02 PROCEDURE — 85025 COMPLETE CBC W/AUTO DIFF WBC: CPT | Mod: HCNC

## 2019-01-02 RX ORDER — FUROSEMIDE 10 MG/ML
40 INJECTION INTRAMUSCULAR; INTRAVENOUS DAILY
Status: DISCONTINUED | OUTPATIENT
Start: 2019-01-03 | End: 2019-01-02

## 2019-01-02 RX ORDER — FUROSEMIDE 10 MG/ML
80 INJECTION INTRAMUSCULAR; INTRAVENOUS 2 TIMES DAILY
Status: DISCONTINUED | OUTPATIENT
Start: 2019-01-02 | End: 2019-01-04

## 2019-01-02 RX ORDER — DOBUTAMINE HYDROCHLORIDE 400 MG/100ML
2.5 INJECTION, SOLUTION INTRAVENOUS CONTINUOUS
Status: DISCONTINUED | OUTPATIENT
Start: 2019-01-02 | End: 2019-01-04

## 2019-01-02 RX ADMIN — GUAIFENESIN 600 MG: 600 TABLET, EXTENDED RELEASE ORAL at 08:01

## 2019-01-02 RX ADMIN — BUPROPION HYDROCHLORIDE 300 MG: 300 TABLET, FILM COATED, EXTENDED RELEASE ORAL at 08:01

## 2019-01-02 RX ADMIN — DOBUTAMINE HYDROCHLORIDE 2.5 MCG/KG/MIN: 200 INJECTION INTRAVENOUS at 04:01

## 2019-01-02 RX ADMIN — APIXABAN 5 MG: 5 TABLET, FILM COATED ORAL at 08:01

## 2019-01-02 RX ADMIN — Medication 3 ML: at 03:01

## 2019-01-02 RX ADMIN — DOXYCYCLINE HYCLATE 100 MG: 100 TABLET, COATED ORAL at 08:01

## 2019-01-02 RX ADMIN — FUROSEMIDE 80 MG: 10 INJECTION, SOLUTION INTRAVENOUS at 05:01

## 2019-01-02 RX ADMIN — DEXAMETHASONE 2 MG: 1 TABLET ORAL at 08:01

## 2019-01-02 RX ADMIN — CETIRIZINE HYDROCHLORIDE 10 MG: 10 TABLET, FILM COATED ORAL at 08:01

## 2019-01-02 RX ADMIN — ASPIRIN 81 MG: 81 TABLET, COATED ORAL at 08:01

## 2019-01-02 RX ADMIN — CYANOCOBALAMIN TAB 1000 MCG 1000 MCG: 1000 TAB at 08:01

## 2019-01-02 RX ADMIN — CARVEDILOL 3.12 MG: 3.12 TABLET, FILM COATED ORAL at 08:01

## 2019-01-02 RX ADMIN — IPRATROPIUM BROMIDE AND ALBUTEROL SULFATE 3 ML: .5; 3 SOLUTION RESPIRATORY (INHALATION) at 06:01

## 2019-01-02 RX ADMIN — TAMSULOSIN HYDROCHLORIDE 0.4 MG: 0.4 CAPSULE ORAL at 08:01

## 2019-01-02 RX ADMIN — ATORVASTATIN CALCIUM 40 MG: 20 TABLET, FILM COATED ORAL at 08:01

## 2019-01-02 RX ADMIN — BENZONATATE 200 MG: 100 CAPSULE ORAL at 05:01

## 2019-01-02 RX ADMIN — Medication 3 ML: at 09:01

## 2019-01-02 RX ADMIN — OMEGA-3-ACID ETHYL ESTERS 2 G: 1 CAPSULE, LIQUID FILLED ORAL at 08:01

## 2019-01-02 RX ADMIN — IPRATROPIUM BROMIDE AND ALBUTEROL SULFATE 3 ML: .5; 3 SOLUTION RESPIRATORY (INHALATION) at 03:01

## 2019-01-02 RX ADMIN — APIXABAN 2.5 MG: 2.5 TABLET, FILM COATED ORAL at 08:01

## 2019-01-02 RX ADMIN — IPRATROPIUM BROMIDE AND ALBUTEROL SULFATE 3 ML: .5; 3 SOLUTION RESPIRATORY (INHALATION) at 08:01

## 2019-01-02 RX ADMIN — LOSARTAN POTASSIUM 25 MG: 25 TABLET, FILM COATED ORAL at 08:01

## 2019-01-02 RX ADMIN — FUROSEMIDE 80 MG: 10 INJECTION, SOLUTION INTRAMUSCULAR; INTRAVENOUS at 08:01

## 2019-01-02 RX ADMIN — IPRATROPIUM BROMIDE AND ALBUTEROL SULFATE 3 ML: .5; 3 SOLUTION RESPIRATORY (INHALATION) at 11:01

## 2019-01-02 RX ADMIN — IPRATROPIUM BROMIDE AND ALBUTEROL SULFATE 3 ML: .5; 3 SOLUTION RESPIRATORY (INHALATION) at 09:01

## 2019-01-02 NOTE — PLAN OF CARE
Rocio Casas MD  Future Appointments   Date Time Provider Department Center   3/11/2019  9:00 AM LAB, APPOINTMENT [facility]  [facility]   3/15/2019  9:00 AM Rocio Casas MD [facility]        01/02/19 1116   Discharge Assessment   Assessment Type Discharge Planning Assessment   Confirmed/corrected address and phone number on facesheet? Yes   Assessment information obtained from? Caregiver   Expected Length of Stay (days) 2   Prior to hospitilization cognitive status: Alert/Oriented   Prior to hospitalization functional status: Needs Assistance   Current cognitive status: Alert/Oriented   Current Functional Status: Needs Assistance   Lives With child(amy), adult   Able to Return to Prior Arrangements yes   Is patient able to care for self after discharge? Yes   Who are your caregiver(s) and their phone number(s)? Galina Bradshaw  dtr  [phone]   Patient's perception of discharge disposition home or selfcare   Readmission Within the Last 30 Days no previous admission in last 30 days   Patient currently being followed by outpatient case management? No   Patient currently receives any other outside agency services? No   Equipment Currently Used at Home none   Do you have any problems affording any of your prescribed medications? No   Is the patient taking medications as prescribed? yes   Does the patient have transportation home? Yes   Transportation Anticipated family or friend will provide   Does the patient receive services at the [facility]? No   Discharge Plan A Home with family   Discharge Plan B Home Health   Patient/Family in Agreement with Plan yes

## 2019-01-02 NOTE — PROGRESS NOTES
[facility]  Cardiology  Progress Note    Patient Name: Jeremie Bradshaw  MRN: [id number]  Admission Date: 12/31/2018  Hospital Length of Stay: 1 days  Code Status: Full Code   Attending Physician: Tabatha Mc MD   Primary Care Physician: Rocio Casas MD  Expected Discharge Date: 1/3/2019  Principal Problem:Acute on chronic combined systolic and diastolic congestive heart failure    Subjective:     HPI/Hospital Course: 84 year old with PMHx significant for persistent atrial fibrillation on OAC, CAD s/p MI with CABG, ischemic cardiomyopathy (EF of 20-25%), BEAR thrombus (2015), pulmonary HTN (Who Group III) and aortic stenosis who presented on 12/31/18 with chief complaint of cough, chest congestion, SOB, wheezing, abdominal pain and diarrhea as well as fatigue/malaise. Of note, patient went to Urgent Care on 12/27/18 and was given Doxycycline for presumed PNA. He continued to have persistent symptoms so he came to the ER and it was recommended at the time that he be admitted for observation for treatment of ADHF however patient left AMA. This hospitalization his workup is revealing for elevated BNP (2,372), CT scan and CXR showing bilateral pleural effusions along with cardiomegaly and reflux from IVC into hepatic veins, troponin elevation from 0.05 to 0.2. EKG shows known LBBB and atrial fibrillation. He is currently being diuresed with IV lasix 80 mg BID, continued with home BB and ARB. His renal function of note is at baseline, serum Cr in 1.2 to 1.4 range. Cardiology is being consulted for CHF exacerbation.     Patient has been seen by Dr. Lopez (EP clinic in 2015), at that time his afib was not bothersome so DCCV option was held off. Given that he had IVCD but not prominent HF it was held off on pursuing CRT-P implantation.     Interval History: Overnight, pt remained in afib.     Review of Systems   Constitution: Positive for weakness and malaise/fatigue. Negative for chills and fever.    Cardiovascular: Positive for orthopnea.   Respiratory: Positive for cough and shortness of breath.    Gastrointestinal: Negative for abdominal pain, diarrhea, nausea and vomiting.   Neurological: Negative for dizziness, focal weakness, headaches and light-headedness.     Objective:     Vital Signs (Most Recent):  Temp: 97.9 °F (36.6 °C) (01/02/19 0815)  Pulse: 91 (01/02/19 0815)  Resp: 14 (01/02/19 0815)  BP: 92/64 (01/02/19 0815)  SpO2: 95 % (01/02/19 0815) Vital Signs (24h Range):  Temp:  [96.3 °F (35.7 °C)-98 °F (36.7 °C)] 97.9 °F (36.6 °C)  Pulse:  [67-97] 91  Resp:  [14-18] 14  SpO2:  [94 %-100 %] 95 %  BP: ()/(57-67) 92/64     Weight: 75.9 kg (167 lb 5.3 oz)  Body mass index is 25.44 kg/m².     SpO2: 95 %  O2 Device (Oxygen Therapy): room air      Intake/Output Summary (Last 24 hours) at 1/2/2019 0909  Last data filed at 1/2/2019 0600  Gross per 24 hour   Intake 870 ml   Output 1100 ml   Net -230 ml       Lines/Drains/Airways     Peripheral Intravenous Line                 Peripheral IV - Single Lumen 04/22/18 1957 Right Wrist 254 days         Peripheral IV - Single Lumen 01/01/19 0053 Right Forearm 1 day                Physical Exam   HENT:   Mouth/Throat: Oropharynx is clear and moist.   Eyes: Pupils are equal, round, and reactive to light.   Neck: JVD present.   Cardiovascular: Intact distal pulses.   Rhonchi, coarse breath sounds scattered throughout upper lung fields  Irregularly irregular heart sounds    Pulmonary/Chest: Effort normal.   Abdominal: Soft.   Musculoskeletal: He exhibits no edema.   Neurological: He is alert.   Skin: Skin is warm.       Significant Labs: All pertinent lab results from the last 24 hours have been reviewed.    Significant Imaging: Echocardiogram:   2D echo with color flow doppler:   Results for orders placed or performed during the hospital encounter of 04/22/18   2D echo with color flow doppler   Result Value Ref Range    QEF 25 (A) 55 - 65    Mitral Valve  Regurgitation MODERATE (A)     Diastolic Dysfunction Yes (A)     Aortic Valve Regurgitation MILD     Aortic Valve Stenosis MODERATE TO SEVERE (A)     Est. PA Systolic Pressure 70.41 (A)     Mitral Valve Mobility NORMAL     Tricuspid Valve Regurgitation MILD      Assessment and Plan:     * Acute on chronic combined systolic and diastolic congestive heart failure    -Stage C, NYHA III; pt is warm and wet   -UOP in the past 24 hours totaled 1.1 L  -likely triggered from viral/bacterial bronchitis/PNA, pt and his daughter report compliance with taking home medications and unlikely ischemic event driving [facility]Fe  -c/w IV diuresis (80 mg lasix BID), monitor weights daily and strict I/O's  -c/w GDMT (BB-Coreg 3.125 mg BID), would hold ARB given development of GALEN (serum Cr wilbert from 1.4 to 1.8), can add potassium sparing diuretic when able, would recommend starting  drip at 2.5 mcg/kg/min to augment diuresis given pt's low flow state  -2D Echo with CFD pending today   -will need to discuss with patient if CRT-D option could be beneficial   -c/w low sodium/cardiac diet      Elevated troponin    -likely demand ischemia, in the setting of having PNA and ADHF  -pt has hx of known LBBB and remote CAD s/p CABG   -EKG does not acute ischemic changes  -c/w ASA/statin/BB/ARB  -troponin peaked and then down-trended, unlikely ACS at this time point  -pending 2D echo with CFD today      Ivelisse Lozoya MD  Cardiology Fellow, PGY-4   [facility]

## 2019-01-02 NOTE — SUBJECTIVE & OBJECTIVE
Past Medical History:   Diagnosis Date    Anemia     Basal cell carcinoma 7/2014    left medial canthus    Basal cell carcinoma 3/9/2015    right forehead    Basal cell carcinoma 09/08/2016    left neck (scoop shave)    Basal cell carcinoma 12/09/2016    left preauricular    BPH (benign prostatic hypertrophy) 8/17/2012    CAD (coronary artery disease) 8/17/2012    Cervical spine fracture 11/20/2012    CHF (congestive heart failure) 8/17/2012    Depression 8/17/2012    GERD (gastroesophageal reflux disease) 3/27/2014    Heart attack     History of atrial fibrillation 8/17/2012    Hyperlipidemia     Hypertension     Kidney stones 8/17/2012    Memory loss     Myocardial infarction     Osteoarthritis of lumbar spine 8/17/2012    Shoulder pain 8/17/2012    Stroke 8/17/2012       Past Surgical History:   Procedure Laterality Date    basal cell carcinoma left ear      CARDIAC SURGERY      CATARACT EXTRACTION W/  INTRAOCULAR LENS IMPLANT Bilateral     CATARACT EXTRACTION, BILATERAL      CORONARY ARTERY BYPASS GRAFT  1990    x1    ECHOCARDIOGRAM-TRANSESOPHAGEAL N/A 6/2/2015    Performed by [facility] at [facility]    TONSILLECTOMY         Review of patient's allergies indicates:   Allergen Reactions    Prednisone Other (See Comments)     hallucinations       No current facility-administered medications on file prior to encounter.      Current Outpatient Medications on File Prior to Encounter   Medication Sig    albuterol (PROVENTIL/VENTOLIN HFA) 90 mcg/actuation inhaler Inhale 2 puffs into the lungs every 6 (six) hours as needed for Wheezing or Shortness of Breath. Rescue    apixaban (ELIQUIS) 5 mg Tab TAKE 1 TABLET(5 MG) BY MOUTH TWICE DAILY    aspirin (ECOTRIN) 81 MG EC tablet Take 81 mg by mouth. Pt taken every other day with potasium    atorvastatin (LIPITOR) 40 MG tablet Take 1 tablet (40 mg total) by mouth once daily.    benzonatate (TESSALON PERLES) 100 MG capsule Take 2 capsules  (200 mg total) by mouth 3 (three) times daily as needed for Cough.    buPROPion (WELLBUTRIN XL) 300 MG 24 hr tablet Take 1 tablet (300 mg total) by mouth every morning.    carvedilol (COREG) 3.125 MG tablet Take 1 tablet (3.125 mg total) by mouth 2 (two) times daily.    carvedilol (COREG) 3.125 MG tablet TAKE 1 TABLET(3.125 MG) BY MOUTH TWICE DAILY    cyanocobalamin (VITAMIN B-12) 1000 MCG tablet Take 1 tablet (1,000 mcg total) by mouth once daily.    desonide (DESOWEN) 0.05 % cream AAA bid    desonide (DESOWEN) 0.05 % lotion APPLY EXTERNALLY TO THE AFFECTED AREA TWICE DAILY AS NEEDED FOR REDNESS AND ITCHING     mg capsule TAKE 1 CAPSULE BY MOUTH ONCE DAILY    doxycycline (VIBRAMYCIN) 100 MG Cap Take 1 capsule (100 mg total) by mouth 2 (two) times daily. for 7 days    furosemide (LASIX) 20 MG tablet TAKE 1 TABLET BY MOUTH ONCE DAILY PLUS ONE EXTRA AS NEEDED    guaiFENesin (MUCINEX) 600 mg 12 hr tablet Take 1 tablet (600 mg total) by mouth 2 (two) times daily.    hydrocortisone (WESTCORT) 0.2 % cream Apply topically 2 (two) times daily.      levocetirizine (XYZAL) 5 MG tablet Take 1 tablet (5 mg total) by mouth once daily.    losartan (COZAAR) 25 MG tablet Take 1 tablet (25 mg total) by mouth once daily.    nitroGLYCERIN (NITROSTAT) 0.4 MG SL tablet Place 1 tablet (0.4 mg total) under the tongue every 5 (five) minutes as needed.    omega-3 acid ethyl esters (LOVAZA) 1 gram capsule Take 2 capsules (2 g total) by mouth once daily.    potassium chloride (MICRO-K) 10 MEQ CpSR TAKE 2 CAPSULES BY MOUTH EVERY OTHER DAY    tamsulosin (FLOMAX) 0.4 mg Cap Take 1 capsule (0.4 mg total) by mouth every evening.     Family History     Problem Relation (Age of Onset)    Cancer Father    Heart disease Mother    Heart failure Mother    No Known Problems Sister, Brother, Maternal Aunt, Maternal Uncle, Paternal Aunt, Paternal Uncle, Maternal Grandmother, Maternal Grandfather, Paternal Grandmother, Paternal  Grandfather        Tobacco Use    Smoking status: Never Smoker    Smokeless tobacco: Never Used   Substance and Sexual Activity    Alcohol use: No     Comment: social drinker    Drug use: No    Sexual activity: Not Currently     Review of Systems   HENT: Negative for congestion.    Cardiovascular: Positive for orthopnea and paroxysmal nocturnal dyspnea. Negative for chest pain, irregular heartbeat, near-syncope, palpitations and syncope.   Respiratory: Positive for cough and shortness of breath.    Gastrointestinal: Negative for nausea and vomiting.   Genitourinary: Negative for dysuria.   Neurological: Negative for dizziness, headaches and light-headedness.     Objective:     Vital Signs (Most Recent):  Temp: 98 °F (36.7 °C) (01/01/19 2127)  Pulse: 94 (01/01/19 2157)  Resp: 16 (01/01/19 2157)  BP: 123/66 (01/01/19 2127)  SpO2: 96 % (01/01/19 2157) Vital Signs (24h Range):  Temp:  [96.3 °F (35.7 °C)-98 °F (36.7 °C)] 98 °F (36.7 °C)  Pulse:  [62-98] 94  Resp:  [15-30] 16  SpO2:  [88 %-99 %] 96 %  BP: (101-125)/(57-77) 123/66     Weight: 76.5 kg (168 lb 10.4 oz)  Body mass index is 25.64 kg/m².    SpO2: 96 %  O2 Device (Oxygen Therapy): nasal cannula      Intake/Output Summary (Last 24 hours) at 1/1/2019 2251  Last data filed at 1/1/2019 1900  Gross per 24 hour   Intake 750 ml   Output 1300 ml   Net -550 ml       Lines/Drains/Airways     Peripheral Intravenous Line                 Peripheral IV - Single Lumen 04/22/18 1957 Right Wrist 254 days         Peripheral IV - Single Lumen 01/01/19 0053 Right Forearm less than 1 day                Physical Exam   HENT:   Mouth/Throat: Oropharynx is clear and moist.   Eyes: Pupils are equal, round, and reactive to light.   Neck: Neck supple. JVD present.   Cardiovascular: Normal rate, regular rhythm, normal heart sounds and intact distal pulses.   Pulmonary/Chest: He has rales.   Abdominal: Soft.   Neurological: He is alert.   Skin: Skin is warm.   Psychiatric: He has a  normal mood and affect.   Vitals reviewed.      Significant Labs: All pertinent lab results from the last 24 hours have been reviewed.    Significant Imaging: Echocardiogram:   2D echo with color flow doppler:   Results for orders placed or performed during the hospital encounter of 04/22/18   2D echo with color flow doppler   Result Value Ref Range    QEF 25 (A) 55 - 65    Mitral Valve Regurgitation MODERATE (A)     Diastolic Dysfunction Yes (A)     Aortic Valve Regurgitation MILD     Aortic Valve Stenosis MODERATE TO SEVERE (A)     Est. PA Systolic Pressure 70.41 (A)     Mitral Valve Mobility NORMAL     Tricuspid Valve Regurgitation MILD

## 2019-01-02 NOTE — ASSESSMENT & PLAN NOTE
-CHADS-VASc score of 7  -c/w rate controlling agent, Coreg 3.125 mg BID   -c/w AC, Eliquis 5 mg BID

## 2019-01-02 NOTE — ASSESSMENT & PLAN NOTE
-Stage C, NYHA III; pt is warm and wet   -c/w IV diuresis (80 mg BID), monitor weights daily and strict I/O's  -c/w GDMT (BB, ARB), can add potassium sparing diuretic when able  -2D Echo with CFD pending  -will need to discuss with patient if CRT-D option could be beneficial   -c/w low sodium/cardiac diet

## 2019-01-02 NOTE — NURSING
Pt c/o SOB and continuous cough respiratory notified, pt given benzonatate cap 200mg po  for cough.

## 2019-01-02 NOTE — PROGRESS NOTES
Physician Attestation for Scribe:  I, Tabatha Mc MD, personally performed the services described in this documentation. All medical record entries made by the scribe were at my direction and in my presence.  I have reviewed the chart and agree that the record reflects my personal performance and is accurate and complete.   Tabatha Mc MD    Hospital Medicine  Progress note    Patient Name: Jeremie Bradshaw  MRN: [id number]  Team: [facility] CARDIOLOGY TEAM C - FELLOWS/CONSULTS Tabatha Mc MD  Admit Date: 12/31/2018  ONEAL 1/3/2019  Code status: Full Code    Principal Problem:  Acute on chronic combined systolic and diastolic congestive heart failure    Interval hx:  No new complaints. Breathing improved.     ROS   Respiratory: no cough or shortness of breath  Cardiovascular: no chest pain or palpitations  Gastrointestinal: no nausea or vomiting, no abdominal pain or change in bowel habits  Behavioral/Psych: no depression or anxiety      PEx  Temp:  [96.3 °F (35.7 °C)-98 °F (36.7 °C)]   Pulse:  [62-98]   Resp:  [15-30]   BP: (101-125)/(57-73)   SpO2:  [88 %-99 %]     Intake/Output Summary (Last 24 hours) at 1/2/2019 0022  Last data filed at 1/1/2019 1900  Gross per 24 hour   Intake 750 ml   Output 1300 ml   Net -550 ml       General Appearance: no acute distress   Heart: regular rate and rhythm  Respiratory: Normal respiratory effort, no crackles. Decreased air movement.   Abdomen: Soft, non-tender; bowel sounds active  Skin: intact.  Neurologic:  No focal numbness or weakness  Mental status: Alert, oriented x 4, affect appropriate     Recent Labs   Lab 12/30/18 2350 01/01/19  0047   WBC 6.03 7.44   HGB 11.6* 11.8*   HCT 36.2* 37.5*   * 150     Recent Labs   Lab 12/30/18  2350 01/01/19  0047 01/01/19  0917   * 132* 134*   K 4.4 4.8 4.5    103 105   CO2 22* 19* 18*   BUN 32* 32* 33*   CREATININE 1.4 1.4 1.4   GLU 96 101 100   CALCIUM 9.2 9.2 9.1   MG  --   --  1.6   PHOS  --   --  4.8*   LIPASE 39 32  --       Recent Labs   Lab 12/30/18  2350 01/01/19  0047   ALKPHOS 72 70   ALT 10 16   AST 20 31   ALBUMIN 3.1* 3.1*   PROT 6.7 6.9   BILITOT 0.8 0.9   INR  --  1.5*      Recent Labs   Lab 12/30/18  2354   POCTGLUCOSE 117*     Recent Labs     01/01/19  0047 01/01/19  0917 01/01/19  1358   TROPONINI 0.154* 0.112* 0.207*       Scheduled Meds:   albuterol-ipratropium  3 mL Nebulization Q4H WAKE    apixaban  5 mg Oral BID    aspirin  81 mg Oral Daily    aspirin  325 mg Oral Once    atorvastatin  40 mg Oral Daily    buPROPion  300 mg Oral QAM    carvedilol  3.125 mg Oral BID    cetirizine  10 mg Oral Daily    cyanocobalamin  1,000 mcg Oral Daily    dexamethasone  2 mg Oral Q12H    doxycycline  100 mg Oral Q12H    furosemide  80 mg Intravenous BID    guaiFENesin  600 mg Oral BID    losartan  25 mg Oral Daily    omega-3 acid ethyl esters  2 g Oral Daily    potassium chloride  20 mEq Oral Every other day    sodium chloride 0.9%  3 mL Intravenous Q8H    tamsulosin  0.4 mg Oral QHS     Continuous Infusions:  As Needed:  albuterol-ipratropium, benzonatate, GI cocktail (mylanta 30 mL, lidocaine 2 % viscous 10 mL, dicyclomine 10 mL) 50 mL, ondansetron, promethazine, ramelteon    Active Hospital Problems    Diagnosis  POA    *Acute on chronic combined systolic and diastolic congestive heart failure [I50.43]  Yes    CKD (chronic kidney disease), stage III [N18.3]  Yes     Chronic    Hyponatremia [E87.1]  Yes    Upper respiratory infection, viral [J06.9]  Yes    Diarrhea [R19.7]  Yes    Elevated troponin [R74.8]  Yes     Chronic    Paroxysmal atrial fibrillation [I48.0]  Yes     Chronic    Essential hypertension [I10]  Yes     Chronic    Coronary artery disease involving native coronary artery of native heart without angina pectoris [I25.10]  Yes     Chronic    Hyperlipidemia [E78.5]  Yes     Chronic      Resolved Hospital Problems   No resolved problems to display.       Overview  84 year old gentleman with  a h/o CAD s/p CABG with history of remote MI (~8 years ago), HTN, A Fib on Eliquis, chronic systolic and diastolic HF, pulmonary HTN, aortic stenosis, and BPH admitted for acute on chronic combined systolic and diastolic congestive heart failure.     Assessment and Plan for Problems addressed today:    Acute on chronic combined systolic and diastolic congestive heart failure  AS (aortic stenosis)  Atrial fibrillation   Severe Pulmonary hypertension  Elevated troponin  Acute hypoxic respiratory failure  · BNP elevated to 2372 from 1464 two days ago.  · Troponin 0.154 > 0.207, baseline of 0.058. No evidence of STEMI on ECG. (1/1)  · CT abdomen/pelvis and CXR showed moderate bilateral pleural effusions and cardiomegaly.  · Echo (4/2018): EF 25% with diastolic dysfunction, moderate MR, moderate to severe AS, PASP 70.41mmHg.   · Increasing Lasix 40mg IV BID to 80mg IV BID.  Heart failure pathway initiated.  · Continue BB, ARB, ASA, statin.   · TTE ordered. Tn trending up; Consulting cardiology. (1/1)  · Wean oxygen as tolerated    Acute bronchitis/Viral URI  · Patient seen by Urgent Care on 12/27/2018.  · Started on doxycycline 100mg BID (last dose to be 1/3/2018), PRN tessalon perles, Mucinex 600mg BID, and Xyzal 5mg daily; continuing current management.   · Received IV methylprednisolone in ED. Continuing with dexamethasone to complete 5 day course. Ordered Duonebs & cetirizine. (1/1)    Diarrhea  Epigastric abdominal pain  · CT abdomen/pelvis showed only diverticulosis coli without evidence of acute diverticulitis.  · Patient recently placed on antibiotics.  · C. Diff pending.     Hyponatremia  · Na 132 > 134 on admit. Osmolality wnl.     CKD (chronic kidney disease), stage III  · Stable with creatinine 1.4, GFR 45.8.  · Avoid nephrotoxic medications.    Coronary artery disease involving native coronary artery of native heart without angina pectoris  S/p CABG   Essential hypertension  Hyperlipidemia  · Continue  carvedilol 3.125mg BID, losartan 25mg daily.  · Continue Lipitor 40mg daily    Paroxysmal atrial fibrillation  Anticoagulated by anticoagulation treatment  · Rate controlled in A Fib.  · Continue BB, Eliquis 5mg BID.  · [id number] VASc score of at least 7 for CHF, HTN, age, CVA, and CAD    Diet: Diet Adult Regular (IDDSI Level 7) Fluid - 2000mL  DVT Prophylaxis:   Anticoagulants   Medication Route Frequency    apixaban tablet 5 mg Oral BID       L/D/A:  PIV    Discharge plan and follow up  Still a Patient      Provider  Tabatha Mc MD  [facility] CARDIOLOGY TEAM C - FELLOWS/CONSULTS   Department of Hospital Medicine    [place] Attestation: I personally scribed for Tabatha Mc MD on 01/02/2019 at 8:21 AM. Electronically signed by brandon Hernandez on 01/02/2019 at 8:21 AM.

## 2019-01-02 NOTE — PLAN OF CARE
Problem: Skin Injury Risk Increased  Goal: Skin Health and Integrity  Outcome: Ongoing (interventions implemented as appropriate)  Weight shifting provided throughout shift

## 2019-01-02 NOTE — HPI
84 year old with PMHx significant for persistent atrial fibrillation on OAC, CAD s/p MI with CABG, ischemic cardiomyopathy (EF of 20-25%), BEAR thrombus, pulmonary HTN (Who Group III) and aortic stenosis who presented on 12/31/18 with chief complaint of cough, chest congestion, SOB, wheezing, abdominal pain and diarrhea as well as fatigue/malaise. Of note, patient went to Urgent Care on 12/27/18 and was given Doxycycline for presumed PNA. He continued to have persistent symptoms so he came to the ER and it was recommended at the time that he be admitted for observation for treatment of ADHF however patient left AMA. This hospitalization his workup is revealing for elevated BNP (2,372), CT scan and CXR showing bilateral pleural effusions along with cardiomegaly and reflux from IVC into hepatic veins, troponin elevation from 0.05 to 0.2. EKG shows known LBBB and atrial fibrillation. He is currently being diuresed with IV lasix 80 mg BID, continued with home BB and ARB. His renal function of note is at baseline, serum Cr in 1.2 to 1.4 range. Cardiology is being consulted for CHF exacerbation.     Patient has been seen by Dr. Lopez (EP clinic in 2015), at that time his afib was not bothersome so DCCV option was held off. Given that he had IVCD but not prominent HF it was held off on pursuing CRT-P implantation.

## 2019-01-02 NOTE — PROGRESS NOTES
Pharmacist Renal Dose Adjustment Note    Jeremie Bradshaw is a 84 y.o. male being treated with the medication apixaban.    Patient Data:    Vital Signs (Most Recent):  Temp: 97.9 °F (36.6 °C) (01/02/19 0815)  Pulse: 81 (01/02/19 0916)  Resp: 18 (01/02/19 0916)  BP: 92/64 (01/02/19 0815)  SpO2: 96 % (01/02/19 0916) Vital Signs (72h Range):  Temp:  [96.3 °F (35.7 °C)-98 °F (36.7 °C)]   Pulse:  [62-98]   Resp:  [14-30]   BP: ()/(53-77)   SpO2:  [88 %-100 %]      Recent Labs   Lab 01/01/19  0047 01/01/19  0917 01/02/19  0751   CREATININE 1.4 1.4 1.8*     Serum creatinine: 1.8 mg/dL (H) 01/02/19 0751  Estimated creatinine clearance: 30.5 mL/min (A)    Medication:apixaban 5mg BID will be changed to medication: apixaban 2.5mg BID    Pharmacist's Name: Joel Nicole  Pharmacist's Extension: [phone]

## 2019-01-02 NOTE — ASSESSMENT & PLAN NOTE
-likely demand ischemia, in the setting of having PNA and decompensated heart failure  -pt has hx of known LBBB and remote CAD s/p CABG   -EKG does not acute ischemic changes  -c/w ASA/statin/BB/ARB  -troponin peaked and then down-trended, unlikely ACS at this time point  -pending 2D echo with CFD today

## 2019-01-02 NOTE — SUBJECTIVE & OBJECTIVE
Interval History: Overnight, pt remained in afib.     Review of Systems   Constitution: Positive for weakness and malaise/fatigue. Negative for chills and fever.   Cardiovascular: Positive for orthopnea.   Respiratory: Positive for cough and shortness of breath.    Gastrointestinal: Negative for abdominal pain, diarrhea, nausea and vomiting.   Neurological: Negative for dizziness, focal weakness, headaches and light-headedness.     Objective:     Vital Signs (Most Recent):  Temp: 97.9 °F (36.6 °C) (01/02/19 0815)  Pulse: 91 (01/02/19 0815)  Resp: 14 (01/02/19 0815)  BP: 92/64 (01/02/19 0815)  SpO2: 95 % (01/02/19 0815) Vital Signs (24h Range):  Temp:  [96.3 °F (35.7 °C)-98 °F (36.7 °C)] 97.9 °F (36.6 °C)  Pulse:  [67-97] 91  Resp:  [14-18] 14  SpO2:  [94 %-100 %] 95 %  BP: ()/(57-67) 92/64     Weight: 75.9 kg (167 lb 5.3 oz)  Body mass index is 25.44 kg/m².     SpO2: 95 %  O2 Device (Oxygen Therapy): room air      Intake/Output Summary (Last 24 hours) at 1/2/2019 0909  Last data filed at 1/2/2019 0600  Gross per 24 hour   Intake 870 ml   Output 1100 ml   Net -230 ml       Lines/Drains/Airways     Peripheral Intravenous Line                 Peripheral IV - Single Lumen 04/22/18 1957 Right Wrist 254 days         Peripheral IV - Single Lumen 01/01/19 0053 Right Forearm 1 day                Physical Exam   HENT:   Mouth/Throat: Oropharynx is clear and moist.   Eyes: Pupils are equal, round, and reactive to light.   Neck: JVD present.   Cardiovascular: Intact distal pulses.   Rhonchi, coarse breath sounds scattered throughout upper lung fields  Irregularly irregular heart sounds    Pulmonary/Chest: Effort normal.   Abdominal: Soft.   Musculoskeletal: He exhibits no edema.   Neurological: He is alert.   Skin: Skin is warm.       Significant Labs: All pertinent lab results from the last 24 hours have been reviewed.    Significant Imaging: Echocardiogram:   2D echo with color flow doppler:   Results for orders placed  or performed during the hospital encounter of 04/22/18   2D echo with color flow doppler   Result Value Ref Range    QEF 25 (A) 55 - 65    Mitral Valve Regurgitation MODERATE (A)     Diastolic Dysfunction Yes (A)     Aortic Valve Regurgitation MILD     Aortic Valve Stenosis MODERATE TO SEVERE (A)     Est. PA Systolic Pressure 70.41 (A)     Mitral Valve Mobility NORMAL     Tricuspid Valve Regurgitation MILD

## 2019-01-02 NOTE — CONSULTS
[facility]  Cardiology  Consult Note    Patient Name: Jeremie Bradshaw  MRN: [id number]  Admission Date: 12/31/2018  Hospital Length of Stay: 0 days  Code Status: Full Code   Attending Provider: Tabatha Mc MD   Consulting Provider: Ivelisse Lozoya MD  Primary Care Physician: Rocio Casas MD  Principal Problem:Acute on chronic combined systolic and diastolic congestive heart failure    Patient information was obtained from patient and ER records.     Inpatient consult to Cardiology  Consult performed by: Ivelisse Lozoya MD  Consult ordered by: Tabatha Mc MD  Reason for consult: CHF exacerbation         Subjective:     Chief Complaint:  Shortness of Breath     HPI: 84 year old with PMHx significant for persistent atrial fibrillation on OAC, CAD s/p MI with CABG, ischemic cardiomyopathy (EF of 20-25%), BEAR thrombus, pulmonary HTN (Who Group III) and aortic stenosis who presented on 12/31/18 with chief complaint of cough, chest congestion, SOB, wheezing, abdominal pain and diarrhea as well as fatigue/malaise. Of note, patient went to Urgent Care on 12/27/18 and was given Doxycycline for presumed PNA. He continued to have persistent symptoms so he came to the ER and it was recommended at the time that he be admitted for observation for treatment of ADHF however patient left AMA. This hospitalization his workup is revealing for elevated BNP (2,372), CT scan and CXR showing bilateral pleural effusions along with cardiomegaly and reflux from IVC into hepatic veins, troponin elevation from 0.05 to 0.2. EKG shows known LBBB and atrial fibrillation. He is currently being diuresed with IV lasix 80 mg BID, continued with home BB and ARB. His renal function of note is at baseline, serum Cr in 1.2 to 1.4 range. Cardiology is being consulted for CHF exacerbation. Bedside echo revealed severely depressed EF, mitral regurgitation and aortic stenosis.     Patient has been seen by Dr. Lopez ( clinic in  2015), at that time his afib was not bothersome so DCCV option was held off. Given that he had IVCD but not prominent HF it was held off on pursuing CRT-P implantation.     Past Medical History:   Diagnosis Date    Anemia     Basal cell carcinoma 7/2014    left medial canthus    Basal cell carcinoma 3/9/2015    right forehead    Basal cell carcinoma 09/08/2016    left neck (scoop shave)    Basal cell carcinoma 12/09/2016    left preauricular    BPH (benign prostatic hypertrophy) 8/17/2012    CAD (coronary artery disease) 8/17/2012    Cervical spine fracture 11/20/2012    CHF (congestive heart failure) 8/17/2012    Depression 8/17/2012    GERD (gastroesophageal reflux disease) 3/27/2014    Heart attack     History of atrial fibrillation 8/17/2012    Hyperlipidemia     Hypertension     Kidney stones 8/17/2012    Memory loss     Myocardial infarction     Osteoarthritis of lumbar spine 8/17/2012    Shoulder pain 8/17/2012    Stroke 8/17/2012       Past Surgical History:   Procedure Laterality Date    basal cell carcinoma left ear      CARDIAC SURGERY      CATARACT EXTRACTION W/  INTRAOCULAR LENS IMPLANT Bilateral     CATARACT EXTRACTION, BILATERAL      CORONARY ARTERY BYPASS GRAFT  1990    x1    ECHOCARDIOGRAM-TRANSESOPHAGEAL N/A 6/2/2015    Performed by [facility] Surgeon at [facility]    TONSILLECTOMY         Review of patient's allergies indicates:   Allergen Reactions    Prednisone Other (See Comments)     hallucinations       No current facility-administered medications on file prior to encounter.      Current Outpatient Medications on File Prior to Encounter   Medication Sig    albuterol (PROVENTIL/VENTOLIN HFA) 90 mcg/actuation inhaler Inhale 2 puffs into the lungs every 6 (six) hours as needed for Wheezing or Shortness of Breath. Rescue    apixaban (ELIQUIS) 5 mg Tab TAKE 1 TABLET(5 MG) BY MOUTH TWICE DAILY    aspirin (ECOTRIN) 81 MG EC tablet Take 81 mg by mouth. Pt taken every other  day with potasium    atorvastatin (LIPITOR) 40 MG tablet Take 1 tablet (40 mg total) by mouth once daily.    benzonatate (TESSALON PERLES) 100 MG capsule Take 2 capsules (200 mg total) by mouth 3 (three) times daily as needed for Cough.    buPROPion (WELLBUTRIN XL) 300 MG 24 hr tablet Take 1 tablet (300 mg total) by mouth every morning.    carvedilol (COREG) 3.125 MG tablet Take 1 tablet (3.125 mg total) by mouth 2 (two) times daily.    carvedilol (COREG) 3.125 MG tablet TAKE 1 TABLET(3.125 MG) BY MOUTH TWICE DAILY    cyanocobalamin (VITAMIN B-12) 1000 MCG tablet Take 1 tablet (1,000 mcg total) by mouth once daily.    desonide (DESOWEN) 0.05 % cream AAA bid    desonide (DESOWEN) 0.05 % lotion APPLY EXTERNALLY TO THE AFFECTED AREA TWICE DAILY AS NEEDED FOR REDNESS AND ITCHING     mg capsule TAKE 1 CAPSULE BY MOUTH ONCE DAILY    doxycycline (VIBRAMYCIN) 100 MG Cap Take 1 capsule (100 mg total) by mouth 2 (two) times daily. for 7 days    furosemide (LASIX) 20 MG tablet TAKE 1 TABLET BY MOUTH ONCE DAILY PLUS ONE EXTRA AS NEEDED    guaiFENesin (MUCINEX) 600 mg 12 hr tablet Take 1 tablet (600 mg total) by mouth 2 (two) times daily.    hydrocortisone (WESTCORT) 0.2 % cream Apply topically 2 (two) times daily.      levocetirizine (XYZAL) 5 MG tablet Take 1 tablet (5 mg total) by mouth once daily.    losartan (COZAAR) 25 MG tablet Take 1 tablet (25 mg total) by mouth once daily.    nitroGLYCERIN (NITROSTAT) 0.4 MG SL tablet Place 1 tablet (0.4 mg total) under the tongue every 5 (five) minutes as needed.    omega-3 acid ethyl esters (LOVAZA) 1 gram capsule Take 2 capsules (2 g total) by mouth once daily.    potassium chloride (MICRO-K) 10 MEQ CpSR TAKE 2 CAPSULES BY MOUTH EVERY OTHER DAY    tamsulosin (FLOMAX) 0.4 mg Cap Take 1 capsule (0.4 mg total) by mouth every evening.     Family History     Problem Relation (Age of Onset)    Cancer Father    Heart disease Mother    Heart failure Mother    No  Known Problems Sister, Brother, Maternal Aunt, Maternal Uncle, Paternal Aunt, Paternal Uncle, Maternal Grandmother, Maternal Grandfather, Paternal Grandmother, Paternal Grandfather        Tobacco Use    Smoking status: Never Smoker    Smokeless tobacco: Never Used   Substance and Sexual Activity    Alcohol use: No     Comment: social drinker    Drug use: No    Sexual activity: Not Currently     Review of Systems   HENT: Negative for congestion.    Cardiovascular: Positive for orthopnea and paroxysmal nocturnal dyspnea. Negative for chest pain, irregular heartbeat, near-syncope, palpitations and syncope.   Respiratory: Positive for cough and shortness of breath.    Gastrointestinal: Negative for nausea and vomiting.   Genitourinary: Negative for dysuria.   Neurological: Negative for dizziness, headaches and light-headedness.     Objective:     Vital Signs (Most Recent):  Temp: 98 °F (36.7 °C) (01/01/19 2127)  Pulse: 94 (01/01/19 2157)  Resp: 16 (01/01/19 2157)  BP: 123/66 (01/01/19 2127)  SpO2: 96 % (01/01/19 2157) Vital Signs (24h Range):  Temp:  [96.3 °F (35.7 °C)-98 °F (36.7 °C)] 98 °F (36.7 °C)  Pulse:  [62-98] 94  Resp:  [15-30] 16  SpO2:  [88 %-99 %] 96 %  BP: (101-125)/(57-77) 123/66     Weight: 76.5 kg (168 lb 10.4 oz)  Body mass index is 25.64 kg/m².    SpO2: 96 %  O2 Device (Oxygen Therapy): nasal cannula      Intake/Output Summary (Last 24 hours) at 1/1/2019 2251  Last data filed at 1/1/2019 1900  Gross per 24 hour   Intake 750 ml   Output 1300 ml   Net -550 ml       Lines/Drains/Airways     Peripheral Intravenous Line                 Peripheral IV - Single Lumen 04/22/18 1957 Right Wrist 254 days         Peripheral IV - Single Lumen 01/01/19 0053 Right Forearm less than 1 day                Physical Exam   HENT:   Mouth/Throat: Oropharynx is clear and moist.   Eyes: Pupils are equal, round, and reactive to light.   Neck: Neck supple. JVD present.   Cardiovascular: Normal rate, regular rhythm, normal  heart sounds and intact distal pulses.   Pulmonary/Chest: He has rales.   Abdominal: Soft.   Neurological: He is alert.   Skin: Skin is warm.   Psychiatric: He has a normal mood and affect.   Vitals reviewed.      Significant Labs: All pertinent lab results from the last 24 hours have been reviewed.    Significant Imaging: Echocardiogram:   2D echo with color flow doppler:   Results for orders placed or performed during the hospital encounter of 04/22/18   2D echo with color flow doppler   Result Value Ref Range    QEF 25 (A) 55 - 65    Mitral Valve Regurgitation MODERATE (A)     Diastolic Dysfunction Yes (A)     Aortic Valve Regurgitation MILD     Aortic Valve Stenosis MODERATE TO SEVERE (A)     Est. PA Systolic Pressure 70.41 (A)     Mitral Valve Mobility NORMAL     Tricuspid Valve Regurgitation MILD      Assessment and Plan:     * Acute on chronic combined systolic and diastolic congestive heart failure    -Stage C, NYHA III; pt is warm and wet   -c/w IV diuresis (80 mg lasix BID), monitor weights daily and strict I/O's  -c/w GDMT (BB, ARB), can add potassium sparing diuretic when able  -2D Echo with CFD pending  -will need to discuss with patient if CRT-D option could be beneficial   -c/w low sodium/cardiac diet      Elevated troponin    -in the setting of having PNA and decompensated heart failure  -pt has hx of known LBBB and remote CAD s/p CABG   -EKG does not acute ischemic changes  -c/w ASA/statin/BB/ARB  -trend troponin till peak and down-trend, unlikely ACS at this time point  -pending 2D echo with CFD in AM      Permanent atrial fibrillation    -CHADS-VASc score of 7  -c/w rate controlling agent, Coreg 3.125 mg BID   -c/w AC, Eliquis 5 mg BID        Thank you for your consult.    Ivelisse Lozoya MD  Cardiology Fellow, PGY-4   [facility]

## 2019-01-02 NOTE — ASSESSMENT & PLAN NOTE
-in the setting of having PNA and decompensated heart failure  -pt has hx of known LBBB and remote CAD s/p CABG   -EKG does not acute ischemic changes  -c/w ASA/statin/BB/ARB  -trend troponin till peak and down-trend, unlikely ACS at this time point  -pending 2D echo with CFD

## 2019-01-02 NOTE — MEDICAL/APP STUDENT
Hospital Medicine  Progress note    Patient Name: Jeremie Bradshaw  MRN: [id number]  Team: [facility] CARDIOLOGY TEAM C - FELLOWS/CONSULTS Tabatha Mc MD  Admit Date: 12/31/2018  ONEAL 1/3/2019  Code status: Full Code    Principal Problem:  Acute on chronic combined systolic and diastolic congestive heart failure    Interval hx:  No new complaints. Breathing improved.     ROS   Respiratory: no cough or shortness of breath  Cardiovascular: no chest pain or palpitations  Gastrointestinal: no nausea or vomiting, no abdominal pain or change in bowel habits  Behavioral/Psych: no depression or anxiety      PEx  Temp:  [96.3 °F (35.7 °C)-98 °F (36.7 °C)]   Pulse:  [67-97]   Resp:  [14-18]   BP: ()/(57-67)   SpO2:  [94 %-100 %]     Intake/Output Summary (Last 24 hours) at 1/2/2019 0827  Last data filed at 1/2/2019 0600  Gross per 24 hour   Intake 870 ml   Output 1100 ml   Net -230 ml       General Appearance: no acute distress   Heart: irregular rate and rhythm. JVD present.   Respiratory: Normal respiratory effort, bilateral crackles at bases. Good bilateral air movement.   Abdomen: Soft, non-tender; bowel sounds active  Skin: intact.  Neurologic:  No focal numbness or weakness  Mental status: Alert, oriented x 4, affect appropriate     Recent Labs   Lab 12/30/18 2350 01/01/19 0047   WBC 6.03 7.44   HGB 11.6* 11.8*   HCT 36.2* 37.5*   * 150     Recent Labs   Lab 12/30/18 2350 01/01/19 0047 01/01/19  0917   * 132* 134*   K 4.4 4.8 4.5    103 105   CO2 22* 19* 18*   BUN 32* 32* 33*   CREATININE 1.4 1.4 1.4   GLU 96 101 100   CALCIUM 9.2 9.2 9.1   MG  --   --  1.6   PHOS  --   --  4.8*   LIPASE 39 32  --      Recent Labs   Lab 12/30/18 2350 01/01/19 0047   ALKPHOS 72 70   ALT 10 16   AST 20 31   ALBUMIN 3.1* 3.1*   PROT 6.7 6.9   BILITOT 0.8 0.9   INR  --  1.5*      Recent Labs   Lab 12/30/18  2354   POCTGLUCOSE 117*     Recent Labs     01/01/19  0047 01/01/19  0917 01/01/19  1358   TROPONINI 0.154* 0.112*  0.207*       Scheduled Meds:   albuterol-ipratropium  3 mL Nebulization Q4H WAKE    apixaban  5 mg Oral BID    aspirin  81 mg Oral Daily    aspirin  325 mg Oral Once    atorvastatin  40 mg Oral Daily    buPROPion  300 mg Oral QAM    carvedilol  3.125 mg Oral BID    cetirizine  10 mg Oral Daily    cyanocobalamin  1,000 mcg Oral Daily    dexamethasone  2 mg Oral Q12H    doxycycline  100 mg Oral Q12H    furosemide  80 mg Intravenous BID    guaiFENesin  600 mg Oral BID    losartan  25 mg Oral Daily    omega-3 acid ethyl esters  2 g Oral Daily    potassium chloride  20 mEq Oral Every other day    sodium chloride 0.9%  3 mL Intravenous Q8H    tamsulosin  0.4 mg Oral QHS     Continuous Infusions:  As Needed:  albuterol-ipratropium, benzonatate, GI cocktail (mylanta 30 mL, lidocaine 2 % viscous 10 mL, dicyclomine 10 mL) 50 mL, ondansetron, promethazine, ramelteon    Active Hospital Problems    Diagnosis  POA    *Acute on chronic combined systolic and diastolic congestive heart failure [I50.43]  Yes    CKD (chronic kidney disease), stage III [N18.3]  Yes     Chronic    Hyponatremia [E87.1]  Yes    Upper respiratory infection, viral [J06.9]  Yes    Diarrhea [R19.7]  Yes    Elevated troponin [R74.8]  Yes     Chronic    Paroxysmal atrial fibrillation [I48.0]  Yes     Chronic    Essential hypertension [I10]  Yes     Chronic    Coronary artery disease involving native coronary artery of native heart without angina pectoris [I25.10]  Yes     Chronic    Hyperlipidemia [E78.5]  Yes     Chronic      Resolved Hospital Problems   No resolved problems to display.       Overview  84 year old gentleman with a h/o CAD s/p CABG with history of remote MI (~8 years ago), HTN, A Fib on Eliquis, chronic systolic and diastolic HF, pulmonary HTN, aortic stenosis, and BPH admitted for acute on chronic combined systolic and diastolic congestive heart failure.     Assessment and Plan for Problems addressed today:    Acute  on chronic combined systolic and diastolic congestive heart failure  AS (aortic stenosis)  Atrial fibrillation   Severe Pulmonary hypertension  Elevated troponin  Acute hypoxic respiratory failure  · BNP elevated to 2372 from 1464 two days ago.  · Troponin 0.154 > 0.207, baseline of 0.058. No evidence of STEMI on ECG. (1/1)  · CT abdomen/pelvis and CXR showed moderate bilateral pleural effusions and cardiomegaly.  · Echo (4/2018): EF 25% with diastolic dysfunction, moderate MR, moderate to severe AS, PASP 70.41mmHg.   · Increasing Lasix 40mg IV BID to 80mg IV BID.  Heart failure pathway initiated.  · Continue BB, ARB, ASA, statin.   · TTE ordered. Tn trending up; Consulting cardiology. (1/1)  · Wean oxygen as tolerated - walk test tomorrow  · Cardiology do not suspect ACS; continue current management. Echo pending. (1/2)  · Troponin down-trending. 2D Echo (1/2): EF 20%. Wall motion abnormalities. Left ventricular diastolic dysfunction; Biatrial enlargement. Aortic stenosis with a dimensionless index of 0.28. Significantly reduced stroke volume; Moderate-to-severe MR; Moderate TR; PASP 77 mm Hg; Elevated CVP 15 mmHg. Dobutamine gtt started to augment diuresis per cardiology. (1/2)    Acute bronchitis/Viral URI  · Patient seen by Urgent Care on 12/27/2018.  · Started on doxycycline 100mg BID (last dose to be 1/3/2018), PRN tessalon perles, Mucinex 600mg BID, and Xyzal 5mg daily; continuing current management.   · Received IV methylprednisolone in ED. Continuing with dexamethasone to complete 5 day course. Ordered Duonebs & cetirizine. (1/1)  · Influenza swab negative.     Diarrhea  Epigastric abdominal pain  · CT abdomen/pelvis showed only diverticulosis coli without evidence of acute diverticulitis.  · Patient recently placed on antibiotics.  · C. Diff not collected, as no evidence of diarrhea since admission.    Hyponatremia  · Na 132 > 134 on admit. Osmolality wnl.     CKD (chronic kidney disease), stage  III  · Stable with creatinine 1.4, GFR 45.8.  · Avoid nephrotoxic medications.  · Holding losartan for rise in sCr. (1/2)    Coronary artery disease involving native coronary artery of native heart without angina pectoris  S/p CABG   Essential hypertension  Hyperlipidemia  · Continue carvedilol 3.125mg BID, losartan 25mg daily.  · Continue Lipitor 40mg daily    Paroxysmal atrial fibrillation  Anticoagulated by anticoagulation treatment  · Rate controlled in A Fib.  · Continue BB, Eliquis 5mg BID.  · [id number] VASc score of at least 7 for CHF, HTN, age, CVA, and CAD    Diet: Diet Adult Regular (IDDSI Level 7) Fluid - 2000mL  DVT Prophylaxis:   Anticoagulants   Medication Route Frequency    apixaban tablet 5 mg Oral BID       L/D/A:  PIV    Discharge plan and follow up  Still a Patient      Provider  Tabatha Mc MD  [facility] [place] C - FELLOWS/CONSULTS   Department of Hospital Medicine    Brandon Attestation: I personally scribed for Tabatha Mc MD on 01/02/2019 at 8:21 AM. Electronically signed by brandon Hernandez on 01/02/2019 at 8:21 AM.

## 2019-01-02 NOTE — PLAN OF CARE
Problem: Infection  Goal: Infection Symptom Resolution  Outcome: Ongoing (interventions implemented as appropriate)  Pt on contact precaution , aseptic tech maintain , will continue to use precautions throughout shift.

## 2019-01-02 NOTE — ASSESSMENT & PLAN NOTE
-Stage C, NYHA III; pt is warm and wet   -UOP in the past 24 hours totaled 1.1 L  -c/w IV diuresis (80 mg lasix BID), monitor weights daily and strict I/O's  -c/w GDMT (BB), would hold ARB given development of GALEN (serum Cr wilbert from 1.4 to 1.8), can add potassium sparing diuretic when able  -2D Echo with CFD pending today   -will need to discuss with patient if CRT-D option could be beneficial   -c/w low sodium/cardiac diet

## 2019-01-03 PROBLEM — R79.89 ELEVATED TROPONIN: Chronic | Status: RESOLVED | Noted: 2018-04-22 | Resolved: 2019-01-03

## 2019-01-03 LAB
ANION GAP SERPL CALC-SCNC: 12 MMOL/L
BASOPHILS # BLD AUTO: 0 K/UL
BASOPHILS NFR BLD: 0 %
BUN SERPL-MCNC: 49 MG/DL
CALCIUM SERPL-MCNC: 9.2 MG/DL
CHLORIDE SERPL-SCNC: 101 MMOL/L
CO2 SERPL-SCNC: 24 MMOL/L
CREAT SERPL-MCNC: 1.7 MG/DL
DIFFERENTIAL METHOD: ABNORMAL
EOSINOPHIL # BLD AUTO: 0 K/UL
EOSINOPHIL NFR BLD: 0 %
ERYTHROCYTE [DISTWIDTH] IN BLOOD BY AUTOMATED COUNT: 15.5 %
EST. GFR  (AFRICAN AMERICAN): 41.9 ML/MIN/1.73 M^2
EST. GFR  (NON AFRICAN AMERICAN): 36.2 ML/MIN/1.73 M^2
GLUCOSE SERPL-MCNC: 101 MG/DL
HCT VFR BLD AUTO: 34.2 %
HGB BLD-MCNC: 11.4 G/DL
IMM GRANULOCYTES # BLD AUTO: 0.04 K/UL
IMM GRANULOCYTES NFR BLD AUTO: 0.5 %
LYMPHOCYTES # BLD AUTO: 1 K/UL
LYMPHOCYTES NFR BLD: 13.3 %
MCH RBC QN AUTO: 30.4 PG
MCHC RBC AUTO-ENTMCNC: 33.3 G/DL
MCV RBC AUTO: 91 FL
MONOCYTES # BLD AUTO: 0.7 K/UL
MONOCYTES NFR BLD: 8.7 %
NEUTROPHILS # BLD AUTO: 6 K/UL
NEUTROPHILS NFR BLD: 77.5 %
NRBC BLD-RTO: 0 /100 WBC
PLATELET # BLD AUTO: 132 K/UL
PMV BLD AUTO: 12.5 FL
POTASSIUM SERPL-SCNC: 3.9 MMOL/L
RBC # BLD AUTO: 3.75 M/UL
SODIUM SERPL-SCNC: 137 MMOL/L
WBC # BLD AUTO: 7.74 K/UL

## 2019-01-03 PROCEDURE — 97165 OT EVAL LOW COMPLEX 30 MIN: CPT | Mod: HCNC

## 2019-01-03 PROCEDURE — 94761 N-INVAS EAR/PLS OXIMETRY MLT: CPT | Mod: HCNC

## 2019-01-03 PROCEDURE — 25000003 PHARM REV CODE 250: Mod: HCNC | Performed by: INTERNAL MEDICINE

## 2019-01-03 PROCEDURE — 85025 COMPLETE CBC W/AUTO DIFF WBC: CPT | Mod: HCNC

## 2019-01-03 PROCEDURE — 25000003 PHARM REV CODE 250: Mod: HCNC | Performed by: PHYSICIAN ASSISTANT

## 2019-01-03 PROCEDURE — 25000242 PHARM REV CODE 250 ALT 637 W/ HCPCS: Mod: HCNC | Performed by: INTERNAL MEDICINE

## 2019-01-03 PROCEDURE — 80048 BASIC METABOLIC PNL TOTAL CA: CPT | Mod: HCNC

## 2019-01-03 PROCEDURE — 63600175 PHARM REV CODE 636 W HCPCS: Mod: HCNC | Performed by: INTERNAL MEDICINE

## 2019-01-03 PROCEDURE — 99232 SBSQ HOSP IP/OBS MODERATE 35: CPT | Mod: HCNC,,, | Performed by: INTERNAL MEDICINE

## 2019-01-03 PROCEDURE — 94799 UNLISTED PULMONARY SVC/PX: CPT | Mod: HCNC

## 2019-01-03 PROCEDURE — 94640 AIRWAY INHALATION TREATMENT: CPT | Mod: HCNC

## 2019-01-03 PROCEDURE — 99900035 HC TECH TIME PER 15 MIN (STAT): Mod: HCNC

## 2019-01-03 PROCEDURE — A4216 STERILE WATER/SALINE, 10 ML: HCPCS | Mod: HCNC | Performed by: PHYSICIAN ASSISTANT

## 2019-01-03 PROCEDURE — 99232 PR SUBSEQUENT HOSPITAL CARE,LEVL II: ICD-10-PCS | Mod: HCNC,,, | Performed by: INTERNAL MEDICINE

## 2019-01-03 PROCEDURE — 36415 COLL VENOUS BLD VENIPUNCTURE: CPT | Mod: HCNC

## 2019-01-03 PROCEDURE — 97161 PT EVAL LOW COMPLEX 20 MIN: CPT | Mod: HCNC

## 2019-01-03 PROCEDURE — 20600001 HC STEP DOWN PRIVATE ROOM: Mod: HCNC

## 2019-01-03 RX ORDER — DOXYCYCLINE HYCLATE 100 MG
100 TABLET ORAL EVERY 12 HOURS
Status: COMPLETED | OUTPATIENT
Start: 2019-01-04 | End: 2019-01-04

## 2019-01-03 RX ADMIN — POTASSIUM CHLORIDE 20 MEQ: 750 CAPSULE, EXTENDED RELEASE ORAL at 10:01

## 2019-01-03 RX ADMIN — APIXABAN 2.5 MG: 2.5 TABLET, FILM COATED ORAL at 09:01

## 2019-01-03 RX ADMIN — IPRATROPIUM BROMIDE AND ALBUTEROL SULFATE 3 ML: .5; 3 SOLUTION RESPIRATORY (INHALATION) at 01:01

## 2019-01-03 RX ADMIN — Medication 3 ML: at 02:01

## 2019-01-03 RX ADMIN — GUAIFENESIN 600 MG: 600 TABLET, EXTENDED RELEASE ORAL at 09:01

## 2019-01-03 RX ADMIN — FUROSEMIDE 80 MG: 10 INJECTION, SOLUTION INTRAVENOUS at 05:01

## 2019-01-03 RX ADMIN — IPRATROPIUM BROMIDE AND ALBUTEROL SULFATE 3 ML: .5; 3 SOLUTION RESPIRATORY (INHALATION) at 07:01

## 2019-01-03 RX ADMIN — TAMSULOSIN HYDROCHLORIDE 0.4 MG: 0.4 CAPSULE ORAL at 09:01

## 2019-01-03 RX ADMIN — BUPROPION HYDROCHLORIDE 300 MG: 300 TABLET, FILM COATED, EXTENDED RELEASE ORAL at 10:01

## 2019-01-03 RX ADMIN — IPRATROPIUM BROMIDE AND ALBUTEROL SULFATE 3 ML: .5; 3 SOLUTION RESPIRATORY (INHALATION) at 04:01

## 2019-01-03 RX ADMIN — ASPIRIN 81 MG: 81 TABLET, COATED ORAL at 10:01

## 2019-01-03 RX ADMIN — CYANOCOBALAMIN TAB 1000 MCG 1000 MCG: 1000 TAB at 10:01

## 2019-01-03 RX ADMIN — CETIRIZINE HYDROCHLORIDE 10 MG: 10 TABLET, FILM COATED ORAL at 10:01

## 2019-01-03 RX ADMIN — CARVEDILOL 3.12 MG: 3.12 TABLET, FILM COATED ORAL at 10:01

## 2019-01-03 RX ADMIN — OMEGA-3-ACID ETHYL ESTERS 2 G: 1 CAPSULE, LIQUID FILLED ORAL at 10:01

## 2019-01-03 RX ADMIN — CARVEDILOL 3.12 MG: 3.12 TABLET, FILM COATED ORAL at 09:01

## 2019-01-03 RX ADMIN — GUAIFENESIN 600 MG: 600 TABLET, EXTENDED RELEASE ORAL at 10:01

## 2019-01-03 RX ADMIN — APIXABAN 2.5 MG: 2.5 TABLET, FILM COATED ORAL at 10:01

## 2019-01-03 RX ADMIN — DOXYCYCLINE HYCLATE 100 MG: 100 TABLET, COATED ORAL at 10:01

## 2019-01-03 RX ADMIN — DEXAMETHASONE 2 MG: 1 TABLET ORAL at 10:01

## 2019-01-03 RX ADMIN — ATORVASTATIN CALCIUM 40 MG: 20 TABLET, FILM COATED ORAL at 10:01

## 2019-01-03 RX ADMIN — FUROSEMIDE 80 MG: 10 INJECTION, SOLUTION INTRAVENOUS at 10:01

## 2019-01-03 RX ADMIN — DEXAMETHASONE 2 MG: 1 TABLET ORAL at 09:01

## 2019-01-03 NOTE — PT/OT/SLP EVAL
Physical Therapy Evaluation / Discharge Summary    Patient Name:  Jeremie Bradshaw   MRN:  [id number]  Admitting Diagnosis:  Acute on chronic combined systolic and diastolic congestive heart failure   Recent Surgery: * No surgery found *      Recommendations:     Discharge Recommendations:  (home, no needs)   Discharge Equipment Recommendations: none   Barriers to discharge: None    Plan:     Pt is safe and independent with all mobility. Pt no longer requires PT services.  · Plan of Care Expires:  02/02/19   Plan of Care Reviewed with: patient    This Plan of care has been discussed with the patient who was involved in its development and understands and is in agreement with the identified goals and treatment plan    History:     Patient lives with his daughter in a single family home with 4 JOSE, handrails present.  Patient reports being independent with mobility & with ADLs. Pt reports he is home alone during the day most of the day.  Patient uses DME as follows: none.    DME owned (not currently used): none.  Hand Dominance: right    Roles/Repsonsibilities:   Work: retired.    Drive: yes.    Managing Medicines/Managing Home: yes.    Hobbies: watching grandchildren's sporting events.  Upon discharge, patient will have assistance from family.    Past Medical History:   Diagnosis Date    Anemia     Basal cell carcinoma 7/2014    left medial canthus    Basal cell carcinoma 3/9/2015    right forehead    Basal cell carcinoma 09/08/2016    left neck (scoop shave)    Basal cell carcinoma 12/09/2016    left preauricular    BPH (benign prostatic hypertrophy) 8/17/2012    CAD (coronary artery disease) 8/17/2012    Cervical spine fracture 11/20/2012    CHF (congestive heart failure) 8/17/2012    Depression 8/17/2012    GERD (gastroesophageal reflux disease) 3/27/2014    Heart attack     History of atrial fibrillation 8/17/2012    Hyperlipidemia     Hypertension     Kidney stones 8/17/2012    Memory loss      "Myocardial infarction     Osteoarthritis of lumbar spine 8/17/2012    Shoulder pain 8/17/2012    Stroke 8/17/2012       Past Surgical History:   Procedure Laterality Date    basal cell carcinoma left ear      CARDIAC SURGERY      CATARACT EXTRACTION W/  INTRAOCULAR LENS IMPLANT Bilateral     CATARACT EXTRACTION, BILATERAL      CORONARY ARTERY BYPASS GRAFT  1990    x1    ECHOCARDIOGRAM-TRANSESOPHAGEAL N/A 6/2/2015    Performed by [facility] Sur[facility]    TONSILLECTOMY         Subjective     Communicated with RN prior to session.  Patient found supine upon PT entry to room, agreeable to evaluation.  Pt's daughter present throughout session.  "I'm moving okay."  Chief Complaint: none stated  Patient comments/goals: to return home  Pain/Comfort:  · Pain Rating 1: 0/10  · Pain Rating Post-Intervention 1: 0/10    Objective:     Patient found with: telemetry, peripheral IV     General Precautions: Standard, Cardiac fall   Orthopedic Precautions:N/A   Braces: N/A   Respiratory Status: room air    Exam:  · Mental Status: Patient is oriented to AxOx4 and follows all verbal, multi-step commands. Pt is Alert and Cooperative during session.  · Skin Integrity: Visible skin intact  · Edema: none noted  · Sensation: Intact  · Hearing: Intact  · Vision:  Intact  · Range of Motion:  · RUE: limited shoulder flex due to rotator cuff injury  · LUE: WFL  · RLE: WFL  · LLE: WFL  · Strength Exam:  · Upper Extremity Strength: grossly  4/5  · Lower Extremity Strength: grossly 4/5    Functional Mobility:  Bed Mobility:   · Supine to Sit: stand by assistance; from left side of bed    Transfers:   · Sit <> Stand Transfer: supervision with no assistive device   · Stand <> Sit Transfer: supervision with no assistive device     Gait:  · Patient ambulated: 100ft   · Patient required: supervision  · Patient used:  No Assistive Device  · Gait Pattern observed: reciprocal gait  · Gait Deviation(s): decreased bautista  · Impairments due " to: kyphotic posture    Therapeutic Activities & Exercises:   Education:  Patient provided with daily orientation and goals of this PT session. Patient and family agreed to participate in session. Patient and family aware of patient's deficits and therapy progression. They were educated to transfer to bedside chair/bedside commode/bathroom with RN/PCT present. Encouraged patient to perform daily exercises & mobility to increase endurance and decrease effects of bedrest. Time provided for therapeutic counseling and discussion of health disposition. All questions answered to patient's and family's satisfaction, within scope of PT practice; voiced no other concerns. White board updated in patient's room, RN notified of session.    Patient left up in chair, with head in midline, neutral pelvis & heels floated for skin protection with all lines intact, call button in reach and RN notified.    AM-PAC 6 CLICK MOBILITY  Total Score:23     Assessment:     Jeremie Bradshaw is a 84 y.o. male admitted with a medical diagnosis of Acute on chronic combined systolic and diastolic congestive heart failure.  Pt is safe and independent with all mobility. Pt is at baseline mobility. Pt no longer requires acute PT services.    Problem List: no impairments identified  Rehab Prognosis: excellent    GOALS: No goals identified at [facility].     Clinical Decision Making:   On examination of body system using standardized tests and measures patient presents with 1-2 elements from any of the following: body structures and functions, activity limitations, and/or participation restrictions.  Leading to a clinical presentation that is considered stable and/or uncomplicated  Evaluation Complexity:  Low- 31918      Time Tracking:     PT Received On: 01/03/19  PT Start Time: 1138     PT Stop Time: 1200  PT Total Time (min): 22 min     Billable Minutes: Evaluation 22    Maryan Ralph PT, DPT  01/03/2019  Pager:[phone]

## 2019-01-03 NOTE — ASSESSMENT & PLAN NOTE
-Stage C, NYHA III; pt is warm and wet   -UOP in the past 24 hours totaled 2.5 L, net negative 1.6 L in past 24 hours and 2.3 Liters net negative for total hospital stay   -c/w IV diuresis (80 mg lasix BID) and  drip at 2.5 mcg/kg/min  -monitor weights daily and strict I/O's  -c/w GDMT (BB- Coreg 3.125 mg BID), ARB-Losartan 25 mg QD, can add potassium sparing diuretic when able  -2D Echo revealed: EF of 20%, RVE, decreased RV systolic function, MR/TR, PAP of 77, CVP 15  -O/P discussion with patient if CRT-D option could be beneficial   -c/w low sodium/cardiac diet   -Cardiology team will continue to follow

## 2019-01-03 NOTE — SUBJECTIVE & OBJECTIVE
Interval History: Overnight, no acute issues. Patient had 2.5 L of urine output. Started on  drip yesterday. This AM, pt reports improvement in cough.     Review of Systems   Constitution: Positive for weakness and malaise/fatigue. Negative for chills and fever.   Cardiovascular: Positive for dyspnea on exertion. Negative for chest pain, irregular heartbeat, leg swelling, near-syncope, palpitations, paroxysmal nocturnal dyspnea and syncope.   Respiratory: Negative for cough.    Gastrointestinal: Negative for abdominal pain, nausea and vomiting.   Neurological: Negative for dizziness, focal weakness, headaches and light-headedness.     Objective:     Vital Signs (Most Recent):  Temp: 98.7 °F (37.1 °C) (01/03/19 1002)  Pulse: 82 (01/03/19 1002)  Resp: 18 (01/03/19 1002)  BP: (!) 91/50 (01/03/19 1002)  SpO2: (!) 93 % (01/03/19 1002) Vital Signs (24h Range):  Temp:  [97.6 °F (36.4 °C)-98.7 °F (37.1 °C)] 98.7 °F (37.1 °C)  Pulse:  [72-91] 82  Resp:  [16-22] 18  SpO2:  [93 %-100 %] 93 %  BP: ()/(50-67) 91/50     Weight: 75.8 kg (167 lb)  Body mass index is 24.66 kg/m².     SpO2: (!) 93 %  O2 Device (Oxygen Therapy): room air      Intake/Output Summary (Last 24 hours) at 1/3/2019 1043  Last data filed at 1/3/2019 0500  Gross per 24 hour   Intake 840 ml   Output 2500 ml   Net -1660 ml       Lines/Drains/Airways     Peripheral Intravenous Line                 Peripheral IV - Single Lumen 04/22/18 1957 Right Wrist 255 days         Peripheral IV - Single Lumen 01/01/19 0053 Right Forearm 2 days                Physical Exam   Constitutional: He is oriented to person, place, and time.   HENT:   Mouth/Throat: Oropharynx is clear and moist.   Eyes: Pupils are equal, round, and reactive to light.   Neck: Neck supple. JVD present.   Cardiovascular: Normal heart sounds and intact distal pulses.   Irregularly irregular   Pulmonary/Chest: Effort normal and breath sounds normal.   Abdominal: Soft.   Musculoskeletal: He exhibits  no edema.   Neurological: He is alert and oriented to person, place, and time.   Skin: Skin is warm and dry.   Psychiatric: He has a normal mood and affect. His behavior is normal.       Significant Labs: All pertinent lab results from the last 24 hours have been reviewed.    Significant Imaging: Echocardiogram:   Transthoracic echo (TTE) complete (Cupid Only):   Results for orders placed or performed during the hospital encounter of 12/31/18   Transthoracic echo (TTE) complete (Cupid Only)   Result Value Ref Range    Ascending aorta 3.11 cm    STJ 2.91 cm    AV mean gradient 8.26 mmHg    Ao peak silver 2.03 m/s    Ao VTI 32.78 cm    IVS 0.70 0.6 - 1.1 cm    LA size 4.65 cm    Left Atrium Major Axis 6.93 cm    Left Atrium Minor Axis 6.44 cm    LVIDD 7.10 (A) 3.5 - 6.0 cm    LVIDS 6.80 (A) 2.1 - 4.0 cm    LVOT diameter 2.01 cm    LVOT peak VTI 9.26 cm    PW 0.70 0.6 - 1.1 cm    RA Major Axis 5.93 cm    RA Width 4.08 cm    RVDD 4.02 cm    Sinus 3.10 cm    TAPSE 1.56 cm    TR Max Silver 3.94 m/s    TDI LATERAL 0.07     TDI SEPTAL 0.04     LA WIDTH 4.80 cm    LV Diastolic Volume 240.04 mL    LV Systolic Volume 229.56 mL    FS 4 %    LA volume 126.66 cm3    LV mass 213.77 g    Left Ventricle Relative Wall Thickness 0.20 cm    AV valve area 0.90 cm2    AV index (prosthetic) 0.28     Mean e' 0.06     LVOT area 3.17 cm2    LVOT stroke volume 29.37 cm3    AV peak gradient 16.48 mmHg    LV Systolic Volume Index 120.0 mL/m2    LV Diastolic Volume Index 125.46 mL/m2    LA Volume Index 66.2 mL/m2    LV Mass Index 111.7 g/m2    Triscuspid Valve Regurgitation Peak Gradient 62.09 mmHg    BSA 1.92 m2    Right Atrial Pressure (from IVC) 15 mmHg    TV rest pulmonary artery pressure 77 mmHg

## 2019-01-03 NOTE — MEDICAL/APP STUDENT
Hospital Medicine  Progress note    Patient Name: Jeermie Bradshaw  MRN: [id number]  [place] [facility] [place] - FELLOWS/CONSULTS Tabatha Mc MD  Admit Date: 12/31/2018  ONEAL 1/4/2019  Code status: Full Code    Principal Problem:  Acute on chronic combined systolic and diastolic congestive heart failure    Interval hx:  No new complaints. Reporting good UOP.     ROS   Respiratory: no cough or shortness of breath  Cardiovascular: no chest pain or palpitations  Gastrointestinal: no nausea or vomiting, no abdominal pain or change in bowel habits  Behavioral/Psych: no depression or anxiety      PEx  Temp:  [97.6 °F (36.4 °C)-97.9 °F (36.6 °C)]   Pulse:  [72-91]   Resp:  [14-22]   BP: ()/(53-67)   SpO2:  [94 %-100 %]     Intake/Output Summary (Last 24 hours) at 1/3/2019 0757  Last data filed at 1/3/2019 0500  Gross per 24 hour   Intake 840 ml   Output 2500 ml   Net -1660 ml       General Appearance: no acute distress   Heart: irregular rate and rhythm. JVD present.   Respiratory: Normal respiratory effort, bilateral crackles at bases. Good bilateral air movement.   Abdomen: Soft, non-tender; bowel sounds active  Skin: intact.  Neurologic:  No focal numbness or weakness  Mental status: Alert, oriented x 4, affect appropriate     Recent Labs   Lab 01/01/19  0047 01/02/19  0751 01/03/19  0329   WBC 7.44 7.85 7.74   HGB 11.8* 11.7* 11.4*   HCT 37.5* 36.2* 34.2*    140* 132*     Recent Labs   Lab 12/30/18  2350 01/01/19  0047 01/01/19  0917 01/02/19  0751 01/03/19  0329   * 132* 134* 136 137   K 4.4 4.8 4.5 4.8 3.9    103 105 104 101   CO2 22* 19* 18* 19* 24   BUN 32* 32* 33* 45* 49*   CREATININE 1.4 1.4 1.4 1.8* 1.7*   GLU 96 101 100 125* 101   CALCIUM 9.2 9.2 9.1 9.4 9.2   MG  --   --  1.6  --   --    PHOS  --   --  4.8*  --   --    LIPASE 39 32  --   --   --      Recent Labs   Lab 12/30/18  7000 01/01/19  0047   ALKPHOS 72 70   ALT 10 16   AST 20 31   ALBUMIN 3.1* 3.1*   PROT 6.7 6.9   BILITOT 0.8  0.9   INR  --  1.5*      Recent Labs   Lab 12/30/18  2354   POCTGLUCOSE 117*     Recent Labs     01/01/19  0917 01/01/19  1358 01/02/19  0751   TROPONINI 0.112* 0.207* 0.165*       Scheduled Meds:   albuterol-ipratropium  3 mL Nebulization Q4H WAKE    apixaban  2.5 mg Oral BID    aspirin  81 mg Oral Daily    atorvastatin  40 mg Oral Daily    buPROPion  300 mg Oral QAM    carvedilol  3.125 mg Oral BID    cetirizine  10 mg Oral Daily    cyanocobalamin  1,000 mcg Oral Daily    dexamethasone  2 mg Oral Q12H    doxycycline  100 mg Oral Q12H    furosemide  80 mg Intravenous BID    guaiFENesin  600 mg Oral BID    losartan  25 mg Oral Daily    omega-3 acid ethyl esters  2 g Oral Daily    potassium chloride  20 mEq Oral Every other day    sodium chloride 0.9%  3 mL Intravenous Q8H    tamsulosin  0.4 mg Oral QHS     Continuous Infusions:   DOBUTamine 2.5 mcg/kg/min (01/02/19 1616)     As Needed:  albuterol-ipratropium, benzonatate, GI cocktail (mylanta 30 mL, lidocaine 2 % viscous 10 mL, dicyclomine 10 mL) 50 mL, ondansetron, promethazine, ramelteon    Active Hospital Problems    Diagnosis  POA    *Acute on chronic combined systolic and diastolic congestive heart failure [I50.43]  Yes    CKD (chronic kidney disease), stage III [N18.3]  Yes     Chronic    Hyponatremia [E87.1]  Yes    Upper respiratory infection, viral [J06.9]  Yes    Diarrhea [R19.7]  Yes    Elevated troponin [R74.8]  Yes     Chronic    Paroxysmal atrial fibrillation [I48.0]  Yes     Chronic    Essential hypertension [I10]  Yes     Chronic    Coronary artery disease involving native coronary artery of native heart without angina pectoris [I25.10]  Yes     Chronic    Hyperlipidemia [E78.5]  Yes     Chronic      Resolved Hospital Problems   No resolved problems to display.       Overview  84 year old gentleman with a h/o CAD s/p CABG with history of remote MI (~8 years ago), HTN, A Fib on Eliquis, chronic systolic and diastolic HF,  pulmonary HTN, aortic stenosis, and BPH admitted for acute on chronic combined systolic and diastolic congestive heart failure.     Assessment and Plan for Problems addressed today:    Acute on chronic combined systolic and diastolic congestive heart failure  AS (aortic stenosis)  Atrial fibrillation   Severe Pulmonary hypertension  Elevated troponin  Acute hypoxic respiratory failure  · BNP elevated to 2372 from 1464 two days ago.  · Troponin 0.154 > 0.207, baseline of 0.058. No evidence of STEMI on ECG. (1/1)  · CT abdomen/pelvis and CXR showed moderate bilateral pleural effusions and cardiomegaly.  · Echo (4/2018): EF 25% with diastolic dysfunction, moderate MR, moderate to severe AS, PASP 70.41mmHg.   · Increasing Lasix 40mg IV BID to 80mg IV BID.  Heart failure pathway initiated.  · Continue BB, ARB, ASA, statin.   · TTE ordered. Tn trending up; Consulting cardiology. (1/1)  · Wean oxygen as tolerated - Plan for walk  · Cardiology do not suspect ACS; continue current management. Echo pending.   · Troponin down-trending. 2D Echo (1/2): EF 20%. Wall motion abnormalities. Left ventricular diastolic dysfunction; Biatrial enlargement. Aortic stenosis with a dimensionless index of 0.28. Significantly reduced stroke volume; Moderate-to-severe MR; Moderate TR; PASP 77 mm Hg; Elevated CVP 15 mmHg. Dobutamine gtt started to augment diuresis per cardiology. (1/2)  · Diuresing well with improvement in breathing. Continuing with dobutamine gtt and IV Lasix. Cardiology to discuss possible benefits of CRT-D as an OP. (1/3)    Acute bronchitis/Viral URI  · Patient seen by Urgent Care on 12/27/2018.  · Started on doxycycline 100mg BID (last dose to be 1/3/2018), PRN tessalon perles, Mucinex 600mg BID, and Xyzal 5mg daily; continuing current management.   · Received IV methylprednisolone in ED. Continuing with dexamethasone to complete 5 day course. Ordered Duonebs & cetirizine. (1/1)  · Influenza swab negative.      Diarrhea  Epigastric abdominal pain  · CT abdomen/pelvis showed only diverticulosis coli without evidence of acute diverticulitis.  · Patient recently placed on antibiotics.  · C. Diff not collected, as no evidence of diarrhea since admission.    CKD (chronic kidney disease), stage III  · Stable with creatinine 1.4, GFR 45.8.  · Avoid nephrotoxic medications.    Coronary artery disease involving native coronary artery of native heart without angina pectoris  S/p CABG   Essential hypertension  Hyperlipidemia  · Continue carvedilol 3.125mg BID, losartan 25mg daily.  · Continue Lipitor 40mg daily    Paroxysmal atrial fibrillation  Anticoagulated by anticoagulation treatment  · Rate controlled in A Fib.  · Continue BB, Eliquis.  · [id number] VASc score of at least 7 for CHF, HTN, age, CVA, and CAD    Hyponatremia, resolved  · On admit, Na 132. Osmolality wnl.     Diet: Diet Adult Regular (IDDSI Level 7) Fluid - 2000mL  DVT Prophylaxis:   Anticoagulants   Medication Route Frequency    apixaban tablet 2.5 mg Oral BID       L/D/A:  PIV    Discharge plan and follow up  Still a Patient      Provider  Tabatha Mc MD  [facility] [place] CARDIOLOGY TEAM C - FELLOWS/CONSULTS   Department of Hospital Medicine    Brandon Attestation: I personally scribed for Tabatha Mc MD on 01/03/2019 at 8:21 AM. Electronically signed by brandon Hernandez on 01/03/2019 at 8:21 AM.

## 2019-01-03 NOTE — PROGRESS NOTES
[facility]  Cardiology  Progress Note    Patient Name: Jeremie Bradshaw  MRN: [id number]  Admission Date: 12/31/2018  Hospital Length of Stay: 2 days  Code Status: Full Code   Attending Physician: Tabatha Mc MD   Primary Care Physician: Rocio Casas MD  Expected Discharge Date: 1/7/2019  Principal Problem:Acute on chronic combined systolic and diastolic congestive heart failure    Subjective:     HPI/Hospital Course: 84 year old with PMHx significant for persistent atrial fibrillation on OAC, CAD s/p MI with CABG, ischemic cardiomyopathy (EF of 20-25%), BEAR thrombus, pulmonary HTN (Who Group III) and aortic stenosis who presented on 12/31/18 with chief complaint of cough, chest congestion, SOB, wheezing, abdominal pain and diarrhea as well as fatigue/malaise. Of note, patient went to Urgent Care on 12/27/18 and was given Doxycycline for presumed PNA. He continued to have persistent symptoms so he came to the ER and it was recommended at the time that he be admitted for observation for treatment of ADHF however patient left AMA. This hospitalization his workup is revealing for elevated BNP (2,372), CT scan and CXR showing bilateral pleural effusions along with cardiomegaly and reflux from IVC into hepatic veins, troponin elevation from 0.05 to 0.2. EKG shows known LBBB and atrial fibrillation. He is currently being diuresed with IV lasix 80 mg BID, continued with home BB and ARB. His renal function of note is at baseline, serum Cr in 1.2 to 1.4 range. Cardiology consulted for CHF exacerbation. Of note, patient has been seen by Dr. Lopez (EP clinic in 2015), at that time his afib was not bothersome so DCCV option was held off. Given that he had IVCD but not prominent HF it was held off on pursuing CRT-P implantation.     Interval History: Overnight, no acute issues. Patient had 2.5 L of urine output. Started on  drip yesterday. This AM, pt reports improvement in cough.     Review of Systems    Constitution: Positive for weakness and malaise/fatigue. Negative for chills and fever.   Cardiovascular: Positive for dyspnea on exertion. Negative for chest pain, irregular heartbeat, leg swelling, near-syncope, palpitations, paroxysmal nocturnal dyspnea and syncope.   Respiratory: Negative for cough.    Gastrointestinal: Negative for abdominal pain, nausea and vomiting.   Neurological: Negative for dizziness, focal weakness, headaches and light-headedness.     Objective:     Vital Signs (Most Recent):  Temp: 98.7 °F (37.1 °C) (01/03/19 1002)  Pulse: 82 (01/03/19 1002)  Resp: 18 (01/03/19 1002)  BP: (!) 91/50 (01/03/19 1002)  SpO2: (!) 93 % (01/03/19 1002) Vital Signs (24h Range):  Temp:  [97.6 °F (36.4 °C)-98.7 °F (37.1 °C)] 98.7 °F (37.1 °C)  Pulse:  [72-91] 82  Resp:  [16-22] 18  SpO2:  [93 %-100 %] 93 %  BP: ()/(50-67) 91/50     Weight: 75.8 kg (167 lb)  Body mass index is 24.66 kg/m².     SpO2: (!) 93 %  O2 Device (Oxygen Therapy): room air      Intake/Output Summary (Last 24 hours) at 1/3/2019 1043  Last data filed at 1/3/2019 0500  Gross per 24 hour   Intake 840 ml   Output 2500 ml   Net -1660 ml       Lines/Drains/Airways     Peripheral Intravenous Line                 Peripheral IV - Single Lumen 04/22/18 1957 Right Wrist 255 days         Peripheral IV - Single Lumen 01/01/19 0053 Right Forearm 2 days                Physical Exam   Constitutional: He is oriented to person, place, and time.   HENT:   Mouth/Throat: Oropharynx is clear and moist.   Eyes: Pupils are equal, round, and reactive to light.   Neck: Neck supple. JVD present.   Cardiovascular: Normal heart sounds and intact distal pulses.   Irregularly irregular   Pulmonary/Chest: Effort normal and breath sounds normal.   Abdominal: Soft.   Musculoskeletal: He exhibits no edema.   Neurological: He is alert and oriented to person, place, and time.   Skin: Skin is warm and dry.   Psychiatric: He has a normal mood and affect. His behavior is  normal.       Significant Labs: All pertinent lab results from the last 24 hours have been reviewed.    Significant Imaging: Echocardiogram:   Transthoracic echo (TTE) complete (Cupid Only):   Results for orders placed or performed during the hospital encounter of 12/31/18   Transthoracic echo (TTE) complete (Cupid Only)   Result Value Ref Range    Ascending aorta 3.11 cm    STJ 2.91 cm    AV mean gradient 8.26 mmHg    Ao peak silver 2.03 m/s    Ao VTI 32.78 cm    IVS 0.70 0.6 - 1.1 cm    LA size 4.65 cm    Left Atrium Major Axis 6.93 cm    Left Atrium Minor Axis 6.44 cm    LVIDD 7.10 (A) 3.5 - 6.0 cm    LVIDS 6.80 (A) 2.1 - 4.0 cm    LVOT diameter 2.01 cm    LVOT peak VTI 9.26 cm    PW 0.70 0.6 - 1.1 cm    RA Major Axis 5.93 cm    RA Width 4.08 cm    RVDD 4.02 cm    Sinus 3.10 cm    TAPSE 1.56 cm    TR Max Silver 3.94 m/s    TDI LATERAL 0.07     TDI SEPTAL 0.04     LA WIDTH 4.80 cm    LV Diastolic Volume 240.04 mL    LV Systolic Volume 229.56 mL    FS 4 %    LA volume 126.66 cm3    LV mass 213.77 g    Left Ventricle Relative Wall Thickness 0.20 cm    AV valve area 0.90 cm2    AV index (prosthetic) 0.28     Mean e' 0.06     LVOT area 3.17 cm2    LVOT stroke volume 29.37 cm3    AV peak gradient 16.48 mmHg    LV Systolic Volume Index 120.0 mL/m2    LV Diastolic Volume Index 125.46 mL/m2    LA Volume Index 66.2 mL/m2    LV Mass Index 111.7 g/m2    Triscuspid Valve Regurgitation Peak Gradient 62.09 mmHg    BSA 1.92 m2    Right Atrial Pressure (from IVC) 15 mmHg    TV rest pulmonary artery pressure 77 mmHg     Assessment and Plan:     * Acute on chronic combined systolic and diastolic congestive heart failure    -precipitated by acute viral/bacterial URI/bronchitis  -Stage C, NYHA III; pt is warm and wet   -UOP in the past 24 hours totaled 2.5 L, net negative 1.6 L in past 24 hours and 2.3 Liters net negative for total hospital stay   -c/w IV diuresis (80 mg lasix BID) and  drip at 2.5 mcg/kg/min  -monitor weights daily and  strict I/O's  -c/w GDMT (BB- Coreg 3.125 mg BID), ARB-Losartan 25 mg QD, can add potassium sparing diuretic when able  -2D Echo revealed: EF of 20%, RVE, decreased RV systolic function, MR/TR, PAP of 77, CVP 15  -O/P discussion with patient if CRT-D option could be beneficial   -c/w low sodium/cardiac diet   -Cardiology team will continue to follow      Ivelisse Lozoya MD  Cardiology Fellow, PGY-4   [facility]

## 2019-01-03 NOTE — PLAN OF CARE
Problem: Adult Inpatient Plan of Care  Goal: Plan of Care Review  Outcome: Ongoing (interventions implemented as appropriate)  Pt remains free from falls and injury. Plan of care reviewed with pt. Plans to continue diuresis was Iv lasix discussed. Pt remains on  drip. Pt understands plan. Pt denies pain, VSS. Will continue to monitor.

## 2019-01-03 NOTE — PROGRESS NOTES
Physician Attestation for Scribe:  I, Tabatha Mc MD, personally performed the services described in this documentation. All medical record entries made by the scribe were at my direction and in my presence.  I have reviewed the chart and agree that the record reflects my personal performance and is accurate and complete.   Tabatha Mc MD    Hospital Medicine  Progress note    Patient Name: Jeremie Bradshaw  MRN: [id number]  Team: [facility] CARDIOLOGY TEAM C - FELLOWS/CONSULTS Tabatha Mc MD  Admit Date: 12/31/2018  ONEAL 1/7/2019  Code status: Full Code    Principal Problem:  Acute on chronic combined systolic and diastolic congestive heart failure    Interval hx:  No new complaints. Reporting good UOP.     ROS   Respiratory: no cough or shortness of breath  Cardiovascular: no chest pain or palpitations  Gastrointestinal: no nausea or vomiting, no abdominal pain or change in bowel habits  Behavioral/Psych: no depression or anxiety      PEx  Temp:  [97 °F (36.1 °C)-98.7 °F (37.1 °C)]   Pulse:  [72-98]   Resp:  [16-18]   BP: ()/(50-67)   SpO2:  [93 %-100 %]     Intake/Output Summary (Last 24 hours) at 1/3/2019 1637  Last data filed at 1/3/2019 0500  Gross per 24 hour   Intake 480 ml   Output 1750 ml   Net -1270 ml       General Appearance: no acute distress   Heart: irregular rate and rhythm. JVD present.   Respiratory: Normal respiratory effort, bilateral crackles at bases. Good bilateral air movement.   Abdomen: Soft, non-tender; bowel sounds active  Skin: intact.  Neurologic:  No focal numbness or weakness  Mental status: Alert, oriented x 4, affect appropriate     Recent Labs   Lab 01/01/19  0047 01/02/19  0751 01/03/19  0329   WBC 7.44 7.85 7.74   HGB 11.8* 11.7* 11.4*   HCT 37.5* 36.2* 34.2*    140* 132*     Recent Labs   Lab 12/30/18  2350 01/01/19  0047 01/01/19  0917 01/02/19  0751 01/03/19  0329   * 132* 134* 136 137   K 4.4 4.8 4.5 4.8 3.9    103 105 104 101   CO2 22* 19* 18* 19* 24    BUN 32* 32* 33* 45* 49*   CREATININE 1.4 1.4 1.4 1.8* 1.7*   GLU 96 101 100 125* 101   CALCIUM 9.2 9.2 9.1 9.4 9.2   MG  --   --  1.6  --   --    PHOS  --   --  4.8*  --   --    LIPASE 39 32  --   --   --      Recent Labs   Lab 12/30/18  2350 01/01/19  0047   ALKPHOS 72 70   ALT 10 16   AST 20 31   ALBUMIN 3.1* 3.1*   PROT 6.7 6.9   BILITOT 0.8 0.9   INR  --  1.5*      Recent Labs   Lab 12/30/18  2354   POCTGLUCOSE 117*     Recent Labs     01/01/19  0917 01/01/19  1358 01/02/19  0751   TROPONINI 0.112* 0.207* 0.165*       Scheduled Meds:   albuterol-ipratropium  3 mL Nebulization Q4H WAKE    apixaban  2.5 mg Oral BID    aspirin  81 mg Oral Daily    atorvastatin  40 mg Oral Daily    buPROPion  300 mg Oral QAM    carvedilol  3.125 mg Oral BID    cetirizine  10 mg Oral Daily    cyanocobalamin  1,000 mcg Oral Daily    dexamethasone  2 mg Oral Q12H    doxycycline  100 mg Oral Q12H    furosemide  80 mg Intravenous BID    guaiFENesin  600 mg Oral BID    losartan  25 mg Oral Daily    omega-3 acid ethyl esters  2 g Oral Daily    potassium chloride  20 mEq Oral Every other day    sodium chloride 0.9%  3 mL Intravenous Q8H    tamsulosin  0.4 mg Oral QHS     Continuous Infusions:   DOBUTamine 2.5 mcg/kg/min (01/02/19 1616)     As Needed:  albuterol-ipratropium, benzonatate, GI cocktail (mylanta 30 mL, lidocaine 2 % viscous 10 mL, dicyclomine 10 mL) 50 mL, ondansetron, promethazine, ramelteon    Active Hospital Problems    Diagnosis  POA    *Acute on chronic combined systolic and diastolic congestive heart failure [I50.43]  Yes    CKD (chronic kidney disease), stage III [N18.3]  Yes     Chronic    Hyponatremia [E87.1]  Yes    Upper respiratory infection, viral [J06.9]  Yes    Diarrhea [R19.7]  Yes    Paroxysmal atrial fibrillation [I48.0]  Yes     Chronic    Essential hypertension [I10]  Yes     Chronic    Coronary artery disease involving native coronary artery of native heart without angina pectoris  [I25.10]  Yes     Chronic    Hyperlipidemia [E78.5]  Yes     Chronic      Resolved Hospital Problems    Diagnosis Date Resolved POA    Elevated troponin [R74.8] 01/03/2019 Yes     Chronic       Overview  84 year old gentleman with a h/o CAD s/p CABG with history of remote MI (~8 years ago), HTN, A Fib on Eliquis, chronic systolic and diastolic HF, pulmonary HTN, aortic stenosis, and BPH admitted for acute on chronic combined systolic and diastolic congestive heart failure.     Assessment and Plan for Problems addressed today:    Acute on chronic combined systolic and diastolic congestive heart failure  AS (aortic stenosis)  Atrial fibrillation   Severe Pulmonary hypertension  Elevated troponin  Acute hypoxic respiratory failure  · BNP elevated to 2372 from 1464 two days ago.  · Troponin 0.154 > 0.207, baseline of 0.058. No evidence of STEMI on ECG. (1/1)  · CT abdomen/pelvis and CXR showed moderate bilateral pleural effusions and cardiomegaly.  · Echo (4/2018): EF 25% with diastolic dysfunction, moderate MR, moderate to severe AS, PASP 70.41mmHg.   · Increasing Lasix 40mg IV BID to 80mg IV BID.  Heart failure pathway initiated.  · Continue BB, ARB, ASA, statin.   · TTE ordered. Tn trending up; Consulting cardiology. (1/1)  · Wean oxygen as tolerated - Plan for walk  · Cardiology do not suspect ACS; continue current management. Echo pending.   · Troponin down-trending. 2D Echo (1/2): EF 20%. Wall motion abnormalities. Left ventricular diastolic dysfunction; Biatrial enlargement. Aortic stenosis with a dimensionless index of 0.28. Significantly reduced stroke volume; Moderate-to-severe MR; Moderate TR; PASP 77 mm Hg; Elevated CVP 15 mmHg. Dobutamine gtt started to augment diuresis per cardiology. (1/2)  · Diuresing well with improvement in breathing. Continuing with dobutamine gtt and IV Lasix. Cardiology to discuss possible benefits of CRT-D as an OP. (1/3)    Acute bronchitis/Viral URI  · Patient seen by Urgent  Care on 12/27/2018.  · Started on doxycycline 100mg BID (last dose to be 1/3/2018), PRN tessalon perles, Mucinex 600mg BID, and Xyzal 5mg daily; continuing current management.   · Received IV methylprednisolone in ED. Continuing with dexamethasone to complete 5 day course. Ordered Duonebs & cetirizine. (1/1)  · Influenza swab negative.     Diarrhea  Epigastric abdominal pain  · CT abdomen/pelvis showed only diverticulosis coli without evidence of acute diverticulitis.  · Patient recently placed on antibiotics.  · C. Diff not collected, as no evidence of diarrhea since admission.    CKD (chronic kidney disease), stage III  · Stable with creatinine 1.4, GFR 45.8.  · Avoid nephrotoxic medications.    Coronary artery disease involving native coronary artery of native heart without angina pectoris  S/p CABG   Essential hypertension  Hyperlipidemia  · Continue carvedilol 3.125mg BID, losartan 25mg daily.  · Continue Lipitor 40mg daily    Paroxysmal atrial fibrillation  Anticoagulated by anticoagulation treatment  · Rate controlled in A Fib.  · Continue BB, Eliquis.  · [id number] VASc score of at least 7 for CHF, HTN, age, CVA, and CAD    Hyponatremia, resolved  · On admit, Na 132. Osmolality wnl.     Diet: Diet Adult Regular (IDDSI Level 7) Fluid - 2000mL  DVT Prophylaxis:   Anticoagulants   Medication Route Frequency    apixaban tablet 2.5 mg Oral BID       L/D/A:  PIV    Discharge plan and follow up  Still a Patient      Provider  Tabatha Mc MD  [facility] [place] - [place]LLOWS/CONSULTS   Department of Hospital Medicine    Scribe Attestation: I personally scribed for Tabatha Mc MD on 01/03/2019 at 8:21 AM. Electronically signed by brandon Hernandez on 01/03/2019 at 8:21 AM.

## 2019-01-03 NOTE — PROGRESS NOTES
Physician Attestation for Scribe:  I, Tabatha Mc MD, personally performed the services described in this documentation. All medical record entries made by the scribe were at my direction and in my presence.  I have reviewed the chart and agree that the record reflects my personal performance and is accurate and complete.   Tabatha Mc MD    Hospital Medicine  Progress note    Patient Name: Jeremie Bradshaw  MRN: [id number]  Team: [facility] [place]ARDIOLOGY TEAM C - FELLOWS/CONSULTS Tabatha Mc MD  Admit Date: 12/31/2018  ONEAL 1/4/2019  Code status: Full Code    Principal Problem:  Acute on chronic combined systolic and diastolic congestive heart failure    Interval hx:  No new complaints. Breathing improved.     ROS   Respiratory: no cough or shortness of breath  Cardiovascular: no chest pain or palpitations  Gastrointestinal: no nausea or vomiting, no abdominal pain or change in bowel habits  Behavioral/Psych: no depression or anxiety      PEx  Temp:  [97.8 °F (36.6 °C)-98 °F (36.7 °C)]   Pulse:  [78-97]   Resp:  [14-22]   BP: ()/(59-67)   SpO2:  [94 %-100 %]     Intake/Output Summary (Last 24 hours) at 1/2/2019 2107  Last data filed at 1/2/2019 2000  Gross per 24 hour   Intake 480 ml   Output 1625 ml   Net -1145 ml       General Appearance: no acute distress   Heart: irregular rate and rhythm. JVD present.   Respiratory: Normal respiratory effort, bilateral crackles at bases. Good bilateral air movement.   Abdomen: Soft, non-tender; bowel sounds active  Skin: intact.  Neurologic:  No focal numbness or weakness  Mental status: Alert, oriented x 4, affect appropriate     Recent Labs   Lab 12/30/18  2350 01/01/19  0047 01/02/19  0751   WBC 6.03 7.44 7.85   HGB 11.6* 11.8* 11.7*   HCT 36.2* 37.5* 36.2*   * 150 140*     Recent Labs   Lab 12/30/18  2350 01/01/19  0047 01/01/19  0917 01/02/19  0751   * 132* 134* 136   K 4.4 4.8 4.5 4.8    103 105 104   CO2 22* 19* 18* 19*   BUN 32* 32* 33* 45*   CREATININE  1.4 1.4 1.4 1.8*   GLU 96 101 100 125*   CALCIUM 9.2 9.2 9.1 9.4   MG  --   --  1.6  --    PHOS  --   --  4.8*  --    LIPASE 39 32  --   --      Recent Labs   Lab 12/30/18  2350 01/01/19  0047   ALKPHOS 72 70   ALT 10 16   AST 20 31   ALBUMIN 3.1* 3.1*   PROT 6.7 6.9   BILITOT 0.8 0.9   INR  --  1.5*      Recent Labs   Lab 12/30/18  2354   POCTGLUCOSE 117*     Recent Labs     01/01/19  0917 01/01/19  1358 01/02/19  0751   TROPONINI 0.112* 0.207* 0.165*       Scheduled Meds:   albuterol-ipratropium  3 mL Nebulization Q4H WAKE    apixaban  2.5 mg Oral BID    aspirin  81 mg Oral Daily    atorvastatin  40 mg Oral Daily    buPROPion  300 mg Oral QAM    carvedilol  3.125 mg Oral BID    cetirizine  10 mg Oral Daily    cyanocobalamin  1,000 mcg Oral Daily    dexamethasone  2 mg Oral Q12H    doxycycline  100 mg Oral Q12H    furosemide  80 mg Intravenous BID    guaiFENesin  600 mg Oral BID    losartan  25 mg Oral Daily    omega-3 acid ethyl esters  2 g Oral Daily    potassium chloride  20 mEq Oral Every other day    sodium chloride 0.9%  3 mL Intravenous Q8H    tamsulosin  0.4 mg Oral QHS     Continuous Infusions:   DOBUTamine 2.5 mcg/kg/min (01/02/19 1616)     As Needed:  albuterol-ipratropium, benzonatate, GI cocktail (mylanta 30 mL, lidocaine 2 % viscous 10 mL, dicyclomine 10 mL) 50 mL, ondansetron, promethazine, ramelteon    Active Hospital Problems    Diagnosis  POA    *Acute on chronic combined systolic and diastolic congestive heart failure [I50.43]  Yes    CKD (chronic kidney disease), stage III [N18.3]  Yes     Chronic    Hyponatremia [E87.1]  Yes    Upper respiratory infection, viral [J06.9]  Yes    Diarrhea [R19.7]  Yes    Elevated troponin [R74.8]  Yes     Chronic    Paroxysmal atrial fibrillation [I48.0]  Yes     Chronic    Essential hypertension [I10]  Yes     Chronic    Coronary artery disease involving native coronary artery of native heart without angina pectoris [I25.10]  Yes      Chronic    Hyperlipidemia [E78.5]  Yes     Chronic      Resolved Hospital Problems   No resolved problems to display.       Overview  84 year old gentleman with a h/o CAD s/p CABG with history of remote MI (~8 years ago), HTN, A Fib on Eliquis, chronic systolic and diastolic HF, pulmonary HTN, aortic stenosis, and BPH admitted for acute on chronic combined systolic and diastolic congestive heart failure.     Assessment and Plan for Problems addressed today:    Acute on chronic combined systolic and diastolic congestive heart failure  AS (aortic stenosis)  Atrial fibrillation   Severe Pulmonary hypertension  Elevated troponin  Acute hypoxic respiratory failure  · BNP elevated to 2372 from 1464 two days ago.  · Troponin 0.154 > 0.207, baseline of 0.058. No evidence of STEMI on ECG. (1/1)  · CT abdomen/pelvis and CXR showed moderate bilateral pleural effusions and cardiomegaly.  · Echo (4/2018): EF 25% with diastolic dysfunction, moderate MR, moderate to severe AS, PASP 70.41mmHg.   · Increasing Lasix 40mg IV BID to 80mg IV BID.  Heart failure pathway initiated.  · Continue BB, ARB, ASA, statin.   · TTE ordered. Tn trending up; Consulting cardiology. (1/1)  · Wean oxygen as tolerated - walk test tomorrow  · Cardiology do not suspect ACS; continue current management. Echo pending. (1/2)  · Troponin down-trending. 2D Echo (1/2): EF 20%. Wall motion abnormalities. Left ventricular diastolic dysfunction; Biatrial enlargement. Aortic stenosis with a dimensionless index of 0.28. Significantly reduced stroke volume; Moderate-to-severe MR; Moderate TR; PASP 77 mm Hg; Elevated CVP 15 mmHg. Dobutamine gtt started to augment diuresis per cardiology. (1/2)    Acute bronchitis/Viral URI  · Patient seen by Urgent Care on 12/27/2018.  · Started on doxycycline 100mg BID (last dose to be 1/3/2018), PRN tessalon perles, Mucinex 600mg BID, and Xyzal 5mg daily; continuing current management.   · Received IV methylprednisolone in ED.  Continuing with dexamethasone to complete 5 day course. Ordered Duonebs & cetirizine. (1/1)  · Influenza swab negative.     Diarrhea  Epigastric abdominal pain  · CT abdomen/pelvis showed only diverticulosis coli without evidence of acute diverticulitis.  · Patient recently placed on antibiotics.  · C. Diff not collected, as no evidence of diarrhea since admission.    Hyponatremia  · Na 132 > 134 on admit. Osmolality wnl.     CKD (chronic kidney disease), stage III  · Stable with creatinine 1.4, GFR 45.8.  · Avoid nephrotoxic medications.  · Holding losartan for rise in sCr. (1/2)    Coronary artery disease involving native coronary artery of native heart without angina pectoris  S/p CABG   Essential hypertension  Hyperlipidemia  · Continue carvedilol 3.125mg BID, losartan 25mg daily.  · Continue Lipitor 40mg daily    Paroxysmal atrial fibrillation  Anticoagulated by anticoagulation treatment  · Rate controlled in A Fib.  · Continue BB, Eliquis 5mg BID.  · TIC2ES8 VASc score of at least 7 for CHF, HTN, age, CVA, and CAD    Diet: Diet Adult Regular (IDDSI Level 7) Fluid - 2000mL  DVT Prophylaxis:   Anticoagulants   Medication Route Frequency    apixaban tablet 2.5 mg Oral BID       L/D/A:  PIV    Discharge plan and follow up  Still a Patient      Provider  Tabatha Mc MD  [facility] [place] - FELLOWS/CONSULTS   Department of Hospital Medicine    Brandon Attestation: I personally scribed for Tabatha Mc MD on 01/02/2019 at 8:21 AM. Electronically signed by brandon Hernandez on 01/02/2019 at 8:21 AM.

## 2019-01-03 NOTE — PLAN OF CARE
Problem: Physical Therapy Goal  Goal: Physical Therapy Goal  Outcome: Outcome(s) achieved Date Met: 01/03/19    Pt is safe and independent with all mobility. Pt no longer requires PT services.  Appropriate transfer level with nursing staff: Bed <> Chair:  Stand Pivot with Supervision.    Maryan Ralph PT, DPT  1/3/2019  Pager: [phone]

## 2019-01-03 NOTE — PT/OT/SLP EVAL
Occupational Therapy   Evaluation and Discharge Note    Name: Jeremie Bradshaw  MRN: [id number]  Admitting Diagnosis:  Acute on chronic combined systolic and diastolic congestive heart failure      Recommendations:     Discharge Recommendations: (home; no OT needs)  Discharge Equipment Recommendations:  none  Barriers to discharge:  None    History:     Occupational Profile:  Living Environment: Pt report he lives with his daughter in a H with 4-5 JOSE with B HRs present. Pt has a tub/shower combo with no DME present but available if needed. Pt reports he sometimes requires assistance to stand from the toilet in which his daughter assist. Pt's daughter works during the day and pt is home alone at times.   Previous level of function: PTA, pt was (I) with ADLs. PTA, pt was (I) with functional mobility but reports he furniture walk and wall walks   Roles and Routines: father, home dweller, enjoys watching television  Equipment Used at home:  (pt owns but does not use; SW and SPC )  Assistance upon Discharge: Pt reports his daughter and grand children are able to assist upon d/c.     Past Medical History:   Diagnosis Date    Anemia     Basal cell carcinoma 7/2014    left medial canthus    Basal cell carcinoma 3/9/2015    right forehead    Basal cell carcinoma 09/08/2016    left neck (scoop shave)    Basal cell carcinoma 12/09/2016    left preauricular    BPH (benign prostatic hypertrophy) 8/17/2012    CAD (coronary artery disease) 8/17/2012    Cervical spine fracture 11/20/2012    CHF (congestive heart failure) 8/17/2012    Depression 8/17/2012    GERD (gastroesophageal reflux disease) 3/27/2014    Heart attack     History of atrial fibrillation 8/17/2012    Hyperlipidemia     Hypertension     Kidney stones 8/17/2012    Memory loss     Myocardial infarction     Osteoarthritis of lumbar spine 8/17/2012    Shoulder pain 8/17/2012    Stroke 8/17/2012       Past Surgical History:   Procedure Laterality Date     basal cell carcinoma left ear      CARDIAC SURGERY      CATARACT EXTRACTION W/  INTRAOCULAR LENS IMPLANT Bilateral     CATARACT EXTRACTION, BILATERAL      CORONARY ARTERY BYPASS GRAFT  1990    x1    ECHOCARDIOGRAM-TRANSESOPHAGEAL N/A 6/2/2015    Performed by Theresa Surgeon at [facility]    TONSILLECTOMY         Subjective     Chief Complaint: none stated   Patient/Family Comments/goals: to return home     Pain/Comfort:  · Pain Rating 1: 0/10  · Pain Rating Post-Intervention 1: 0/10    Patients cultural, spiritual, Synagogue conflicts given the current situation: no    Objective:     Communicated with: RN prior to session.  Patient found supine in bed with IV line tangled under B LEs  and peripheral IV upon OT entry to room. Pt agreeable to therapy session    General Precautions: Standard, fall   Orthopedic Precautions:N/A   Braces: N/A     Occupational Performance:    Bed Mobility:    · Patient completed Scooting/Bridging with supervision  · Patient completed Supine to Sit with supervision  · Patient completed Sit to Supine with supervision    Functional Mobility/Transfers:  · Patient completed Sit <> Stand Transfer with supervision  with  no assistive device   · Patient completed Toilet Transfer functional ambulation from EOB to bathroom with step transfer  technique with supervision to sit and min A to stand from toilet 2/2 low seat height with  grab bars  Functional Mobility: Pt engaged in functional mobility simulating household/community mobility with SBA using no AD but occassionally reaching for wall for stability.     Activities of Daily Living:  · Upper Body Dressing: set-up  to don gown like robe   · Lower Body Dressing: supervision pt donned/doffed B  socks while seated EOB in figure 4 position   · Toileting: supervision simulated hygiene and clothing mangement     Cognitive/Visual Perceptual:  Cognitive/Psychosocial Skills:     -       Oriented to: Person, Place, Time and Situation   -        Follows Commands/attention:Follows two-step commands and [place] at times   -       Communication: clear/fluent  -       Memory: No Deficits noted  -       Safety awareness/insight to disability: impaired   -       Mood/Affect/Coping skills/emotional control: Appropriate to situation  Visual/Perceptual:      -not formally assesed; no visual deficits noted during therapy session       Physical Exam:  Balance:    - Static sit: good  -  Dynamic sit: good  - Static standing: good  - Dynamic Standing: good-    Postural examination/scapula alignment:    -       Rounded shoulders  -       Kyphosis  Skin integrity: Visible skin intact and Thin  Sensation:    -       Intact  Dominant hand:    -       right  Upper Extremity Range of Motion:     -       Right Upper Extremity: WFL  -       Left Upper Extremity: WFL  Upper Extremity Strength:    -       Right Upper Extremity: WFL  -       Left Upper Extremity: WFL    AMPAC 6 Click ADL:  AMPAC Total Score: 24    Treatment & Education:  Pt educated by therapist on:   - Pt educated on role of OT, POC, and goals for therapy.    - Patient and family aware of patient's deficits and therapy progression.   - pt instructed to call for assistance with OOB mobility due to risk of falls managing IV pole and lines.   - Educated pt on being appropriate to transfer with nsg and PCT with supervision   - Time provided for therapeutic counseling and discussion of health disposition.   - Importance of OOB ax's with staff member assistance and sitting OOB majority of day.   - Pt completed ADLs and functional mobility for treatment session as noted above   - Pt verbalized understanding. Pt expressed no further concerns/questions.  - whiteboard updated   Education:    Patient left supine with all lines intact, call button in reach and bed alarm on    Assessment:     Jeremie Bradshaw is a 84 y.o. male with a medical diagnosis of Acute on chronic combined systolic and diastolic congestive heart failure. At  "this time, patient is functioning at their prior level of function and does not require further acute OT services.     Clinical Decision Makin.  OT Low:  "Pt evaluation falls under low complexity for evaluation coding due to performance deficits noted in 1-3 areas as stated above and 0 co-morbities affecting current functional status. Data obtained from problem focused assessments. No modifications or assistance was required for completion of evaluation. Only brief occupational profile and history review completed."     Plan:     During this hospitalization, patient does not require further acute OT services.  Please re-consult if situation changes.    · Plan of Care Reviewed with: patient    This Plan of care has been discussed with the patient who was involved in its development and understands and is in agreement with the identified goals and treatment plan    GOALS:   Multidisciplinary Problems     Occupational Therapy Goals     Not on file          Multidisciplinary Problems (Resolved)        Problem: Occupational Therapy Goal    Goal Priority Disciplines Outcome Interventions   Occupational Therapy Goal   (Resolved)     OT, PT/OT Outcome(s) achieved                    Time Tracking:     OT Date of Treatment: 19  OT Start Time: 1435  OT Stop Time: 1451  OT Total Time (min): 16 min    Billable Minutes:Evaluation 16    Elizabeth Leyva OT  1/3/2019    "

## 2019-01-03 NOTE — PLAN OF CARE
Problem: Adult Inpatient Plan of Care  Goal: Plan of Care Review  Outcome: Revised  Pt remains free from injury/falls for shift. Educated Pt on meds no questions at this time. VSS.  No complaints of CP, SOB, pain/discomfort  Bed locked in lowest position, call bell within reach. All questions addressed.  Will continue to monitor.

## 2019-01-03 NOTE — PLAN OF CARE
Problem: Occupational Therapy Goal  Goal: Occupational Therapy Goal  Outcome: Outcome(s) achieved Date Met: 01/03/19  Initial eval completed   No goal established   Pt to be d/c from acute OT 1/3/19    Elizabeth Leyva, OTR/L  [phone]  1/3/2019

## 2019-01-04 LAB
ALBUMIN SERPL BCP-MCNC: 2.9 G/DL
ANION GAP SERPL CALC-SCNC: 12 MMOL/L
BNP SERPL-MCNC: 1636 PG/ML
BUN SERPL-MCNC: 55 MG/DL
CALCIUM SERPL-MCNC: 9.2 MG/DL
CHLORIDE SERPL-SCNC: 100 MMOL/L
CO2 SERPL-SCNC: 24 MMOL/L
CREAT SERPL-MCNC: 1.7 MG/DL
EST. GFR  (AFRICAN AMERICAN): 41.9 ML/MIN/1.73 M^2
EST. GFR  (NON AFRICAN AMERICAN): 36.2 ML/MIN/1.73 M^2
GLUCOSE SERPL-MCNC: 106 MG/DL
MAGNESIUM SERPL-MCNC: 2 MG/DL
PHOSPHATE SERPL-MCNC: 4.9 MG/DL
POTASSIUM SERPL-SCNC: 4.1 MMOL/L
SODIUM SERPL-SCNC: 136 MMOL/L

## 2019-01-04 PROCEDURE — 25000242 PHARM REV CODE 250 ALT 637 W/ HCPCS: Mod: HCNC | Performed by: INTERNAL MEDICINE

## 2019-01-04 PROCEDURE — 36415 COLL VENOUS BLD VENIPUNCTURE: CPT | Mod: HCNC

## 2019-01-04 PROCEDURE — 20600001 HC STEP DOWN PRIVATE ROOM: Mod: HCNC

## 2019-01-04 PROCEDURE — 63600175 PHARM REV CODE 636 W HCPCS: Mod: HCNC | Performed by: INTERNAL MEDICINE

## 2019-01-04 PROCEDURE — 99232 SBSQ HOSP IP/OBS MODERATE 35: CPT | Mod: HCNC,,, | Performed by: INTERNAL MEDICINE

## 2019-01-04 PROCEDURE — 94799 UNLISTED PULMONARY SVC/PX: CPT | Mod: HCNC

## 2019-01-04 PROCEDURE — 25000003 PHARM REV CODE 250: Mod: HCNC | Performed by: PHYSICIAN ASSISTANT

## 2019-01-04 PROCEDURE — 83735 ASSAY OF MAGNESIUM: CPT | Mod: HCNC

## 2019-01-04 PROCEDURE — 99232 PR SUBSEQUENT HOSPITAL CARE,LEVL II: ICD-10-PCS | Mod: HCNC,,, | Performed by: INTERNAL MEDICINE

## 2019-01-04 PROCEDURE — 83880 ASSAY OF NATRIURETIC PEPTIDE: CPT | Mod: HCNC

## 2019-01-04 PROCEDURE — 80069 RENAL FUNCTION PANEL: CPT | Mod: HCNC

## 2019-01-04 PROCEDURE — 94640 AIRWAY INHALATION TREATMENT: CPT | Mod: HCNC

## 2019-01-04 PROCEDURE — 25000003 PHARM REV CODE 250: Mod: HCNC | Performed by: INTERNAL MEDICINE

## 2019-01-04 RX ORDER — FUROSEMIDE 80 MG/1
80 TABLET ORAL DAILY
Status: DISCONTINUED | OUTPATIENT
Start: 2019-01-04 | End: 2019-01-05

## 2019-01-04 RX ADMIN — LOSARTAN POTASSIUM 25 MG: 25 TABLET, FILM COATED ORAL at 09:01

## 2019-01-04 RX ADMIN — CARVEDILOL 3.12 MG: 3.12 TABLET, FILM COATED ORAL at 09:01

## 2019-01-04 RX ADMIN — CYANOCOBALAMIN TAB 1000 MCG 1000 MCG: 1000 TAB at 09:01

## 2019-01-04 RX ADMIN — DOXYCYCLINE HYCLATE 100 MG: 100 TABLET, COATED ORAL at 12:01

## 2019-01-04 RX ADMIN — IPRATROPIUM BROMIDE AND ALBUTEROL SULFATE 3 ML: .5; 3 SOLUTION RESPIRATORY (INHALATION) at 08:01

## 2019-01-04 RX ADMIN — DEXAMETHASONE 2 MG: 1 TABLET ORAL at 09:01

## 2019-01-04 RX ADMIN — DOBUTAMINE HYDROCHLORIDE 2.5 MCG/KG/MIN: 200 INJECTION INTRAVENOUS at 07:01

## 2019-01-04 RX ADMIN — DOXYCYCLINE HYCLATE 100 MG: 100 TABLET, COATED ORAL at 09:01

## 2019-01-04 RX ADMIN — APIXABAN 2.5 MG: 2.5 TABLET, FILM COATED ORAL at 09:01

## 2019-01-04 RX ADMIN — BENZONATATE 200 MG: 100 CAPSULE ORAL at 09:01

## 2019-01-04 RX ADMIN — GUAIFENESIN 600 MG: 600 TABLET, EXTENDED RELEASE ORAL at 09:01

## 2019-01-04 RX ADMIN — ASPIRIN 81 MG: 81 TABLET, COATED ORAL at 09:01

## 2019-01-04 RX ADMIN — FUROSEMIDE 80 MG: 10 INJECTION, SOLUTION INTRAVENOUS at 09:01

## 2019-01-04 RX ADMIN — ATORVASTATIN CALCIUM 40 MG: 20 TABLET, FILM COATED ORAL at 09:01

## 2019-01-04 RX ADMIN — TAMSULOSIN HYDROCHLORIDE 0.4 MG: 0.4 CAPSULE ORAL at 09:01

## 2019-01-04 RX ADMIN — IPRATROPIUM BROMIDE AND ALBUTEROL SULFATE 3 ML: .5; 3 SOLUTION RESPIRATORY (INHALATION) at 04:01

## 2019-01-04 RX ADMIN — OMEGA-3-ACID ETHYL ESTERS 2 G: 1 CAPSULE, LIQUID FILLED ORAL at 09:01

## 2019-01-04 RX ADMIN — CETIRIZINE HYDROCHLORIDE 10 MG: 10 TABLET, FILM COATED ORAL at 09:01

## 2019-01-04 RX ADMIN — BUPROPION HYDROCHLORIDE 300 MG: 300 TABLET, FILM COATED, EXTENDED RELEASE ORAL at 09:01

## 2019-01-04 RX ADMIN — IPRATROPIUM BROMIDE AND ALBUTEROL SULFATE 3 ML: .5; 3 SOLUTION RESPIRATORY (INHALATION) at 07:01

## 2019-01-04 NOTE — SUBJECTIVE & OBJECTIVE
Interval History: Overnight, pt found to be hypotensive, given 250 NS bolus.     Review of Systems   Constitution: Negative for chills and fever.   HENT: Negative for congestion.    Cardiovascular: Negative for chest pain, irregular heartbeat, leg swelling, near-syncope, orthopnea, palpitations, paroxysmal nocturnal dyspnea and syncope.   Respiratory: Negative for cough and shortness of breath.    Neurological: Negative for dizziness, focal weakness, headaches and light-headedness.     Objective:     Vital Signs (Most Recent):  Temp: 98.2 °F (36.8 °C) (01/04/19 0731)  Pulse: 84 (01/04/19 0829)  Resp: 16 (01/04/19 0829)  BP: (!) 96/54 (01/04/19 0731)  SpO2: (!) 93 % (01/04/19 0829) Vital Signs (24h Range):  Temp:  [95.6 °F (35.3 °C)-98.7 °F (37.1 °C)] 98.2 °F (36.8 °C)  Pulse:  [] 84  Resp:  [16-18] 16  SpO2:  [93 %-98 %] 93 %  BP: ()/(43-66) 96/54     Weight: 75.8 kg (167 lb 1.7 oz)  Body mass index is 24.68 kg/m².     SpO2: (!) 93 %  O2 Device (Oxygen Therapy): room air      Intake/Output Summary (Last 24 hours) at 1/4/2019 0919  Last data filed at 1/4/2019 0600  Gross per 24 hour   Intake 480 ml   Output 1050 ml   Net -570 ml       Lines/Drains/Airways     Peripheral Intravenous Line                 Peripheral IV - Single Lumen 01/04/19 0253 Left;Posterior Wrist less than 1 day                Physical Exam   HENT:   Mouth/Throat: Oropharynx is clear and moist.   Eyes: Pupils are equal, round, and reactive to light.   Neck: No JVD present.   Cardiovascular: Normal heart sounds and intact distal pulses.   Irregularly irregular   Pulmonary/Chest: Effort normal and breath sounds normal.   Abdominal: Soft.   Musculoskeletal: He exhibits no edema.   Neurological: He is alert.   Skin: Skin is warm and dry.   Psychiatric: He has a normal mood and affect. His behavior is normal.   Vitals reviewed.      Significant Labs: All pertinent lab results from the last 24 hours have been reviewed.    Significant Imaging:  Echocardiogram:   2D echo with color flow doppler:   Results for orders placed or performed during the hospital encounter of 04/22/18   2D echo with color flow doppler   Result Value Ref Range    QEF 25 (A) 55 - 65    Mitral Valve Regurgitation MODERATE (A)     Diastolic Dysfunction Yes (A)     Aortic Valve Regurgitation MILD     Aortic Valve Stenosis MODERATE TO SEVERE (A)     Est. PA Systolic Pressure 70.41 (A)     Mitral Valve Mobility NORMAL     Tricuspid Valve Regurgitation MILD     and Transthoracic echo (TTE) complete (Cupid Only):   Results for orders placed or performed during the hospital encounter of 12/31/18   Transthoracic echo (TTE) complete (Cupid Only)   Result Value Ref Range    Ascending aorta 3.11 cm    STJ 2.91 cm    AV mean gradient 8.26 mmHg    Ao peak silver 2.03 m/s    Ao VTI 32.78 cm    IVS 0.70 0.6 - 1.1 cm    LA size 4.65 cm    Left Atrium Major Axis 6.93 cm    Left Atrium Minor Axis 6.44 cm    LVIDD 7.10 (A) 3.5 - 6.0 cm    LVIDS 6.80 (A) 2.1 - 4.0 cm    LVOT diameter 2.01 cm    LVOT peak VTI 9.26 cm    PW 0.70 0.6 - 1.1 cm    RA Major Axis 5.93 cm    RA Width 4.08 cm    RVDD 4.02 cm    Sinus 3.10 cm    TAPSE 1.56 cm    TR Max Silver 3.94 m/s    TDI LATERAL 0.07     TDI SEPTAL 0.04     LA WIDTH 4.80 cm    LV Diastolic Volume 240.04 mL    LV Systolic Volume 229.56 mL    FS 4 %    LA volume 126.66 cm3    LV mass 213.77 g    Left Ventricle Relative Wall Thickness 0.20 cm    AV valve area 0.90 cm2    AV index (prosthetic) 0.28     Mean e' 0.06     LVOT area 3.17 cm2    LVOT stroke volume 29.37 cm3    AV peak gradient 16.48 mmHg    LV Systolic Volume Index 120.0 mL/m2    LV Diastolic Volume Index 125.46 mL/m2    LA Volume Index 66.2 mL/m2    LV Mass Index 111.7 g/m2    Triscuspid Valve Regurgitation Peak Gradient 62.09 mmHg    BSA 1.92 m2    Right Atrial Pressure (from IVC) 15 mmHg    TV rest pulmonary artery pressure 77 mmHg

## 2019-01-04 NOTE — CARE UPDATE
RN Proactive Rounding Note  Time of Visit: 0125    Admit Date: 2018  LOS: 3  Code Status: Full Code   Date of Visit: 2019  : 1934  Age: 84 y.o.  Sex: male  Race: White  Bed: 350/350 A:   MRN: [id number]  Was the patient discharged from an ICU this admission? no   Was the patient discharged from a PACU within last 24 hours?  no  Did the patient receive conscious sedation/general anesthesia in last 24 hours?  no  Was the patient in the ED within the past 24 hours?  no  Was the patient started on NIPPV within the past 24 hours?  no  Attending Physician: Tabatha Mc MD  [place] [facility] [place]/CONSULTS      ASSESSMENT:     Abnormal Vital Signs: BP 75/43 (54)  Clinical Issues: Circulatory     INTERVENTIONS/ RECOMMENDATIONS:      BP retaken - 128/66 (84). 250 CC NS bolus ordered over 2 hours by provider.    Discussed plan of care with Reymundo OMALLEY.    PHYSICIAN ESCALATION:     Yes/No  yes - escalated by primary RN    Orders received and case discussed with Marge PURI .    Disposition: Remain in room 350A.    FOLLOW-UP/CONTINGENCY:     Call back the Rapid Response Nurse at [phone] for additional questions or concerns.

## 2019-01-04 NOTE — PROGRESS NOTES
[facility]  Cardiology  Progress Note    Patient Name: Jeremie Bradshaw  MRN: [id number]  Admission Date: 12/31/2018  Hospital Length of Stay: 3 days  Code Status: Full Code   Attending Physician: Tabatha Mc MD   Primary Care Physician: Rocio Casas MD  Expected Discharge Date: 1/7/2019  Principal Problem:Acute on chronic combined systolic and diastolic congestive heart failure    Subjective:     HPI/Hospital Course: 84 year old with PMHx significant for persistent atrial fibrillation on OAC, CAD s/p MI with CABG, ischemic cardiomyopathy (EF of 20-25%), BEAR thrombus, pulmonary HTN (Who Group III) and aortic stenosis who presented on 12/31/18 with chief complaint of cough, chest congestion, SOB, wheezing, abdominal pain and diarrhea as well as fatigue/malaise. Of note, patient went to Urgent Care on 12/27/18 and was given Doxycycline for presumed PNA. He continued to have persistent symptoms so he came to the ER and it was recommended at the time that he be admitted for observation for treatment of ADHF however patient left AMA. This hospitalization his workup is revealing for elevated BNP (2,372), CT scan and CXR showing bilateral pleural effusions along with cardiomegaly and reflux from IVC into hepatic veins, troponin elevation from 0.05 to 0.2. EKG shows known LBBB and atrial fibrillation. He is currently being diuresed with IV lasix 80 mg BID, continued with home BB and ARB. His renal function of note is at baseline, serum Cr in 1.2 to 1.4 range. Cardiology is being consulted for CHF exacerbation. Patient has been seen by Dr. Lopez (EP clinic in 2015), at that time his afib was not bothersome so DCCV option was held off. Given that he had IVCD but not prominent HF it was held off on pursuing CRT-P implantation.      Interval History: Overnight, pt found to be hypotensive, given 250 NS bolus.     Review of Systems   Constitution: Negative for chills and fever.   HENT: Negative for  congestion.    Cardiovascular: Negative for chest pain, irregular heartbeat, leg swelling, near-syncope, orthopnea, palpitations, paroxysmal nocturnal dyspnea and syncope.   Respiratory: Negative for cough and shortness of breath.    Neurological: Negative for dizziness, focal weakness, headaches and light-headedness.     Objective:     Vital Signs (Most Recent):  Temp: 98.2 °F (36.8 °C) (01/04/19 0731)  Pulse: 84 (01/04/19 0829)  Resp: 16 (01/04/19 0829)  BP: (!) 96/54 (01/04/19 0731)  SpO2: (!) 93 % (01/04/19 0829) Vital Signs (24h Range):  Temp:  [95.6 °F (35.3 °C)-98.7 °F (37.1 °C)] 98.2 °F (36.8 °C)  Pulse:  [] 84  Resp:  [16-18] 16  SpO2:  [93 %-98 %] 93 %  BP: ()/(43-66) 96/54     Weight: 75.8 kg (167 lb 1.7 oz)  Body mass index is 24.68 kg/m².     SpO2: (!) 93 %  O2 Device (Oxygen Therapy): room air      Intake/Output Summary (Last 24 hours) at 1/4/2019 0919  Last data filed at 1/4/2019 0600  Gross per 24 hour   Intake 480 ml   Output 1050 ml   Net -570 ml       Lines/Drains/Airways     Peripheral Intravenous Line                 Peripheral IV - Single Lumen 01/04/19 0253 Left;Posterior Wrist less than 1 day                Physical Exam   HENT:   Mouth/Throat: Oropharynx is clear and moist.   Eyes: Pupils are equal, round, and reactive to light.   Neck: No JVD present.   Cardiovascular: Normal heart sounds and intact distal pulses.   Irregularly irregular   Pulmonary/Chest: Effort normal and breath sounds normal.   Abdominal: Soft.   Musculoskeletal: He exhibits no edema.   Neurological: He is alert.   Skin: Skin is warm and dry.   Psychiatric: He has a normal mood and affect. His behavior is normal.   Vitals reviewed.      Significant Labs: All pertinent lab results from the last 24 hours have been reviewed.    Significant Imaging: Echocardiogram:   2D echo with color flow doppler:   Results for orders placed or performed during the hospital encounter of 04/22/18   2D echo with color flow  doppler   Result Value Ref Range    QEF 25 (A) 55 - 65    Mitral Valve Regurgitation MODERATE (A)     Diastolic Dysfunction Yes (A)     Aortic Valve Regurgitation MILD     Aortic Valve Stenosis MODERATE TO SEVERE (A)     Est. PA Systolic Pressure 70.41 (A)     Mitral Valve Mobility NORMAL     Tricuspid Valve Regurgitation MILD     and Transthoracic echo (TTE) complete (Cupid Only):   Results for orders placed or performed during the hospital encounter of 12/31/18   Transthoracic echo (TTE) complete (Cupid Only)   Result Value Ref Range    Ascending aorta 3.11 cm    STJ 2.91 cm    AV mean gradient 8.26 mmHg    Ao peak silver 2.03 m/s    Ao VTI 32.78 cm    IVS 0.70 0.6 - 1.1 cm    LA size 4.65 cm    Left Atrium Major Axis 6.93 cm    Left Atrium Minor Axis 6.44 cm    LVIDD 7.10 (A) 3.5 - 6.0 cm    LVIDS 6.80 (A) 2.1 - 4.0 cm    LVOT diameter 2.01 cm    LVOT peak VTI 9.26 cm    PW 0.70 0.6 - 1.1 cm    RA Major Axis 5.93 cm    RA Width 4.08 cm    RVDD 4.02 cm    Sinus 3.10 cm    TAPSE 1.56 cm    TR Max Silver 3.94 m/s    TDI LATERAL 0.07     TDI SEPTAL 0.04     LA WIDTH 4.80 cm    LV Diastolic Volume 240.04 mL    LV Systolic Volume 229.56 mL    FS 4 %    LA volume 126.66 cm3    LV mass 213.77 g    Left Ventricle Relative Wall Thickness 0.20 cm    AV valve area 0.90 cm2    AV index (prosthetic) 0.28     Mean e' 0.06     LVOT area 3.17 cm2    LVOT stroke volume 29.37 cm3    AV peak gradient 16.48 mmHg    LV Systolic Volume Index 120.0 mL/m2    LV Diastolic Volume Index 125.46 mL/m2    LA Volume Index 66.2 mL/m2    LV Mass Index 111.7 g/m2    Triscuspid Valve Regurgitation Peak Gradient 62.09 mmHg    BSA 1.92 m2    Right Atrial Pressure (from IVC) 15 mmHg    TV rest pulmonary artery pressure 77 mmHg     Assessment and Plan:     * Acute on chronic combined systolic and diastolic congestive heart failure    -Stage C, NYHA III; pt is warm and now dry   -on labs developing contraction alkalosis and rising BUN  -UOP in the past 24  hours totaled 1 L, net negative 560 cc in past 24 hours and 2.8 Liters net negative for total hospital stay   -would recommend stopping  drip at 2.5 mcg/kg/min (order discontinued)  -convert from IV to PO lasix 80 mg QD (order placed)  -monitor weights daily and strict I/O's  -c/w GDMT (BB- Coreg 3.125 mg BID), ARB-Losartan 25 mg QD, can add potassium sparing diuretic when able  -2D Echo revealed: EF of 20%, RVE, decreased RV systolic function, MR/TR, PAP of 77, CVP 15  -O/P discussion with patient if CRT-D option could be beneficial   -c/w low sodium/cardiac diet   -Cardiology team will continue to follow; pt to follow up with O/P Cardiology clinic       Ivelisse Lozoya MD  Cardiology Fellow, PGY-4   [facility]

## 2019-01-04 NOTE — NURSING
Verified orders to discontinue DBT infusion and modify lasix IV push to PO starting 1/5/2019 with MD Mc.

## 2019-01-04 NOTE — PROGRESS NOTES
Hospital Medicine  Progress note    Patient Name: Jeremie Bradshaw  MRN: [id number]  [place] [facility] [place] - FELLOWS/CONSULTS Tabatha cM MD  Admit Date: 12/31/2018  ONEAL 1/7/2019  Code status: Full Code    Principal Problem:  Acute on chronic combined systolic and diastolic congestive heart failure    Interval hx:  No new complaints. SBP 75 mmHg last night. Bolus 250 mL ordered, but cancelled after his blood pressure improved after 2 IVs where placed.     ROS   Respiratory: no cough or shortness of breath  Cardiovascular: no chest pain or palpitations  Gastrointestinal: no nausea or vomiting, no abdominal pain or change in bowel habits  Behavioral/Psych: no depression or anxiety      PEx  Temp:  [95.6 °F (35.3 °C)-98.2 °F (36.8 °C)]   Pulse:  []   Resp:  [16-18]   BP: ()/(43-70)   SpO2:  [93 %-98 %]     Intake/Output Summary (Last 24 hours) at 1/4/2019 1721  Last data filed at 1/4/2019 1200  Gross per 24 hour   Intake 1360.97 ml   Output 1700 ml   Net -339.03 ml       General Appearance: no acute distress   Heart: irregular rate and rhythm. JVD no longer present.   Respiratory: Normal respiratory effort, bilateral crackles at bases. Good bilateral air movement.   Abdomen: Soft, non-tender; bowel sounds active  Skin: intact.  Neurologic:  No focal numbness or weakness  Mental status: Alert, oriented x 4, affect appropriate     Recent Labs   Lab 01/01/19  0047 01/02/19  0751 01/03/19  0329   WBC 7.44 7.85 7.74   HGB 11.8* 11.7* 11.4*   HCT 37.5* 36.2* 34.2*    140* 132*     Recent Labs   Lab 12/30/18  2350 01/01/19  0047 01/01/19  0917 01/02/19  0751 01/03/19  0329 01/04/19  0528   * 132* 134* 136 137 136   K 4.4 4.8 4.5 4.8 3.9 4.1    103 105 104 101 100   CO2 22* 19* 18* 19* 24 24   BUN 32* 32* 33* 45* 49* 55*   CREATININE 1.4 1.4 1.4 1.8* 1.7* 1.7*   GLU 96 101 100 125* 101 106   CALCIUM 9.2 9.2 9.1 9.4 9.2 9.2   MG  --   --  1.6  --   --  2.0   PHOS  --   --  4.8*  --   --  4.9*    LIPASE 39 32  --   --   --   --      Recent Labs   Lab 12/30/18  2350 01/01/19  0047 01/04/19  0528   ALKPHOS 72 70  --    ALT 10 16  --    AST 20 31  --    ALBUMIN 3.1* 3.1* 2.9*   PROT 6.7 6.9  --    BILITOT 0.8 0.9  --    INR  --  1.5*  --       Recent Labs   Lab 12/30/18  2354   POCTGLUCOSE 117*     Recent Labs     01/02/19  0751   TROPONINI 0.165*       Scheduled Meds:   albuterol-ipratropium  3 mL Nebulization Q4H WAKE    apixaban  2.5 mg Oral BID    aspirin  81 mg Oral Daily    atorvastatin  40 mg Oral Daily    buPROPion  300 mg Oral QAM    carvedilol  3.125 mg Oral BID    cetirizine  10 mg Oral Daily    cyanocobalamin  1,000 mcg Oral Daily    dexamethasone  2 mg Oral Q12H    furosemide  80 mg Oral Daily    guaiFENesin  600 mg Oral BID    losartan  25 mg Oral Daily    omega-3 acid ethyl esters  2 g Oral Daily    potassium chloride  20 mEq Oral Every other day    sodium chloride 0.9%  250 mL Intravenous Once    sodium chloride 0.9%  3 mL Intravenous Q8H    tamsulosin  0.4 mg Oral QHS     Continuous Infusions:    As Needed:  albuterol-ipratropium, benzonatate, GI cocktail (mylanta 30 mL, lidocaine 2 % viscous 10 mL, dicyclomine 10 mL) 50 mL, ondansetron, promethazine, ramelteon    Active Hospital Problems    Diagnosis  POA    *Acute on chronic combined systolic and diastolic congestive heart failure [I50.43]  Yes    CKD (chronic kidney disease), stage III [N18.3]  Yes     Chronic    Hyponatremia [E87.1]  Yes    Upper respiratory infection, viral [J06.9]  Yes    Diarrhea [R19.7]  Yes    Paroxysmal atrial fibrillation [I48.0]  Yes     Chronic    Essential hypertension [I10]  Yes     Chronic    Coronary artery disease involving native coronary artery of native heart without angina pectoris [I25.10]  Yes     Chronic    Hyperlipidemia [E78.5]  Yes     Chronic      Resolved Hospital Problems    Diagnosis Date Resolved POA    Elevated troponin [R74.8] 01/03/2019 Yes     Chronic        Overview  84 year old gentleman with a h/o CAD s/p CABG with history of remote MI (~8 years ago), HTN, A Fib on Eliquis, chronic systolic and diastolic HF, pulmonary HTN, aortic stenosis, and BPH admitted for acute on chronic combined systolic and diastolic congestive heart failure.     Assessment and Plan for Problems addressed today:    Acute on chronic combined systolic and diastolic congestive heart failure  AS (aortic stenosis)  Atrial fibrillation   Severe Pulmonary hypertension  Elevated troponin  Acute hypoxic respiratory failure  · BNP elevated to 2372 from 1464 two days ago.  · Troponin 0.154 > 0.207, baseline of 0.058. No evidence of STEMI on ECG. (1/1)  · CT abdomen/pelvis and CXR showed moderate bilateral pleural effusions and cardiomegaly.  · Echo (4/2018): EF 25% with diastolic dysfunction, moderate MR, moderate to severe AS, PASP 70.41mmHg.   · Increasing Lasix 40mg IV BID to 80mg IV BID.  Heart failure pathway initiated.  · Continue BB, ARB, ASA, statin.   · TTE ordered. Tn trending up; Consulting cardiology. (1/1)  · Wean oxygen as tolerated - Plan for walk  · Cardiology do not suspect ACS; continue current management. Echo pending.   · Troponin down-trending. 2D Echo (1/2): EF 20%. Wall motion abnormalities. Left ventricular diastolic dysfunction; Biatrial enlargement. Aortic stenosis with a dimensionless index of 0.28. Significantly reduced stroke volume; Moderate-to-severe MR; Moderate TR; PASP 77 mm Hg; Elevated CVP 15 mmHg. Dobutamine gtt started to augment diuresis per cardiology. (1/2)  · Diuresing well with improvement in breathing. Continuing with dobutamine gtt and IV Lasix. Cardiology to discuss possible benefits of CRT-D as an OP. (1/3)  · Discontinue IV dobutamine and IV pushes of furosemide. Furosemide 80 mg orally daily.  · Check ambulatory stats in AM    Acute bronchitis/Viral URI  · Patient seen by Urgent Care on 12/27/2018.  · Started on doxycycline 100mg BID (last dose to  be 1/3/2018), PRN tessalon perles, Mucinex 600mg BID, and Xyzal 5mg daily; continuing current management.   · Received IV methylprednisolone in ED. Continuing with dexamethasone to complete 5 day course. Ordered Duonebs & cetirizine. (1/1)  · Influenza swab negative.     Diarrhea  Epigastric abdominal pain  · CT abdomen/pelvis showed only diverticulosis coli without evidence of acute diverticulitis.  · Patient recently placed on antibiotics.  · C. Diff not collected, as no evidence of diarrhea since admission.    CKD (chronic kidney disease), stage III  · Stable with creatinine 1.4, GFR 45.8.  · Avoid nephrotoxic medications.    Coronary artery disease involving native coronary artery of native heart without angina pectoris  S/p CABG   Essential hypertension  Hyperlipidemia  · Continue carvedilol 3.125mg BID, losartan 25mg daily.  · Continue Lipitor 40mg daily    Paroxysmal atrial fibrillation  Anticoagulated by anticoagulation treatment  · Rate controlled in A Fib.  · Continue BB, Eliquis.  · [id number] VASc score of at least 7 for CHF, HTN, age, CVA, and CAD    Hyponatremia, resolved  · On admit, Na 132. Osmolality wnl.     Diet: Diet Adult Regular (IDDSI Level 7) Fluid - 2000mL  DVT Prophylaxis:   Anticoagulants   Medication Route Frequency    apixaban tablet 2.5 mg Oral BID       L/D/A:  PIV    Discharge plan and follow up  Still a Patient      Provider  Tabatha Mc MD  [facility] [place] - [place]LLOWS/CONSULTS   Department of Hospital Medicine    Casaibmeena Attestation: I personally scribed for Tabatha Mc MD on 01/04/2019 at 8:21 AM. Electronically signed by brandno Hernandez on 01/04/2019 at 8:21 AM.

## 2019-01-04 NOTE — PLAN OF CARE
Notified by RN of patient's SBP 75, asymptomatic.  Patient discussed with cardiology on-call.  Will give 250cc over 2 hours per their recommendations.  Appreciate their assistance.    Marge Bhagat, [facility], PA-C  Hospital Medicine Department  Staff:  Dr. Mcgraw    ADDENDUM:  Notified by RN at 1:39 that patient's BP improved to 120 after new IV was placed.  Will hold off on fluids.

## 2019-01-04 NOTE — PHYSICIAN QUERY
PT Name: Jeremie Bradshaw  MR #: [id number]     Physician Query Form - Diagnosis Clarification      CDS/: Lyn Isidro RN, CCDS             Contact information: [email or web address]    This form is a permanent document in the medical record.     Query Date: January 4, 2019    By submitting this query, we are merely seeking further clarification of documentation.  Please utilize your independent clinical judgment when addressing the question(s) below.     The medical record contains the following:      Findings Supporting Clinical Information Location in Medical Record    admitted from ED with CHF exacerbation and acute bronchitis.       Elevated Troponin in the setting of having PNA and decompensated heart failure      likely triggered from viral/bacterial bronchitis/PNA,    Acute Bronchitis/Viral URI Patient seen by Urgent Care on 12/27/2018.  - Started on doxycycline 100mg BID (last dose to be 1/3/2018), PRN tessalon perles, Mucinex 600mg BID, and Xyzal 5mg daily.    Of note, patient went to Urgent Care on 12/27/18 and was given Doxycycline for presumed PNA                  Cardiomegaly.  Bilateral pleural effusions.  Prominent interstitial lung markings. 1/1 h/p          1/1-1/2  consult note          1/2 consult note      1/1- 1/3 prog notes      1/1 cxr     Please clarify if the __PNA____ diagnosis has been:    [  ] Ruled In   [  ] Ruled In, Now Resolved   [  ] Ruled Out   [  ] Other/Clarification of findings (please specify):    [  ] Clinically insignificant     [  ] Clinically undetermined     Please document in your progress notes daily for the duration of treatment, until resolved, and include in your discharge summary.                                                                        Not considered, diagnosis as stated in my assessment and plan

## 2019-01-04 NOTE — CARE UPDATE
Rapid Response Follow-up Note    Followed up with patient for proactive rounding.   No acute issues at this time. /64. Patient resting comfortably. Reviewed plan of care with primary RN, Reymundo.   Please call Rapid Response RN with any questions or concerns at  [phone].

## 2019-01-04 NOTE — PLAN OF CARE
Problem: Adult Inpatient Plan of Care  Goal: Plan of Care Review  Outcome: Revised  Blood pressure monitoring cont. Pt remains free from injury/falls for shift. Educated Pt on meds no questions at this time. VSS.  No complaints of CP, SOB, pain/discomfort  Bed locked in lowest position, call bell within reach. All questions addressed.  Will continue to monitor.

## 2019-01-04 NOTE — PLAN OF CARE
Problem: Adult Inpatient Plan of Care  Goal: Plan of Care Review  Outcome: Ongoing (interventions implemented as appropriate)  Plan of care reviewed with pt, verbalized understanding  Answered questions  Free from falls and injury  Afebrile  Afib on tele    Will continue to monitor

## 2019-01-04 NOTE — PROGRESS NOTES
Notified Leola REYES of Pts BP=75/43 MAP 54. MD ordered 250mL bolus to run over 2 hrs. Will cont to monitor.

## 2019-01-05 LAB
ALBUMIN SERPL BCP-MCNC: 2.8 G/DL
ANION GAP SERPL CALC-SCNC: 12 MMOL/L
BUN SERPL-MCNC: 59 MG/DL
CALCIUM SERPL-MCNC: 9.2 MG/DL
CHLORIDE SERPL-SCNC: 99 MMOL/L
CO2 SERPL-SCNC: 28 MMOL/L
CREAT SERPL-MCNC: 1.7 MG/DL
EST. GFR  (AFRICAN AMERICAN): 41.9 ML/MIN/1.73 M^2
EST. GFR  (NON AFRICAN AMERICAN): 36.2 ML/MIN/1.73 M^2
GLUCOSE SERPL-MCNC: 109 MG/DL
MAGNESIUM SERPL-MCNC: 2.3 MG/DL
PHOSPHATE SERPL-MCNC: 4.5 MG/DL
POTASSIUM SERPL-SCNC: 3.9 MMOL/L
SODIUM SERPL-SCNC: 139 MMOL/L

## 2019-01-05 PROCEDURE — 94799 UNLISTED PULMONARY SVC/PX: CPT | Mod: HCNC

## 2019-01-05 PROCEDURE — 20600001 HC STEP DOWN PRIVATE ROOM: Mod: HCNC

## 2019-01-05 PROCEDURE — 63600175 PHARM REV CODE 636 W HCPCS: Mod: HCNC | Performed by: INTERNAL MEDICINE

## 2019-01-05 PROCEDURE — 94640 AIRWAY INHALATION TREATMENT: CPT | Mod: HCNC

## 2019-01-05 PROCEDURE — A4216 STERILE WATER/SALINE, 10 ML: HCPCS | Mod: HCNC | Performed by: PHYSICIAN ASSISTANT

## 2019-01-05 PROCEDURE — 36415 COLL VENOUS BLD VENIPUNCTURE: CPT | Mod: HCNC

## 2019-01-05 PROCEDURE — 25000003 PHARM REV CODE 250: Mod: HCNC | Performed by: STUDENT IN AN ORGANIZED HEALTH CARE EDUCATION/TRAINING PROGRAM

## 2019-01-05 PROCEDURE — 99232 PR SUBSEQUENT HOSPITAL CARE,LEVL II: ICD-10-PCS | Mod: HCNC,,, | Performed by: INTERNAL MEDICINE

## 2019-01-05 PROCEDURE — 25000242 PHARM REV CODE 250 ALT 637 W/ HCPCS: Mod: HCNC | Performed by: INTERNAL MEDICINE

## 2019-01-05 PROCEDURE — 94761 N-INVAS EAR/PLS OXIMETRY MLT: CPT | Mod: HCNC

## 2019-01-05 PROCEDURE — 83735 ASSAY OF MAGNESIUM: CPT | Mod: HCNC

## 2019-01-05 PROCEDURE — 25000003 PHARM REV CODE 250: Mod: HCNC | Performed by: PHYSICIAN ASSISTANT

## 2019-01-05 PROCEDURE — 99900035 HC TECH TIME PER 15 MIN (STAT): Mod: HCNC

## 2019-01-05 PROCEDURE — 99232 SBSQ HOSP IP/OBS MODERATE 35: CPT | Mod: HCNC,,, | Performed by: INTERNAL MEDICINE

## 2019-01-05 PROCEDURE — 25000003 PHARM REV CODE 250: Mod: HCNC | Performed by: INTERNAL MEDICINE

## 2019-01-05 PROCEDURE — 80069 RENAL FUNCTION PANEL: CPT | Mod: HCNC

## 2019-01-05 RX ORDER — METOPROLOL SUCCINATE 25 MG/1
25 TABLET, EXTENDED RELEASE ORAL DAILY
Qty: 30 TABLET | Refills: 11 | Status: ON HOLD | OUTPATIENT
Start: 2019-01-06 | End: 2019-01-28 | Stop reason: HOSPADM

## 2019-01-05 RX ORDER — IPRATROPIUM BROMIDE AND ALBUTEROL SULFATE 2.5; .5 MG/3ML; MG/3ML
3 SOLUTION RESPIRATORY (INHALATION)
Status: DISCONTINUED | OUTPATIENT
Start: 2019-01-05 | End: 2019-01-06 | Stop reason: HOSPADM

## 2019-01-05 RX ORDER — FUROSEMIDE 40 MG/1
40 TABLET ORAL 2 TIMES DAILY
Status: DISCONTINUED | OUTPATIENT
Start: 2019-01-05 | End: 2019-01-06 | Stop reason: HOSPADM

## 2019-01-05 RX ORDER — FUROSEMIDE 20 MG/1
TABLET ORAL
Qty: 60 TABLET | Refills: 11 | Status: SHIPPED | OUTPATIENT
Start: 2019-01-05 | End: 2019-01-06 | Stop reason: HOSPADM

## 2019-01-05 RX ORDER — FUROSEMIDE 40 MG/1
40 TABLET ORAL DAILY
Status: DISCONTINUED | OUTPATIENT
Start: 2019-01-06 | End: 2019-01-05

## 2019-01-05 RX ORDER — METOPROLOL SUCCINATE 25 MG/1
25 TABLET, EXTENDED RELEASE ORAL DAILY
Status: DISCONTINUED | OUTPATIENT
Start: 2019-01-06 | End: 2019-01-06 | Stop reason: HOSPADM

## 2019-01-05 RX ADMIN — DEXAMETHASONE 2 MG: 1 TABLET ORAL at 09:01

## 2019-01-05 RX ADMIN — DEXAMETHASONE 2 MG: 1 TABLET ORAL at 08:01

## 2019-01-05 RX ADMIN — IPRATROPIUM BROMIDE AND ALBUTEROL SULFATE 3 ML: .5; 3 SOLUTION RESPIRATORY (INHALATION) at 07:01

## 2019-01-05 RX ADMIN — Medication 3 ML: at 10:01

## 2019-01-05 RX ADMIN — GUAIFENESIN 600 MG: 600 TABLET, EXTENDED RELEASE ORAL at 08:01

## 2019-01-05 RX ADMIN — FUROSEMIDE 40 MG: 40 TABLET ORAL at 09:01

## 2019-01-05 RX ADMIN — Medication 3 ML: at 08:01

## 2019-01-05 RX ADMIN — BENZONATATE 200 MG: 100 CAPSULE ORAL at 11:01

## 2019-01-05 RX ADMIN — APIXABAN 2.5 MG: 2.5 TABLET, FILM COATED ORAL at 08:01

## 2019-01-05 RX ADMIN — IPRATROPIUM BROMIDE AND ALBUTEROL SULFATE 3 ML: .5; 3 SOLUTION RESPIRATORY (INHALATION) at 01:01

## 2019-01-05 RX ADMIN — BUPROPION HYDROCHLORIDE 300 MG: 300 TABLET, FILM COATED, EXTENDED RELEASE ORAL at 08:01

## 2019-01-05 RX ADMIN — CETIRIZINE HYDROCHLORIDE 10 MG: 10 TABLET, FILM COATED ORAL at 08:01

## 2019-01-05 RX ADMIN — GUAIFENESIN 600 MG: 600 TABLET, EXTENDED RELEASE ORAL at 09:01

## 2019-01-05 RX ADMIN — CYANOCOBALAMIN TAB 1000 MCG 1000 MCG: 1000 TAB at 08:01

## 2019-01-05 RX ADMIN — ASPIRIN 81 MG: 81 TABLET, COATED ORAL at 08:01

## 2019-01-05 RX ADMIN — POTASSIUM CHLORIDE 20 MEQ: 750 CAPSULE, EXTENDED RELEASE ORAL at 08:01

## 2019-01-05 RX ADMIN — LOSARTAN POTASSIUM 25 MG: 25 TABLET, FILM COATED ORAL at 08:01

## 2019-01-05 RX ADMIN — ATORVASTATIN CALCIUM 40 MG: 20 TABLET, FILM COATED ORAL at 08:01

## 2019-01-05 RX ADMIN — CARVEDILOL 3.12 MG: 3.12 TABLET, FILM COATED ORAL at 08:01

## 2019-01-05 RX ADMIN — APIXABAN 2.5 MG: 2.5 TABLET, FILM COATED ORAL at 09:01

## 2019-01-05 RX ADMIN — OMEGA-3-ACID ETHYL ESTERS 2 G: 1 CAPSULE, LIQUID FILLED ORAL at 08:01

## 2019-01-05 RX ADMIN — FUROSEMIDE 80 MG: 80 TABLET ORAL at 08:01

## 2019-01-05 RX ADMIN — TAMSULOSIN HYDROCHLORIDE 0.4 MG: 0.4 CAPSULE ORAL at 09:01

## 2019-01-05 NOTE — MEDICAL/APP STUDENT
Discharge Summary  Hospital Medicine    Attending Provider on Discharge: Tabatha Mc MD  [place]ospital Medicine [place] [facility] [place] - FELLOWS/CONSULTS  Date of Admission:  12/31/2018     Date of Discharge:  1/6/2019  Code status: Full Code    Active Hospital Problems    Diagnosis  POA    *Acute on chronic combined systolic and diastolic congestive heart failure [I50.43]  Yes    CKD (chronic kidney disease), stage III [N18.3]  Yes     Chronic    Hyponatremia [E87.1]  Yes    Upper respiratory infection, viral [J06.9]  Yes    Diarrhea [R19.7]  Yes    Paroxysmal atrial fibrillation [I48.0]  Yes     Chronic    Essential hypertension [I10]  Yes     Chronic    Coronary artery disease involving native coronary artery of native heart without angina pectoris [I25.10]  Yes     Chronic    Hyperlipidemia [E78.5]  Yes     Chronic      Resolved Hospital Problems    Diagnosis Date Resolved POA    Elevated troponin [R74.8] 01/03/2019 Yes     Chronic        HPI  Patient is an 84 year old gentleman with a h/o CAD s/p CABG with history of remote MI (~8 years ago), HTN, A Fib on Eliquis, chronic systolic and diastolic HF, pulmonary HTN, aortic stenosis, and BPH.  Daughter at bedside assists with HPI.  He presents with worsening productive cough and SOB.  Patient also complains of diarrhea and mid-epigastric pain.  Patient notes that he initially began feeling poorly last week.  He was seen by Urgent Care on 12/27/2018 with complaints of congestion, cough, SOB, wheezing, and chest tightness.  He was started on Doxycycline 100mg BID, PRN tessalon perles, Mucinex 600mg BID, and Xyzal 5mg daily.  Symptoms continued to worsen, so his daughter brought him to the ED on 12/30/2018.  Admission for observation and diuresis was recommended at that time, but patient refused and left AMA.  Daughter states that symptoms are worse today.  She notes SOB is worse when he lies flat and reports BLE.  She gave him an extra dose of Lasix, but  this has not helped.  Patient denies chest pain, dizziness, palpitations, fever/chills, N/V, dysuria.  Abdominal pain is worse with eating.    Hospital Course    Overview  84 year old gentleman with a h/o CAD s/p CABG with history of remote MI (~8 years ago), HTN, A Fib on Eliquis, chronic systolic and diastolic HF, pulmonary HTN, aortic stenosis, and BPH admitted for acute on chronic combined systolic and diastolic congestive heart failure. Patient was diuresed with Lasix and per cardiology consult, dobutamine gtt. 2D Echo confirmed EF 20% with wall motion abnormalities, Biatrial enlargement and AS. Discharging on PO Lasix 40mg. OP cardiology f/u with discussion of possible benefits of CRT-D as an OP.      By Problem:     Acute on chronic combined systolic and diastolic congestive heart failure  AS (aortic stenosis)  Atrial fibrillation   Severe Pulmonary hypertension  Elevated troponin  Acute hypoxic respiratory failure  · BNP elevated to 2372 from 1464 two days ago.  · Troponin 0.154 > 0.207, baseline of 0.058. No evidence of STEMI on ECG. (1/1)  · CT abdomen/pelvis and CXR showed moderate bilateral pleural effusions and cardiomegaly.  · Echo (4/2018): EF 25% with diastolic dysfunction, moderate MR, moderate to severe AS, PASP 70.41mmHg.   · Increasing Lasix 40mg IV BID to 80mg IV BID.  Heart failure pathway initiated.  · Continue BB, ARB, ASA, statin.   · TTE ordered. Tn trending up; Consulting cardiology. (1/1)  · Wean oxygen as tolerated - Plan for walk  · Cardiology do not suspect ACS; continue current management. Echo pending.   · Troponin down-trending. 2D Echo (1/2): EF 20%. Wall motion abnormalities. Left ventricular diastolic dysfunction; Biatrial enlargement. Aortic stenosis with a dimensionless index of 0.28. Significantly reduced stroke volume; Moderate-to-severe MR; Moderate TR; PASP 77 mm Hg; Elevated CVP 15 mmHg. Dobutamine gtt started to augment diuresis per cardiology. (1/2)  · Diuresing well  with improvement in breathing. Continuing with dobutamine gtt and IV Lasix. Cardiology to discuss possible benefits of CRT-D as an OP. (1/3)  · Discontinue IV dobutamine and IV pushes of furosemide. Furosemide 80 mg orally daily. (1/4)  · Check ambulatory stats. Per cardiology, changing carvedilol to Toprol XL 25mg and decreasing Lasix to 40mg PO BID. (1/5)      Acute bronchitis/Viral URI  · Patient seen by Urgent Care on 12/27/2018.  · Started on doxycycline 100mg BID (last dose to be 1/3/2018), PRN tessalon perles, Mucinex 600mg BID, and Xyzal 5mg daily; continuing current management.   · Received IV methylprednisolone in ED. Continuing with dexamethasone to complete 5 day course. Ordered Duonebs & cetirizine. (1/1)  · Influenza swab negative. Discharging on albuterol nebulizer.      Diarrhea  Epigastric abdominal pain  · CT abdomen/pelvis showed only diverticulosis coli without evidence of acute diverticulitis.  · Patient recently placed on antibiotics.  · C. Diff not collected, as no evidence of diarrhea since admission.     CKD (chronic kidney disease), stage III  · Stable with creatinine 1.4, GFR 45.8.  · Avoid nephrotoxic medications.     Coronary artery disease involving native coronary artery of native heart without angina pectoris  S/p CABG   Essential hypertension  Hyperlipidemia  · Continue carvedilol 3.125mg BID, losartan 25mg daily.  · Continue Lipitor 40mg daily  ·  Changed carvedilol to Toprol XL 25mg (1/5)     Paroxysmal atrial fibrillation  Anticoagulated by anticoagulation treatment  · Rate controlled in A Fib.  · Continue BB, Eliquis.  · COU6QT9 VASc score of at least 7 for CHF, HTN, age, CVA, and CAD     Hyponatremia, resolved  · On admit, Na 132. Osmolality wnl.     Recent Labs   Lab 01/02/19  0751 01/03/19  0329 01/06/19  0858   WBC 7.85 7.74 9.01   HGB 11.7* 11.4* 12.0*   HCT 36.2* 34.2* 39.4*   * 132* 151     Recent Labs   Lab 12/30/18  2350 01/01/19  0047  01/04/19  0528  01/05/19  0404 01/06/19  0338   * 132*   < > 136 139 140   K 4.4 4.8   < > 4.1 3.9 3.9    103   < > 100 99 102   CO2 22* 19*   < > 24 28 31*   BUN 32* 32*   < > 55* 59* 59*   CREATININE 1.4 1.4   < > 1.7* 1.7* 1.6*   GLU 96 101   < > 106 109 97   CALCIUM 9.2 9.2   < > 9.2 9.2 9.1   MG  --   --    < > 2.0 2.3 2.3   PHOS  --   --    < > 4.9* 4.5 4.1   LIPASE 39 32  --   --   --   --     < > = values in this interval not displayed.     Recent Labs   Lab 12/30/18  2350 01/01/19  0047 01/04/19  0528 01/05/19 0404 01/06/19  0338   ALKPHOS 72 70  --   --   --    ALT 10 16  --   --   --    AST 20 31  --   --   --    ALBUMIN 3.1* 3.1* 2.9* 2.8* 2.7*   PROT 6.7 6.9  --   --   --    BILITOT 0.8 0.9  --   --   --    INR  --  1.5*  --   --   --       Recent Labs   Lab 12/30/18  2354   POCTGLUCOSE 117*       Procedures: none    Consultants: Cardiology    Current Discharge Medication List      START taking these medications    Details   furosemide (LASIX) 40 MG tablet Take 1 tablet (40 mg total) by mouth 2 (two) times daily.  Qty: 60 tablet, Refills: 11      metoprolol succinate (TOPROL-XL) 25 MG 24 hr tablet Take 1 tablet (25 mg total) by mouth once daily.  Qty: 30 tablet, Refills: 11         CONTINUE these medications which have CHANGED    Details   apixaban (ELIQUIS) 2.5 mg Tab Take 1 tablet (2.5 mg total) by mouth 2 (two) times daily.  Qty: 180 tablet, Refills: 3         CONTINUE these medications which have NOT CHANGED    Details   albuterol (PROVENTIL/VENTOLIN HFA) 90 mcg/actuation inhaler Inhale 2 puffs into the lungs every 6 (six) hours as needed for Wheezing or Shortness of Breath. Rescue  Qty: 1 Inhaler, Refills: 0    Associated Diagnoses: Upper respiratory tract infection, unspecified type      aspirin (ECOTRIN) 81 MG EC tablet Take 81 mg by mouth. Pt taken every other day with potasium      atorvastatin (LIPITOR) 40 MG tablet Take 1 tablet (40 mg total) by mouth once daily.  Qty: 30 tablet, Refills: 11       benzonatate (TESSALON PERLES) 100 MG capsule Take 2 capsules (200 mg total) by mouth 3 (three) times daily as needed for Cough.  Qty: 20 capsule, Refills: 0    Associated Diagnoses: Upper respiratory tract infection, unspecified type      buPROPion (WELLBUTRIN XL) 300 MG 24 hr tablet Take 1 tablet (300 mg total) by mouth every morning.  Qty: 30 tablet, Refills: 11      cyanocobalamin (VITAMIN B-12) 1000 MCG tablet Take 1 tablet (1,000 mcg total) by mouth once daily.      desonide (DESOWEN) 0.05 % lotion APPLY EXTERNALLY TO THE AFFECTED AREA TWICE DAILY AS NEEDED FOR REDNESS AND ITCHING  Qty: 1 Bottle, Refills: 0      levocetirizine (XYZAL) 5 MG tablet Take 1 tablet (5 mg total) by mouth once daily.  Qty: 30 tablet, Refills: 0    Associated Diagnoses: Upper respiratory tract infection, unspecified type      losartan (COZAAR) 25 MG tablet Take 1 tablet (25 mg total) by mouth once daily.  Qty: 30 tablet, Refills: 11      nitroGLYCERIN (NITROSTAT) 0.4 MG SL tablet Place 1 tablet (0.4 mg total) under the tongue every 5 (five) minutes as needed.  Qty: 25 tablet, Refills: 3      omega-3 acid ethyl esters (LOVAZA) 1 gram capsule Take 2 capsules (2 g total) by mouth once daily.  Qty: 180 capsule, Refills: 4      potassium chloride (MICRO-K) 10 MEQ CpSR TAKE 2 CAPSULES BY MOUTH EVERY OTHER DAY  Qty: 30 capsule, Refills: 11      tamsulosin (FLOMAX) 0.4 mg Cap Take 1 capsule (0.4 mg total) by mouth every evening.  Qty: 30 capsule, Refills: 11         STOP taking these medications       carvedilol (COREG) 3.125 MG tablet Comments:   Reason for Stopping:         carvedilol (COREG) 3.125 MG tablet Comments:   Reason for Stopping:         desonide (DESOWEN) 0.05 % cream Comments:   Reason for Stopping:          mg capsule Comments:   Reason for Stopping:         doxycycline (VIBRAMYCIN) 100 MG Cap Comments:   Reason for Stopping:         guaiFENesin (MUCINEX) 600 mg 12 hr tablet Comments:   Reason for Stopping:          hydrocortisone (WESTCORT) 0.2 % cream Comments:   Reason for Stopping:               Discharge Diet:regular diet with Normal Fluid intake of 1500 - 2000 mL per day    Activity: activity as tolerated    Discharge Condition: Stable    Disposition: Home-Health Care Svc    Tests pending at the time of discharge: none      Time spent  on the discharge of the patient including review of hospital course with the patient. reviewing discharge medications and arranging follow-up care     Discharge examination Patient was seen and examined on the date of discharge and determined to be suitable for discharge.    Discharge plan and follow up:    Future Appointments   Date Time Provider Department Center   3/11/2019  9:00 AM LAB, APPOINTMENT [facility] LAB  [facility]   3/15/2019  9:00 AM Rocio Casas MD [facility]       Provider  MD Casa Glynnibe Attestation: I personally scribed for Tabatha Mc MD on 01/06/2019 at 7:51 AM. Electronically signed by brandon Hernandez on 01/06/2019 at 7:51 AM.

## 2019-01-05 NOTE — PROGRESS NOTES
[facility]  Cardiology  Progress Note    Patient Name: Jeremie Bradshaw  MRN: [id number]  Admission Date: 12/31/2018  Hospital Length of Stay: 4 days  Code Status: Full Code   Attending Physician: Tabatha Mc MD   Primary Care Physician: Rocio Casas MD  Expected Discharge Date: 1/7/2019  Principal Problem:Acute on chronic combined systolic and diastolic congestive heart failure    Subjective:     Interval History: Feels well, denies angina, dyspnea, lightheadedness, or syncope.     Review of Systems   Constitution: Negative for chills and fever.   HENT: Negative for congestion.    Cardiovascular: Negative for chest pain, irregular heartbeat, leg swelling, near-syncope, orthopnea, palpitations, paroxysmal nocturnal dyspnea and syncope.   Respiratory: Negative for cough and shortness of breath.    Neurological: Negative for dizziness, focal weakness, headaches and light-headedness.     Objective:     Vital Signs (Most Recent):  Temp: 97.3 °F (36.3 °C) (01/05/19 1120)  Pulse: 76 (01/05/19 1120)  Resp: 20 (01/05/19 0809)  BP: 97/63 (01/05/19 1120)  SpO2: 99 % (01/05/19 1120) Vital Signs (24h Range):  Temp:  [97 °F (36.1 °C)-98.4 °F (36.9 °C)] 97.3 °F (36.3 °C)  Pulse:  [70-93] 76  Resp:  [16-20] 20  SpO2:  [95 %-99 %] 99 %  BP: ()/(48-69) 97/63     Weight: 69.6 kg (153 lb 7 oz)  Body mass index is 22.66 kg/m².     SpO2: 99 %  O2 Device (Oxygen Therapy): room air      Intake/Output Summary (Last 24 hours) at 1/5/2019 1256  Last data filed at 1/5/2019 0815  Gross per 24 hour   Intake 0 ml   Output 1025 ml   Net -1025 ml       Lines/Drains/Airways     Peripheral Intravenous Line                 Peripheral IV - Single Lumen 01/04/19 0253 Left;Posterior Wrist 1 day                Physical Exam   HENT:   Mouth/Throat: Oropharynx is clear and moist.   Eyes: Pupils are equal, round, and reactive to light.   Neck: No JVD present.   Cardiovascular: Normal heart sounds and intact distal pulses.    Irregularly irregular   Pulmonary/Chest: Effort normal and breath sounds normal. He has no wheezes. He has no rales.   Abdominal: Soft. He exhibits no distension. There is no tenderness.   Musculoskeletal: Normal range of motion. He exhibits no edema.   Neurological: He is alert.   Skin: Skin is warm and dry.   Psychiatric: He has a normal mood and affect. His behavior is normal.   Vitals reviewed.      Significant Labs: All pertinent lab results from the last 24 hours have been reviewed.    Significant Imaging: Echocardiogram:   2D echo with color flow doppler:   Results for orders placed or performed during the hospital encounter of 04/22/18   2D echo with color flow doppler   Result Value Ref Range    QEF 25 (A) 55 - 65    Mitral Valve Regurgitation MODERATE (A)     Diastolic Dysfunction Yes (A)     Aortic Valve Regurgitation MILD     Aortic Valve Stenosis MODERATE TO SEVERE (A)     Est. PA Systolic Pressure 70.41 (A)     Mitral Valve Mobility NORMAL     Tricuspid Valve Regurgitation MILD     and Transthoracic echo (TTE) complete (Cupid Only):   Results for orders placed or performed during the hospital encounter of 12/31/18   Transthoracic echo (TTE) complete (Cupid Only)   Result Value Ref Range    Ascending aorta 3.11 cm    STJ 2.91 cm    AV mean gradient 8.26 mmHg    Ao peak silver 2.03 m/s    Ao VTI 32.78 cm    IVS 0.70 0.6 - 1.1 cm    LA size 4.65 cm    Left Atrium Major Axis 6.93 cm    Left Atrium Minor Axis 6.44 cm    LVIDD 7.10 (A) 3.5 - 6.0 cm    LVIDS 6.80 (A) 2.1 - 4.0 cm    LVOT diameter 2.01 cm    LVOT peak VTI 9.26 cm    PW 0.70 0.6 - 1.1 cm    RA Major Axis 5.93 cm    RA Width 4.08 cm    RVDD 4.02 cm    Sinus 3.10 cm    TAPSE 1.56 cm    TR Max Silver 3.94 m/s    TDI LATERAL 0.07     TDI SEPTAL 0.04     LA WIDTH 4.80 cm    LV Diastolic Volume 240.04 mL    LV Systolic Volume 229.56 mL    FS 4 %    LA volume 126.66 cm3    LV mass 213.77 g    Left Ventricle Relative Wall Thickness 0.20 cm    AV valve area  0.90 cm2    AV index (prosthetic) 0.28     Mean e' 0.06     LVOT area 3.17 cm2    LVOT stroke volume 29.37 cm3    AV peak gradient 16.48 mmHg    LV Systolic Volume Index 120.0 mL/m2    LV Diastolic Volume Index 125.46 mL/m2    LA Volume Index 66.2 mL/m2    LV Mass Index 111.7 g/m2    Triscuspid Valve Regurgitation Peak Gradient 62.09 mmHg    BSA 1.92 m2    Right Atrial Pressure (from IVC) 15 mmHg    TV rest pulmonary artery pressure 77 mmHg     Assessment and Plan:     * Acute on chronic combined systolic and diastolic congestive heart failure    -Stage C, NYHA III; pt is warm and now dry   -monitor weights daily and strict I/O's  -c/w GDMT (BB), ARB-Losartan 25 mg QD, can add potassium sparing diuretic when able  -2D Echo revealed: EF of 20%, RVE, decreased RV systolic function, MR/TR, PAP of 77, CVP 15   -O/P discussion with patient if CRT-D option could be beneficial   -c/w low sodium/cardiac diet   -Cardiology team will continue to follow; pt to follow up with O/P Cardiology clinic    -Switch from carvedilol to Troprol XL 25 mg starting tomorrow (hold if SBP <90)  -Decrease lasix to 40 PO BID  -Increase physical activity as tolerated         VTE Risk Mitigation (From admission, onward)        Ordered     apixaban tablet 2.5 mg  2 times daily      01/02/19 1011     IP VTE HIGH RISK PATIENT  Once      01/01/19 0509     Place sequential compression device  Until discontinued      01/01/19 0509     Place VIRAJ hose  Until discontinued      01/01/19 0509        Discussed w Dr Primo Almanza MD  Cardiology  [facility]

## 2019-01-05 NOTE — SUBJECTIVE & OBJECTIVE
Interval History: Feels well, denies angina, dyspnea, lightheadedness, or syncope.     Review of Systems   Constitution: Negative for chills and fever.   HENT: Negative for congestion.    Cardiovascular: Negative for chest pain, irregular heartbeat, leg swelling, near-syncope, orthopnea, palpitations, paroxysmal nocturnal dyspnea and syncope.   Respiratory: Negative for cough and shortness of breath.    Neurological: Negative for dizziness, focal weakness, headaches and light-headedness.     Objective:     Vital Signs (Most Recent):  Temp: 97.3 °F (36.3 °C) (01/05/19 1120)  Pulse: 76 (01/05/19 1120)  Resp: 20 (01/05/19 0809)  BP: 97/63 (01/05/19 1120)  SpO2: 99 % (01/05/19 1120) Vital Signs (24h Range):  Temp:  [97 °F (36.1 °C)-98.4 °F (36.9 °C)] 97.3 °F (36.3 °C)  Pulse:  [70-93] 76  Resp:  [16-20] 20  SpO2:  [95 %-99 %] 99 %  BP: ()/(48-69) 97/63     Weight: 69.6 kg (153 lb 7 oz)  Body mass index is 22.66 kg/m².     SpO2: 99 %  O2 Device (Oxygen Therapy): room air      Intake/Output Summary (Last 24 hours) at 1/5/2019 1256  Last data filed at 1/5/2019 0815  Gross per 24 hour   Intake 0 ml   Output 1025 ml   Net -1025 ml       Lines/Drains/Airways     Peripheral Intravenous Line                 Peripheral IV - Single Lumen 01/04/19 0253 Left;Posterior Wrist 1 day                Physical Exam   HENT:   Mouth/Throat: Oropharynx is clear and moist.   Eyes: Pupils are equal, round, and reactive to light.   Neck: No JVD present.   Cardiovascular: Normal heart sounds and intact distal pulses.   Irregularly irregular   Pulmonary/Chest: Effort normal and breath sounds normal. He has no wheezes. He has no rales.   Abdominal: Soft. He exhibits no distension. There is no tenderness.   Musculoskeletal: Normal range of motion. He exhibits no edema.   Neurological: He is alert.   Skin: Skin is warm and dry.   Psychiatric: He has a normal mood and affect. His behavior is normal.   Vitals reviewed.      Significant Labs: All  pertinent lab results from the last 24 hours have been reviewed.    Significant Imaging: Echocardiogram:   2D echo with color flow doppler:   Results for orders placed or performed during the hospital encounter of 04/22/18   2D echo with color flow doppler   Result Value Ref Range    QEF 25 (A) 55 - 65    Mitral Valve Regurgitation MODERATE (A)     Diastolic Dysfunction Yes (A)     Aortic Valve Regurgitation MILD     Aortic Valve Stenosis MODERATE TO SEVERE (A)     Est. PA Systolic Pressure 70.41 (A)     Mitral Valve Mobility NORMAL     Tricuspid Valve Regurgitation MILD     and Transthoracic echo (TTE) complete (Cupid Only):   Results for orders placed or performed during the hospital encounter of 12/31/18   Transthoracic echo (TTE) complete (Cupid Only)   Result Value Ref Range    Ascending aorta 3.11 cm    STJ 2.91 cm    AV mean gradient 8.26 mmHg    Ao peak silver 2.03 m/s    Ao VTI 32.78 cm    IVS 0.70 0.6 - 1.1 cm    LA size 4.65 cm    Left Atrium Major Axis 6.93 cm    Left Atrium Minor Axis 6.44 cm    LVIDD 7.10 (A) 3.5 - 6.0 cm    LVIDS 6.80 (A) 2.1 - 4.0 cm    LVOT diameter 2.01 cm    LVOT peak VTI 9.26 cm    PW 0.70 0.6 - 1.1 cm    RA Major Axis 5.93 cm    RA Width 4.08 cm    RVDD 4.02 cm    Sinus 3.10 cm    TAPSE 1.56 cm    TR Max Silver 3.94 m/s    TDI LATERAL 0.07     TDI SEPTAL 0.04     LA WIDTH 4.80 cm    LV Diastolic Volume 240.04 mL    LV Systolic Volume 229.56 mL    FS 4 %    LA volume 126.66 cm3    LV mass 213.77 g    Left Ventricle Relative Wall Thickness 0.20 cm    AV valve area 0.90 cm2    AV index (prosthetic) 0.28     Mean e' 0.06     LVOT area 3.17 cm2    LVOT stroke volume 29.37 cm3    AV peak gradient 16.48 mmHg    LV Systolic Volume Index 120.0 mL/m2    LV Diastolic Volume Index 125.46 mL/m2    LA Volume Index 66.2 mL/m2    LV Mass Index 111.7 g/m2    Triscuspid Valve Regurgitation Peak Gradient 62.09 mmHg    BSA 1.92 m2    Right Atrial Pressure (from IVC) 15 mmHg    TV rest pulmonary artery  pressure 77 mmHg

## 2019-01-05 NOTE — MEDICAL/APP STUDENT
Hospital Medicine  Progress note    Patient Name: Jeremie Bradshaw  MRN: [id number]  Team: [facility] [place]ARDIOLOGY TEAM C - FELLOWS/CONSULTS Tabatha Mc MD  Admit Date: 12/31/2018  ONEAL 1/7/2019  Code status: Full Code    Principal Problem:  Acute on chronic combined systolic and diastolic congestive heart failure    Interval hx:  No new complaints. OOB with good appetite and reporting continued UOP.     ROS   Respiratory: no cough or shortness of breath  Cardiovascular: no chest pain or palpitations  Gastrointestinal: no nausea or vomiting, no abdominal pain or change in bowel habits  Behavioral/Psych: no depression or anxiety    PEx  Temp:  [97 °F (36.1 °C)-98.4 °F (36.9 °C)]   Pulse:  [70-88]   Resp:  [16-20]   BP: ()/(48-70)   SpO2:  [93 %-99 %]     Intake/Output Summary (Last 24 hours) at 1/5/2019 0739  Last data filed at 1/5/2019 0500  Gross per 24 hour   Intake 676.72 ml   Output 1950 ml   Net -1273.28 ml       General Appearance: no acute distress   Heart: irregular rate and rhythm.   Respiratory: Normal respiratory effort, R lung clear with L-lung base crackles. Good bilateral air movement.   Abdomen: Soft, non-tender; bowel sounds active  Skin: intact.  Neurologic:  No focal numbness or weakness  Mental status: Alert, oriented x 4, affect appropriate     Recent Labs   Lab 01/01/19  0047 01/02/19  0751 01/03/19  0329   WBC 7.44 7.85 7.74   HGB 11.8* 11.7* 11.4*   HCT 37.5* 36.2* 34.2*    140* 132*     Recent Labs   Lab 12/30/18  2350 01/01/19  0047 01/01/19  0917  01/03/19  0329 01/04/19  0528 01/05/19  0404   * 132* 134*   < > 137 136 139   K 4.4 4.8 4.5   < > 3.9 4.1 3.9    103 105   < > 101 100 99   CO2 22* 19* 18*   < > 24 24 28   BUN 32* 32* 33*   < > 49* 55* 59*   CREATININE 1.4 1.4 1.4   < > 1.7* 1.7* 1.7*   GLU 96 101 100   < > 101 106 109   CALCIUM 9.2 9.2 9.1   < > 9.2 9.2 9.2   MG  --   --  1.6  --   --  2.0 2.3   PHOS  --   --  4.8*  --   --  4.9* 4.5   LIPASE 39 32  --   --   --    --   --     < > = values in this interval not displayed.     Recent Labs   Lab 12/30/18  2350 01/01/19  0047 01/04/19  0528 01/05/19  0404   ALKPHOS 72 70  --   --    ALT 10 16  --   --    AST 20 31  --   --    ALBUMIN 3.1* 3.1* 2.9* 2.8*   PROT 6.7 6.9  --   --    BILITOT 0.8 0.9  --   --    INR  --  1.5*  --   --       Recent Labs   Lab 12/30/18  2354   POCTGLUCOSE 117*     Recent Labs     01/02/19  0751   TROPONINI 0.165*       Scheduled Meds:   albuterol-ipratropium  3 mL Nebulization Q6H WAKE    apixaban  2.5 mg Oral BID    aspirin  81 mg Oral Daily    atorvastatin  40 mg Oral Daily    buPROPion  300 mg Oral QAM    cetirizine  10 mg Oral Daily    cyanocobalamin  1,000 mcg Oral Daily    dexamethasone  2 mg Oral Q12H    [START ON 1/6/2019] furosemide  40 mg Oral Daily    guaiFENesin  600 mg Oral BID    losartan  25 mg Oral Daily    [START ON 1/6/2019] metoprolol succinate  25 mg Oral Daily    omega-3 acid ethyl esters  2 g Oral Daily    potassium chloride  20 mEq Oral Every other day    sodium chloride 0.9%  250 mL Intravenous Once    sodium chloride 0.9%  3 mL Intravenous Q8H    tamsulosin  0.4 mg Oral QHS     Continuous Infusions:    As Needed:  albuterol-ipratropium, benzonatate, GI cocktail (mylanta 30 mL, lidocaine 2 % viscous 10 mL, dicyclomine 10 mL) 50 mL, ondansetron, promethazine, ramelteon    Active Hospital Problems    Diagnosis  POA    *Acute on chronic combined systolic and diastolic congestive heart failure [I50.43]  Yes    CKD (chronic kidney disease), stage III [N18.3]  Yes     Chronic    Hyponatremia [E87.1]  Yes    Upper respiratory infection, viral [J06.9]  Yes    Diarrhea [R19.7]  Yes    Paroxysmal atrial fibrillation [I48.0]  Yes     Chronic    Essential hypertension [I10]  Yes     Chronic    Coronary artery disease involving native coronary artery of native heart without angina pectoris [I25.10]  Yes     Chronic    Hyperlipidemia [E78.5]  Yes     Chronic       Resolved Hospital Problems    Diagnosis Date Resolved POA    Elevated troponin [R74.8] 01/03/2019 Yes     Chronic       Overview  84 year old gentleman with a h/o CAD s/p CABG with history of remote MI (~8 years ago), HTN, A Fib on Eliquis, chronic systolic and diastolic HF, pulmonary HTN, aortic stenosis, and BPH admitted for acute on chronic combined systolic and diastolic congestive heart failure. Patient was diuresed with Lasix and per cardiology consult, dobutamine gtt. 2D Echo confirmed EF 20% with wall motion abnormalities, Biatrial enlargement and AS. Plan to discharge on PO Lasix. OP cardiology f/u with discussion of possible benefits of CRT-D as an OP.     Assessment and Plan for Problems addressed today:    Acute on chronic combined systolic and diastolic congestive heart failure  AS (aortic stenosis)  Atrial fibrillation   Severe Pulmonary hypertension  Elevated troponin  Acute hypoxic respiratory failure  · BNP elevated to 2372 from 1464 two days ago.  · Troponin 0.154 > 0.207, baseline of 0.058. No evidence of STEMI on ECG. (1/1)  · CT abdomen/pelvis and CXR showed moderate bilateral pleural effusions and cardiomegaly.  · Echo (4/2018): EF 25% with diastolic dysfunction, moderate MR, moderate to severe AS, PASP 70.41mmHg.   · Increasing Lasix 40mg IV BID to 80mg IV BID.  Heart failure pathway initiated.  · Continue BB, ARB, ASA, statin.   · TTE ordered. Tn trending up; Consulting cardiology. (1/1)  · Wean oxygen as tolerated - Plan for walk  · Cardiology do not suspect ACS; continue current management. Echo pending.   · Troponin down-trending. 2D Echo (1/2): EF 20%. Wall motion abnormalities. Left ventricular diastolic dysfunction; Biatrial enlargement. Aortic stenosis with a dimensionless index of 0.28. Significantly reduced stroke volume; Moderate-to-severe MR; Moderate TR; PASP 77 mm Hg; Elevated CVP 15 mmHg. Dobutamine gtt started to augment diuresis per cardiology. (1/2)  · Diuresing well with  improvement in breathing. Continuing with dobutamine gtt and IV Lasix. Cardiology to discuss possible benefits of CRT-D as an OP. (1/3)  · Discontinue IV dobutamine and IV pushes of furosemide. Furosemide 80 mg orally daily. (1/4)  · Check ambulatory stats. Per cardiology, changing carvedilol to Toprol XL 25mg tomorrow and decreasing Lasix to 40mg PO BID. (1/5)     Acute bronchitis/Viral URI  · Patient seen by Urgent Care on 12/27/2018.  · Started on doxycycline 100mg BID (last dose to be 1/3/2018), PRN tessalon perles, Mucinex 600mg BID, and Xyzal 5mg daily; continuing current management.   · Received IV methylprednisolone in ED. Continuing with dexamethasone to complete 5 day course. Ordered Duonebs & cetirizine. (1/1)  · Influenza swab negative.     Diarrhea  Epigastric abdominal pain  · CT abdomen/pelvis showed only diverticulosis coli without evidence of acute diverticulitis.  · Patient recently placed on antibiotics.  · C. Diff not collected, as no evidence of diarrhea since admission.    CKD (chronic kidney disease), stage III  · Stable with creatinine 1.4, GFR 45.8.  · Avoid nephrotoxic medications.    Coronary artery disease involving native coronary artery of native heart without angina pectoris  S/p CABG   Essential hypertension  Hyperlipidemia  · Continue carvedilol 3.125mg BID, losartan 25mg daily.  · Continue Lipitor 40mg daily    Paroxysmal atrial fibrillation  Anticoagulated by anticoagulation treatment  · Rate controlled in A Fib.  · Continue BB, Eliquis.  · [id number] VASc score of at least 7 for CHF, HTN, age, CVA, and CAD    Hyponatremia, resolved  · On admit, Na 132. Osmolality wnl.     Diet: Diet Adult Regular (IDDSI Level 7) Fluid - 2000mL  DVT Prophylaxis:   Anticoagulants   Medication Route Frequency    apixaban tablet 2.5 mg Oral BID       L/D/A:  PIV    Discharge plan and follow up  Still a Patient      Provider  Tabatha Mc MD  [facility] CARDIOLOGY TEAM C - FELLOWS/CONSULTS   Department of  [facility]    Scribe Attestation: I personally scribed for Tabatha Mc MD on 01/05/2019 at 8:21 AM. Electronically signed by brandon Hernandez on 01/05/2019 at 8:21 AM.

## 2019-01-06 VITALS
DIASTOLIC BLOOD PRESSURE: 50 MMHG | TEMPERATURE: 98 F | HEIGHT: 69 IN | SYSTOLIC BLOOD PRESSURE: 82 MMHG | RESPIRATION RATE: 16 BRPM | WEIGHT: 153 LBS | HEART RATE: 68 BPM | BODY MASS INDEX: 22.66 KG/M2 | OXYGEN SATURATION: 97 %

## 2019-01-06 LAB
ALBUMIN SERPL BCP-MCNC: 2.7 G/DL
ANION GAP SERPL CALC-SCNC: 7 MMOL/L
BUN SERPL-MCNC: 59 MG/DL
CALCIUM SERPL-MCNC: 9.1 MG/DL
CHLORIDE SERPL-SCNC: 102 MMOL/L
CO2 SERPL-SCNC: 31 MMOL/L
CREAT SERPL-MCNC: 1.6 MG/DL
ERYTHROCYTE [DISTWIDTH] IN BLOOD BY AUTOMATED COUNT: 15.3 %
EST. GFR  (AFRICAN AMERICAN): 45.1 ML/MIN/1.73 M^2
EST. GFR  (NON AFRICAN AMERICAN): 39 ML/MIN/1.73 M^2
GLUCOSE SERPL-MCNC: 97 MG/DL
HCT VFR BLD AUTO: 39.4 %
HGB BLD-MCNC: 12 G/DL
MAGNESIUM SERPL-MCNC: 2.3 MG/DL
MCH RBC QN AUTO: 29.2 PG
MCHC RBC AUTO-ENTMCNC: 30.5 G/DL
MCV RBC AUTO: 96 FL
PHOSPHATE SERPL-MCNC: 4.1 MG/DL
PLATELET # BLD AUTO: 151 K/UL
PMV BLD AUTO: 11.9 FL
POTASSIUM SERPL-SCNC: 3.9 MMOL/L
RBC # BLD AUTO: 4.11 M/UL
SODIUM SERPL-SCNC: 140 MMOL/L
WBC # BLD AUTO: 9.01 K/UL

## 2019-01-06 PROCEDURE — 80069 RENAL FUNCTION PANEL: CPT | Mod: HCNC

## 2019-01-06 PROCEDURE — 94640 AIRWAY INHALATION TREATMENT: CPT | Mod: HCNC

## 2019-01-06 PROCEDURE — 83735 ASSAY OF MAGNESIUM: CPT | Mod: HCNC

## 2019-01-06 PROCEDURE — 94761 N-INVAS EAR/PLS OXIMETRY MLT: CPT | Mod: HCNC

## 2019-01-06 PROCEDURE — 99239 PR HOSPITAL DISCHARGE DAY,>30 MIN: ICD-10-PCS | Mod: HCNC,,, | Performed by: INTERNAL MEDICINE

## 2019-01-06 PROCEDURE — 25000003 PHARM REV CODE 250: Mod: HCNC | Performed by: STUDENT IN AN ORGANIZED HEALTH CARE EDUCATION/TRAINING PROGRAM

## 2019-01-06 PROCEDURE — 85027 COMPLETE CBC AUTOMATED: CPT | Mod: HCNC

## 2019-01-06 PROCEDURE — 25000003 PHARM REV CODE 250: Mod: HCNC | Performed by: INTERNAL MEDICINE

## 2019-01-06 PROCEDURE — 36415 COLL VENOUS BLD VENIPUNCTURE: CPT | Mod: HCNC

## 2019-01-06 PROCEDURE — 99239 HOSP IP/OBS DSCHRG MGMT >30: CPT | Mod: HCNC,,, | Performed by: INTERNAL MEDICINE

## 2019-01-06 PROCEDURE — 25000242 PHARM REV CODE 250 ALT 637 W/ HCPCS: Mod: HCNC | Performed by: INTERNAL MEDICINE

## 2019-01-06 PROCEDURE — 25000003 PHARM REV CODE 250: Mod: HCNC | Performed by: PHYSICIAN ASSISTANT

## 2019-01-06 RX ORDER — FUROSEMIDE 40 MG/1
40 TABLET ORAL 2 TIMES DAILY
Qty: 60 TABLET | Refills: 11 | Status: ON HOLD | OUTPATIENT
Start: 2019-01-06 | End: 2019-01-28 | Stop reason: HOSPADM

## 2019-01-06 RX ORDER — ALBUTEROL SULFATE 1.25 MG/3ML
1.25 SOLUTION RESPIRATORY (INHALATION) 4 TIMES DAILY
Qty: 360 ML | Refills: 0 | Status: ON HOLD | OUTPATIENT
Start: 2019-01-06 | End: 2019-01-28 | Stop reason: HOSPADM

## 2019-01-06 RX ADMIN — ASPIRIN 81 MG: 81 TABLET, COATED ORAL at 08:01

## 2019-01-06 RX ADMIN — CETIRIZINE HYDROCHLORIDE 10 MG: 10 TABLET, FILM COATED ORAL at 08:01

## 2019-01-06 RX ADMIN — FUROSEMIDE 40 MG: 40 TABLET ORAL at 08:01

## 2019-01-06 RX ADMIN — METOPROLOL SUCCINATE 25 MG: 25 TABLET, EXTENDED RELEASE ORAL at 08:01

## 2019-01-06 RX ADMIN — IPRATROPIUM BROMIDE AND ALBUTEROL SULFATE 3 ML: .5; 3 SOLUTION RESPIRATORY (INHALATION) at 01:01

## 2019-01-06 RX ADMIN — BUPROPION HYDROCHLORIDE 300 MG: 300 TABLET, FILM COATED, EXTENDED RELEASE ORAL at 08:01

## 2019-01-06 RX ADMIN — OMEGA-3-ACID ETHYL ESTERS 2 G: 1 CAPSULE, LIQUID FILLED ORAL at 08:01

## 2019-01-06 RX ADMIN — APIXABAN 2.5 MG: 2.5 TABLET, FILM COATED ORAL at 08:01

## 2019-01-06 RX ADMIN — GUAIFENESIN 600 MG: 600 TABLET, EXTENDED RELEASE ORAL at 08:01

## 2019-01-06 RX ADMIN — LOSARTAN POTASSIUM 25 MG: 25 TABLET, FILM COATED ORAL at 08:01

## 2019-01-06 RX ADMIN — ATORVASTATIN CALCIUM 40 MG: 20 TABLET, FILM COATED ORAL at 08:01

## 2019-01-06 RX ADMIN — CYANOCOBALAMIN TAB 1000 MCG 1000 MCG: 1000 TAB at 08:01

## 2019-01-06 NOTE — PLAN OF CARE
Pt being discharged home with Alena , [phone].   Pt also is having a nebulizer sent to his home before discharge.  Pt's daughter is providing transportation home.  Pt ready to discharge once nebulizer is delivered.

## 2019-01-06 NOTE — PROGRESS NOTES
[facility]  Cardiology  Progress Note    Patient Name: Jeremie Bradshaw  MRN: [id number]  Admission Date: 12/31/2018  Hospital Length of Stay: 5 days  Code Status: Full Code   Attending Physician: Tabatha Mc MD   Primary Care Physician: Rocio Casas MD  Expected Discharge Date: 1/7/2019  Principal Problem:Acute on chronic combined systolic and diastolic congestive heart failure    Subjective:     Interval History: Patient reports feeling symptomatically improved today, his only complaint is a persistent cough. Feels he has more energy. Unable to comment on SEWELL as he has been primarily bed bound (fall risk). Denies chest pain, shortness of breath or worsening edema. Reports no symptoms or concerns at this time.    Review of Systems   Constitution: Negative.   HENT: Negative.    Eyes: Negative.    Cardiovascular: Negative.    Respiratory: Positive for cough.    Endocrine: Negative.    Hematologic/Lymphatic: Negative.    Skin: Negative.    Musculoskeletal: Negative.    Gastrointestinal: Negative.    Genitourinary: Negative.    Neurological: Negative.    Psychiatric/Behavioral: Negative.      Objective:     Vital Signs (Most Recent):  Temp: 96.3 °F (35.7 °C) (01/06/19 0812)  Pulse: 82 (01/06/19 0812)  Resp: 18 (01/06/19 0812)  BP: (!) 112/55 (01/06/19 0812)  SpO2: 98 % (01/06/19 0812) Vital Signs (24h Range):  Temp:  [96 °F (35.6 °C)-98.2 °F (36.8 °C)] 96.3 °F (35.7 °C)  Pulse:  [63-95] 82  Resp:  [18] 18  SpO2:  [96 %-99 %] 98 %  BP: ()/(55-70) 112/55     Weight: 69.4 kg (152 lb 16 oz)  Body mass index is 22.59 kg/m².     SpO2: 98 %  O2 Device (Oxygen Therapy): room air      Intake/Output Summary (Last 24 hours) at 1/6/2019 0910  Last data filed at 1/6/2019 0600  Gross per 24 hour   Intake 1505 ml   Output 2125 ml   Net -620 ml       Lines/Drains/Airways     Peripheral Intravenous Line                 Peripheral IV - Single Lumen 01/04/19 0253 Left;Posterior Wrist 2 days                 Physical Exam   Constitutional: He is oriented to person, place, and time. He appears well-developed and well-nourished. No distress.   HENT:   Head: Normocephalic and atraumatic.   Neck: No JVD present.   Cardiovascular: Normal rate, normal heart sounds and intact distal pulses. Exam reveals no gallop and no friction rub.   No murmur heard.  Irregularly irregular rhythm   Pulmonary/Chest: Effort normal. No respiratory distress. He has wheezes (expiratory wheezing, left mid lung field). He has no rales.   Intermittent cough   Abdominal: Soft. Bowel sounds are normal. He exhibits no distension. There is no tenderness.   Musculoskeletal: He exhibits no edema.   Neurological: He is alert and oriented to person, place, and time.   Skin: He is not diaphoretic.       Significant Labs:     Recent Results (from the past 24 hour(s))   Renal function panel    Collection Time: 01/06/19  3:38 AM   Result Value Ref Range    Glucose 97 70 - 110 mg/dL    Sodium 140 136 - 145 mmol/L    Potassium 3.9 3.5 - 5.1 mmol/L    Chloride 102 95 - 110 mmol/L    CO2 31 (H) 23 - 29 mmol/L    BUN, Bld 59 (H) 8 - 23 mg/dL    Calcium 9.1 8.7 - 10.5 mg/dL    Creatinine 1.6 (H) 0.5 - 1.4 mg/dL    Albumin 2.7 (L) 3.5 - 5.2 g/dL    Phosphorus 4.1 2.7 - 4.5 mg/dL    eGFR if  45.1 (A) >60 mL/min/1.73 m^2    eGFR if non  39.0 (A) >60 mL/min/1.73 m^2    Anion Gap 7 (L) 8 - 16 mmol/L   Magnesium    Collection Time: 01/06/19  3:38 AM   Result Value Ref Range    Magnesium 2.3 1.6 - 2.6 mg/dL         Significant Imaging:     I have reviewed all pertinent imaging studies from the last 24 hours      Assessment and Plan:       * Acute on chronic combined systolic and diastolic congestive heart failure    EF 20-25% (similar to prior)  Symptoms have improved, appears euvolemic on exam, however has persistent cough and Left mid lung field wheezing  Consider repeat chest imaging  Net -4.6 L (-695 cc last 24 hours), BUN/Cr stable  at 59/1.6  Continue toprol 25 mg, losartan 25 mg, lasix 40 PO BID  Monitor daily weights, strict I/O  O/P discussion with patient if CRT-D option could be beneficial   c/w low sodium/cardiac diet   Recommend aggressive PT/OT         VTE Risk Mitigation (From admission, onward)        Ordered     apixaban tablet 2.5 mg  2 times daily      01/02/19 1011     IP VTE HIGH RISK PATIENT  Once      01/01/19 0509     Place sequential compression device  Until discontinued      01/01/19 0509     Place VIRAJ hose  Until discontinued      01/01/19 0509          César Craig MD  Cardiology  [facility]

## 2019-01-06 NOTE — NURSING
Pt given DC instructions  Verbalized understanding  Reviewed post-procedure instructions  IV and Tel removed, and put in the dirty drawer  Awaiting escort

## 2019-01-06 NOTE — ASSESSMENT & PLAN NOTE
EF 20-25% (similar to prior)  Symptoms have improved, appears euvolemic on exam, however has persistent cough and Left mid lung field wheezing  Consider repeat chest imaging  Net -4.6 L (-695 cc last 24 hours), BUN/Cr stable at 59/1.6  Continue toprol 25 mg, losartan 25 mg, lasix 40 PO BID  Monitor daily weights, strict I/O  O/P discussion with patient if CRT-D option could be beneficial   c/w low sodium/cardiac diet   Recommend aggressive PT/OT

## 2019-01-06 NOTE — PLAN OF CARE
Problem: Adult Inpatient Plan of Care  Goal: Plan of Care Review  Outcome: Ongoing (interventions implemented as appropriate)  Plan of care reviewed with pt, verbalized understanding  Answered questions  Free from falls and injury  Afebrile  SR-Afib on tele  Will continue to monitor

## 2019-01-06 NOTE — SUBJECTIVE & OBJECTIVE
Interval History: Patient reports feeling symptomatically improved today, his only complaint is a persistent cough. Feels he has more energy. Unable to comment on SEWELL as he has been primarily bed bound (fall risk). Denies chest pain, shortness of breath or worsening edema. Reports no symptoms or concerns at this time.    Review of Systems   Constitution: Negative.   HENT: Negative.    Eyes: Negative.    Cardiovascular: Negative.    Respiratory: Positive for cough.    Endocrine: Negative.    Hematologic/Lymphatic: Negative.    Skin: Negative.    Musculoskeletal: Negative.    Gastrointestinal: Negative.    Genitourinary: Negative.    Neurological: Negative.    Psychiatric/Behavioral: Negative.      Objective:     Vital Signs (Most Recent):  Temp: 96.3 °F (35.7 °C) (01/06/19 0812)  Pulse: 82 (01/06/19 0812)  Resp: 18 (01/06/19 0812)  BP: (!) 112/55 (01/06/19 0812)  SpO2: 98 % (01/06/19 0812) Vital Signs (24h Range):  Temp:  [96 °F (35.6 °C)-98.2 °F (36.8 °C)] 96.3 °F (35.7 °C)  Pulse:  [63-95] 82  Resp:  [18] 18  SpO2:  [96 %-99 %] 98 %  BP: ()/(55-70) 112/55     Weight: 69.4 kg (152 lb 16 oz)  Body mass index is 22.59 kg/m².     SpO2: 98 %  O2 Device (Oxygen Therapy): room air      Intake/Output Summary (Last 24 hours) at 1/6/2019 0910  Last data filed at 1/6/2019 0600  Gross per 24 hour   Intake 1505 ml   Output 2125 ml   Net -620 ml       Lines/Drains/Airways     Peripheral Intravenous Line                 Peripheral IV - Single Lumen 01/04/19 0253 Left;Posterior Wrist 2 days                Physical Exam   Constitutional: He is oriented to person, place, and time. He appears well-developed and well-nourished. No distress.   HENT:   Head: Normocephalic and atraumatic.   Neck: No JVD present.   Cardiovascular: Normal rate, normal heart sounds and intact distal pulses. Exam reveals no gallop and no friction rub.   No murmur heard.  Irregularly irregular rhythm   Pulmonary/Chest: Effort normal. No respiratory  distress. He has wheezes (expiratory wheezing, left mid lung field). He has no rales.   Intermittent cough   Abdominal: Soft. Bowel sounds are normal. He exhibits no distension. There is no tenderness.   Musculoskeletal: He exhibits no edema.   Neurological: He is alert and oriented to person, place, and time.   Skin: He is not diaphoretic.       Significant Labs:     Recent Results (from the past 24 hour(s))   Renal function panel    Collection Time: 01/06/19  3:38 AM   Result Value Ref Range    Glucose 97 70 - 110 mg/dL    Sodium 140 136 - 145 mmol/L    Potassium 3.9 3.5 - 5.1 mmol/L    Chloride 102 95 - 110 mmol/L    CO2 31 (H) 23 - 29 mmol/L    BUN, Bld 59 (H) 8 - 23 mg/dL    Calcium 9.1 8.7 - 10.5 mg/dL    Creatinine 1.6 (H) 0.5 - 1.4 mg/dL    Albumin 2.7 (L) 3.5 - 5.2 g/dL    Phosphorus 4.1 2.7 - 4.5 mg/dL    eGFR if  45.1 (A) >60 mL/min/1.73 m^2    eGFR if non  39.0 (A) >60 mL/min/1.73 m^2    Anion Gap 7 (L) 8 - 16 mmol/L   Magnesium    Collection Time: 01/06/19  3:38 AM   Result Value Ref Range    Magnesium 2.3 1.6 - 2.6 mg/dL         Significant Imaging:     I have reviewed all pertinent imaging studies from the last 24 hours

## 2019-01-06 NOTE — NURSING
Reported to MD Mc about pt PIV seeping blood and if ok to take out, MD confirmed and No need to replace it right now d/t possible discharge.

## 2019-01-06 NOTE — PLAN OF CARE
[facility]    HOME HEALTH ORDERS  FACE TO FACE ENCOUNTER    Patient Name: Jeremie Bradshaw  YOB: 1934    PCP: Rocio Casas MD   PCP Address: [address] / [place] [address]  PCP Phone Number: [phone]  PCP Fax: [phone]    Encounter Date: 01/06/2019    Admit to Home Health    Diagnoses:  Active Hospital Problems    Diagnosis  POA    *Acute on chronic combined systolic and diastolic congestive heart failure [I50.43]  Yes    CKD (chronic kidney disease), stage III [N18.3]  Yes     Chronic    Hyponatremia [E87.1]  Yes    Upper respiratory infection, viral [J06.9]  Yes    Diarrhea [R19.7]  Yes    Paroxysmal atrial fibrillation [I48.0]  Yes     Chronic    Essential hypertension [I10]  Yes     Chronic    Coronary artery disease involving native coronary artery of native heart without angina pectoris [I25.10]  Yes     Chronic    Hyperlipidemia [E78.5]  Yes     Chronic      Resolved Hospital Problems    Diagnosis Date Resolved POA    Elevated troponin [R74.8] 01/03/2019 Yes     Chronic       Future Appointments   Date Time Provider Department Center   3/11/2019  9:00 AM LAB, APPOINTMENT [facility]   3/15/2019  9:00 AM Rocio Casas MD [facility]           I have seen and examined this patient face to face today. My clinical findings that support the need for the home health skilled services and home bound status are the following:  Weakness/numbness causing balance and gait disturbance due to Heart Failure and Weakness/Debility making it taxing to leave home.  Requiring assistive device to leave home due to unsteady gait caused by  Heart Failure and Weakness/Debility.    Allergies:  Review of patient's allergies indicates:   Allergen Reactions    Prednisone Other (See Comments)     hallucinations       Diet: regular diet    Activities: activity as tolerated    Nursing:   SN to complete comprehensive assessment  including routine vital signs. Instruct on disease process and s/s of complications to report to MD. Review/verify medication list sent home with the patient at time of discharge  and instruct patient/caregiver as needed. Frequency may be adjusted depending on start of care date.    Notify MD if SBP > 160 or < 90; DBP > 90 or < 50; HR > 120 or < 50; Temp > 101      CONSULTS:    Physical Therapy to evaluate and treat. Evaluate for home safety and equipment needs; Establish/upgrade home exercise program. Perform / instruct on therapeutic exercises, gait training, transfer training, and Range of Motion.  Occupational Therapy to evaluate and treat. Evaluate home environment for safety and equipment needs. Perform/Instruct on transfers, ADL training, ROM, and therapeutic exercises.    MISCELLANEOUS CARE:  Heart Failure:      SN to instruct on the following:    Instruct on the definition of CHF.   Instruct on the signs/sympoms of CHF to be reported.   Instruct on and monitor daily weights.   Instruct on factors that cause exacerbation.   Instruct on action, dose, schedule, and side effects of medications.   Instruct on diet as prescribed.   Instruct on activity allowed.   Instruct on life-style modifications for life long management of CHF   SN to assess compliance with daily weights, diet, medications, fluid retention,    safety precautions, activities permitted and life-style modifications.   Additional 1-2 SN visits per week as needed for signs and symptoms     of CHF exacerbation.      For Weight Gain > 2-3 lbs in 1 day or 4-6 lbs over 1 week notify PCP or cardiologist    Medications: Review discharge medications with patient and family and provide education.      Current Discharge Medication List      START taking these medications    Details   albuterol (ACCUNEB) 1.25 mg/3 mL Nebu Take 3 mLs (1.25 mg total) by nebulization 4 (four) times daily. Rescue  Qty: 1 Box, Refills: 0      furosemide (LASIX) 40 MG tablet Take  1 tablet (40 mg total) by mouth 2 (two) times daily.  Qty: 60 tablet, Refills: 11      metoprolol succinate (TOPROL-XL) 25 MG 24 hr tablet Take 1 tablet (25 mg total) by mouth once daily.  Qty: 30 tablet, Refills: 11         CONTINUE these medications which have CHANGED    Details   apixaban (ELIQUIS) 2.5 mg Tab Take 1 tablet (2.5 mg total) by mouth 2 (two) times daily.  Qty: 180 tablet, Refills: 3         CONTINUE these medications which have NOT CHANGED    Details   albuterol (PROVENTIL/VENTOLIN HFA) 90 mcg/actuation inhaler Inhale 2 puffs into the lungs every 6 (six) hours as needed for Wheezing or Shortness of Breath. Rescue  Qty: 1 Inhaler, Refills: 0    Associated Diagnoses: Upper respiratory tract infection, unspecified type      aspirin (ECOTRIN) 81 MG EC tablet Take 81 mg by mouth. Pt taken every other day with potasium      atorvastatin (LIPITOR) 40 MG tablet Take 1 tablet (40 mg total) by mouth once daily.  Qty: 30 tablet, Refills: 11      benzonatate (TESSALON PERLES) 100 MG capsule Take 2 capsules (200 mg total) by mouth 3 (three) times daily as needed for Cough.  Qty: 20 capsule, Refills: 0    Associated Diagnoses: Upper respiratory tract infection, unspecified type      buPROPion (WELLBUTRIN XL) 300 MG 24 hr tablet Take 1 tablet (300 mg total) by mouth every morning.  Qty: 30 tablet, Refills: 11      cyanocobalamin (VITAMIN B-12) 1000 MCG tablet Take 1 tablet (1,000 mcg total) by mouth once daily.      desonide (DESOWEN) 0.05 % lotion APPLY EXTERNALLY TO THE AFFECTED AREA TWICE DAILY AS NEEDED FOR REDNESS AND ITCHING  Qty: 1 Bottle, Refills: 0      levocetirizine (XYZAL) 5 MG tablet Take 1 tablet (5 mg total) by mouth once daily.  Qty: 30 tablet, Refills: 0    Associated Diagnoses: Upper respiratory tract infection, unspecified type      losartan (COZAAR) 25 MG tablet Take 1 tablet (25 mg total) by mouth once daily.  Qty: 30 tablet, Refills: 11      nitroGLYCERIN (NITROSTAT) 0.4 MG SL tablet Place 1  tablet (0.4 mg total) under the tongue every 5 (five) minutes as needed.  Qty: 25 tablet, Refills: 3      omega-3 acid ethyl esters (LOVAZA) 1 gram capsule Take 2 capsules (2 g total) by mouth once daily.  Qty: 180 capsule, Refills: 4      potassium chloride (MICRO-K) 10 MEQ CpSR TAKE 2 CAPSULES BY MOUTH EVERY OTHER DAY  Qty: 30 capsule, Refills: 11      tamsulosin (FLOMAX) 0.4 mg Cap Take 1 capsule (0.4 mg total) by mouth every evening.  Qty: 30 capsule, Refills: 11         STOP taking these medications       carvedilol (COREG) 3.125 MG tablet Comments:   Reason for Stopping:         carvedilol (COREG) 3.125 MG tablet Comments:   Reason for Stopping:         desonide (DESOWEN) 0.05 % cream Comments:   Reason for Stopping:          mg capsule Comments:   Reason for Stopping:         doxycycline (VIBRAMYCIN) 100 MG Cap Comments:   Reason for Stopping:         guaiFENesin (MUCINEX) 600 mg 12 hr tablet Comments:   Reason for Stopping:         hydrocortisone (WESTCORT) 0.2 % cream Comments:   Reason for Stopping:               I certify that this patient is confined to his home and needs intermittent skilled nursing care, physical therapy and occupational therapy.

## 2019-01-06 NOTE — PROGRESS NOTES
Physician Attestation for Scribe:  I, Tabatha Mc MD, personally performed the services described in this documentation. All medical record entries made by the scribe were at my direction and in my presence.  I have reviewed the chart and agree that the record reflects my personal performance and is accurate and complete.   Tabatha Mc MD    Hospital Medicine  Progress note    Patient Name: Jeremie Bradshaw  MRN: [id number]  Team: [facility]ARDIOLOGY TEAM C - FELLOWS/CONSULTS Tabatha Mc MD  Admit Date: 12/31/2018  ONEAL 1/7/2019  Code status: Full Code    Principal Problem:  Acute on chronic combined systolic and diastolic congestive heart failure    Interval hx:  No new complaints. OOB with good appetite and reporting continued UOP.     ROS   Respiratory: no cough or shortness of breath  Cardiovascular: no chest pain or palpitations  Gastrointestinal: no nausea or vomiting, no abdominal pain or change in bowel habits  Behavioral/Psych: no depression or anxiety    PEx  Temp:  [97.3 °F (36.3 °C)-98.4 °F (36.9 °C)]   Pulse:  [63-93]   Resp:  [18-20]   BP: ()/(48-63)   SpO2:  [95 %-99 %]     Intake/Output Summary (Last 24 hours) at 1/5/2019 2124  Last data filed at 1/5/2019 1600  Gross per 24 hour   Intake 785 ml   Output 1450 ml   Net -665 ml       General Appearance: no acute distress   Heart: irregular rate and rhythm.   Respiratory: Normal respiratory effort, R lung clear with L-lung base crackles. Good bilateral air movement.   Abdomen: Soft, non-tender; bowel sounds active  Skin: intact.  Neurologic:  No focal numbness or weakness  Mental status: Alert, oriented x 4, affect appropriate     Recent Labs   Lab 01/01/19  0047 01/02/19  0751 01/03/19  0329   WBC 7.44 7.85 7.74   HGB 11.8* 11.7* 11.4*   HCT 37.5* 36.2* 34.2*    140* 132*     Recent Labs   Lab 12/30/18  2350 01/01/19  0047 01/01/19  0917  01/03/19  0329 01/04/19  0528 01/05/19  0404   * 132* 134*   < > 137 136 139   K 4.4 4.8 4.5   < >  3.9 4.1 3.9    103 105   < > 101 100 99   CO2 22* 19* 18*   < > 24 24 28   BUN 32* 32* 33*   < > 49* 55* 59*   CREATININE 1.4 1.4 1.4   < > 1.7* 1.7* 1.7*   GLU 96 101 100   < > 101 106 109   CALCIUM 9.2 9.2 9.1   < > 9.2 9.2 9.2   MG  --   --  1.6  --   --  2.0 2.3   PHOS  --   --  4.8*  --   --  4.9* 4.5   LIPASE 39 32  --   --   --   --   --     < > = values in this interval not displayed.     Recent Labs   Lab 12/30/18  2350 01/01/19  0047 01/04/19  0528 01/05/19  0404   ALKPHOS 72 70  --   --    ALT 10 16  --   --    AST 20 31  --   --    ALBUMIN 3.1* 3.1* 2.9* 2.8*   PROT 6.7 6.9  --   --    BILITOT 0.8 0.9  --   --    INR  --  1.5*  --   --       Recent Labs   Lab 12/30/18  2354   POCTGLUCOSE 117*     No results for input(s): CPK, CPKMB, MB, TROPONINI in the last 72 hours.    Scheduled Meds:   albuterol-ipratropium  3 mL Nebulization Q6H WAKE    apixaban  2.5 mg Oral BID    aspirin  81 mg Oral Daily    atorvastatin  40 mg Oral Daily    buPROPion  300 mg Oral QAM    cetirizine  10 mg Oral Daily    cyanocobalamin  1,000 mcg Oral Daily    dexamethasone  2 mg Oral Q12H    furosemide  40 mg Oral BID    guaiFENesin  600 mg Oral BID    losartan  25 mg Oral Daily    [START ON 1/6/2019] metoprolol succinate  25 mg Oral Daily    omega-3 acid ethyl esters  2 g Oral Daily    potassium chloride  20 mEq Oral Every other day    sodium chloride 0.9%  250 mL Intravenous Once    sodium chloride 0.9%  3 mL Intravenous Q8H    tamsulosin  0.4 mg Oral QHS     Continuous Infusions:    As Needed:  albuterol-ipratropium, benzonatate, GI cocktail (mylanta 30 mL, lidocaine 2 % viscous 10 mL, dicyclomine 10 mL) 50 mL, ondansetron, promethazine, ramelteon    Active Hospital Problems    Diagnosis  POA    *Acute on chronic combined systolic and diastolic congestive heart failure [I50.43]  Yes    CKD (chronic kidney disease), stage III [N18.3]  Yes     Chronic    Hyponatremia [E87.1]  Yes    Upper respiratory  infection, viral [J06.9]  Yes    Diarrhea [R19.7]  Yes    Paroxysmal atrial fibrillation [I48.0]  Yes     Chronic    Essential hypertension [I10]  Yes     Chronic    Coronary artery disease involving native coronary artery of native heart without angina pectoris [I25.10]  Yes     Chronic    Hyperlipidemia [E78.5]  Yes     Chronic      Resolved Hospital Problems    Diagnosis Date Resolved POA    Elevated troponin [R74.8] 01/03/2019 Yes     Chronic       Overview  84 year old gentleman with a h/o CAD s/p CABG with history of remote MI (~8 years ago), HTN, A Fib on Eliquis, chronic systolic and diastolic HF, pulmonary HTN, aortic stenosis, and BPH admitted for acute on chronic combined systolic and diastolic congestive heart failure. Patient was diuresed with Lasix and per cardiology consult, dobutamine gtt. 2D Echo confirmed EF 20% with wall motion abnormalities, Biatrial enlargement and AS. Plan to discharge on PO Lasix. OP cardiology f/u with discussion of possible benefits of CRT-D as an OP.     Assessment and Plan for Problems addressed today:    Acute on chronic combined systolic and diastolic congestive heart failure  AS (aortic stenosis)  Atrial fibrillation   Severe Pulmonary hypertension  Elevated troponin  Acute hypoxic respiratory failure  · BNP elevated to 2372 from 1464 two days ago.  · Troponin 0.154 > 0.207, baseline of 0.058. No evidence of STEMI on ECG. (1/1)  · CT abdomen/pelvis and CXR showed moderate bilateral pleural effusions and cardiomegaly.  · Echo (4/2018): EF 25% with diastolic dysfunction, moderate MR, moderate to severe AS, PASP 70.41mmHg.   · Increasing Lasix 40mg IV BID to 80mg IV BID.  Heart failure pathway initiated.  · Continue BB, ARB, ASA, statin.   · TTE ordered. Tn trending up; Consulting cardiology. (1/1)  · Wean oxygen as tolerated - Plan for walk  · Cardiology do not suspect ACS; continue current management. Echo pending.   · Troponin down-trending. 2D Echo (1/2): EF 20%.  Wall motion abnormalities. Left ventricular diastolic dysfunction; Biatrial enlargement. Aortic stenosis with a dimensionless index of 0.28. Significantly reduced stroke volume; Moderate-to-severe MR; Moderate TR; PASP 77 mm Hg; Elevated CVP 15 mmHg. Dobutamine gtt started to augment diuresis per cardiology. (1/2)  · Diuresing well with improvement in breathing. Continuing with dobutamine gtt and IV Lasix. Cardiology to discuss possible benefits of CRT-D as an OP. (1/3)  · Discontinue IV dobutamine and IV pushes of furosemide. Furosemide 80 mg orally daily. (1/4)  · Check ambulatory stats. Per cardiology, changing carvedilol to Toprol XL 25mg tomorrow and decreasing Lasix to 40mg PO BID. (1/5)     Acute bronchitis/Viral URI  · Patient seen by Urgent Care on 12/27/2018.  · Started on doxycycline 100mg BID (last dose to be 1/3/2018), PRN tessalon perles, Mucinex 600mg BID, and Xyzal 5mg daily; continuing current management.   · Received IV methylprednisolone in ED. Continuing with dexamethasone to complete 5 day course. Ordered Duonebs & cetirizine. (1/1)  · Influenza swab negative.     Diarrhea  Epigastric abdominal pain  · CT abdomen/pelvis showed only diverticulosis coli without evidence of acute diverticulitis.  · Patient recently placed on antibiotics.  · C. Diff not collected, as no evidence of diarrhea since admission.    CKD (chronic kidney disease), stage III  · Stable with creatinine 1.4, GFR 45.8.  · Avoid nephrotoxic medications.    Coronary artery disease involving native coronary artery of native heart without angina pectoris  S/p CABG   Essential hypertension  Hyperlipidemia  · Continue carvedilol 3.125mg BID, losartan 25mg daily.  · Continue Lipitor 40mg daily    Paroxysmal atrial fibrillation  Anticoagulated by anticoagulation treatment  · Rate controlled in A Fib.  · Continue BB, Eliquis.  · [id number] VASc score of at least 7 for CHF, HTN, age, CVA, and CAD    Hyponatremia, resolved  · On admit, Na  132. Osmolality wnl.     Diet: Diet Adult Regular (IDDSI Level 7) Fluid - 2000mL  DVT Prophylaxis:   Anticoagulants   Medication Route Frequency    apixaban tablet 2.5 mg Oral BID       L/D/A:  PIV    Discharge plan and follow up  Still a Patient      Provider  Tabatha Mc MD  [facility] [place] - [place]LLOWS/CONSULTS   Department of [facility] Medicine    Scribmeena Attestation: I personally scribed for Tabatha Mc MD on 01/05/2019 at 8:21 AM. Electronically signed by brandon Hernandez on 01/05/2019 at 8:21 AM.

## 2019-01-06 NOTE — PLAN OF CARE
Problem: Adult Inpatient Plan of Care  Goal: Plan of Care Review  Plan of care reviewed with pt, verbalized understanding  Answered questions  Free from falls and injury  Afebrile  SR-Afib on tele  Will continue to monitor

## 2019-01-06 NOTE — NURSING
Notified MD Mc pt AMBULATORY SATURATIONS ON RA results, >90%  Also, about pt extreme unsteadiness and dizziness.

## 2019-01-07 ENCOUNTER — NURSE TRIAGE (OUTPATIENT)
Dept: ADMINISTRATIVE | Facility: CLINIC | Age: 84
End: 2019-01-07

## 2019-01-07 NOTE — TELEPHONE ENCOUNTER
Patient called to report the following:     -80/42  -82/50 at the time of call  -just released from hospital yesterday   -denies dizziness, weakness, difficulty breathing   -patient has another does of lasix today   -advised to hold lasix and f/u with provider in ED   -would like call back from provider before reporting to ED     Reason for Disposition   [1] Systolic BP < 80 AND [2] NOT dizzy, lightheaded or weak    Protocols used: ST LOW BLOOD PRESSURE-A-AH

## 2019-01-10 ENCOUNTER — TELEPHONE (OUTPATIENT)
Dept: INTERNAL MEDICINE | Facility: CLINIC | Age: 84
End: 2019-01-10

## 2019-01-10 ENCOUNTER — HOSPITAL ENCOUNTER (EMERGENCY)
Facility: HOSPITAL | Age: 84
Discharge: HOME | End: 2019-01-10
Attending: EMERGENCY MEDICINE
Payer: MEDICARE

## 2019-01-10 VITALS
BODY MASS INDEX: 22.66 KG/M2 | DIASTOLIC BLOOD PRESSURE: 50 MMHG | RESPIRATION RATE: 20 BRPM | HEART RATE: 76 BPM | SYSTOLIC BLOOD PRESSURE: 92 MMHG | OXYGEN SATURATION: 100 % | HEIGHT: 69 IN | TEMPERATURE: 98 F | WEIGHT: 153 LBS

## 2019-01-10 DIAGNOSIS — R40.20 LOSS OF CONSCIOUSNESS: Primary | ICD-10-CM

## 2019-01-10 DIAGNOSIS — I50.42 CHRONIC COMBINED SYSTOLIC AND DIASTOLIC CONGESTIVE HEART FAILURE: ICD-10-CM

## 2019-01-10 DIAGNOSIS — R33.9 URINARY RETENTION: ICD-10-CM

## 2019-01-10 LAB
ALBUMIN SERPL BCP-MCNC: 2.6 G/DL
ALP SERPL-CCNC: 69 U/L
ALT SERPL W/O P-5'-P-CCNC: 25 U/L
ANION GAP SERPL CALC-SCNC: 14 MMOL/L
AST SERPL-CCNC: 33 U/L
BASOPHILS # BLD AUTO: 0.01 K/UL
BASOPHILS NFR BLD: 0.1 %
BILIRUB SERPL-MCNC: 1.5 MG/DL
BILIRUB UR QL STRIP: NEGATIVE
BUN SERPL-MCNC: 55 MG/DL
CALCIUM SERPL-MCNC: 8.4 MG/DL
CHLORIDE SERPL-SCNC: 99 MMOL/L
CLARITY UR REFRACT.AUTO: CLEAR
CO2 SERPL-SCNC: 19 MMOL/L
COLOR UR AUTO: NORMAL
CREAT SERPL-MCNC: 1.8 MG/DL
DIFFERENTIAL METHOD: ABNORMAL
EOSINOPHIL # BLD AUTO: 0 K/UL
EOSINOPHIL NFR BLD: 0 %
ERYTHROCYTE [DISTWIDTH] IN BLOOD BY AUTOMATED COUNT: 15.3 %
EST. GFR  (AFRICAN AMERICAN): 39.1 ML/MIN/1.73 M^2
EST. GFR  (NON AFRICAN AMERICAN): 33.8 ML/MIN/1.73 M^2
GLUCOSE SERPL-MCNC: 109 MG/DL
GLUCOSE UR QL STRIP: NEGATIVE
HCT VFR BLD AUTO: 34.6 %
HGB BLD-MCNC: 11.4 G/DL
HGB UR QL STRIP: NEGATIVE
IMM GRANULOCYTES # BLD AUTO: 0.06 K/UL
IMM GRANULOCYTES NFR BLD AUTO: 0.7 %
KETONES UR QL STRIP: NEGATIVE
LEUKOCYTE ESTERASE UR QL STRIP: NEGATIVE
LYMPHOCYTES # BLD AUTO: 0.7 K/UL
LYMPHOCYTES NFR BLD: 8 %
MCH RBC QN AUTO: 29.8 PG
MCHC RBC AUTO-ENTMCNC: 32.9 G/DL
MCV RBC AUTO: 91 FL
MONOCYTES # BLD AUTO: 1.1 K/UL
MONOCYTES NFR BLD: 13.2 %
NEUTROPHILS # BLD AUTO: 6.3 K/UL
NEUTROPHILS NFR BLD: 78 %
NITRITE UR QL STRIP: NEGATIVE
NRBC BLD-RTO: 0 /100 WBC
PH UR STRIP: 5 [PH] (ref 5–8)
PLATELET # BLD AUTO: 121 K/UL
PMV BLD AUTO: 11.4 FL
POTASSIUM SERPL-SCNC: 4.3 MMOL/L
PROT SERPL-MCNC: 5.9 G/DL
PROT UR QL STRIP: NEGATIVE
RBC # BLD AUTO: 3.82 M/UL
SODIUM SERPL-SCNC: 132 MMOL/L
SP GR UR STRIP: 1.01 (ref 1–1.03)
URN SPEC COLLECT METH UR: NORMAL
WBC # BLD AUTO: 8.13 K/UL

## 2019-01-10 PROCEDURE — 85025 COMPLETE CBC W/AUTO DIFF WBC: CPT | Mod: HCNC

## 2019-01-10 PROCEDURE — 93010 ELECTROCARDIOGRAM REPORT: CPT | Mod: HCNC,,, | Performed by: INTERNAL MEDICINE

## 2019-01-10 PROCEDURE — 99284 PR EMERGENCY DEPT VISIT,LEVEL IV: ICD-10-PCS | Mod: HCNC,,, | Performed by: EMERGENCY MEDICINE

## 2019-01-10 PROCEDURE — 99283 EMERGENCY DEPT VISIT LOW MDM: CPT | Mod: HCNC

## 2019-01-10 PROCEDURE — 93005 ELECTROCARDIOGRAM TRACING: CPT | Mod: HCNC

## 2019-01-10 PROCEDURE — 93010 EKG 12-LEAD: ICD-10-PCS | Mod: HCNC,,, | Performed by: INTERNAL MEDICINE

## 2019-01-10 PROCEDURE — 80053 COMPREHEN METABOLIC PANEL: CPT | Mod: HCNC

## 2019-01-10 PROCEDURE — 99284 EMERGENCY DEPT VISIT MOD MDM: CPT | Mod: HCNC,,, | Performed by: EMERGENCY MEDICINE

## 2019-01-10 PROCEDURE — 81003 URINALYSIS AUTO W/O SCOPE: CPT | Mod: HCNC

## 2019-01-10 RX ORDER — POLYETHYLENE GLYCOL 3350 17 G/17G
17 POWDER, FOR SOLUTION ORAL DAILY
Qty: 1 BOTTLE | Refills: 0 | Status: SHIPPED | OUTPATIENT
Start: 2019-01-10

## 2019-01-10 NOTE — ED PROVIDER NOTES
Encounter Date: 1/10/2019       History     Chief Complaint   Patient presents with    Loss of Consciousness     Syncope while on the toilet. SBP 80 on arrival and remains low. 1L NS infusing.      HPI   Pt is a 85yo M with h/o CHF (EF 20%), a-fib, HTN who presents with LOC. The story is provided by the pt and his daughter who witnessed the event. The pt was helped into bed tonight by his daughter. Once in bed, he flexed his arms and legs into a curled up position. The pts daughter witnessed this event. He was not verbally responsive during this event, but he did smile when his name was said. He has not done this before. Daughter is unsure how long this lasted, but knows that on EMS arrival the pt had already returned to nl.     Review of patient's allergies indicates:   Allergen Reactions    Prednisone Other (See Comments)     hallucinations     Past Medical History:   Diagnosis Date    Anemia     Basal cell carcinoma 7/2014    left medial canthus    Basal cell carcinoma 3/9/2015    right forehead    Basal cell carcinoma 09/08/2016    left neck (scoop shave)    Basal cell carcinoma 12/09/2016    left preauricular    BPH (benign prostatic hypertrophy) 8/17/2012    CAD (coronary artery disease) 8/17/2012    Cervical spine fracture 11/20/2012    CHF (congestive heart failure) 8/17/2012    Depression 8/17/2012    GERD (gastroesophageal reflux disease) 3/27/2014    Heart attack     History of atrial fibrillation 8/17/2012    Hyperlipidemia     Hypertension     Kidney stones 8/17/2012    Memory loss     Myocardial infarction     Osteoarthritis of lumbar spine 8/17/2012    Shoulder pain 8/17/2012    Stroke 8/17/2012     Past Surgical History:   Procedure Laterality Date    basal cell carcinoma left ear      CARDIAC SURGERY      CATARACT EXTRACTION W/  INTRAOCULAR LENS IMPLANT Bilateral     CATARACT EXTRACTION, BILATERAL      CORONARY ARTERY BYPASS GRAFT  1990    x1     ECHOCARDIOGRAM-TRANSESOPHAGEAL N/A 6/2/2015    Performed by [facility]    TONSILLECTOMY       Family History   Problem Relation Age of Onset    Cancer Father     Heart failure Mother     Heart disease Mother     No Known Problems Sister     No Known Problems Brother     No Known Problems Maternal Aunt     No Known Problems Maternal Uncle     No Known Problems Paternal Aunt     No Known Problems Paternal Uncle     No Known Problems Maternal Grandmother     No Known Problems Maternal Grandfather     No Known Problems Paternal Grandmother     No Known Problems Paternal Grandfather     Amblyopia Neg Hx     Blindness Neg Hx     Cataracts Neg Hx     Diabetes Neg Hx     Glaucoma Neg Hx     Hypertension Neg Hx     Macular degeneration Neg Hx     Retinal detachment Neg Hx     Strabismus Neg Hx     Thyroid disease Neg Hx     Stroke Neg Hx     Melanoma Neg Hx     Psoriasis Neg Hx     Lupus Neg Hx     Eczema Neg Hx     Acne Neg Hx      Social History     Tobacco Use    Smoking status: Never Smoker    Smokeless tobacco: Never Used   Substance Use Topics    Alcohol use: No     Comment: social drinker    Drug use: No     Review of Systems   Constitutional: Negative for fever.   HENT: Negative for sore throat.    Respiratory: Negative for shortness of breath.    Cardiovascular: Negative for chest pain.   Gastrointestinal: Negative for nausea.   Genitourinary: Negative for dysuria.   Musculoskeletal: Negative for back pain.   Skin: Negative for rash.   Neurological: Negative for dizziness and weakness.   Hematological: Does not bruise/bleed easily.       Physical Exam     Initial Vitals [01/10/19 0213]   BP Pulse Resp Temp SpO2   (!) 80/40 84 18 98.4 °F (36.9 °C) 98 %      MAP       --         Physical Exam    Constitutional: He appears well-developed and well-nourished. He is not diaphoretic. No distress.   HENT:   Head: Normocephalic and atraumatic.   Eyes: Conjunctivae and EOM are  normal.   Neck: Normal range of motion. Neck supple.   Cardiovascular: Normal rate, regular rhythm and normal heart sounds. Exam reveals no gallop and no friction rub.    No murmur heard.  Pulmonary/Chest: Breath sounds normal. No respiratory distress. He has no wheezes. He has no rhonchi. He has no rales.   Abdominal: Soft. He exhibits no distension. There is no tenderness. There is no rebound and no guarding.   Musculoskeletal: He exhibits no edema or tenderness.   Neurological: He is alert.   Oriented x1 to name. Moves all 4 extremities with no focal deficit. CN II-XII in tact.   Skin: Skin is warm and dry.   Psychiatric: He has a normal mood and affect. Thought content normal.         ED Course   Procedures  Labs Reviewed - No data to display       Imaging Results    None       MDM  Pt is a 85yo M with h/o CHF (EF 20%), a-fib, HTN who presents with LOC.   This episode does not appear to be a true syncopal episode. It may be related to dementia. His BP has remained above SBP 90 since arrival here, with his baseline mentation per family.  Labs are overall baseline with Hgb of 11.4, CMP with Cr of 1.8, around baseline. UA with no signs of infection. EKG with a-fib LBBB, no significant changes from previous. He did have a bladder scan showing 400cc urine, and cath was placed. Family is concerned that the pt will self remove the fernandez due to agitation. Since he has not had urinary retention before, and typically urinates without difficulty, will remove the catheter for now and continue his home tamsulosin. I have discussed return precautions with family and they agree to bring him back with any abdominal pain or distension. Plan to f/u with PCP later today or tomorrow.    Shahram Lomax MD  Resident, PGY-2  1/10/2019 5:36 AM                            Clinical Impression:   The primary encounter diagnosis was Urinary retention. Diagnoses of Loss of consciousness and Chronic combined systolic and diastolic congestive  heart failure were also pertinent to this visit.                             Shahram Lomax MD  Resident  01/10/19 [id number]

## 2019-01-10 NOTE — TELEPHONE ENCOUNTER
----- Message from Namrata Lomax sent at 1/9/2019  5:36 PM CST -----  Contact: Patient  Portal   Patient needs to schedule a F/U after hospital stay.    There were no appointment until 02/22/2018.    Patient can be reached at [phone]

## 2019-01-10 NOTE — ED NOTES
Pt w/ hx of stroke, basal cell carninoma, htn, gerd presents to ED via EMS. Pts family states that pt has been dealing with constipation since being d/c from hospital. Pts family states that pt was on toilet for about 1 hr trying to defecate, but had no successful. Pt ambulated back to bed, where he had a vagal episode. Pts family called EMS and upon there arrival pts SBP was in the 80s. Pts family states that they aren't sure if pt loss consciousness or how long episode lasted.

## 2019-01-10 NOTE — DISCHARGE INSTRUCTIONS
Please return to the emergency department if you are experiencing any concerning symptoms including chest pain, shortness of breath.

## 2019-01-10 NOTE — TELEPHONE ENCOUNTER
----- Message from Namrata Lomax sent at 1/9/2019  5:36 PM CST -----  Contact: Patient  Portal   Patient needs to schedule a F/U after hospital stay.    There were no appointment until 02/22/2018.    Patient can be reached at [phone]

## 2019-01-15 ENCOUNTER — TELEPHONE (OUTPATIENT)
Dept: INTERNAL MEDICINE | Facility: CLINIC | Age: 84
End: 2019-01-15

## 2019-01-15 DIAGNOSIS — R26.9 GAIT ABNORMALITY: Primary | ICD-10-CM

## 2019-01-15 NOTE — TELEPHONE ENCOUNTER
Pt daughter states that the pt was supposed to have an order for a walker with wheels however it never arrived to pt home as of yet. She is calling to check the status

## 2019-01-15 NOTE — TELEPHONE ENCOUNTER
----- Message from Perla Weber sent at 1/15/2019 11:44 AM CST -----  Contact: JOSE ENRIQUE Michelle [phone]  Pt daughter would like a call back in reference to pt receiving a walker. PT daughter states last week she called requesting a walker for pt and would like to know the status. Please call and advise.

## 2019-01-16 ENCOUNTER — OFFICE VISIT (OUTPATIENT)
Dept: INTERNAL MEDICINE | Facility: CLINIC | Age: 84
End: 2019-01-16
Payer: MEDICARE

## 2019-01-16 ENCOUNTER — TELEPHONE (OUTPATIENT)
Dept: INTERNAL MEDICINE | Facility: CLINIC | Age: 84
End: 2019-01-16

## 2019-01-16 ENCOUNTER — LAB VISIT (OUTPATIENT)
Dept: LAB | Facility: HOSPITAL | Age: 84
End: 2019-01-16
Attending: INTERNAL MEDICINE
Payer: MEDICARE

## 2019-01-16 VITALS
WEIGHT: 157.5 LBS | SYSTOLIC BLOOD PRESSURE: 84 MMHG | OXYGEN SATURATION: 97 % | HEART RATE: 79 BPM | BODY MASS INDEX: 23.87 KG/M2 | HEIGHT: 68 IN | DIASTOLIC BLOOD PRESSURE: 51 MMHG

## 2019-01-16 DIAGNOSIS — I50.9 CONGESTIVE HEART FAILURE, UNSPECIFIED HF CHRONICITY, UNSPECIFIED HEART FAILURE TYPE: ICD-10-CM

## 2019-01-16 DIAGNOSIS — I50.43 ACUTE ON CHRONIC COMBINED SYSTOLIC AND DIASTOLIC CONGESTIVE HEART FAILURE: ICD-10-CM

## 2019-01-16 DIAGNOSIS — F32.A DEPRESSION, UNSPECIFIED DEPRESSION TYPE: Primary | ICD-10-CM

## 2019-01-16 DIAGNOSIS — I10 ESSENTIAL HYPERTENSION: Chronic | ICD-10-CM

## 2019-01-16 DIAGNOSIS — E78.5 HYPERLIPIDEMIA, UNSPECIFIED HYPERLIPIDEMIA TYPE: Chronic | ICD-10-CM

## 2019-01-16 LAB
ALBUMIN SERPL BCP-MCNC: 2.5 G/DL
ANION GAP SERPL CALC-SCNC: 10 MMOL/L
BUN SERPL-MCNC: 92 MG/DL
CALCIUM SERPL-MCNC: 8.5 MG/DL
CHLORIDE SERPL-SCNC: 101 MMOL/L
CO2 SERPL-SCNC: 23 MMOL/L
CREAT SERPL-MCNC: 2.4 MG/DL
EST. GFR  (AFRICAN AMERICAN): 27.6 ML/MIN/1.73 M^2
EST. GFR  (NON AFRICAN AMERICAN): 23.9 ML/MIN/1.73 M^2
GLUCOSE SERPL-MCNC: 97 MG/DL
PHOSPHATE SERPL-MCNC: 4 MG/DL
POTASSIUM SERPL-SCNC: 4.3 MMOL/L
SODIUM SERPL-SCNC: 134 MMOL/L

## 2019-01-16 PROCEDURE — 99999 PR PBB SHADOW E&M-EST. PATIENT-LVL III: CPT | Mod: PBBFAC,HCNC,, | Performed by: INTERNAL MEDICINE

## 2019-01-16 PROCEDURE — 3074F SYST BP LT 130 MM HG: CPT | Mod: CPTII,HCNC,S$GLB, | Performed by: INTERNAL MEDICINE

## 2019-01-16 PROCEDURE — 36415 COLL VENOUS BLD VENIPUNCTURE: CPT | Mod: HCNC

## 2019-01-16 PROCEDURE — 99215 PR OFFICE/OUTPT VISIT, EST, LEVL V, 40-54 MIN: ICD-10-PCS | Mod: HCNC,S$GLB,, | Performed by: INTERNAL MEDICINE

## 2019-01-16 PROCEDURE — 3078F DIAST BP <80 MM HG: CPT | Mod: CPTII,HCNC,S$GLB, | Performed by: INTERNAL MEDICINE

## 2019-01-16 PROCEDURE — 1101F PR PT FALLS ASSESS DOC 0-1 FALLS W/OUT INJ PAST YR: ICD-10-PCS | Mod: CPTII,HCNC,S$GLB, | Performed by: INTERNAL MEDICINE

## 2019-01-16 PROCEDURE — 80069 RENAL FUNCTION PANEL: CPT | Mod: HCNC

## 2019-01-16 PROCEDURE — 1101F PT FALLS ASSESS-DOCD LE1/YR: CPT | Mod: CPTII,HCNC,S$GLB, | Performed by: INTERNAL MEDICINE

## 2019-01-16 PROCEDURE — 99499 RISK ADDL DX/OHS AUDIT: ICD-10-PCS | Mod: S$GLB,,, | Performed by: INTERNAL MEDICINE

## 2019-01-16 PROCEDURE — 99215 OFFICE O/P EST HI 40 MIN: CPT | Mod: HCNC,S$GLB,, | Performed by: INTERNAL MEDICINE

## 2019-01-16 PROCEDURE — 3078F PR MOST RECENT DIASTOLIC BLOOD PRESSURE < 80 MM HG: ICD-10-PCS | Mod: CPTII,HCNC,S$GLB, | Performed by: INTERNAL MEDICINE

## 2019-01-16 PROCEDURE — 99999 PR PBB SHADOW E&M-EST. PATIENT-LVL III: ICD-10-PCS | Mod: PBBFAC,HCNC,, | Performed by: INTERNAL MEDICINE

## 2019-01-16 PROCEDURE — 3074F PR MOST RECENT SYSTOLIC BLOOD PRESSURE < 130 MM HG: ICD-10-PCS | Mod: CPTII,HCNC,S$GLB, | Performed by: INTERNAL MEDICINE

## 2019-01-16 PROCEDURE — 99499 UNLISTED E&M SERVICE: CPT | Mod: S$GLB,,, | Performed by: INTERNAL MEDICINE

## 2019-01-16 RX ORDER — CITALOPRAM 10 MG/1
10 TABLET ORAL DAILY
Qty: 30 TABLET | Refills: 3 | Status: SHIPPED | OUTPATIENT
Start: 2019-01-16 | End: 2020-01-16

## 2019-01-16 RX ORDER — PAROXETINE 10 MG/1
TABLET, FILM COATED ORAL
Qty: 30 TABLET | Refills: 3 | Status: SHIPPED | OUTPATIENT
Start: 2019-01-16 | End: 2019-01-16

## 2019-01-16 NOTE — PROGRESS NOTES
CHIEF COMPLAINT: Hospital follow up    HISTORY OF PRESENT ILLNESS: This is a 84-year-old man who presents with his daughter for Hospital follow up.     He was admitted from 12/31/18 to 1/6/19 due to CHF with volume overload. He was diuresed and discharged. On 1/10/19, he was straining to have a BM all day. His daughter got him back in the bed and he leaned back and rolled his eye back and went out for a second. HE came to the ED.     He is not eating very much. BP has been low. Daughter is holding losartan 25mg. She is giving him metoprolol succinate 25 mg daily, eliquis 5 mg twice daily, and lasix 40 mg twice daily. No chest pain. He has some cough. No shortness ofbreath, nausea, vomiting, diarrhea. HE still has some consiptation.    He is very stubborn. He does not want to eat or get out of bed. He did get out of bed for physical therapy yesterday. He was depressed this holiday season.  HE has been refusing to drink ensure. He continues to take Wellbutrin  mg daily. He has some mild memory loss.      He continues to take atorvastatin 40 mg 1 tablet daily and Lovaza 1 g daily for her hyperlipidemia.          Past Medical History:   1. Coronary artery disease status post CABG x1 in 1993, and STEMI 01/2010, status post stenting, refused cardiac rehab, followed by Dr. Aguilar.   2. Congestive heart failure. Ejection fraction was 30% Jan 2012  3. History of atrial fibrillation -- EKG Jan 2012 - sinus rhythm  4. Hyperlipidemia  5. Hypertension.   6. Osteoarthritis of the lumbar spine -- Had a flare in 2006 and had MRI at that time.   7. History of nephrolithiasis in 2005.   8. History of left thalamic stroke in the early 2000s per Dr. Matos's notes.   9. Depression -- followed by Dr. Bingham .  10. BPH with obstruction, had a cystoscope in 08/2010. Saw Urology 06/2011.  11. Right rotator cuff tear with arthropathy. He has pain from time to time. Saw Dr. Narvaez for an injection 03/2011.     PAST SURGICAL HISTORY:  "  1. CABG x1 in .   2. Bilateral cataract surgery, right 2005, left .   3. Excision of basal cell carcinoma of the left ear in .     SOCIAL HISTORY: He has never smoked, drinks alcohol once a month. . Has three children. Retired from the [employer].     FAMILY HISTORY: Mother  in her 60s of heart trouble. Father  in his 50s of some sort of abdominal malignancy. He has a half brother, which he is not sure of his health issues.     Screening PSA was 1016. Declines colonoscopy.     PHYSICAL EXAMINATION:    BP (!) 84/51 (BP Location: Left arm, Patient Position: Sitting, BP Method: Medium (Manual))   Pulse 79   Ht 5' 8" (1.727 m)   Wt 71.5 kg (157 lb 8.3 oz)   SpO2 97%   BMI 23.95 kg/m²     GENERAL: He is alert, oriented, no apparent distress. Affect within normal limits.   HEENT: Conjunctivae anicteric. . TM clear  Neck supple. Oropharynx -clear  CHEST: Respiratory effort normal. Lungs clear. Rales right base.    HEART: Regular rate and rhythm without murmurs, gallops or rubs. No lower extremity edema.       Records reviewed       ASSESSMENT AND PLAN:   1.  Coronary artery disease, CHF and a fib and aortic atherosclerosis- renal function panel today to see if overdiuresed.  May need to hold lasix.  3. Hypertension - on metoprolol succinate 25 mg daily. May be dehydrated. Check labs  4. Hyperlipidemia -- controlled.    5. GERD - asx. Takes ranitidine 300 mg Nightly as needed    6.  Major Depression  And memory loss--long discussion with patient, daughter and son regarding his mood. He is not giving up. Add paxil 10 mg 1/2 tablet daily for 8 days then one tablet daily.  Stressed importance of eating to him. He does want to live and does not want to die yet. Continue wellbutrin  mg daily and aricept 10 mg daily  7. Osteoarthritis of lumbar spine -- stable.   8. Benign prostatic hypertrophy -- had trouble emptying bladder - to see urology    Had discussion with daughter " and son regarding hospice if he does not start to eat.   .  I will see him back in 2 weeks, sooner if problems arise.     Spent greater than 40 minutes with the patient, greater than 50% in face to face counseling.

## 2019-01-16 NOTE — TELEPHONE ENCOUNTER
----- Message from Harmeet Mooney sent at 1/16/2019  3:09 PM CST -----  Contact: Pharmacy Neisha HAYES [phone]  Pharmacy calling stating is needing P.A. For Rx paroxetine (PAXIL) 10 MG tablet    Requesting for a call back from office.    [facility] - [place], [place] - [address]    Please call an advise  Thank you

## 2019-01-17 ENCOUNTER — TELEPHONE (OUTPATIENT)
Dept: UROLOGY | Facility: CLINIC | Age: 84
End: 2019-01-17

## 2019-01-17 ENCOUNTER — TELEPHONE (OUTPATIENT)
Dept: ADMINISTRATIVE | Facility: CLINIC | Age: 84
End: 2019-01-17

## 2019-01-17 ENCOUNTER — OFFICE VISIT (OUTPATIENT)
Dept: UROLOGY | Facility: CLINIC | Age: 84
End: 2019-01-17
Payer: MEDICARE

## 2019-01-17 VITALS
HEIGHT: 68 IN | DIASTOLIC BLOOD PRESSURE: 65 MMHG | SYSTOLIC BLOOD PRESSURE: 103 MMHG | BODY MASS INDEX: 23.86 KG/M2 | WEIGHT: 157.44 LBS | HEART RATE: 61 BPM

## 2019-01-17 DIAGNOSIS — N13.8 BPH WITH URINARY OBSTRUCTION: Primary | ICD-10-CM

## 2019-01-17 DIAGNOSIS — N40.1 BPH WITH URINARY OBSTRUCTION: Primary | ICD-10-CM

## 2019-01-17 PROCEDURE — 1101F PT FALLS ASSESS-DOCD LE1/YR: CPT | Mod: CPTII,HCNC,S$GLB, | Performed by: UROLOGY

## 2019-01-17 PROCEDURE — 99999 PR PBB SHADOW E&M-EST. PATIENT-LVL III: ICD-10-PCS | Mod: PBBFAC,HCNC,, | Performed by: UROLOGY

## 2019-01-17 PROCEDURE — 99204 OFFICE O/P NEW MOD 45 MIN: CPT | Mod: HCNC,S$GLB,, | Performed by: UROLOGY

## 2019-01-17 PROCEDURE — 3078F PR MOST RECENT DIASTOLIC BLOOD PRESSURE < 80 MM HG: ICD-10-PCS | Mod: CPTII,HCNC,S$GLB, | Performed by: UROLOGY

## 2019-01-17 PROCEDURE — 1101F PR PT FALLS ASSESS DOC 0-1 FALLS W/OUT INJ PAST YR: ICD-10-PCS | Mod: CPTII,HCNC,S$GLB, | Performed by: UROLOGY

## 2019-01-17 PROCEDURE — 3074F SYST BP LT 130 MM HG: CPT | Mod: CPTII,HCNC,S$GLB, | Performed by: UROLOGY

## 2019-01-17 PROCEDURE — 99204 PR OFFICE/OUTPT VISIT, NEW, LEVL IV, 45-59 MIN: ICD-10-PCS | Mod: HCNC,S$GLB,, | Performed by: UROLOGY

## 2019-01-17 PROCEDURE — 3078F DIAST BP <80 MM HG: CPT | Mod: CPTII,HCNC,S$GLB, | Performed by: UROLOGY

## 2019-01-17 PROCEDURE — 3074F PR MOST RECENT SYSTOLIC BLOOD PRESSURE < 130 MM HG: ICD-10-PCS | Mod: CPTII,HCNC,S$GLB, | Performed by: UROLOGY

## 2019-01-17 PROCEDURE — 99999 PR PBB SHADOW E&M-EST. PATIENT-LVL III: CPT | Mod: PBBFAC,HCNC,, | Performed by: UROLOGY

## 2019-01-17 RX ORDER — PAROXETINE 10 MG/1
10 TABLET, FILM COATED ORAL EVERY MORNING
COMMUNITY

## 2019-01-17 NOTE — PROGRESS NOTES
Subjective:       Patient ID: Jeremie Bradshaw is a 84 y.o. male.    Chief Complaint: Advice Only (pt was having issue voiding out , but family members state he was rx lasix, anfd flomax 0.4mg , he was voiding too much but family members state pcp cut back on the lasix now because he voiding too much . but pt still taking flomax 0.4mg family member state completly turn around in the past 2o days his condition have gotten worse he not walking like before not driving and more,)    HPI    Jeremie Bradshaw is a 84 y.o. male with PMHx of BPH, kidney stones, and HTN here for management and evaluation of urinary frequency. Patient was admitted from 12/31/18 to 1/6/19 due to CHF with volume overload. He was diuresed and discharged. Now his daughter complains that he is voiding too much after he was prescribed Lasix. They recently decreased Lasix dosage from 80 mg to 40 mg. He takes 40 mg Lasix BID and 0.4 mg Flomax qd. His family notes that the patient's overall condition has deteriorated in the past 20 days. He experiences nocturia 4-5x. His daughter states that his heart won't tolerate any procedures. AUA symptom score is 29.    Past Medical History:   Diagnosis Date    Anemia     Basal cell carcinoma 7/2014    left medial canthus    Basal cell carcinoma 3/9/2015    right forehead    Basal cell carcinoma 09/08/2016    left neck (scoop shave)    Basal cell carcinoma 12/09/2016    left preauricular    BPH (benign prostatic hypertrophy) 8/17/2012    CAD (coronary artery disease) 8/17/2012    Cervical spine fracture 11/20/2012    CHF (congestive heart failure) 8/17/2012    Depression 8/17/2012    GERD (gastroesophageal reflux disease) 3/27/2014    Heart attack     History of atrial fibrillation 8/17/2012    Hyperlipidemia     Hypertension     Kidney stones 8/17/2012    Memory loss     Myocardial infarction     Osteoarthritis of lumbar spine 8/17/2012    Shoulder pain 8/17/2012    Stroke 8/17/2012       Past  Surgical History:   Procedure Laterality Date    basal cell carcinoma left ear      CARDIAC SURGERY      CATARACT EXTRACTION W/  INTRAOCULAR LENS IMPLANT Bilateral     CATARACT EXTRACTION, BILATERAL      CORONARY ARTERY BYPASS GRAFT  1990    x1    ECHOCARDIOGRAM-TRANSESOPHAGEAL N/A 6/2/2015    [facility] by [facility]    TONSILLECTOMY         Family History   Problem Relation Age of Onset    Cancer Father     Heart failure Mother     Heart disease Mother     No Known Problems Sister     No Known Problems Brother     No Known Problems Maternal Aunt     No Known Problems Maternal Uncle     No Known Problems Paternal Aunt     No Known Problems Paternal Uncle     No Known Problems Maternal Grandmother     No Known Problems Maternal Grandfather     No Known Problems Paternal Grandmother     No Known Problems Paternal Grandfather     Amblyopia Neg Hx     Blindness Neg Hx     Cataracts Neg Hx     Diabetes Neg Hx     Glaucoma Neg Hx     Hypertension Neg Hx     Macular degeneration Neg Hx     Retinal detachment Neg Hx     Strabismus Neg Hx     Thyroid disease Neg Hx     Stroke Neg Hx     Melanoma Neg Hx     Psoriasis Neg Hx     Lupus Neg Hx     Eczema Neg Hx     Acne Neg Hx        Social History     Socioeconomic History    Marital status:      Spouse name: Not on file    Number of children: Not on file    Years of education: Not on file    Highest education level: Not on file   Social Needs    Financial resource strain: Not on file    Food insecurity - worry: Not on file    Food insecurity - inability: Not on file    Transportation needs - medical: Not on file    Transportation needs - non-medical: Not on file   Occupational History    Occupation: Retired     Employer: retired   Tobacco Use    Smoking status: Never Smoker    Smokeless tobacco: Never Used   Substance and Sexual Activity    Alcohol use: No     Comment: social drinker    Drug use: No     Sexual activity: Not Currently   Other Topics Concern    Not on file   Social History Narrative    Not on file       Allergies:  Prednisone    Medications:    Current Outpatient Medications:     albuterol (ACCUNEB) 1.25 mg/3 mL Nebu, Take 3 mLs (1.25 mg total) by nebulization 4 (four) times daily. Rescue, Disp: 360 mL, Rfl: 0    albuterol (PROVENTIL/VENTOLIN HFA) 90 mcg/actuation inhaler, Inhale 2 puffs into the lungs every 6 (six) hours as needed for Wheezing or Shortness of Breath. Rescue, Disp: 1 Inhaler, Rfl: 0    apixaban (ELIQUIS) 2.5 mg Tab, Take 1 tablet (2.5 mg total) by mouth 2 (two) times daily., Disp: 180 tablet, Rfl: 3    aspirin (ECOTRIN) 81 MG EC tablet, Take 81 mg by mouth. Pt taken every other day with potasium, Disp: , Rfl:     atorvastatin (LIPITOR) 40 MG tablet, Take 1 tablet (40 mg total) by mouth once daily., Disp: 30 tablet, Rfl: 11    benzonatate (TESSALON PERLES) 100 MG capsule, Take 2 capsules (200 mg total) by mouth 3 (three) times daily as needed for Cough., Disp: 20 capsule, Rfl: 0    buPROPion (WELLBUTRIN XL) 300 MG 24 hr tablet, Take 1 tablet (300 mg total) by mouth every morning., Disp: 30 tablet, Rfl: 11    citalopram (CELEXA) 10 MG tablet, Take 1 tablet (10 mg total) by mouth once daily., Disp: 30 tablet, Rfl: 3    cyanocobalamin (VITAMIN B-12) 1000 MCG tablet, Take 1 tablet (1,000 mcg total) by mouth once daily., Disp: , Rfl:     desonide (DESOWEN) 0.05 % lotion, APPLY EXTERNALLY TO THE AFFECTED AREA TWICE DAILY AS NEEDED FOR REDNESS AND ITCHING, Disp: 1 Bottle, Rfl: 0    furosemide (LASIX) 40 MG tablet, Take 1 tablet (40 mg total) by mouth 2 (two) times daily., Disp: 60 tablet, Rfl: 11    levocetirizine (XYZAL) 5 MG tablet, Take 1 tablet (5 mg total) by mouth once daily., Disp: 30 tablet, Rfl: 0    losartan (COZAAR) 25 MG tablet, Take 1 tablet (25 mg total) by mouth once daily., Disp: 30 tablet, Rfl: 11    metoprolol succinate (TOPROL-XL) 25 MG 24 hr tablet, Take 1  tablet (25 mg total) by mouth once daily., Disp: 30 tablet, Rfl: 11    nitroGLYCERIN (NITROSTAT) 0.4 MG SL tablet, Place 1 tablet (0.4 mg total) under the tongue every 5 (five) minutes as needed., Disp: 25 tablet, Rfl: 3    omega-3 acid ethyl esters (LOVAZA) 1 gram capsule, Take 2 capsules (2 g total) by mouth once daily., Disp: 180 capsule, Rfl: 4    paroxetine (PAXIL) 10 MG tablet, Take 10 mg by mouth every morning., Disp: , Rfl:     polyethylene glycol (GLYCOLAX) 17 gram/dose powder, Take 17 g by mouth once daily., Disp: 1 Bottle, Rfl: 0    potassium chloride (MICRO-K) 10 MEQ CpSR, TAKE 2 CAPSULES BY MOUTH EVERY OTHER DAY, Disp: 30 capsule, Rfl: 11    tamsulosin (FLOMAX) 0.4 mg Cap, Take 1 capsule (0.4 mg total) by mouth every evening., Disp: 30 capsule, Rfl: 11    Review of Systems   Constitutional: Negative for activity change, appetite change, chills, diaphoresis, fatigue, fever and unexpected weight change.   HENT: Negative for congestion, dental problem, hearing loss, mouth sores, postnasal drip, rhinorrhea, sinus pressure and trouble swallowing.    Eyes: Negative for pain, discharge and itching.   Respiratory: Negative for apnea, cough, choking, chest tightness, shortness of breath and wheezing.    Cardiovascular: Negative for chest pain, palpitations and leg swelling.   Gastrointestinal: Negative for abdominal distention, abdominal pain, anal bleeding, blood in stool, constipation, diarrhea, nausea, rectal pain and vomiting.   Endocrine: Negative for polydipsia and polyuria.   Genitourinary: Positive for frequency. Negative for decreased urine volume, difficulty urinating, discharge, dysuria, enuresis, flank pain, genital sores, hematuria, penile pain, penile swelling and scrotal swelling.        Nocturia 4-5x.   Musculoskeletal: Negative for arthralgias, back pain and myalgias.   Skin: Negative for color change, rash and wound.   Neurological: Negative for dizziness, syncope, speech difficulty,  light-headedness and headaches.   Hematological: Negative for adenopathy. Does not bruise/bleed easily.   Psychiatric/Behavioral: Negative for behavioral problems, confusion and sleep disturbance.       Objective:      Physical Exam   Constitutional: He appears well-developed.   HENT:   Head: Normocephalic.   Neck: Neck supple.   Cardiovascular: Normal rate.    Pulmonary/Chest: Effort normal.   Abdominal: Soft.   Genitourinary:   Genitourinary Comments: PVR is 30 cc.  Patient declined BRIAN.   Neurological: He is alert.   Skin: Skin is warm.     Psychiatric: He has a normal mood and affect.       Assessment:       1. BPH with urinary obstruction        Plan:       Jeremie was seen today for advice only.    Diagnoses and all orders for this visit:    BPH with urinary obstruction          Patient will put up with current symptoms. Continue monitoring.  Patient is emptying his bladder.  Recommended sitting with his feet above his heart to drain the fluid accumulating in his feet.  Continue Flomax.  Continue Lasix per cardiology.  Patient declined BRIAN.  RTC prn.    Sinan FERNANDO, am acting as a scribe on this patient encounter in the presence and under the supervision of Dr. Moralez.    01/17/2019 8:51 AM    I, Dr. Moralez, personally performed the services described in this documentation.   All medical record entries made by the scribe were at my direction and in my presence.   I have reviewed the chart and agree that the record is accurate and complete.   Gregg Moralez MD.  9:30 AM 01/17/2019

## 2019-01-17 NOTE — TELEPHONE ENCOUNTER
Jason notify daughter    [facility] will not cover paxil for depression  Dr PAK sent in another medication - celexa or citalopram 10 mg - he is to take one tablet daily.      His blood work reveals that he is dehydrated. Hold lasix (furosemide) for the next 2 days then resume the lasix 40 mg once daily  Try to get as much fluid in him as possible

## 2019-01-17 NOTE — TELEPHONE ENCOUNTER
Home Health SOC 01/07/2019 to 03/07/2019 with [facility], Dr Tabatha Mc. SN,PT services.

## 2019-01-18 NOTE — DISCHARGE SUMMARY
Physician Attestation for Scribe:  I, Tabatha Mc MD, personally performed the services described in this documentation. All medical record entries made by the scribe were at my direction and in my presence.  I have reviewed the chart and agree that the record reflects my personal performance and is accurate and complete.   Tabatha Mc MD    Discharge Summary  Hospital Medicine     Attending Provider on Discharge: Tabatha Mc MD  [facility] Medicine Team: [facility] CARDIOLOGY TEAM C - FELLOWS/CONSULTS  Date of Admission:  12/31/2018     Date of Discharge:  1/6/2019  Code status: Full Code            Active Hospital Problems     Diagnosis   POA    *Acute on chronic combined systolic and diastolic congestive heart failure [I50.43]   Yes    CKD (chronic kidney disease), stage III [N18.3]   Yes       Chronic    Hyponatremia [E87.1]   Yes    Upper respiratory infection, viral [J06.9]   Yes    Diarrhea [R19.7]   Yes    Paroxysmal atrial fibrillation [I48.0]   Yes       Chronic    Essential hypertension [I10]   Yes       Chronic    Coronary artery disease involving native coronary artery of native heart without angina pectoris [I25.10]   Yes       Chronic    Hyperlipidemia [E78.5]   Yes       Chronic       Resolved Hospital Problems     Diagnosis Date Resolved POA    Elevated troponin [R74.8] 01/03/2019 Yes       Chronic         HPI  Patient is an 84 year old gentleman with a h/o CAD s/p CABG with history of remote MI (~8 years ago), HTN, A Fib on Eliquis, chronic systolic and diastolic HF, pulmonary HTN, aortic stenosis, and BPH.  Daughter at bedside assists with HPI.  He presents with worsening productive cough and SOB.  Patient also complains of diarrhea and mid-epigastric pain.  Patient notes that he initially began feeling poorly last week.  He was seen by Urgent Care on 12/27/2018 with complaints of congestion, cough, SOB, wheezing, and chest tightness.  He was started on Doxycycline 100mg BID, PRN tessalon  perles, Mucinex 600mg BID, and Xyzal 5mg daily.  Symptoms continued to worsen, so his daughter brought him to the ED on 12/30/2018.  Admission for observation and diuresis was recommended at that time, but patient refused and left AMA.  Daughter states that symptoms are worse today.  She notes SOB is worse when he lies flat and reports BLE.  She gave him an extra dose of Lasix, but this has not helped.  Patient denies chest pain, dizziness, palpitations, fever/chills, N/V, dysuria.  Abdominal pain is worse with eating.     Hospital Course     Overview  84 year old gentleman with a h/o CAD s/p CABG with history of remote MI (~8 years ago), HTN, A Fib on Eliquis, chronic systolic and diastolic HF, pulmonary HTN, aortic stenosis, and BPH admitted for acute on chronic combined systolic and diastolic congestive heart failure. Patient was diuresed with Lasix and per cardiology consult, dobutamine gtt. 2D Echo confirmed EF 20% with wall motion abnormalities, Biatrial enlargement and AS. Discharging on PO Lasix 40mg. OP cardiology f/u with discussion of possible benefits of CRT-D as an OP.      By Problem:     Acute on chronic combined systolic and diastolic congestive heart failure  AS (aortic stenosis)  Atrial fibrillation   Severe Pulmonary hypertension  Elevated troponin  Acute hypoxic respiratory failure  · BNP elevated to 2372 from 1464 two days ago.  · Troponin 0.154 > 0.207, baseline of 0.058. No evidence of STEMI on ECG. (1/1)  · CT abdomen/pelvis and CXR showed moderate bilateral pleural effusions and cardiomegaly.  · Echo (4/2018): EF 25% with diastolic dysfunction, moderate MR, moderate to severe AS, PASP 70.41mmHg.   · Increasing Lasix 40mg IV BID to 80mg IV BID.  Heart failure pathway initiated.  · Continue BB, ARB, ASA, statin.   · TTE ordered. Tn trending up; Consulting cardiology. (1/1)  · Wean oxygen as tolerated - Plan for walk  · Cardiology do not suspect ACS; continue current management. Echo pending.    · Troponin down-trending. 2D Echo (1/2): EF 20%. Wall motion abnormalities. Left ventricular diastolic dysfunction; Biatrial enlargement. Aortic stenosis with a dimensionless index of 0.28. Significantly reduced stroke volume; Moderate-to-severe MR; Moderate TR; PASP 77 mm Hg; Elevated CVP 15 mmHg. Dobutamine gtt started to augment diuresis per cardiology. (1/2)  · Diuresing well with improvement in breathing. Continuing with dobutamine gtt and IV Lasix. Cardiology to discuss possible benefits of CRT-D as an OP. (1/3)  · Discontinue IV dobutamine and IV pushes of furosemide. Furosemide 80 mg orally daily. (1/4)  · Check ambulatory stats. Per cardiology, changing carvedilol to Toprol XL 25mg and decreasing Lasix to 40mg PO BID. (1/5)      Acute bronchitis/Viral URI  · Patient seen by Urgent Care on 12/27/2018.  · Started on doxycycline 100mg BID (last dose to be 1/3/2018), PRN tessalon perles, Mucinex 600mg BID, and Xyzal 5mg daily; continuing current management.   · Received IV methylprednisolone in ED. Continuing with dexamethasone to complete 5 day course. Ordered Duonebs & cetirizine. (1/1)  · Influenza swab negative. Discharging on albuterol nebulizer.      Diarrhea  Epigastric abdominal pain  · CT abdomen/pelvis showed only diverticulosis coli without evidence of acute diverticulitis.  · Patient recently placed on antibiotics.  · C. Diff not collected, as no evidence of diarrhea since admission.     CKD (chronic kidney disease), stage III  · Stable with creatinine 1.4, GFR 45.8.  · Avoid nephrotoxic medications.     Coronary artery disease involving native coronary artery of native heart without angina pectoris  S/p CABG   Essential hypertension  Hyperlipidemia  · Continue carvedilol 3.125mg BID, losartan 25mg daily.  · Continue Lipitor 40mg daily  ·  Changed carvedilol to Toprol XL 25mg (1/5)     Paroxysmal atrial fibrillation  Anticoagulated by anticoagulation treatment  · Rate controlled in A  Fib.  · Continue BB, Eliquis.  · [id number] VASc score of at least 7 for CHF, HTN, age, CVA, and CAD     Hyponatremia, resolved  · On admit, Na 132. Osmolality wnl.            Recent Labs   Lab 01/02/19  0751 01/03/19  0329 01/06/19  0858   WBC 7.85 7.74 9.01   HGB 11.7* 11.4* 12.0*   HCT 36.2* 34.2* 39.4*   * 132* 151               Recent Labs   Lab 12/30/18  2350 01/01/19  0047 01/04/19 0528 01/05/19  0404 01/06/19  0338   * 132*   < > 136 139 140   K 4.4 4.8   < > 4.1 3.9 3.9    103   < > 100 99 102   CO2 22* 19*   < > 24 28 31*   BUN 32* 32*   < > 55* 59* 59*   CREATININE 1.4 1.4   < > 1.7* 1.7* 1.6*   GLU 96 101   < > 106 109 97   CALCIUM 9.2 9.2   < > 9.2 9.2 9.1   MG  --   --    < > 2.0 2.3 2.3   PHOS  --   --    < > 4.9* 4.5 4.1   LIPASE 39 32  --   --   --   --     < > = values in this interval not displayed.              Recent Labs   Lab 12/30/18  2350 01/01/19 0047 01/04/19 0528 01/05/19  0404 01/06/19  0338   ALKPHOS 72 70  --   --   --    ALT 10 16  --   --   --    AST 20 31  --   --   --    ALBUMIN 3.1* 3.1* 2.9* 2.8* 2.7*   PROT 6.7 6.9  --   --   --    BILITOT 0.8 0.9  --   --   --    INR  --  1.5*  --   --   --           Recent Labs   Lab 12/30/18 2354   POCTGLUCOSE 117*         Procedures: none     Consultants: Cardiology           Current Discharge Medication List             START taking these medications     Details   furosemide (LASIX) 40 MG tablet Take 1 tablet (40 mg total) by mouth 2 (two) times daily.  Qty: 60 tablet, Refills: 11       metoprolol succinate (TOPROL-XL) 25 MG 24 hr tablet Take 1 tablet (25 mg total) by mouth once daily.  Qty: 30 tablet, Refills: 11                 CONTINUE these medications which have CHANGED     Details   apixaban (ELIQUIS) 2.5 mg Tab Take 1 tablet (2.5 mg total) by mouth 2 (two) times daily.  Qty: 180 tablet, Refills: 3                 CONTINUE these medications which have NOT CHANGED     Details   albuterol (PROVENTIL/VENTOLIN HFA)  90 mcg/actuation inhaler Inhale 2 puffs into the lungs every 6 (six) hours as needed for Wheezing or Shortness of Breath. Rescue  Qty: 1 Inhaler, Refills: 0     Associated Diagnoses: Upper respiratory tract infection, unspecified type       aspirin (ECOTRIN) 81 MG EC tablet Take 81 mg by mouth. Pt taken every other day with potasium       atorvastatin (LIPITOR) 40 MG tablet Take 1 tablet (40 mg total) by mouth once daily.  Qty: 30 tablet, Refills: 11       benzonatate (TESSALON PERLES) 100 MG capsule Take 2 capsules (200 mg total) by mouth 3 (three) times daily as needed for Cough.  Qty: 20 capsule, Refills: 0     Associated Diagnoses: Upper respiratory tract infection, unspecified type       buPROPion (WELLBUTRIN XL) 300 MG 24 hr tablet Take 1 tablet (300 mg total) by mouth every morning.  Qty: 30 tablet, Refills: 11       cyanocobalamin (VITAMIN B-12) 1000 MCG tablet Take 1 tablet (1,000 mcg total) by mouth once daily.       desonide (DESOWEN) 0.05 % lotion APPLY EXTERNALLY TO THE AFFECTED AREA TWICE DAILY AS NEEDED FOR REDNESS AND ITCHING  Qty: 1 Bottle, Refills: 0       levocetirizine (XYZAL) 5 MG tablet Take 1 tablet (5 mg total) by mouth once daily.  Qty: 30 tablet, Refills: 0     Associated Diagnoses: Upper respiratory tract infection, unspecified type       losartan (COZAAR) 25 MG tablet Take 1 tablet (25 mg total) by mouth once daily.  Qty: 30 tablet, Refills: 11       nitroGLYCERIN (NITROSTAT) 0.4 MG SL tablet Place 1 tablet (0.4 mg total) under the tongue every 5 (five) minutes as needed.  Qty: 25 tablet, Refills: 3       omega-3 acid ethyl esters (LOVAZA) 1 gram capsule Take 2 capsules (2 g total) by mouth once daily.  Qty: 180 capsule, Refills: 4       potassium chloride (MICRO-K) 10 MEQ CpSR TAKE 2 CAPSULES BY MOUTH EVERY OTHER DAY  Qty: 30 capsule, Refills: 11       tamsulosin (FLOMAX) 0.4 mg Cap Take 1 capsule (0.4 mg total) by mouth every evening.  Qty: 30 capsule, Refills: 11                STOP  taking these medications         carvedilol (COREG) 3.125 MG tablet Comments:   Reason for Stopping:            carvedilol (COREG) 3.125 MG tablet Comments:   Reason for Stopping:            desonide (DESOWEN) 0.05 % cream Comments:   Reason for Stopping:             mg capsule Comments:   Reason for Stopping:            doxycycline (VIBRAMYCIN) 100 MG Cap Comments:   Reason for Stopping:            guaiFENesin (MUCINEX) 600 mg 12 hr tablet Comments:   Reason for Stopping:            hydrocortisone (WESTCORT) 0.2 % cream Comments:   Reason for Stopping:                    Discharge Diet:regular diet with Normal Fluid intake of 1500 - 2000 mL per day     Activity: activity as tolerated     Discharge Condition: Stable     Disposition: Home-Health Care Svc     Tests pending at the time of discharge: none      Time spent  on the discharge of the patient including review of hospital course with the patient. reviewing discharge medications and arranging follow-up care      Discharge examination Patient was seen and examined on the date of discharge and determined to be suitable for discharge.     Discharge plan and follow up:            Future Appointments   Date Time Provider Department Center   3/11/2019  9:00 AM LAB, APPOINTMENT [facility] LAB  [facility]   3/15/2019  9:00 AM Rocio Casas MD [facility]         Provider  Tabatha Mc MD     Scribe Attestation: I personally scribed for Tabatha Mc MD on 01/06/2019 at 7:51 AM. Electronically signed by brandon Hernandez on 01/06/2019 at 7:51 AM.

## 2019-01-21 ENCOUNTER — HOSPITAL ENCOUNTER (INPATIENT)
Facility: HOSPITAL | Age: 84
LOS: 11 days | Discharge: HOSPICE | DRG: 871 | End: 2019-02-01
Attending: EMERGENCY MEDICINE | Admitting: INTERNAL MEDICINE
Payer: MEDICARE

## 2019-01-21 DIAGNOSIS — Z51.5 PALLIATIVE CARE ENCOUNTER: ICD-10-CM

## 2019-01-21 DIAGNOSIS — E87.5 HYPERKALEMIA: ICD-10-CM

## 2019-01-21 DIAGNOSIS — Z71.89 GOALS OF CARE, COUNSELING/DISCUSSION: ICD-10-CM

## 2019-01-21 DIAGNOSIS — R55 SYNCOPE, UNSPECIFIED SYNCOPE TYPE: ICD-10-CM

## 2019-01-21 DIAGNOSIS — I50.9 CHF (CONGESTIVE HEART FAILURE): ICD-10-CM

## 2019-01-21 DIAGNOSIS — R79.89 ELEVATED TROPONIN: ICD-10-CM

## 2019-01-21 DIAGNOSIS — I95.9 HYPOTENSION: Primary | ICD-10-CM

## 2019-01-21 DIAGNOSIS — A41.9 SEPTIC SHOCK: ICD-10-CM

## 2019-01-21 DIAGNOSIS — R65.21 SEPTIC SHOCK: ICD-10-CM

## 2019-01-21 DIAGNOSIS — Z71.89 ADVANCED CARE PLANNING/COUNSELING DISCUSSION: ICD-10-CM

## 2019-01-21 DIAGNOSIS — I48.91 A-FIB: ICD-10-CM

## 2019-01-21 DIAGNOSIS — I35.0 AORTIC STENOSIS: ICD-10-CM

## 2019-01-21 PROBLEM — N17.9 AKI (ACUTE KIDNEY INJURY): Status: ACTIVE | Noted: 2019-01-21

## 2019-01-21 PROBLEM — R57.0 CARDIOGENIC SHOCK: Status: ACTIVE | Noted: 2019-01-21

## 2019-01-21 PROBLEM — R74.01 TRANSAMINITIS: Status: ACTIVE | Noted: 2019-01-21

## 2019-01-21 LAB
ALBUMIN SERPL BCP-MCNC: 2.5 G/DL
ALP SERPL-CCNC: 132 U/L
ALT SERPL W/O P-5'-P-CCNC: 340 U/L
ANION GAP SERPL CALC-SCNC: 13 MMOL/L
ANION GAP SERPL CALC-SCNC: 14 MMOL/L
ANION GAP SERPL CALC-SCNC: 14 MMOL/L
ANION GAP SERPL CALC-SCNC: 16 MMOL/L
AST SERPL-CCNC: 122 U/L
BACTERIA #/AREA URNS AUTO: ABNORMAL /HPF
BACTERIA #/AREA URNS AUTO: ABNORMAL /HPF
BASOPHILS # BLD AUTO: 0 K/UL
BASOPHILS NFR BLD: 0 %
BILIRUB SERPL-MCNC: 1.9 MG/DL
BILIRUB UR QL STRIP: NEGATIVE
BILIRUB UR QL STRIP: NEGATIVE
BNP SERPL-MCNC: 3676 PG/ML
BUN SERPL-MCNC: 65 MG/DL (ref 6–30)
BUN SERPL-MCNC: 74 MG/DL
BUN SERPL-MCNC: 77 MG/DL
BUN SERPL-MCNC: 79 MG/DL
BUN SERPL-MCNC: 80 MG/DL
CALCIUM SERPL-MCNC: 8 MG/DL
CALCIUM SERPL-MCNC: 8.5 MG/DL
CALCIUM SERPL-MCNC: 8.8 MG/DL
CALCIUM SERPL-MCNC: 9 MG/DL
CHLORIDE SERPL-SCNC: 100 MMOL/L
CHLORIDE SERPL-SCNC: 100 MMOL/L
CHLORIDE SERPL-SCNC: 100 MMOL/L (ref 95–110)
CHLORIDE SERPL-SCNC: 101 MMOL/L
CHLORIDE SERPL-SCNC: 102 MMOL/L
CLARITY UR REFRACT.AUTO: ABNORMAL
CLARITY UR REFRACT.AUTO: ABNORMAL
CO2 SERPL-SCNC: 17 MMOL/L
CO2 SERPL-SCNC: 17 MMOL/L
CO2 SERPL-SCNC: 18 MMOL/L
CO2 SERPL-SCNC: 19 MMOL/L
COLOR UR AUTO: ABNORMAL
COLOR UR AUTO: ABNORMAL
CREAT SERPL-MCNC: 2.7 MG/DL
CREAT SERPL-MCNC: 2.9 MG/DL (ref 0.5–1.4)
CREAT SERPL-MCNC: 3 MG/DL
CREAT SERPL-MCNC: 3.1 MG/DL
CREAT SERPL-MCNC: 3.1 MG/DL
DIFFERENTIAL METHOD: ABNORMAL
EOSINOPHIL # BLD AUTO: 0 K/UL
EOSINOPHIL NFR BLD: 0 %
ERYTHROCYTE [DISTWIDTH] IN BLOOD BY AUTOMATED COUNT: 16.6 %
EST. GFR  (AFRICAN AMERICAN): 20.3 ML/MIN/1.73 M^2
EST. GFR  (AFRICAN AMERICAN): 20.3 ML/MIN/1.73 M^2
EST. GFR  (AFRICAN AMERICAN): 21.1 ML/MIN/1.73 M^2
EST. GFR  (AFRICAN AMERICAN): 23.9 ML/MIN/1.73 M^2
EST. GFR  (NON AFRICAN AMERICAN): 17.5 ML/MIN/1.73 M^2
EST. GFR  (NON AFRICAN AMERICAN): 17.5 ML/MIN/1.73 M^2
EST. GFR  (NON AFRICAN AMERICAN): 18.2 ML/MIN/1.73 M^2
EST. GFR  (NON AFRICAN AMERICAN): 20.7 ML/MIN/1.73 M^2
GLUCOSE SERPL-MCNC: 112 MG/DL
GLUCOSE SERPL-MCNC: 112 MG/DL
GLUCOSE SERPL-MCNC: 194 MG/DL
GLUCOSE SERPL-MCNC: 75 MG/DL
GLUCOSE SERPL-MCNC: 76 MG/DL (ref 70–110)
GLUCOSE UR QL STRIP: NEGATIVE
GLUCOSE UR QL STRIP: NEGATIVE
HCT VFR BLD AUTO: 34.9 %
HCT VFR BLD CALC: 35 %PCV (ref 36–54)
HGB BLD-MCNC: 11.2 G/DL
HGB UR QL STRIP: ABNORMAL
HGB UR QL STRIP: ABNORMAL
HYALINE CASTS UR QL AUTO: 13 /LPF
HYALINE CASTS UR QL AUTO: 4 /LPF
IMM GRANULOCYTES # BLD AUTO: 0.05 K/UL
IMM GRANULOCYTES NFR BLD AUTO: 0.7 %
KETONES UR QL STRIP: NEGATIVE
KETONES UR QL STRIP: NEGATIVE
LACTATE SERPL-SCNC: 5.6 MMOL/L
LEUKOCYTE ESTERASE UR QL STRIP: NEGATIVE
LEUKOCYTE ESTERASE UR QL STRIP: NEGATIVE
LYMPHOCYTES # BLD AUTO: 1 K/UL
LYMPHOCYTES NFR BLD: 14.4 %
MAGNESIUM SERPL-MCNC: 2.6 MG/DL
MCH RBC QN AUTO: 29 PG
MCHC RBC AUTO-ENTMCNC: 32.1 G/DL
MCV RBC AUTO: 90 FL
MICROSCOPIC COMMENT: ABNORMAL
MICROSCOPIC COMMENT: ABNORMAL
MONOCYTES # BLD AUTO: 0.6 K/UL
MONOCYTES NFR BLD: 9.2 %
NEUTROPHILS # BLD AUTO: 5.2 K/UL
NEUTROPHILS NFR BLD: 75.7 %
NITRITE UR QL STRIP: NEGATIVE
NITRITE UR QL STRIP: NEGATIVE
NRBC BLD-RTO: 0 /100 WBC
PH UR STRIP: 5 [PH] (ref 5–8)
PH UR STRIP: 5 [PH] (ref 5–8)
PLATELET # BLD AUTO: 91 K/UL
PMV BLD AUTO: 12.8 FL
POC IONIZED CALCIUM: 0.94 MMOL/L (ref 1.06–1.42)
POC TCO2 (MEASURED): 18 MMOL/L (ref 23–29)
POCT GLUCOSE: 173 MG/DL (ref 70–110)
POCT GLUCOSE: 68 MG/DL (ref 70–110)
POCT GLUCOSE: 70 MG/DL (ref 70–110)
POCT GLUCOSE: 76 MG/DL (ref 70–110)
POTASSIUM BLD-SCNC: 6.4 MMOL/L (ref 3.5–5.1)
POTASSIUM SERPL-SCNC: 4.7 MMOL/L
POTASSIUM SERPL-SCNC: 5.3 MMOL/L
POTASSIUM SERPL-SCNC: 5.6 MMOL/L
POTASSIUM SERPL-SCNC: 6.2 MMOL/L
PROCALCITONIN SERPL IA-MCNC: 0.08 NG/ML
PROT SERPL-MCNC: 5.6 G/DL
PROT UR QL STRIP: NEGATIVE
PROT UR QL STRIP: NEGATIVE
RBC # BLD AUTO: 3.86 M/UL
RBC #/AREA URNS AUTO: 17 /HPF (ref 0–4)
RBC #/AREA URNS AUTO: 2 /HPF (ref 0–4)
SAMPLE: ABNORMAL
SODIUM BLD-SCNC: 132 MMOL/L (ref 136–145)
SODIUM SERPL-SCNC: 130 MMOL/L
SODIUM SERPL-SCNC: 131 MMOL/L
SODIUM SERPL-SCNC: 134 MMOL/L
SODIUM SERPL-SCNC: 136 MMOL/L
SP GR UR STRIP: 1.01 (ref 1–1.03)
SP GR UR STRIP: 1.01 (ref 1–1.03)
TROPONIN I SERPL DL<=0.01 NG/ML-MCNC: 1.45 NG/ML
URN SPEC COLLECT METH UR: ABNORMAL
URN SPEC COLLECT METH UR: ABNORMAL
WBC # BLD AUTO: 6.86 K/UL
WBC #/AREA URNS AUTO: 1 /HPF (ref 0–5)
WBC #/AREA URNS AUTO: 1 /HPF (ref 0–5)

## 2019-01-21 PROCEDURE — 81001 URINALYSIS AUTO W/SCOPE: CPT | Mod: 91,HCNC

## 2019-01-21 PROCEDURE — 63600175 PHARM REV CODE 636 W HCPCS: Mod: HCNC | Performed by: STUDENT IN AN ORGANIZED HEALTH CARE EDUCATION/TRAINING PROGRAM

## 2019-01-21 PROCEDURE — 84484 ASSAY OF TROPONIN QUANT: CPT | Mod: HCNC

## 2019-01-21 PROCEDURE — 83735 ASSAY OF MAGNESIUM: CPT | Mod: HCNC

## 2019-01-21 PROCEDURE — 51701 INSERT BLADDER CATHETER: CPT | Mod: HCNC

## 2019-01-21 PROCEDURE — 25000003 PHARM REV CODE 250: Mod: HCNC | Performed by: PHYSICIAN ASSISTANT

## 2019-01-21 PROCEDURE — 94640 AIRWAY INHALATION TREATMENT: CPT | Mod: HCNC

## 2019-01-21 PROCEDURE — 83880 ASSAY OF NATRIURETIC PEPTIDE: CPT | Mod: HCNC

## 2019-01-21 PROCEDURE — 25000003 PHARM REV CODE 250: Mod: HCNC | Performed by: STUDENT IN AN ORGANIZED HEALTH CARE EDUCATION/TRAINING PROGRAM

## 2019-01-21 PROCEDURE — 25000003 PHARM REV CODE 250: Mod: HCNC | Performed by: INTERNAL MEDICINE

## 2019-01-21 PROCEDURE — 93010 ELECTROCARDIOGRAM REPORT: CPT | Mod: HCNC,,, | Performed by: INTERNAL MEDICINE

## 2019-01-21 PROCEDURE — 96365 THER/PROPH/DIAG IV INF INIT: CPT | Mod: HCNC

## 2019-01-21 PROCEDURE — 82962 GLUCOSE BLOOD TEST: CPT | Mod: HCNC

## 2019-01-21 PROCEDURE — 80048 BASIC METABOLIC PNL TOTAL CA: CPT | Mod: 91,HCNC

## 2019-01-21 PROCEDURE — 51702 INSERT TEMP BLADDER CATH: CPT | Mod: HCNC

## 2019-01-21 PROCEDURE — 99291 PR CRITICAL CARE, E/M 30-74 MINUTES: ICD-10-PCS | Mod: HCNC,,, | Performed by: PHYSICIAN ASSISTANT

## 2019-01-21 PROCEDURE — 83605 ASSAY OF LACTIC ACID: CPT | Mod: HCNC

## 2019-01-21 PROCEDURE — 93005 ELECTROCARDIOGRAM TRACING: CPT | Mod: HCNC

## 2019-01-21 PROCEDURE — A4216 STERILE WATER/SALINE, 10 ML: HCPCS | Mod: HCNC | Performed by: STUDENT IN AN ORGANIZED HEALTH CARE EDUCATION/TRAINING PROGRAM

## 2019-01-21 PROCEDURE — 63600175 PHARM REV CODE 636 W HCPCS: Mod: HCNC | Performed by: PHYSICIAN ASSISTANT

## 2019-01-21 PROCEDURE — 93010 ELECTROCARDIOGRAM REPORT: CPT | Mod: HCNC,76,, | Performed by: INTERNAL MEDICINE

## 2019-01-21 PROCEDURE — 12000002 HC ACUTE/MED SURGE SEMI-PRIVATE ROOM: Mod: HCNC

## 2019-01-21 PROCEDURE — 99291 PR CRITICAL CARE, E/M 30-74 MINUTES: ICD-10-PCS | Mod: HCNC,GC,, | Performed by: INTERNAL MEDICINE

## 2019-01-21 PROCEDURE — 81001 URINALYSIS AUTO W/SCOPE: CPT | Mod: HCNC

## 2019-01-21 PROCEDURE — 84145 PROCALCITONIN (PCT): CPT | Mod: HCNC

## 2019-01-21 PROCEDURE — 99291 CRITICAL CARE FIRST HOUR: CPT | Mod: 25,HCNC

## 2019-01-21 PROCEDURE — 96366 THER/PROPH/DIAG IV INF ADDON: CPT | Mod: XS,HCNC

## 2019-01-21 PROCEDURE — 93010 EKG 12-LEAD: ICD-10-PCS | Mod: HCNC,76,, | Performed by: INTERNAL MEDICINE

## 2019-01-21 PROCEDURE — 96375 TX/PRO/DX INJ NEW DRUG ADDON: CPT | Mod: HCNC

## 2019-01-21 PROCEDURE — 99291 CRITICAL CARE FIRST HOUR: CPT | Mod: HCNC,,, | Performed by: PHYSICIAN ASSISTANT

## 2019-01-21 PROCEDURE — 80053 COMPREHEN METABOLIC PANEL: CPT | Mod: HCNC

## 2019-01-21 PROCEDURE — 87040 BLOOD CULTURE FOR BACTERIA: CPT | Mod: HCNC

## 2019-01-21 PROCEDURE — 99291 CRITICAL CARE FIRST HOUR: CPT | Mod: HCNC,GC,, | Performed by: INTERNAL MEDICINE

## 2019-01-21 PROCEDURE — 96367 TX/PROPH/DG ADDL SEQ IV INF: CPT | Mod: HCNC

## 2019-01-21 PROCEDURE — 85025 COMPLETE CBC W/AUTO DIFF WBC: CPT | Mod: HCNC

## 2019-01-21 PROCEDURE — 25000242 PHARM REV CODE 250 ALT 637 W/ HCPCS: Mod: HCNC | Performed by: PHYSICIAN ASSISTANT

## 2019-01-21 RX ORDER — ASPIRIN 81 MG/1
81 TABLET ORAL DAILY
Status: DISCONTINUED | OUTPATIENT
Start: 2019-01-21 | End: 2019-02-01 | Stop reason: HOSPADM

## 2019-01-21 RX ORDER — SODIUM CHLORIDE 0.9 % (FLUSH) 0.9 %
3 SYRINGE (ML) INJECTION EVERY 8 HOURS
Status: DISCONTINUED | OUTPATIENT
Start: 2019-01-21 | End: 2019-02-01 | Stop reason: HOSPADM

## 2019-01-21 RX ORDER — FUROSEMIDE 10 MG/ML
100 INJECTION INTRAMUSCULAR; INTRAVENOUS ONCE
Status: DISCONTINUED | OUTPATIENT
Start: 2019-01-21 | End: 2019-01-21

## 2019-01-21 RX ORDER — VANCOMYCIN HCL IN 5 % DEXTROSE 1G/250ML
15 PLASTIC BAG, INJECTION (ML) INTRAVENOUS
Status: DISCONTINUED | OUTPATIENT
Start: 2019-01-21 | End: 2019-01-21

## 2019-01-21 RX ORDER — ATORVASTATIN CALCIUM 10 MG/1
40 TABLET, FILM COATED ORAL DAILY
Status: DISCONTINUED | OUTPATIENT
Start: 2019-01-21 | End: 2019-01-21

## 2019-01-21 RX ORDER — OMEGA-3-ACID ETHYL ESTERS 1 G/1
2 CAPSULE, LIQUID FILLED ORAL DAILY
Status: DISCONTINUED | OUTPATIENT
Start: 2019-01-21 | End: 2019-01-29

## 2019-01-21 RX ORDER — DEXTROSE 50 % IN WATER (D50W) INTRAVENOUS SYRINGE
25
Status: COMPLETED | OUTPATIENT
Start: 2019-01-21 | End: 2019-01-21

## 2019-01-21 RX ORDER — PAROXETINE 10 MG/1
10 TABLET, FILM COATED ORAL EVERY MORNING
Status: DISCONTINUED | OUTPATIENT
Start: 2019-01-21 | End: 2019-02-01 | Stop reason: HOSPADM

## 2019-01-21 RX ORDER — SODIUM CHLORIDE 9 MG/ML
INJECTION, SOLUTION INTRAVENOUS CONTINUOUS
Status: ACTIVE | OUTPATIENT
Start: 2019-01-21 | End: 2019-01-22

## 2019-01-21 RX ORDER — VANCOMYCIN HCL IN 5 % DEXTROSE 1G/250ML
15 PLASTIC BAG, INJECTION (ML) INTRAVENOUS
Status: DISCONTINUED | OUTPATIENT
Start: 2019-01-21 | End: 2019-01-24

## 2019-01-21 RX ORDER — TAMSULOSIN HYDROCHLORIDE 0.4 MG/1
0.4 CAPSULE ORAL NIGHTLY
Status: DISCONTINUED | OUTPATIENT
Start: 2019-01-21 | End: 2019-01-21

## 2019-01-21 RX ORDER — NITROGLYCERIN 0.4 MG/1
0.4 TABLET SUBLINGUAL EVERY 5 MIN PRN
Status: DISCONTINUED | OUTPATIENT
Start: 2019-01-21 | End: 2019-02-01 | Stop reason: HOSPADM

## 2019-01-21 RX ORDER — CITALOPRAM 10 MG/1
10 TABLET ORAL DAILY
Status: DISCONTINUED | OUTPATIENT
Start: 2019-01-21 | End: 2019-02-01 | Stop reason: HOSPADM

## 2019-01-21 RX ORDER — ALBUTEROL SULFATE 90 UG/1
2 AEROSOL, METERED RESPIRATORY (INHALATION) EVERY 6 HOURS PRN
Status: DISCONTINUED | OUTPATIENT
Start: 2019-01-21 | End: 2019-02-01 | Stop reason: HOSPADM

## 2019-01-21 RX ORDER — ALBUTEROL SULFATE 2.5 MG/.5ML
10 SOLUTION RESPIRATORY (INHALATION)
Status: COMPLETED | OUTPATIENT
Start: 2019-01-21 | End: 2019-01-21

## 2019-01-21 RX ORDER — FUROSEMIDE 10 MG/ML
100 INJECTION INTRAMUSCULAR; INTRAVENOUS ONCE
Status: DISCONTINUED | OUTPATIENT
Start: 2019-01-21 | End: 2019-01-22

## 2019-01-21 RX ORDER — DOBUTAMINE HYDROCHLORIDE 400 MG/100ML
2.5 INJECTION, SOLUTION INTRAVENOUS CONTINUOUS
Status: DISCONTINUED | OUTPATIENT
Start: 2019-01-21 | End: 2019-01-25

## 2019-01-21 RX ORDER — BUPROPION HYDROCHLORIDE 150 MG/1
300 TABLET ORAL EVERY MORNING
Status: DISCONTINUED | OUTPATIENT
Start: 2019-01-21 | End: 2019-02-01 | Stop reason: HOSPADM

## 2019-01-21 RX ADMIN — ASPIRIN 81 MG: 81 TABLET, COATED ORAL at 08:01

## 2019-01-21 RX ADMIN — DEXTROSE MONOHYDRATE 25 G: 500 INJECTION PARENTERAL at 11:01

## 2019-01-21 RX ADMIN — OMEGA-3-ACID ETHYL ESTERS 2 G: 1 CAPSULE, LIQUID FILLED ORAL at 08:01

## 2019-01-21 RX ADMIN — SODIUM POLYSTYRENE SULFONATE 30 G: 15 SUSPENSION ORAL; RECTAL at 12:01

## 2019-01-21 RX ADMIN — ALBUTEROL SULFATE 10 MG: 2.5 SOLUTION RESPIRATORY (INHALATION) at 06:01

## 2019-01-21 RX ADMIN — SODIUM CHLORIDE 250 ML: 0.9 INJECTION, SOLUTION INTRAVENOUS at 05:01

## 2019-01-21 RX ADMIN — PIPERACILLIN AND TAZOBACTAM 4.5 G: 4; .5 INJECTION, POWDER, LYOPHILIZED, FOR SOLUTION INTRAVENOUS; PARENTERAL at 07:01

## 2019-01-21 RX ADMIN — BUPROPION HYDROCHLORIDE 300 MG: 150 TABLET, EXTENDED RELEASE ORAL at 07:01

## 2019-01-21 RX ADMIN — CITALOPRAM HYDROBROMIDE 10 MG: 10 TABLET ORAL at 08:01

## 2019-01-21 RX ADMIN — SODIUM CHLORIDE 250 ML: 0.9 INJECTION, SOLUTION INTRAVENOUS at 04:01

## 2019-01-21 RX ADMIN — APIXABAN 2.5 MG: 2.5 TABLET, FILM COATED ORAL at 10:01

## 2019-01-21 RX ADMIN — SODIUM CHLORIDE: 0.9 INJECTION, SOLUTION INTRAVENOUS at 06:01

## 2019-01-21 RX ADMIN — SODIUM POLYSTYRENE SULFONATE 30 G: 15 SUSPENSION ORAL; RECTAL at 05:01

## 2019-01-21 RX ADMIN — VANCOMYCIN HYDROCHLORIDE 1000 MG: 1 INJECTION, POWDER, FOR SOLUTION INTRAVENOUS at 10:01

## 2019-01-21 RX ADMIN — EPINEPHRINE 0.04 MCG/KG/MIN: 1 INJECTION INTRAMUSCULAR; INTRAVENOUS; SUBCUTANEOUS at 01:01

## 2019-01-21 RX ADMIN — PAROXETINE 10 MG: 10 TABLET, FILM COATED ORAL at 07:01

## 2019-01-21 RX ADMIN — DEXTROSE MONOHYDRATE 25 G: 500 INJECTION PARENTERAL at 07:01

## 2019-01-21 RX ADMIN — INSULIN HUMAN 10 UNITS: 100 INJECTION, SOLUTION PARENTERAL at 07:01

## 2019-01-21 RX ADMIN — ATORVASTATIN CALCIUM 40 MG: 10 TABLET, FILM COATED ORAL at 08:01

## 2019-01-21 RX ADMIN — Medication 3 ML: at 10:01

## 2019-01-21 RX ADMIN — APIXABAN 2.5 MG: 2.5 TABLET, FILM COATED ORAL at 08:01

## 2019-01-21 RX ADMIN — DOBUTAMINE HYDROCHLORIDE 5 MCG/KG/MIN: 200 INJECTION INTRAVENOUS at 11:01

## 2019-01-21 NOTE — ED NOTES
"Patient and family updated on POC.  Patient repositioned for comfort. Patient inadvertently disconnected IV. Sheets changed.  Patient reoriented to situation. Stating "I have to pee." Reminded placement of catheter. [place] intact draining scant urine to  bag. Holding lasix until map reaches 65 per MD order.  "

## 2019-01-21 NOTE — ASSESSMENT & PLAN NOTE
1/2/19 TTE    · Mild eccentric left ventricular hypertrophy. Severely decreased left ventricular systolic function. The estimated ejection fraction is 20%.  Wall motion abnormalities (see image)  · Left ventricular diastolic dysfunction - grade indeterminant. Increased LAP  · Biatrial enlargement.  · Mild right ventricular enlargement. Mildly reduced right ventricular systolic function.  · There is aortic stenosis with a dimensionless index of 0.28. There is significantly reduced stroke volume  · Moderate-to-severe mitral regurgitation.  · Moderate tricuspid regurgitation.  · The estimated PA systolic pressure is 77 mm Hg  · Elevated central venous pressure (15 mm Hg).

## 2019-01-21 NOTE — ED TRIAGE NOTES
Pt presents to ed per ems with cc of syncope. Pt has been having trouble with syncopal episodes while having a bowel movement secondary to constipation. Pt with generalized weakness. Family states loss of consciousness. Denies pt hitting his head. Pt complaining of nausea. Pt back to baseline at this time per family.

## 2019-01-21 NOTE — ASSESSMENT & PLAN NOTE
EKG: Afib; no changes to prior  Rate controlled    Plan:  Continue home apixiban 2.5mg BID  Continue to monitor rates

## 2019-01-21 NOTE — ED NOTES
Patient pulled out IV inadvertently. Patient confused not willing to turn. Assisted with 2 people to turn patient. Loose BM noted. Linens changed.Pericare provided.

## 2019-01-21 NOTE — SUBJECTIVE & OBJECTIVE
"Interval History:   Pt has no acute complaints. Says he "can't remember."  Per family at bedside, has had issues with mentation including memory.     Review of Systems   Unable to perform ROS: dementia     Objective:     Vital Signs (Most Recent):  Temp: 98.5 °F (36.9 °C) (01/21/19 1411)  Pulse: 80 (01/21/19 1411)  Resp: 17 (01/21/19 1412)  BP: (!) 86/46 (01/21/19 1411)  SpO2: 100 % (01/21/19 1411) Vital Signs (24h Range):  Temp:  [96.9 °F (36.1 °C)-98.5 °F (36.9 °C)] 98.5 °F (36.9 °C)  Pulse:  [62-80] 80  Resp:  [15-22] 17  SpO2:  [96 %-100 %] 100 %  BP: ()/(36-78) 86/46     Weight: 70.8 kg (156 lb)  Body mass index is 23.72 kg/m².     SpO2: 100 %  O2 Device (Oxygen Therapy): nasal cannula      Intake/Output Summary (Last 24 hours) at 1/21/2019 1513  Last data filed at 1/21/2019 1300  Gross per 24 hour   Intake --   Output 70 ml   Net -70 ml       Lines/Drains/Airways     Drain                 Urethral Catheter 01/21/19 1205 Non-latex less than 1 day          Peripheral Intravenous Line                 Peripheral IV - Single Lumen 01/21/19 0000 Right Forearm less than 1 day         Peripheral IV - Single Lumen 01/21/19 0729 Left Hand less than 1 day         Peripheral IV - Single Lumen 01/21/19 1144 Left Antecubital less than 1 day                Physical Exam   Constitutional: No distress.   Thin, frail-appearing, old white man   HENT:   Head: Normocephalic and atraumatic.   Mouth/Throat: Oropharyngeal exudate present.   Right auricle with hole   Eyes: Conjunctivae are normal. No scleral icterus.   Neck: Normal range of motion. Neck supple. No JVD present.   Cardiovascular: Normal rate, regular rhythm and intact distal pulses.   Murmur heard.  Pulmonary/Chest: Effort normal. No respiratory distress. He has no wheezes. He has rales. He exhibits no tenderness.   Abdominal: Soft. Bowel sounds are normal. There is no tenderness. There is no guarding.   Musculoskeletal: He exhibits edema (mild around ankles). "   Lymphadenopathy:     He has no cervical adenopathy.   Neurological: He is alert.   Skin: Skin is warm and dry. He is not diaphoretic.   Nursing note and vitals reviewed.      Significant Labs:   Recent Lab Results       01/21/19  1210   01/21/19  1203   01/21/19  1147   01/21/19  1123   01/21/19  1039        Immature Granulocytes               Immature Grans (Abs)               Procalcitonin               Albumin               Alkaline Phosphatase               ALT               Anion Gap     13         Appearance, UA   Hazy           AST               Bacteria, UA   Few           Baso #               Basophil%               Bilirubin (UA)   Negative           Total Bilirubin               BNP               BUN, Bld     79         Calcium     8.0         Chloride     100         CO2     17         Color, UA   Pauly           Creatinine     3.0         Differential Method               eGFR if      21.1         eGFR if non      18.2  Comment:  Calculation used to obtain the estimated glomerular filtration  rate (eGFR) is the CKD-EPI equation.            Eos #               Eosinophil%               Glucose     194         Glucose, UA   Negative           Gran # (ANC)               Gran%               Hematocrit               Hemoglobin               Hyaline Casts, UA   4           Ketones, UA   Negative           Lactate, Kevin               Leukocytes, UA   Negative           Lymph #               Lymph%               Magnesium               MCH               MCHC               MCV               Microscopic Comment   SEE COMMENT  Comment:  Other formed elements not mentioned in the report are not   present in the microscopic examination.              Mono #               Mono%               MPV               Nitrite, UA   Negative           nRBC               Occult Blood UA   3+           pH, UA   5.0           Platelets               POC BUN               POC Chloride                POC Creatinine               POC Glucose               POC Hematocrit               POC Ionized Calcium               POC Potassium               POC Sodium               POC TCO2 (MEASURED)               POCT Glucose 173     68 70     Potassium     5.6         Total Protein               Protein, UA   Negative  Comment:  Recommend a 24 hour urine protein or a urine   protein/creatinine ratio if globulin induced proteinuria is  clinically suspected.             RBC               RBC, UA   17           RDW               Sample               Sodium     130         Specific [place], UA   1.015           Specimen UA   Urine, Catheterized           Troponin I               WBC, UA   1           WBC                                01/21/19  0739   01/21/19  0730   01/21/19  0651   01/21/19  0619   01/21/19  0604        Immature Granulocytes               Immature Grans (Abs)               Procalcitonin   0.08  Comment:  A concentration < 0.25 ng/mL represents a low risk bacterial   infection.  Procalcitonin may not be accurate among patients with localized   infection, recent trauma or major surgery, immunosuppressed state,   invasive fungal infection, renal dysfunction. Decisions regarding   initiation or continuation of antibiotic therapy should not be based   solely on procalcitonin levels.             Albumin               Alkaline Phosphatase               ALT               Anion Gap               Appearance, UA         Hazy     AST               Bacteria, UA         Few     Baso #               Basophil%               Bilirubin (UA)         Negative     Total Bilirubin               BNP               BUN, Bld               Calcium               Chloride               CO2               Color, UA         Pauly     Creatinine               Differential Method               eGFR if                eGFR if non                Eos #               Eosinophil%               Glucose                Glucose, UA         Negative     Gran # (ANC)               Gran%               Hematocrit               Hemoglobin               Hyaline Casts, UA         13     Ketones, UA         Negative     Lactate, Kevin     5.6  Comment:  Falsely low lactic acid results can be found in samples   containing >=13.0 mg/dL total bilirubin and/or >=3.5 mg/dL   direct bilirubin.  *Critical value -   Results called to and read back by:niurka biggs rn           Leukocytes, UA         Negative     Lymph #               Lymph%               Magnesium               MCH               MCHC               MCV               Microscopic Comment         SEE COMMENT  Comment:  Other formed elements not mentioned in the report are not   present in the microscopic examination.        Mono #               Mono%               MPV               Nitrite, UA         Negative     nRBC               Occult Blood UA         2+     pH, UA         5.0     Platelets               POC BUN       65       POC Chloride       100       POC Creatinine       2.9       POC Glucose       76       POC Hematocrit       35       POC Ionized Calcium       0.94       POC Potassium       6.4       POC Sodium       132       POC TCO2 (MEASURED)       18       POCT Glucose 76             Potassium               Total Protein               Protein, UA         Negative  Comment:  Recommend a 24 hour urine protein or a urine   protein/creatinine ratio if globulin induced proteinuria is  clinically suspected.       RBC               RBC, UA         2     RDW               Sample       KEVIN       Sodium               Specific [place], UA         1.015     Specimen UA         Urine, Catheterized     Troponin I               WBC, UA         1     WBC                                01/21/19  0343        Immature Granulocytes 0.7     Immature Grans (Abs) 0.05  Comment:  Mild elevation in immature granulocytes is non specific and   can be seen in a variety of conditions including stress  response,   acute inflammation, trauma and pregnancy. Correlation with other   laboratory and clinical findings is essential.       Procalcitonin       Albumin 2.5     Alkaline Phosphatase 132          Anion Gap 14     Appearance, UA            Bacteria, UA       Baso # 0.00     Basophil% 0.0     Bilirubin (UA)       Total Bilirubin 1.9  Comment:  For infants and newborns, interpretation of results should be based  on gestational age, weight and in agreement with clinical  observations.  Premature Infant recommended reference ranges:  Up to 24 hours.............<8.0 mg/dL  Up to 48 hours............<12.0 mg/dL  3-5 days..................<15.0 mg/dL  6-29 days.................<15.0 mg/dL       BNP 3,676  Comment:  Values of less than 100 pg/ml are consistent with non-CHF populations.     BUN, Bld 74     Calcium 8.8     Chloride 100     CO2 17     Color, UA       Creatinine 2.7     Differential Method Automated     eGFR if African American 23.9     eGFR if non  20.7  Comment:  Calculation used to obtain the estimated glomerular filtration  rate (eGFR) is the CKD-EPI equation.        Eos # 0.0     Eosinophil% 0.0     Glucose 75     Glucose, UA       Gran # (ANC) 5.2     Gran% 75.7     Hematocrit 34.9     Hemoglobin 11.2     Hyaline Casts, UA       Ketones, UA       Lactate, Kevin       Leukocytes, UA       Lymph # 1.0     Lymph% 14.4     Magnesium 2.6     MCH 29.0     MCHC 32.1     MCV 90     Microscopic Comment       Mono # 0.6     Mono% 9.2     MPV 12.8     Nitrite, UA       nRBC 0     Occult Blood UA       pH, UA       Platelets 91     POC BUN       POC Chloride       POC Creatinine       POC Glucose       POC Hematocrit       POC Ionized Calcium       POC Potassium       POC Sodium       POC TCO2 (MEASURED)       POCT Glucose       Potassium 6.2  Comment:  *No Visible Hemolysis     Total Protein 5.6     Protein, UA       RBC 3.86     RBC, UA       RDW 16.6     Sample       Sodium 131      Specific Gravity, UA       Specimen UA       Troponin I 1.445  Comment:  The reference interval for Troponin I represents the 99th percentile   cutoff   for our facility and is consistent with 3rd generation assay   performance.       WBC, UA       WBC 6.86           Significant Imaging: Echocardiogram:   2D echo with color flow doppler:   Results for orders placed or performed during the hospital encounter of 04/22/18   2D echo with color flow doppler   Result Value Ref Range    QEF 25 (A) 55 - 65    Mitral Valve Regurgitation MODERATE (A)     Diastolic Dysfunction Yes (A)     Aortic Valve Regurgitation MILD     Aortic Valve Stenosis MODERATE TO SEVERE (A)     Est. PA Systolic Pressure 70.41 (A)     Mitral Valve Mobility NORMAL     Tricuspid Valve Regurgitation MILD     and Transthoracic echo (TTE) complete (Cupid Only):   Results for orders placed or performed during the hospital encounter of 12/31/18   Transthoracic echo (TTE) complete (Cupid Only)   Result Value Ref Range    Ascending aorta 3.11 cm    STJ 2.91 cm    AV mean gradient 8.26 mmHg    Ao peak silver 2.03 m/s    Ao VTI 32.78 cm    IVS 0.70 0.6 - 1.1 cm    LA size 4.65 cm    Left Atrium Major Axis 6.93 cm    Left Atrium Minor Axis 6.44 cm    LVIDD 7.10 (A) 3.5 - 6.0 cm    LVIDS 6.80 (A) 2.1 - 4.0 cm    LVOT diameter 2.01 cm    LVOT peak VTI 9.26 cm    PW 0.70 0.6 - 1.1 cm    RA Major Axis 5.93 cm    RA Width 4.08 cm    RVDD 4.02 cm    Sinus 3.10 cm    TAPSE 1.56 cm    TR Max Silver 3.94 m/s    TDI LATERAL 0.07     TDI SEPTAL 0.04     LA WIDTH 4.80 cm    LV Diastolic Volume 240.04 mL    LV Systolic Volume 229.56 mL    FS 4 %    LA volume 126.66 cm3    LV mass 213.77 g    Left Ventricle Relative Wall Thickness 0.20 cm    AV valve area 0.90 cm2    AV index (prosthetic) 0.28     Mean e' 0.06     LVOT area 3.17 cm2    LVOT stroke volume 29.37 cm3    AV peak gradient 16.48 mmHg    LV Systolic Volume Index 120.0 mL/m2    LV Diastolic Volume Index 125.46 mL/m2    LA  Volume Index 66.2 mL/m2    LV Mass Index 111.7 g/m2    Triscuspid Valve Regurgitation Peak Gradient 62.09 mmHg    BSA 1.92 m2    Right Atrial Pressure (from IVC) 15 mmHg    TV rest pulmonary artery pressure 77 mmHg      Results for orders placed or performed during the hospital encounter of 01/21/19   X-Ray Chest PA And Lateral    Narrative    EXAMINATION:  XR CHEST PA AND LATERAL    CLINICAL HISTORY:  Hypotension, unspecified    TECHNIQUE:  Single frontal view of the chest was performed.    COMPARISON:  January 1, 2019.    FINDINGS:  Sternotomy wires present.    Cardiomegaly with central vascular congestion, mild edema and small effusions, similar to prior.    Heart and lungs  appear unchanged when allowing for differences in technique and positioning.      Impression    Cardiomegaly with central vascular congestion, mild edema and small effusions, similar to prior.    No significant change from prior study.      Electronically signed by: Jimmy Denis MD  Date:    01/21/2019  Time:    04:08

## 2019-01-21 NOTE — HPI
84 year old M with PMHx significant for persistent atrial fibrillation on OAC, CAD s/p MI with CABG, ischemic cardiomyopathy (EF of 20-25%), BEAR thrombus, pulmonary HTN (Who Group III) and aortic stenosis who presents via EMS after a syncopal episode. Of note, Pt was admitted from 12/31/18 to 1/6/19 due to CHF with volume overload. He was diuresed and discharged. Family at bedside details that since discharge, Pt has been constipated and straining to move bowels. He has had a couple of episodes of decreased alertness, not complete syncope, when straining. Mental status returns to baseline within a few minutes. This AM family reports that they found the patient lying on his bed unresponsive.  Per EMS, patient was responsive only to pain upon their arrival with SBP 88 and he was given 100ml fluids en route. Upon presentation to the ED, Pt was alert but not amenable to answering questions. Family reports poor oral intake by Pt since most recent discharge. Lasix has been intermittently held. Yesterday, Pt had multiple BMs following use of stool softener. Other than episode of syncope, episodes of near syncope, ongoing chronic cough, and generalized weakness since most recent discharge, family reports no new symptoms such as F/C, wheezing, SOB, productive cough.

## 2019-01-21 NOTE — ED NOTES
MD called to notify patient's BP of 74/49 upon starting lasix drip, asking for parameters of BP before given 100mg of lasix, followed by lasix drip. MD stating he is ordering epi to get BP up, and with a map of 65, lasix is to be given. MD aware that dobutamine drip was just started. Patient awake and alert at this time, oriented to person and place.

## 2019-01-21 NOTE — ED NOTES
Report received from LYSSA Yun.    Pt identifiers for Jeremie Bradshaw 84 y.o. male checked and correct.    Patient lying in stretcher, appears to be resting comfortably and in no acute distress. Patient is clean and well groomed and clothing is properly fastened.    NEUROLOGICAL: The patient is awake, alert and oriented to person, time, place, and situation and speaking clearly and appropriately. [place] both ears, worse on the left. Eyes open spontaneously, behavior appropriate to situation, follows commands, facial expression symmetrical, purposeful motor response noted, normal sensation in all extremities when touched with a finger.  CARDIOVASCULAR: Patient has a normal rate but irregular rhythm, controlled Afib on cardiac monitor. Pulses intact. 1+ peripheral edema noted to BLE, capillary refill < 3 seconds.   RESPIRATORY: Airway is open, respirations are spontaneous, patient has a normal effort and rate, but has bilateral rales. Non- productive cough noted. Oxygen maintained at 4 lpm/ NC  GASTROINTESTINAL: Abdomen is soft and non-tender to palpation, no distention noted, active bowel sounds present in all four quadrants. Incontinent stool present- cleaned patient and clean diaper applied.  GENITOURINARY: Patient voids spontaneously without difficulty. Patient is incontinent.   MUSCULOSKELETAL: Patient moving all extremities spontaneously, no obvious swelling or deformities noted. Generalized weakness reported.  INTEGUMENTARY: The skin is warm and dry, color consistent with ethnicity, patient has normal skin turgor and moist mucus membranes.Skin tear to left forearm noted.    PSYCHOSOCIAL: Calm, pleasant, and cooperative but occasionally gets irritable. Son present at bedside.

## 2019-01-21 NOTE — ED NOTES
LOC: Pt is awake, alert, oriented to self and place. Affect is appropriate. Speech clear and appropriate.      Appearance: Pt resting comfortably in no acute distress. Pt clean and well groomed.     Skin: Skin warm and dry. Color consistent with ethnicity. Skin turgor normal. Mucus membranes dry. Skin intact. No breakdown or bruising noted.     Musculoskeletal: Pt moving upper and lower extremities without difficulty. Generalized weakness.     Respiratory: Airway open and patent. Respirations spontaneous, even, easy, and unlabored. Pt has normal effort and rate. No accessory muscle use noted. Non productive cough. Denies shortness of breath.     Cardiovascular: No peripheral edema noted. No complaints of chest pain. S1S2 present. Rate regular. Radial pulses 2+.     Abdominal: Abdomen soft and nontender. No distention noted. Denies n/v. Bowels sounds active x 4 quadrants.     Neurological: Eyes open spontaneously. Behavior appropriate to situation. Follows commands appropriately. Facial expression symmetrical. Purposeful motor response. Sensation intact.

## 2019-01-21 NOTE — ASSESSMENT & PLAN NOTE
Labs indicative of multiple organ injury (BUN 79, Cr 3, Trop 1.445, lactate 5.6, , ).   BNP elevated to 3676 compared to 1636 a few weeks ago.  Likely 2/2 decreased perfusion 2/2 cardiogenic shock.   CXR w/ central vascular congestion and small effusions.    Plan:  Diuresis with lasix after MAP increases to 65; Pt on dobutamine. Will add Epi pending central line. Pt refusing central line placement at this time.   Strict I/Os  Fluid Restriction  Hold home BB, ARB, statin  TTE pending

## 2019-01-21 NOTE — ED NOTES
Patient repositioned for comfort. Son at bedside. Updated on POC. Patient responds to verbal stimuli.

## 2019-01-21 NOTE — ED NOTES
Critical care at bedside and aware of patient's low BP. Family updated on POC by MD; family opting not to place central line at this time. MD educating family.

## 2019-01-21 NOTE — ED NOTES
Dr. Herbert notified that patient's family wants to speak with him about the central line placement. Family agrees that it is ok to place line at this time after speaking with another family member.

## 2019-01-21 NOTE — ED NOTES
Pt is Awake, alert  Oriented pt person, place and people  Reoriented to time  Resp moderate dept and reg Breath sounds clear to upper lung fields , crackles auscultated to lower lung fields Radial pulses strong and irregular  Saline lock # 20 ga to left hand and right forearm intact O2 at 4L/min per nasal cannula in place  Pt is on a cardiac monitor and pulse oximetry

## 2019-01-21 NOTE — SIGNIFICANT EVENT
Have discussed the need for central venous access for pressors given his low BP however the patient does not want it right now. He will discuss with his daughter when she arrives.     Andrei Herbert MD  Cardiology Fellow  Pager [phone]

## 2019-01-21 NOTE — ASSESSMENT & PLAN NOTE
Cr/BUN 2.7/74 on admit  Cr/BUN 1.6/59 a few weeks ago  Likely cardio-renal  Will kalpeshe  Will monitor for improvement

## 2019-01-21 NOTE — ED PROVIDER NOTES
Encounter Date: 1/21/2019       History     Chief Complaint   Patient presents with    Hypotension     Pt arrives via EMS for hypotension. BP 99/50 in triage.     84-year-old male with multiple comorbidities including CAD s/p CABG, ischemic cardiomyopathy with EF of 20%, AFib, CKD 3, partial hypotension presents for hypotension/possible syncopal episode.  Family reports that the found the patient on lying on his bed unresponsive.  Per EMS, patient was responsive only to pain upon their arrival.  Mildly hypertensive systolic blood pressure of 88.  Patient given approximately 100 mL of fluids EN route.  Patient is alert but does not answer most questions.  He denies any pain, shortness of breath or any complaints at this time.  Unable to obtain further history from the patient.  Patient's son and daughter reports that he has had multiple syncopal episodes over the past couple months.  States he has had multiple episodes while having bowel movements, most recently yesterday.          Review of patient's allergies indicates:   Allergen Reactions    Prednisone Other (See Comments)     hallucinations     Past Medical History:   Diagnosis Date    Anemia     Basal cell carcinoma 7/2014    left medial canthus    Basal cell carcinoma 3/9/2015    right forehead    Basal cell carcinoma 09/08/2016    left neck (scoop shave)    Basal cell carcinoma 12/09/2016    left preauricular    BPH (benign prostatic hypertrophy) 8/17/2012    CAD (coronary artery disease) 8/17/2012    Cervical spine fracture 11/20/2012    CHF (congestive heart failure) 8/17/2012    Depression 8/17/2012    GERD (gastroesophageal reflux disease) 3/27/2014    Heart attack     History of atrial fibrillation 8/17/2012    Hyperlipidemia     Hypertension     Kidney stones 8/17/2012    Memory loss     Myocardial infarction     Osteoarthritis of lumbar spine 8/17/2012    Shoulder pain 8/17/2012    Stroke 8/17/2012     Past Surgical History:    Procedure Laterality Date    basal cell carcinoma left ear      CARDIAC SURGERY      CATARACT EXTRACTION W/  INTRAOCULAR LENS IMPLANT Bilateral     CATARACT EXTRACTION, BILATERAL      CORONARY ARTERY BYPASS GRAFT  1990    x1    ECHOCARDIOGRAM-TRANSESOPHAGEAL N/A 6/2/2015    Performed by [facility] at [facility]    TONSILLECTOMY       Social History     Tobacco Use    Smoking status: Never Smoker    Smokeless tobacco: Never Used   Substance Use Topics    Alcohol use: No     Comment: social drinker    Drug use: No     Review of Systems   Unable to perform ROS: Dementia       Physical Exam     Initial Vitals [01/21/19 0239]   BP Pulse Resp Temp SpO2   (!) 99/50 64 20 97.8 °F (36.6 °C) 100 %      MAP       --         Vitals:    01/21/19 0808   BP: (!) 94/52   Pulse: 63   Resp: 20   Temp: 97.9 °F (36.6 °C)       Physical Exam    Nursing note and vitals reviewed.  Constitutional: He appears well-developed and well-nourished. He is not diaphoretic. No distress.   HENT:   Head: Normocephalic and atraumatic. Head is without raccoon's eyes, without Guy's sign, without abrasion, without contusion and without laceration.   Eyes: EOM are normal. Pupils are equal, round, and reactive to light.   Neck: Normal range of motion. Neck supple.   Cardiovascular: Normal rate, regular rhythm and intact distal pulses. Exam reveals no gallop and no friction rub.    Murmur heard.  Pulmonary/Chest: Breath sounds normal. No respiratory distress. He has no wheezes. He has no rhonchi. He has no rales. He exhibits no tenderness.   Abdominal: Soft. Bowel sounds are normal. He exhibits no distension and no mass. There is no tenderness. There is no rebound and no guarding.   Musculoskeletal: Normal range of motion. He exhibits no edema or tenderness.   Neurological: He is alert.   Oriented to place and self.  Answers some questions appropriately.  Obeys most commands.   Skin: Skin is warm and dry.         ED Course   Critical  Care  Date/Time: 1/21/2019 8:22 AM  Performed by: Jessenia Woodard PA-C  Authorized by: Mell Burnette MD   Direct patient critical care time: 30 minutes  Additional history critical care time: 10 minutes  Ordering / reviewing critical care time: 10 minutes  Documentation critical care time: 5 minutes  Consulting other physicians critical care time: 5 minutes  Consult with family critical care time: 5 minutes  Other critical care time: 0 minutes  Total critical care time (exclusive of procedural time) : 65 minutes  Critical care time was exclusive of separately billable procedures and treating other patients and teaching time.  Critical care was necessary to treat or prevent imminent or life-threatening deterioration of the following conditions: metabolic crisis and renal failure.  Critical care was time spent personally by me on the following activities: examination of patient, ordering and performing treatments and interventions, ordering and review of radiographic studies, re-evaluation of patient's condition, ordering and review of laboratory studies, evaluation of patient's response to treatment, obtaining history from patient or surrogate and development of treatment plan with patient or surrogate.        Labs Reviewed   CBC W/ AUTO DIFFERENTIAL - Abnormal; Notable for the following components:       Result Value    RBC 3.86 (*)     Hemoglobin 11.2 (*)     Hematocrit 34.9 (*)     RDW 16.6 (*)     Platelets 91 (*)     Immature Granulocytes 0.7 (*)     Immature Grans (Abs) 0.05 (*)     Gran% 75.7 (*)     Lymph% 14.4 (*)     All other components within normal limits   B-TYPE NATRIURETIC PEPTIDE - Abnormal; Notable for the following components:    BNP 3,676 (*)     All other components within normal limits   COMPREHENSIVE METABOLIC PANEL - Abnormal; Notable for the following components:    Sodium 131 (*)     Potassium 6.2 (*)     CO2 17 (*)     BUN, Bld 74 (*)     Creatinine 2.7 (*)     Total Protein 5.6  (*)     Albumin 2.5 (*)     Total Bilirubin 1.9 (*)      (*)      (*)     eGFR if  23.9 (*)     eGFR if non  20.7 (*)     All other components within normal limits   TROPONIN I - Abnormal; Notable for the following components:    Troponin I 1.445 (*)     All other components within normal limits   URINALYSIS, REFLEX TO URINE CULTURE - Abnormal; Notable for the following components:    Appearance, UA Hazy (*)     Occult Blood UA 2+ (*)     All other components within normal limits    Narrative:     Preferred Collection Type->Urine, Clean Catch   LACTIC ACID, PLASMA - Abnormal; Notable for the following components:    Lactate (Lactic Acid) 5.6 (*)     All other components within normal limits   URINALYSIS MICROSCOPIC - Abnormal; Notable for the following components:    Bacteria, UA Few (*)     Hyaline Casts, UA 13 (*)     All other components within normal limits    Narrative:     Preferred Collection Type->Urine, Clean Catch   ISTAT PROCEDURE - Abnormal; Notable for the following components:    POC BUN 65 (*)     POC Creatinine 2.9 (*)     POC Sodium 132 (*)     POC Potassium 6.4 (*)     POC TCO2 (MEASURED) 18 (*)     POC Ionized Calcium 0.94 (*)     POC Hematocrit 35 (*)     All other components within normal limits   CULTURE, BLOOD   CULTURE, BLOOD   MAGNESIUM   PROCALCITONIN   POCT GLUCOSE   ISTAT CHEM8     EKG Readings: (Independently Interpreted)   Initial Reading: No STEMI. Previous EKG: Compared with most recent EKG Previous EKG Date: 1/10/19. Rhythm: Atrial Fibrillation. Heart Rate: 72. Conduction: LBBB. Other Impression: similar to prior EKG       Imaging Results          X-Ray Chest PA And Lateral (Final result)  Result time 01/21/19 04:08:47    Final result by Jimmy Denis MD (01/21/19 04:08:47)                 Impression:      Cardiomegaly with central vascular congestion, mild edema and small effusions, similar to prior.    No significant change from  prior study.      Electronically signed by: Jimmy Denis MD  Date:    01/21/2019  Time:    04:08             Narrative:    EXAMINATION:  XR CHEST PA AND LATERAL    CLINICAL HISTORY:  Hypotension, unspecified    TECHNIQUE:  Single frontal view of the chest was performed.    COMPARISON:  January 1, 2019.    FINDINGS:  Sternotomy wires present.    Cardiomegaly with central vascular congestion, mild edema and small effusions, similar to prior.    Heart and lungs  appear unchanged when allowing for differences in technique and positioning.                                 Medical Decision Making:   History:   Old Medical Records: I decided to obtain old medical records.  Clinical Tests:   Lab Tests: Ordered and Reviewed  Radiological Study: Ordered and Reviewed  Medical Tests: Ordered and Reviewed  Other:   I have discussed this case with another health care provider.       APC / Resident Notes:   84-year-old male presenting for syncopal episode and hypotension.  He is alert, oriented only to self and place.  Borderline hypotensive at 99/50, vitals otherwise normal. Exam unrevealing. DDX includes vasovagal syncope, arrhythmia, ACS, CHF exacerbation, electrolyte derangement, dehydration, UTI.  Will check labs, do chest x-ray and reassess.    Labs notable for multiple concerning abnormalities.  Patient has decreasing renal function creatinine of 2.7.  Hyperkalemia with potassium of 6.2 without visible hemolysis.  Will give albuterol and repeat i-STAT to confirm.    BNP elevated at 3676.  Troponin elevated at 1.445.  Unclear if these elevations are real versus due to decreased excretion secondary to renal failure.  Will consult Cardiology.    Patient seen and evaluated by Cardiology.  They will admit the patient to the CCU.  Recheck i-STAT shows potassium of 6.4.  No peaked T-waves or EKG changes.  Will give dextrose, insulin.  Discussed results and plan with patient and family members, they are comfortable with  admission.  I discussed this patient with my supervising physician.         Attending Attestation:     Physician Attestation Statement for NP/PA:   I have conducted a face to face encounter with this patient in addition to the NP/PA, due to Medical Complexity    Other NP/PA Attestation Additions:    History of Present Illness: 84M with PMH CAD s/p CABG, CHF EF 20%, AFib, CKD 3, recurrent hypotension, dementia presents with unresponsive episode. Pt has had several syncopal and near syncopal events over the past few months, most recently while having bowel movements yesterday.  Today family found patient unresponsive while lying on his bed, per EMS patient only responsive to painful stimuli upon arrival and noted to be hypotensive to systolic 80s.  Per review of records patient frequently has hypotension.  Patient is endorsing fatigue and generalized weakness, but denying any pain, shortness of breath, or nausea.   Physical Exam: NAD, NCAT, thin, A&Ox2 (person, place), no focal neuro deficits, CTAB, irreg irreg, normal extremity ROM/m/s, abd s/nt/nd, no LE edema, skin dry and warm     Medical Decision Makin-year-old male with PMH CHF EF 20% presenting with recurrence near syncopal episodes.  Multiple lab abnormalities including worsening renal failure with creatinine 2.7, likely leading to hyperkalemia with potassium of 6.4.  Also demonstrating cardiac abnormalities including BNP elevation to 3600 and troponin elevation to 1.4, however no new EKG changes.  Hypotension appears to be recurrent and aggressive IV hydration held given patient's significantly decreased EF, no infectious etiology identified. Patient treated for hyperkalemia and admitted to CCU after cardiology consult.    I discussed the patient's care with Advanced Practice Clinician, and did see this patient with the APC.  I reviewed their note and agree with the history, physical, assessment, diagnosis, treatment, and disposition plan provided by the  Advanced Practice Clinician.                     Clinical Impression:   The primary encounter diagnosis was Hypotension. Diagnoses of Hyperkalemia, Elevated troponin, and Syncope, unspecified syncope type were also pertinent to this visit.      Disposition:   Disposition: Admitted  Condition: Serious                        Jessenia Woodard PA-C  01/21/19 0823       Mell Burnette MD  01/21/19 0931

## 2019-01-21 NOTE — ED NOTES
"MD notified of patient's hypotensive status ~80s/50s. Patient awake to verbal stimuli, when asked if he's dizzy or lightheaded, reports "maybe so."   BG recheck 68. Will give dextrose and dobutamine.  "

## 2019-01-21 NOTE — H&P
"[facility]  Cardiology  History and Physical     Patient Name: Jeremie Bradshaw  MRN: [id number]  Admission Date: 1/21/2019  Code Status: DNR   Attending Provider: Mell Burnette MD   Primary Care Physician: Rocio Casas MD  Principal Problem:Cardiogenic shock    Patient information was obtained from relative(s), EMS personnel, past medical records and ER records.     Subjective:     Chief Complaint:  Syncope    HPI:  84 year old M with PMHx significant for persistent atrial fibrillation on OAC, CAD s/p MI with CABG, ischemic cardiomyopathy (EF of 20-25%), BEAR thrombus, pulmonary HTN (Who Group III) and aortic stenosis who presents via EMS after a syncopal episode. Of note, Pt was admitted from 12/31/18 to 1/6/19 due to CHF with volume overload. He was diuresed and discharged. Family at bedside details that since discharge, Pt has been constipated and straining to move bowels. He has had a couple of episodes of decreased alertness, not complete syncope, when straining. Mental status returns to baseline within a few minutes. This AM family reports that they found the patient lying on his bed unresponsive.  Per EMS, patient was responsive only to pain upon their arrival with SBP 88 and he was given 100ml fluids en route. Upon presentation to the ED, Pt was alert but not amenable to answering questions. Family reports poor oral intake by Pt since most recent discharge. Lasix has been intermittently held. Yesterday, Pt had multiple BMs following use of stool softener. Other than episode of syncope, episodes of near syncope, ongoing chronic cough, and generalized weakness since most recent discharge, family reports no new symptoms such as F/C, wheezing, SOB, productive cough.    Interval History:   Pt has no acute complaints. Says he "can't remember."  Per family at bedside, has had issues with mentation including memory.     Review of Systems   Unable to perform ROS: dementia     Objective: "     Vital Signs (Most Recent):  Temp: 98.5 °F (36.9 °C) (01/21/19 1411)  Pulse: 80 (01/21/19 1411)  Resp: 17 (01/21/19 1412)  BP: (!) 86/46 (01/21/19 1411)  SpO2: 100 % (01/21/19 1411) Vital Signs (24h Range):  Temp:  [96.9 °F (36.1 °C)-98.5 °F (36.9 °C)] 98.5 °F (36.9 °C)  Pulse:  [62-80] 80  Resp:  [15-22] 17  SpO2:  [96 %-100 %] 100 %  BP: ()/(36-78) 86/46     Weight: 70.8 kg (156 lb)  Body mass index is 23.72 kg/m².     SpO2: 100 %  O2 Device (Oxygen Therapy): nasal cannula      Intake/Output Summary (Last 24 hours) at 1/21/2019 1513  Last data filed at 1/21/2019 1300  Gross per 24 hour   Intake --   Output 70 ml   Net -70 ml       Lines/Drains/Airways     Drain                 Urethral Catheter 01/21/19 1205 Non-latex less than 1 day          Peripheral Intravenous Line                 Peripheral IV - Single Lumen 01/21/19 0000 Right Forearm less than 1 day         Peripheral IV - Single Lumen 01/21/19 0729 Left Hand less than 1 day         Peripheral IV - Single Lumen 01/21/19 1144 Left Antecubital less than 1 day                Physical Exam   Constitutional: No distress.   Thin, frail-appearing, old white man   HENT:   Head: Normocephalic and atraumatic.   Mouth/Throat: Oropharyngeal exudate present.   Right auricle with hole   Eyes: Conjunctivae are normal. No scleral icterus.   Neck: Normal range of motion. Neck supple. No JVD present.   Cardiovascular: Normal rate, regular rhythm and intact distal pulses.   Murmur heard.  Pulmonary/Chest: Effort normal. No respiratory distress. He has no wheezes. He has rales. He exhibits no tenderness.   Abdominal: Soft. Bowel sounds are normal. There is no tenderness. There is no guarding.   Musculoskeletal: He exhibits edema (mild around ankles).   Lymphadenopathy:     He has no cervical adenopathy.   Neurological: He is alert.   Skin: Skin is warm and dry. He is not diaphoretic.   Nursing note and vitals reviewed.      Significant Labs:   Recent Lab Results        01/21/19  1210   01/21/19  1203   01/21/19  1147   01/21/19  1123   01/21/19  1039        Immature Granulocytes               Immature Grans (Abs)               Procalcitonin               Albumin               Alkaline Phosphatase               ALT               Anion Gap     13         Appearance, UA   Hazy           AST               Bacteria, UA   Few           Baso #               Basophil%               Bilirubin (UA)   Negative           Total Bilirubin               BNP               BUN, Bld     79         Calcium     8.0         Chloride     100         CO2     17         Color, UA   Pauly           Creatinine     3.0         Differential Method               eGFR if      21.1         eGFR if non      18.2  Comment:  Calculation used to obtain the estimated glomerular filtration  rate (eGFR) is the CKD-EPI equation.            Eos #               Eosinophil%               Glucose     194         Glucose, UA   Negative           Gran # (ANC)               Gran%               Hematocrit               Hemoglobin               Hyaline Casts, UA   4           Ketones, UA   Negative           Lactate, Kevin               Leukocytes, UA   Negative           Lymph #               Lymph%               Magnesium               MCH               MCHC               MCV               Microscopic Comment   SEE COMMENT  Comment:  Other formed elements not mentioned in the report are not   present in the microscopic examination.              Mono #               Mono%               MPV               Nitrite, UA   Negative           nRBC               Occult Blood UA   3+           pH, UA   5.0           Platelets               POC BUN               POC Chloride               POC Creatinine               POC Glucose               POC Hematocrit               POC Ionized Calcium               POC Potassium               POC Sodium               POC TCO2 (MEASURED)               POCT Glucose 173      68 70     Potassium     5.6         Total Protein               Protein, UA   Negative  Comment:  Recommend a 24 hour urine protein or a urine   protein/creatinine ratio if globulin induced proteinuria is  clinically suspected.             RBC               RBC, UA   17           RDW               Sample               Sodium     130         Specific [place], UA   1.015           Specimen UA   Urine, Catheterized           Troponin I               WBC, UA   1           WBC                                01/21/19  0739   01/21/19  0730   01/21/19  0651   01/21/19  0619   01/21/19  0604        Immature Granulocytes               Immature Grans (Abs)               Procalcitonin   0.08  Comment:  A concentration < 0.25 ng/mL represents a low risk bacterial   infection.  Procalcitonin may not be accurate among patients with localized   infection, recent trauma or major surgery, immunosuppressed state,   invasive fungal infection, renal dysfunction. Decisions regarding   initiation or continuation of antibiotic therapy should not be based   solely on procalcitonin levels.             Albumin               Alkaline Phosphatase               ALT               Anion Gap               Appearance, UA         Hazy     AST               Bacteria, UA         Few     Baso #               Basophil%               Bilirubin (UA)         Negative     Total Bilirubin               BNP               BUN, Bld               Calcium               Chloride               CO2               Color, UA         Pauly     Creatinine               Differential Method               eGFR if                eGFR if non                Eos #               Eosinophil%               Glucose               Glucose, UA         Negative     Gran # (ANC)               Gran%               Hematocrit               Hemoglobin               Hyaline Casts, UA         13     Ketones, UA         Negative     Lactate, Kevin      5.6  Comment:  Falsely low lactic acid results can be found in samples   containing >=13.0 mg/dL total bilirubin and/or >=3.5 mg/dL   direct bilirubin.  *Critical value -   Results called to and read back by:niurka biggs rn           Leukocytes, UA         Negative     Lymph #               Lymph%               Magnesium               MCH               MCHC               MCV               Microscopic Comment         SEE COMMENT  Comment:  Other formed elements not mentioned in the report are not   present in the microscopic examination.        Mono #               Mono%               MPV               Nitrite, UA         Negative     nRBC               Occult Blood UA         2+     pH, UA         5.0     Platelets               POC BUN       65       POC Chloride       100       POC Creatinine       2.9       POC Glucose       76       POC Hematocrit       35       POC Ionized Calcium       0.94       POC Potassium       6.4       POC Sodium       132       POC TCO2 (MEASURED)       18       POCT Glucose 76             Potassium               Total Protein               Protein, UA         Negative  Comment:  Recommend a 24 hour urine protein or a urine   protein/creatinine ratio if globulin induced proteinuria is  clinically suspected.       RBC               RBC, UA         2     RDW               Sample       MARC       Sodium               Specific [place], UA         1.015     Specimen UA         Urine, Catheterized     Troponin I               WBC, UA         1     WBC                                01/21/19  0343        Immature Granulocytes 0.7     Immature Grans (Abs) 0.05  Comment:  Mild elevation in immature granulocytes is non specific and   can be seen in a variety of conditions including stress response,   acute inflammation, trauma and pregnancy. Correlation with other   laboratory and clinical findings is essential.       Procalcitonin       Albumin 2.5     Alkaline Phosphatase 132           Anion Gap 14     Appearance, UA            Bacteria, UA       Baso # 0.00     Basophil% 0.0     Bilirubin (UA)       Total Bilirubin 1.9  Comment:  For infants and newborns, interpretation of results should be based  on gestational age, weight and in agreement with clinical  observations.  Premature Infant recommended reference ranges:  Up to 24 hours.............<8.0 mg/dL  Up to 48 hours............<12.0 mg/dL  3-5 days..................<15.0 mg/dL  6-29 days.................<15.0 mg/dL       BNP 3,676  Comment:  Values of less than 100 pg/ml are consistent with non-CHF populations.     BUN, Bld 74     Calcium 8.8     Chloride 100     CO2 17     Color, UA       Creatinine 2.7     Differential Method Automated     eGFR if African American 23.9     eGFR if non  20.7  Comment:  Calculation used to obtain the estimated glomerular filtration  rate (eGFR) is the CKD-EPI equation.        Eos # 0.0     Eosinophil% 0.0     Glucose 75     Glucose, UA       Gran # (ANC) 5.2     Gran% 75.7     Hematocrit 34.9     Hemoglobin 11.2     Hyaline Casts, UA       Ketones, UA       Lactate, Kevin       Leukocytes, UA       Lymph # 1.0     Lymph% 14.4     Magnesium 2.6     MCH 29.0     MCHC 32.1     MCV 90     Microscopic Comment       Mono # 0.6     Mono% 9.2     MPV 12.8     Nitrite, UA       nRBC 0     Occult Blood UA       pH, UA       Platelets 91     POC BUN       POC Chloride       POC Creatinine       POC Glucose       POC Hematocrit       POC Ionized Calcium       POC Potassium       POC Sodium       POC TCO2 (MEASURED)       POCT Glucose       Potassium 6.2  Comment:  *No Visible Hemolysis     Total Protein 5.6     Protein, UA       RBC 3.86     RBC, UA       RDW 16.6     Sample       Sodium 131     Specific Gravity, UA       Specimen UA       Troponin I 1.445  Comment:  The reference interval for Troponin I represents the 99th percentile   cutoff   for our facility and is consistent with 3rd generation  assay   performance.       WBC, UA       WBC 6.86           Significant Imaging: Echocardiogram:   2D echo with color flow doppler:   Results for orders placed or performed during the hospital encounter of 04/22/18   2D echo with color flow doppler   Result Value Ref Range    QEF 25 (A) 55 - 65    Mitral Valve Regurgitation MODERATE (A)     Diastolic Dysfunction Yes (A)     Aortic Valve Regurgitation MILD     Aortic Valve Stenosis MODERATE TO SEVERE (A)     Est. PA Systolic Pressure 70.41 (A)     Mitral Valve Mobility NORMAL     Tricuspid Valve Regurgitation MILD     and Transthoracic echo (TTE) complete (Cupid Only):   Results for orders placed or performed during the hospital encounter of 12/31/18   Transthoracic echo (TTE) complete (Cupid Only)   Result Value Ref Range    Ascending aorta 3.11 cm    STJ 2.91 cm    AV mean gradient 8.26 mmHg    Ao peak silver 2.03 m/s    Ao VTI 32.78 cm    IVS 0.70 0.6 - 1.1 cm    LA size 4.65 cm    Left Atrium Major Axis 6.93 cm    Left Atrium Minor Axis 6.44 cm    LVIDD 7.10 (A) 3.5 - 6.0 cm    LVIDS 6.80 (A) 2.1 - 4.0 cm    LVOT diameter 2.01 cm    LVOT peak VTI 9.26 cm    PW 0.70 0.6 - 1.1 cm    RA Major Axis 5.93 cm    RA Width 4.08 cm    RVDD 4.02 cm    Sinus 3.10 cm    TAPSE 1.56 cm    TR Max Silver 3.94 m/s    TDI LATERAL 0.07     TDI SEPTAL 0.04     LA WIDTH 4.80 cm    LV Diastolic Volume 240.04 mL    LV Systolic Volume 229.56 mL    FS 4 %    LA volume 126.66 cm3    LV mass 213.77 g    Left Ventricle Relative Wall Thickness 0.20 cm    AV valve area 0.90 cm2    AV index (prosthetic) 0.28     Mean e' 0.06     LVOT area 3.17 cm2    LVOT stroke volume 29.37 cm3    AV peak gradient 16.48 mmHg    LV Systolic Volume Index 120.0 mL/m2    LV Diastolic Volume Index 125.46 mL/m2    LA Volume Index 66.2 mL/m2    LV Mass Index 111.7 g/m2    Triscuspid Valve Regurgitation Peak Gradient 62.09 mmHg    BSA 1.92 m2    Right Atrial Pressure (from IVC) 15 mmHg    TV rest pulmonary artery pressure  77 mmHg      Results for orders placed or performed during the hospital encounter of 01/21/19   X-Ray Chest PA And Lateral    Narrative    EXAMINATION:  XR CHEST PA AND LATERAL    CLINICAL HISTORY:  Hypotension, unspecified    TECHNIQUE:  Single frontal view of the chest was performed.    COMPARISON:  January 1, 2019.    FINDINGS:  Sternotomy wires present.    Cardiomegaly with central vascular congestion, mild edema and small effusions, similar to prior.    Heart and lungs  appear unchanged when allowing for differences in technique and positioning.      Impression    Cardiomegaly with central vascular congestion, mild edema and small effusions, similar to prior.    No significant change from prior study.      Electronically signed by: Jimmy Denis MD  Date:    01/21/2019  Time:    04:08     Assessment and Plan:     * Cardiogenic shock    Labs indicative of multiple organ injury (BUN 79, Cr 3, Trop 1.445, lactate 5.6, , ).   BNP elevated to 3676 compared to 1636 a few weeks ago.  Likely 2/2 decreased perfusion 2/2 cardiogenic shock.   CXR w/ central vascular congestion and small effusions.    Plan:  Diuresis with lasix after MAP increases to 65; Pt on dobutamine. Will add Epi pending central line. Pt refusing central line placement at this time.   Strict I/Os  Fluid Restriction  Hold home BB, ARB, statin  TTE pending      Hyperkalemia    K 6.2 on exam  EKG: Afib; no changes to prior    Plan:  Shifting w/ kayexalate and insulin/dextrose  Will continue to monitor     Transaminitis    See cardiogenic shock     GALEN (acute kidney injury)    Cr/BUN 2.7/74 on admit  Cr/BUN 1.6/59 a few weeks ago  Likely cardio-renal  Will diurese  Will monitor for improvement     Hypotension    See cardiogenic shock     Paroxysmal atrial fibrillation    EKG: Afib; no changes to prior  Rate controlled    Plan:  Continue home apixiban 2.5mg BID  Continue to monitor rates     Chronic combined systolic and diastolic  congestive heart failure    1/2/19 TTE    · Mild eccentric left ventricular hypertrophy. Severely decreased left ventricular systolic function. The estimated ejection fraction is 20%.  Wall motion abnormalities (see image)  · Left ventricular diastolic dysfunction - grade indeterminant. Increased LAP  · Biatrial enlargement.  · Mild right ventricular enlargement. Mildly reduced right ventricular systolic function.  · There is aortic stenosis with a dimensionless index of 0.28. There is significantly reduced stroke volume  · Moderate-to-severe mitral regurgitation.  · Moderate tricuspid regurgitation.  · The estimated PA systolic pressure is 77 mm Hg  · Elevated central venous pressure (15 mm Hg).     Benign prostatic hyperplasia    Continue home tamsulosin      Depression    Continue home paroxetine, citalopram, wellbutrin     Hyperlipidemia    Holding statin  See cardiogenic shock     Coronary artery disease involving native coronary artery of native heart without angina pectoris    Continue home ASA  Holding BB, Statin, Ace-I  See cardiogenic shock         VTE Risk Mitigation (From admission, onward)        Ordered     apixaban tablet 2.5 mg  2 times daily      01/21/19 0657     IP VTE HIGH RISK PATIENT  Once      01/21/19 0657          Evette Ibarra MD  , PGY-1  [facility]

## 2019-01-21 NOTE — ED NOTES
Notified Dr SATURNINO Herbert on cardiology service of pt CBG = 70  Pt may have a cardiac soft diet verbal order readback Dr SATURNINO Herbert/SATURNINO Wilkerson,RN

## 2019-01-21 NOTE — ASSESSMENT & PLAN NOTE
K 6.2 on exam  EKG: Afib; no changes to prior    Plan:  Shifting w/ kayexalate and insulin/dextrose  Will continue to monitor

## 2019-01-22 PROBLEM — R65.21 SEPTIC SHOCK: Status: ACTIVE | Noted: 2019-01-22

## 2019-01-22 PROBLEM — A41.9 SEPTIC SHOCK: Status: ACTIVE | Noted: 2019-01-22

## 2019-01-22 LAB
ALBUMIN SERPL BCP-MCNC: 2.4 G/DL
ALP SERPL-CCNC: 109 U/L
ALT SERPL W/O P-5'-P-CCNC: 252 U/L
ANION GAP SERPL CALC-SCNC: 11 MMOL/L
ANION GAP SERPL CALC-SCNC: 12 MMOL/L
ANION GAP SERPL CALC-SCNC: 13 MMOL/L
ANION GAP SERPL CALC-SCNC: 13 MMOL/L
ANION GAP SERPL CALC-SCNC: 14 MMOL/L
ASCENDING AORTA: 3.32 CM
AST SERPL-CCNC: 82 U/L
AV INDEX (PROSTH): 0.28
AV MEAN GRADIENT: 12.34 MMHG
AV PEAK GRADIENT: 18.84 MMHG
AV VALVE AREA: 1 CM2
AV VELOCITY RATIO: 0.32
BASOPHILS # BLD AUTO: 0.01 K/UL
BASOPHILS NFR BLD: 0.1 %
BILIRUB SERPL-MCNC: 1.5 MG/DL
BUN SERPL-MCNC: 58 MG/DL
BUN SERPL-MCNC: 60 MG/DL
BUN SERPL-MCNC: 66 MG/DL
BUN SERPL-MCNC: 67 MG/DL
BUN SERPL-MCNC: 71 MG/DL
CALCIUM SERPL-MCNC: 7.6 MG/DL
CALCIUM SERPL-MCNC: 7.8 MG/DL
CALCIUM SERPL-MCNC: 8 MG/DL
CALCIUM SERPL-MCNC: 8 MG/DL
CALCIUM SERPL-MCNC: 8.1 MG/DL
CALCIUM SERPL-MCNC: 8.1 MG/DL
CALCIUM SERPL-MCNC: 8.2 MG/DL
CHLORIDE SERPL-SCNC: 101 MMOL/L
CHLORIDE SERPL-SCNC: 102 MMOL/L
CHLORIDE SERPL-SCNC: 105 MMOL/L
CHLORIDE SERPL-SCNC: 105 MMOL/L
CO2 SERPL-SCNC: 18 MMOL/L
CO2 SERPL-SCNC: 19 MMOL/L
CO2 SERPL-SCNC: 20 MMOL/L
CREAT SERPL-MCNC: 2.4 MG/DL
CREAT SERPL-MCNC: 2.5 MG/DL
CREAT SERPL-MCNC: 2.6 MG/DL
CREAT SERPL-MCNC: 2.6 MG/DL
CREAT SERPL-MCNC: 2.9 MG/DL
CV ECHO LV RWT: 0.28 CM
DIFFERENTIAL METHOD: ABNORMAL
DOP CALC AO PEAK VEL: 2.17 M/S
DOP CALC AO VTI: 40.76 CM
DOP CALC LVOT AREA: 3.53 CM2
DOP CALC LVOT DIAMETER: 2.12 CM
DOP CALC LVOT PEAK VEL: 0.7 M/S
DOP CALC LVOT STROKE VOLUME: 40.89 CM3
DOP CALCLVOT PEAK VEL VTI: 11.59 CM
ECHO LV POSTERIOR WALL: 0.98 CM (ref 0.6–1.1)
EOSINOPHIL # BLD AUTO: 0 K/UL
EOSINOPHIL NFR BLD: 0.1 %
ERYTHROCYTE [DISTWIDTH] IN BLOOD BY AUTOMATED COUNT: 16.8 %
EST. GFR  (AFRICAN AMERICAN): 22 ML/MIN/1.73 M^2
EST. GFR  (AFRICAN AMERICAN): 25.1 ML/MIN/1.73 M^2
EST. GFR  (AFRICAN AMERICAN): 25.1 ML/MIN/1.73 M^2
EST. GFR  (AFRICAN AMERICAN): 26.3 ML/MIN/1.73 M^2
EST. GFR  (AFRICAN AMERICAN): 27.6 ML/MIN/1.73 M^2
EST. GFR  (NON AFRICAN AMERICAN): 19 ML/MIN/1.73 M^2
EST. GFR  (NON AFRICAN AMERICAN): 21.7 ML/MIN/1.73 M^2
EST. GFR  (NON AFRICAN AMERICAN): 21.7 ML/MIN/1.73 M^2
EST. GFR  (NON AFRICAN AMERICAN): 22.7 ML/MIN/1.73 M^2
EST. GFR  (NON AFRICAN AMERICAN): 23.9 ML/MIN/1.73 M^2
FRACTIONAL SHORTENING: 7 % (ref 28–44)
GLUCOSE SERPL-MCNC: 123 MG/DL
GLUCOSE SERPL-MCNC: 137 MG/DL
GLUCOSE SERPL-MCNC: 144 MG/DL
GLUCOSE SERPL-MCNC: 144 MG/DL
GLUCOSE SERPL-MCNC: 147 MG/DL
GLUCOSE SERPL-MCNC: 165 MG/DL
GLUCOSE SERPL-MCNC: 175 MG/DL
HCT VFR BLD AUTO: 32.6 %
HGB BLD-MCNC: 10.6 G/DL
IMM GRANULOCYTES # BLD AUTO: 0.05 K/UL
IMM GRANULOCYTES NFR BLD AUTO: 0.5 %
INTERVENTRICULAR SEPTUM: 0.8 CM (ref 0.6–1.1)
LA MAJOR: 6.7 CM
LA MINOR: 6.42 CM
LA WIDTH: 5.86 CM
LACTATE SERPL-SCNC: 3.1 MMOL/L
LEFT ATRIUM SIZE: 4.38 CM
LEFT ATRIUM VOLUME INDEX: 77.8 ML/M2
LEFT ATRIUM VOLUME: 143.05 CM3
LEFT INTERNAL DIMENSION IN SYSTOLE: 6.47 CM (ref 2.1–4)
LEFT VENTRICLE DIASTOLIC VOLUME INDEX: 136.29 ML/M2
LEFT VENTRICLE DIASTOLIC VOLUME: 250.64 ML
LEFT VENTRICLE MASS INDEX: 149.1 G/M2
LEFT VENTRICLE SYSTOLIC VOLUME INDEX: 116.4 ML/M2
LEFT VENTRICLE SYSTOLIC VOLUME: 214.09 ML
LEFT VENTRICULAR INTERNAL DIMENSION IN DIASTOLE: 6.94 CM (ref 3.5–6)
LEFT VENTRICULAR MASS: 274.16 G
LYMPHOCYTES # BLD AUTO: 0.9 K/UL
LYMPHOCYTES NFR BLD: 8.3 %
MCH RBC QN AUTO: 30.1 PG
MCHC RBC AUTO-ENTMCNC: 32.5 G/DL
MCV RBC AUTO: 93 FL
MONOCYTES # BLD AUTO: 1.2 K/UL
MONOCYTES NFR BLD: 11.6 %
NEUTROPHILS # BLD AUTO: 8.4 K/UL
NEUTROPHILS NFR BLD: 79.4 %
NRBC BLD-RTO: 0 /100 WBC
PISA TR MAX VEL: 3.82 M/S
PLATELET # BLD AUTO: 103 K/UL
PMV BLD AUTO: 12.5 FL
POCT GLUCOSE: 123 MG/DL (ref 70–110)
POTASSIUM SERPL-SCNC: 3.2 MMOL/L
POTASSIUM SERPL-SCNC: 3.4 MMOL/L
POTASSIUM SERPL-SCNC: 3.4 MMOL/L
POTASSIUM SERPL-SCNC: 3.8 MMOL/L
POTASSIUM SERPL-SCNC: 3.8 MMOL/L
POTASSIUM SERPL-SCNC: 3.9 MMOL/L
POTASSIUM SERPL-SCNC: 4 MMOL/L
PROT SERPL-MCNC: 5.2 G/DL
RA MAJOR: 5.84 CM
RA PRESSURE: 15 MMHG
RA WIDTH: 3.28 CM
RBC # BLD AUTO: 3.52 M/UL
RIGHT VENTRICULAR END-DIASTOLIC DIMENSION: 3.73 CM
SINUS: 3.23 CM
SODIUM SERPL-SCNC: 134 MMOL/L
SODIUM SERPL-SCNC: 135 MMOL/L
SODIUM SERPL-SCNC: 138 MMOL/L
STJ: 3.17 CM
TDI LATERAL: 0.09
TDI SEPTAL: 0.06
TDI: 0.08
TR MAX PG: 58.37 MMHG
TRICUSPID ANNULAR PLANE SYSTOLIC EXCURSION: 1.57 CM
TV REST PULMONARY ARTERY PRESSURE: 73 MMHG
WBC # BLD AUTO: 10.52 K/UL

## 2019-01-22 PROCEDURE — 80048 BASIC METABOLIC PNL TOTAL CA: CPT | Mod: HCNC

## 2019-01-22 PROCEDURE — 20000000 HC ICU ROOM: Mod: HCNC

## 2019-01-22 PROCEDURE — 25000003 PHARM REV CODE 250: Mod: HCNC | Performed by: STUDENT IN AN ORGANIZED HEALTH CARE EDUCATION/TRAINING PROGRAM

## 2019-01-22 PROCEDURE — 63600175 PHARM REV CODE 636 W HCPCS: Mod: HCNC | Performed by: STUDENT IN AN ORGANIZED HEALTH CARE EDUCATION/TRAINING PROGRAM

## 2019-01-22 PROCEDURE — 80053 COMPREHEN METABOLIC PANEL: CPT | Mod: HCNC

## 2019-01-22 PROCEDURE — 80048 BASIC METABOLIC PNL TOTAL CA: CPT | Mod: 91,HCNC

## 2019-01-22 PROCEDURE — 99232 SBSQ HOSP IP/OBS MODERATE 35: CPT | Mod: HCNC,GC,, | Performed by: INTERNAL MEDICINE

## 2019-01-22 PROCEDURE — 85025 COMPLETE CBC W/AUTO DIFF WBC: CPT | Mod: HCNC

## 2019-01-22 PROCEDURE — 99232 PR SUBSEQUENT HOSPITAL CARE,LEVL II: ICD-10-PCS | Mod: HCNC,GC,, | Performed by: INTERNAL MEDICINE

## 2019-01-22 PROCEDURE — 83605 ASSAY OF LACTIC ACID: CPT | Mod: HCNC

## 2019-01-22 RX ORDER — SODIUM CHLORIDE 9 MG/ML
INJECTION, SOLUTION INTRAVENOUS CONTINUOUS
Status: ACTIVE | OUTPATIENT
Start: 2019-01-22 | End: 2019-01-22

## 2019-01-22 RX ADMIN — PIPERACILLIN AND TAZOBACTAM 4.5 G: 4; .5 INJECTION, POWDER, LYOPHILIZED, FOR SOLUTION INTRAVENOUS; PARENTERAL at 07:01

## 2019-01-22 RX ADMIN — SODIUM CHLORIDE: 0.9 INJECTION, SOLUTION INTRAVENOUS at 08:01

## 2019-01-22 RX ADMIN — VANCOMYCIN HYDROCHLORIDE 1000 MG: 1 INJECTION, POWDER, FOR SOLUTION INTRAVENOUS at 07:01

## 2019-01-22 RX ADMIN — APIXABAN 2.5 MG: 2.5 TABLET, FILM COATED ORAL at 08:01

## 2019-01-22 RX ADMIN — EPINEPHRINE 0.02 MCG/KG/MIN: 1 INJECTION INTRAMUSCULAR; INTRAVENOUS; SUBCUTANEOUS at 12:01

## 2019-01-22 RX ADMIN — ASPIRIN 81 MG: 81 TABLET, COATED ORAL at 08:01

## 2019-01-22 RX ADMIN — OMEGA-3-ACID ETHYL ESTERS 2 G: 1 CAPSULE, LIQUID FILLED ORAL at 08:01

## 2019-01-22 RX ADMIN — CITALOPRAM HYDROBROMIDE 10 MG: 10 TABLET ORAL at 08:01

## 2019-01-22 RX ADMIN — APIXABAN 2.5 MG: 2.5 TABLET, FILM COATED ORAL at 09:01

## 2019-01-22 RX ADMIN — PIPERACILLIN AND TAZOBACTAM 4.5 G: 4; .5 INJECTION, POWDER, LYOPHILIZED, FOR SOLUTION INTRAVENOUS; PARENTERAL at 06:01

## 2019-01-22 RX ADMIN — BUPROPION HYDROCHLORIDE 300 MG: 150 TABLET, EXTENDED RELEASE ORAL at 06:01

## 2019-01-22 RX ADMIN — PAROXETINE 10 MG: 10 TABLET, FILM COATED ORAL at 06:01

## 2019-01-22 RX ADMIN — EPINEPHRINE 0.1 MCG/KG/MIN: 1 INJECTION INTRAMUSCULAR; INTRAVENOUS; SUBCUTANEOUS at 06:01

## 2019-01-22 NOTE — ED NOTES
Pt resting comfortably. Family at bedside. Ice cream given per request. Awaiting room assignment. Dobutamine, Epi, Zosyn, Vanc and NS continue to infuse. No redness, swelling noted at site. Bed in low, locked position, Call light within reach. Side rails up x 2. Will continue to monitor.

## 2019-01-22 NOTE — ASSESSMENT & PLAN NOTE
K 6.2 on exam  EKG: Afib; no changes to prior    Plan:  S/p Shifting w/ kayexalate and insulin/dextrose  Will continue to monitor

## 2019-01-22 NOTE — ED NOTES
Pt in bed sleeping. Aware we are still waiting on bed. Pt on cardiac, bp and o2 monitor. RR 20 even and unlabored.

## 2019-01-22 NOTE — ED NOTES
LOC: The patient is awake, alert, and aware of environment. The patient is oriented x 2 and speaking appropriately.   APPEARANCE: No acute distress noted.   PSYCHOSOCIAL: Patient is calm and cooperative.   SKIN: The skin is warm, dry.   RESPIRATORY: Airway is open and patent. Bilateral chest rise and fall. Respirations are spontaneous, even and unlabored. Normal effort and rate noted. No accessory muscle use noted.   CARDIAC:  Denies chest pain or SOB.   ABDOMEN: Soft and non tender to palpation. No distention noted.   URINARY:  Voids independently.   NEUROLOGIC: Eyes open spontaneously. Speech clear. Tolerating saliva secretions well. Able to follow commands, demonstrating ability to actively and appropriately communicate within context of current conversation. Symmetrical facial muscles. Moving all extremities well. Movement is purposeful.   MUSCULOSKELETAL: No obvious deformities noted.

## 2019-01-22 NOTE — ED NOTES
Patient is resting comfortably. Pt moved to hospital bed. Gown and blankets changed. Elizabeth, RN, OC spoke with family regarding ICU room availability and delay in room assignment. Family verbalized understanding. Will continue to monitor.

## 2019-01-22 NOTE — ED NOTES
Pt resting in bed on cardiac, bp and o2 monitor. Pt daughter call and notified he was assigned a bed in [facility]. RR 20 even and unlabored.

## 2019-01-22 NOTE — ED NOTES
Care assumed. Pt resting in bed on cardiac, bp and o2 monitor. RR 20 even and unlabored. Pt daughter at bedside and Is aware  that we are still waiting for ICU bed.

## 2019-01-22 NOTE — PROGRESS NOTES
[facility]  Cardiology  Progress Note    Patient Name: Jeremie Bradshaw  MRN: [id number]  Admission Date: 1/21/2019  Hospital Length of Stay: 1 days  Code Status: DNR   Attending Physician: Mell Burnette MD   Primary Care Physician: Rocio Casas MD  Expected Discharge Date:   Principal Problem:Septic shock    Subjective:     Hospital Course:   Pt admitted 1/21/19 with cardiogenic shock vs septic shock in the setting of acute on chronic CHF due to systolic and diastolic dysfunction. On admit, BNP elevated and pulmonary congestion on CXR. However, clinically, Pt appeared euvolemic and history of recent poor oral intake. Septic shock suspected > cardiac shock and fluids and broad spectrum Abx initiated in addition to dobutamine and Epi. 1/22/19 Dobutamine and IVF continued. Epi parameters placed; will begin to wean.     Interval History:   Pt has no complaints this AM of CP, SOB.   He is resting comfortably.     Review of Systems   Constitution: Negative for chills and fever.   HENT: Negative for congestion.    Eyes: Negative for visual disturbance.   Cardiovascular: Negative for chest pain and palpitations.   Respiratory: Positive for cough. Negative for shortness of breath and wheezing.    Gastrointestinal: Negative for abdominal pain, nausea and vomiting.   Neurological: Negative for headaches.   Psychiatric/Behavioral: Negative for altered mental status.     Objective:     Vital Signs (Most Recent):  Temp: 97.4 °F (36.3 °C) (01/22/19 0710)  Pulse: 76 (01/22/19 1609)  Resp: 19 (01/22/19 1609)  BP: (!) 87/52 (01/22/19 1613)  SpO2: 96 % (01/22/19 1609) Vital Signs (24h Range):  Temp:  [97.4 °F (36.3 °C)-97.9 °F (36.6 °C)] 97.4 °F (36.3 °C)  Pulse:  [74-92] 76  Resp:  [12-22] 19  SpO2:  [96 %-100 %] 96 %  BP: ()/(50-67) 87/52     Weight: 70.8 kg (156 lb)  Body mass index is 23.72 kg/m².     SpO2: 96 %  O2 Device (Oxygen Therapy): nasal cannula      Intake/Output Summary (Last 24 hours) at  1/22/2019 1627  Last data filed at 1/22/2019 1507  Gross per 24 hour   Intake 500 ml   Output 815 ml   Net -315 ml       Lines/Drains/Airways     Drain                 Urethral Catheter 01/21/19 1205 Non-latex 1 day          Peripheral Intravenous Line                 Peripheral IV - Single Lumen 01/21/19 0000 Right Forearm 1 day         Peripheral IV - Single Lumen 01/21/19 0729 Left Hand 1 day                Physical Exam   Constitutional: No distress.   Resting supine in bed, comfortably   HENT:   Head: Normocephalic and atraumatic.   Nose: Nose normal.   Mouth/Throat: Oropharynx is clear and moist. No oropharyngeal exudate.   Eyes: No scleral icterus.   Neck: Normal range of motion. Neck supple. No JVD present.   Cardiovascular: Normal rate, regular rhythm and intact distal pulses.   Murmur heard.  Pulmonary/Chest: Effort normal. No respiratory distress. He has no wheezes. He has rales. He exhibits no tenderness.   Abdominal: Soft. Bowel sounds are normal. There is no tenderness. There is no guarding.   Musculoskeletal: He exhibits edema (minimal edema around ankles, unchanged).   Lymphadenopathy:     He has no cervical adenopathy.   Neurological: He is alert.   Skin: Skin is warm and dry. He is not diaphoretic.   Nursing note and vitals reviewed.      Significant Labs:   Recent Lab Results       01/22/19  1241   01/22/19  0936   01/22/19  0934   01/22/19  0654   01/22/19  0416        Immature Granulocytes         0.5     Immature Grans (Abs)         0.05  Comment:  Mild elevation in immature granulocytes is non specific and   can be seen in a variety of conditions including stress response,   acute inflammation, trauma and pregnancy. Correlation with other   laboratory and clinical findings is essential.       Albumin         2.4     Alkaline Phosphatase         109     ALT         252     Anion Gap 12   13   14              14     Ascending aorta   3.32           Ao peak carmen   2.17           Ao VTI   40.76            AST         82     AV valve area   1.00           AV mean gradient   12.34           AV peak gradient   18.84           AV Velocity Ratio   0.32           Baso #         0.01     Basophil%         0.1     Total Bilirubin         1.5  Comment:  For infants and newborns, interpretation of results should be based  on gestational age, weight and in agreement with clinical  observations.  Premature Infant recommended reference ranges:  Up to 24 hours.............<8.0 mg/dL  Up to 48 hours............<12.0 mg/dL  3-5 days..................<15.0 mg/dL  6-29 days.................<15.0 mg/dL       BUN, Bld 66   67   71              71     Calcium 8.2   8.0   8.1              8.1     Chloride 102   102   102              102     CO2 20   20   19              19     Creatinine 2.6   2.6   2.9              2.9     Left Ventricle Relative Wall Thickness   0.28           Differential Method         Automated     AV index (prosthetic)   0.28           eGFR if  25.1   25.1   22.0              22.0     eGFR if non  21.7  Comment:  Calculation used to obtain the estimated glomerular filtration  rate (eGFR) is the CKD-EPI equation.      21.7  Comment:  Calculation used to obtain the estimated glomerular filtration  rate (eGFR) is the CKD-EPI equation.      19.0  Comment:  Calculation used to obtain the estimated glomerular filtration  rate (eGFR) is the CKD-EPI equation.                 19.0  Comment:  Calculation used to obtain the estimated glomerular filtration  rate (eGFR) is the CKD-EPI equation.        Eos #         0.0     Eosinophil%         0.1     FS   7           Glucose 137   147   144              144     Gran # (ANC)         8.4     Gran%         79.4     Hematocrit         32.6     Hemoglobin         10.6     IVS   0.80           LA WIDTH   5.86           Lactate, Kevin       3.1  Comment:  Falsely low lactic acid results can be found in samples   containing >=13.0 mg/dL total  bilirubin and/or >=3.5 mg/dL   direct bilirubin.         Left Atrium Major Axis   6.70           Left Atrium Minor Axis   6.42           LA size   4.38           LA volume   143.05           LA Volume Index   77.8           LVOT area   3.53           LV Diastolic Volume   250.64           LV Diastolic Volume Index   136.29           LVIDD   6.94           LVIDS   6.47           LV mass   274.16           LV Mass Index   149.1           LVOT diameter   2.12           LVOT peak silver   [id number]           LVOT stroke volume   40.89           LVOT peak VTI   11.59           LV Systolic Volume   214.09           LV Systolic Volume Index   116.4           Lymph #         0.9     Lymph%         8.3     MCH         30.1     MCHC         32.5     MCV         93     Mean e'   0.08           Mono #         1.2     Mono%         11.6     MPV         12.5     nRBC         0     Platelets         103     Potassium 3.4   3.4   3.8              3.8     Total Protein         5.2     PW   0.98           Right Atrial Pressure (from IVC)   15           RA Major Axis   5.84           RA Width   3.28           RBC         3.52     RDW         16.8     RVDD   3.73           Sinus   3.23           Sodium 134   135   135              135     STJ   3.17           TAPSE   1.57           TDI SEPTAL   0.06           TDI LATERAL   0.09           Triscuspid Valve Regurgitation Peak Gradient   58.37           TR Max Silver   3.82           TV rest pulmonary artery pressure   73           WBC         10.52                      01/22/19  0156   01/21/19 2008        Immature Granulocytes         Immature Grans (Abs)         Albumin         Alkaline Phosphatase         ALT         Anion Gap 14 16     Ascending aorta         Ao peak silver         Ao VTI         AST         AV valve area         AV mean gradient         AV peak gradient         AV Velocity Ratio         Baso #         Basophil%         Total Bilirubin         BUN, Bld 71 77      Calcium 8.0 8.5     Chloride 101 102     CO2 19 18     Creatinine 2.9 3.1     Left Ventricle Relative Wall Thickness         Differential Method         AV index (prosthetic)         eGFR if  22.0 20.3     eGFR if non  19.0  Comment:  Calculation used to obtain the estimated glomerular filtration  rate (eGFR) is the CKD-EPI equation.    17.5  Comment:  Calculation used to obtain the estimated glomerular filtration  rate (eGFR) is the CKD-EPI equation.        Eos #         Eosinophil%         FS         Glucose 165 112     Gran # (ANC)         Gran%         Hematocrit         Hemoglobin         IVS         LA WIDTH         Lactate, Kevin         Left Atrium Major Axis         Left Atrium Minor Axis         LA size         LA volume         LA Volume Index         LVOT area         LV Diastolic Volume         LV Diastolic Volume Index         LVIDD         LVIDS         LV mass         LV Mass Index         LVOT diameter         LVOT peak silver         LVOT stroke volume         LVOT peak VTI         LV Systolic Volume         LV Systolic Volume Index         Lymph #         Lymph%         MCH         MCHC         MCV         Mean e'         Mono #         Mono%         MPV         nRBC         Platelets         Potassium 3.9 4.7     Total Protein         PW         Right Atrial Pressure (from IVC)         RA Major Axis         RA Width         RBC         RDW         RVDD         Sinus         Sodium 134 136     STJ         TAPSE         TDI SEPTAL         TDI LATERAL         Triscuspid Valve Regurgitation Peak Gradient         TR Max Silver         TV rest pulmonary artery pressure         WBC               Significant Imaging: Reviewed for past 24 hours    Assessment and Plan:       * Septic shock    Labs indicative of multiple organ injury (BUN 79, Cr 3, Trop 1.445, lactate 5.6, , ).   BNP elevated to 3676 compared to 1636 a few weeks ago.  Likely 2/2 decreased perfusion 2/2  cardiogenic shock vs septic shock.  CXR w/ central vascular congestion and small effusions.  However, Pt euvolemic clinically.     Plan:  Continuous IVF  Broad spectrum abx: vanc, zosyn  Blood cultures pending: NGTD  Pt on dobutamine  Epi titrated to maintain MAP > 65; attempting to wean  Strict I/Os  Hold home BB, ARB, statin  TTE stable compared to previous     Cardiogenic shock    See septic shock     Hyperkalemia    K 6.2 on exam  EKG: Afib; no changes to prior    Plan:  S/p Shifting w/ kayexalate and insulin/dextrose  Will continue to monitor     Transaminitis    See cardiogenic shock  See septic shock     GALEN (acute kidney injury)    Cr/BUN 2.7/74 on admit  Cr/BUN 1.6/59 a few weeks ago  Likely pre-renal vs cardio-renal    Improving w/ IVFs  Will continue to monitor     Hypotension    See cardiogenic shock  See septic shock     Paroxysmal atrial fibrillation    EKG: Afib; no changes to prior  Rate controlled    Plan:  Continue home apixiban 2.5mg BID  Continue to monitor rates     Chronic combined systolic and diastolic congestive heart failure    1/2/19 TTE    · Mild eccentric left ventricular hypertrophy. Severely decreased left ventricular systolic function. The estimated ejection fraction is 20%.  Wall motion abnormalities (see image)  · Left ventricular diastolic dysfunction - grade indeterminant. Increased LAP  · Biatrial enlargement.  · Mild right ventricular enlargement. Mildly reduced right ventricular systolic function.  · There is aortic stenosis with a dimensionless index of 0.28. There is significantly reduced stroke volume  · Moderate-to-severe mitral regurgitation.  · Moderate tricuspid regurgitation.  · The estimated PA systolic pressure is 77 mm Hg  · Elevated central venous pressure (15 mm Hg).    1/22/19 TTE  · Severe left ventricular enlargement.  · Severely decreased left ventricular systolic function. The estimated ejection fraction is 20%  · Septal wall has abnormal motion consistent  with left bundle branch block.  · Moderate global hypokinetic wall motion.  · Eccentric left ventricular hypertrophy.  · Indeterminate left ventricular diastolic function.  · Normal right ventricular systolic function.  · Severe left atrial enlargement.  · Moderate mitral regurgitation.  · Mild tricuspid regurgitation.  · Mild aortic regurgitation.  · The estimated PA systolic pressure is 73 mm Hg  · Elevated central venous pressure (15 mm Hg).     Benign prostatic hyperplasia    Continue home tamsulosin      Depression    Continue home paroxetine, citalopram, wellbutrin     Hyperlipidemia    Holding statin  See cardiogenic shock  See septic shock     Coronary artery disease involving native coronary artery of native heart without angina pectoris    Continue home ASA  Holding BB, Statin, Ace-I  See cardiogenic shock  See septic shock         VTE Risk Mitigation (From admission, onward)        Ordered     apixaban tablet 2.5 mg  2 times daily      01/21/19 0657     IP VTE HIGH RISK PATIENT  Once      01/21/19 0657          Evette Ibarra MD  IM, PGY-1  [facility]

## 2019-01-22 NOTE — ASSESSMENT & PLAN NOTE
1/2/19 TTE    · Mild eccentric left ventricular hypertrophy. Severely decreased left ventricular systolic function. The estimated ejection fraction is 20%.  Wall motion abnormalities (see image)  · Left ventricular diastolic dysfunction - grade indeterminant. Increased LAP  · Biatrial enlargement.  · Mild right ventricular enlargement. Mildly reduced right ventricular systolic function.  · There is aortic stenosis with a dimensionless index of 0.28. There is significantly reduced stroke volume  · Moderate-to-severe mitral regurgitation.  · Moderate tricuspid regurgitation.  · The estimated PA systolic pressure is 77 mm Hg  · Elevated central venous pressure (15 mm Hg).    1/22/19 TTE  · Severe left ventricular enlargement.  · Severely decreased left ventricular systolic function. The estimated ejection fraction is 20%  · Septal wall has abnormal motion consistent with left bundle branch block.  · Moderate global hypokinetic wall motion.  · Eccentric left ventricular hypertrophy.  · Indeterminate left ventricular diastolic function.  · Normal right ventricular systolic function.  · Severe left atrial enlargement.  · Moderate mitral regurgitation.  · Mild tricuspid regurgitation.  · Mild aortic regurgitation.  · The estimated PA systolic pressure is 73 mm Hg  · Elevated central venous pressure (15 mm Hg).

## 2019-01-22 NOTE — ASSESSMENT & PLAN NOTE
Labs indicative of multiple organ injury (BUN 79, Cr 3, Trop 1.445, lactate 5.6, , ).   BNP elevated to 3676 compared to 1636 a few weeks ago.  Likely 2/2 decreased perfusion 2/2 cardiogenic shock vs septic shock.  CXR w/ central vascular congestion and small effusions.  However, Pt euvolemic clinically.     Plan:  Continuous IVF  Broad spectrum abx: vanc, zosyn  Blood cultures pending: NGTD  Pt on dobutamine  Epi titrated to maintain MAP > 65; attempting to wean  Strict I/Os  Hold home BB, ARB, statin  TTE stable compared to previous

## 2019-01-22 NOTE — SUBJECTIVE & OBJECTIVE
Interval History:   Pt has no complaints this AM of CP, SOB.   He is resting comfortably.     Review of Systems   Constitution: Negative for chills and fever.   HENT: Negative for congestion.    Eyes: Negative for visual disturbance.   Cardiovascular: Negative for chest pain and palpitations.   Respiratory: Positive for cough. Negative for shortness of breath and wheezing.    Gastrointestinal: Negative for abdominal pain, nausea and vomiting.   Neurological: Negative for headaches.   Psychiatric/Behavioral: Negative for altered mental status.     Objective:     Vital Signs (Most Recent):  Temp: 97.4 °F (36.3 °C) (01/22/19 0710)  Pulse: 76 (01/22/19 1609)  Resp: 19 (01/22/19 1609)  BP: (!) 87/52 (01/22/19 1613)  SpO2: 96 % (01/22/19 1609) Vital Signs (24h Range):  Temp:  [97.4 °F (36.3 °C)-97.9 °F (36.6 °C)] 97.4 °F (36.3 °C)  Pulse:  [74-92] 76  Resp:  [12-22] 19  SpO2:  [96 %-100 %] 96 %  BP: ()/(50-67) 87/52     Weight: 70.8 kg (156 lb)  Body mass index is 23.72 kg/m².     SpO2: 96 %  O2 Device (Oxygen Therapy): nasal cannula      Intake/Output Summary (Last 24 hours) at 1/22/2019 1627  Last data filed at 1/22/2019 1507  Gross per 24 hour   Intake 500 ml   Output 815 ml   Net -315 ml       Lines/Drains/Airways     Drain                 Urethral Catheter 01/21/19 1205 Non-latex 1 day          Peripheral Intravenous Line                 Peripheral IV - Single Lumen 01/21/19 0000 Right Forearm 1 day         Peripheral IV - Single Lumen 01/21/19 0729 Left Hand 1 day                Physical Exam   Constitutional: No distress.   Resting supine in bed, comfortably   HENT:   Head: Normocephalic and atraumatic.   Nose: Nose normal.   Mouth/Throat: Oropharynx is clear and moist. No oropharyngeal exudate.   Eyes: No scleral icterus.   Neck: Normal range of motion. Neck supple. No JVD present.   Cardiovascular: Normal rate, regular rhythm and intact distal pulses.   Murmur heard.  Pulmonary/Chest: Effort normal. No  respiratory distress. He has no wheezes. He has rales. He exhibits no tenderness.   Abdominal: Soft. Bowel sounds are normal. There is no tenderness. There is no guarding.   Musculoskeletal: He exhibits edema (minimal edema around ankles, unchanged).   Lymphadenopathy:     He has no cervical adenopathy.   Neurological: He is alert.   Skin: Skin is warm and dry. He is not diaphoretic.   Nursing note and vitals reviewed.      Significant Labs:   Recent Lab Results       01/22/19  1241   01/22/19  0936   01/22/19  0934   01/22/19  0654   01/22/19  0416        Immature Granulocytes         0.5     Immature Grans (Abs)         0.05  Comment:  Mild elevation in immature granulocytes is non specific and   can be seen in a variety of conditions including stress response,   acute inflammation, trauma and pregnancy. Correlation with other   laboratory and clinical findings is essential.       Albumin         2.4     Alkaline Phosphatase         109     ALT         252     Anion Gap 12   13   14              14     Ascending aorta   3.32           Ao peak carmen   2.17           Ao VTI   40.76           AST         82     AV valve area   1.00           AV mean gradient   12.34           AV peak gradient   18.84           AV Velocity Ratio   0.32           Baso #         0.01     Basophil%         0.1     Total Bilirubin         1.5  Comment:  For infants and newborns, interpretation of results should be based  on gestational age, weight and in agreement with clinical  observations.  Premature Infant recommended reference ranges:  Up to 24 hours.............<8.0 mg/dL  Up to 48 hours............<12.0 mg/dL  3-5 days..................<15.0 mg/dL  6-29 days.................<15.0 mg/dL       BUN, Bld 66   67   71              71     Calcium 8.2   8.0   8.1              8.1     Chloride 102   102   102              102     CO2 20   20   19              19     Creatinine 2.6   2.6   2.9              2.9     Left Ventricle Relative Wall  Thickness   0.28           Differential Method         Automated     AV index (prosthetic)   0.28           eGFR if  25.1   25.1   22.0              22.0     eGFR if non  21.7  Comment:  Calculation used to obtain the estimated glomerular filtration  rate (eGFR) is the CKD-EPI equation.      21.7  Comment:  Calculation used to obtain the estimated glomerular filtration  rate (eGFR) is the CKD-EPI equation.      19.0  Comment:  Calculation used to obtain the estimated glomerular filtration  rate (eGFR) is the CKD-EPI equation.                 19.0  Comment:  Calculation used to obtain the estimated glomerular filtration  rate (eGFR) is the CKD-EPI equation.        Eos #         0.0     Eosinophil%         0.1     FS   7           Glucose 137   147   144              144     Gran # (ANC)         8.4     Gran%         79.4     Hematocrit         32.6     Hemoglobin         10.6     IVS   0.80           LA WIDTH   5.86           Lactate, Kevin       3.1  Comment:  Falsely low lactic acid results can be found in samples   containing >=13.0 mg/dL total bilirubin and/or >=3.5 mg/dL   direct bilirubin.         Left Atrium Major Axis   6.70           Left Atrium Minor Axis   6.42           LA size   4.38           LA volume   143.05           LA Volume Index   77.8           LVOT area   3.53           LV Diastolic Volume   250.64           LV Diastolic Volume Index   136.29           LVIDD   6.94           LVIDS   6.47           LV mass   274.16           LV Mass Index   149.1           LVOT diameter   2.12           LVOT peak carmen   [id number]           LVOT stroke volume   40.89           LVOT peak VTI   11.59           LV Systolic Volume   214.09           LV Systolic Volume Index   116.4           Lymph #         0.9     Lymph%         8.3     MCH         30.1     MCHC         32.5     MCV         93     Mean e'   0.08           Mono #         1.2     Mono%         11.6     MPV          12.5     nRBC         0     Platelets         103     Potassium 3.4   3.4   3.8              3.8     Total Protein         5.2     PW   0.98           Right Atrial Pressure (from IVC)   15           RA Major Axis   5.84           RA Width   3.28           RBC         3.52     RDW         16.8     RVDD   3.73           Sinus   3.23           Sodium 134   135   135              135     STJ   3.17           TAPSE   1.57           TDI SEPTAL   0.06           TDI LATERAL   0.09           Triscuspid Valve Regurgitation Peak Gradient   58.37           TR Max Silver   3.82           TV rest pulmonary artery pressure   73           WBC         10.52                      01/22/19  0156   01/21/19 2008        Immature Granulocytes         Immature Grans (Abs)         Albumin         Alkaline Phosphatase         ALT         Anion Gap 14 16     Ascending aorta         Ao peak silver         Ao VTI         AST         AV valve area         AV mean gradient         AV peak gradient         AV Velocity Ratio         Baso #         Basophil%         Total Bilirubin         BUN, Bld 71 77     Calcium 8.0 8.5     Chloride 101 102     CO2 19 18     Creatinine 2.9 3.1     Left Ventricle Relative Wall Thickness         Differential Method         AV index (prosthetic)         eGFR if  22.0 20.3     eGFR if non  19.0  Comment:  Calculation used to obtain the estimated glomerular filtration  rate (eGFR) is the CKD-EPI equation.    17.5  Comment:  Calculation used to obtain the estimated glomerular filtration  rate (eGFR) is the CKD-EPI equation.        Eos #         Eosinophil%         FS         Glucose 165 112     Gran # (ANC)         Gran%         Hematocrit         Hemoglobin         IVS         LA WIDTH         Lactate, Kevin         Left Atrium Major Axis         Left Atrium Minor Axis         LA size         LA volume         LA Volume Index         LVOT area         LV Diastolic Volume         LV Diastolic  Volume Index         LVIDD         LVIDS         LV mass         LV Mass Index         LVOT diameter         LVOT peak silver         LVOT stroke volume         LVOT peak VTI         LV Systolic Volume         LV Systolic Volume Index         Lymph #         Lymph%         MCH         MCHC         MCV         Mean e'         Mono #         Mono%         MPV         nRBC         Platelets         Potassium 3.9 4.7     Total Protein         PW         Right Atrial Pressure (from IVC)         RA Major Axis         RA Width         RBC         RDW         RVDD         Sinus         Sodium 134 136     STJ         TAPSE         TDI SEPTAL         TDI LATERAL         Triscuspid Valve Regurgitation Peak Gradient         TR Max Silver         TV rest pulmonary artery pressure         WBC               Significant Imaging: Reviewed for past 24 hours

## 2019-01-22 NOTE — PLAN OF CARE
Interval update:     Patient seen post NS 250cc bolus x2. Family at bedside, patient sleeping comfortably, laying flat, no respiratory distress. Stats 100% on 1LNC.     Will hold Lasix bolus / ggt for now.   Will re-assess later    Deborah Valdez M.D.  Page # [phone]  Cardiovascular Fellow PGY-IV  [facility]

## 2019-01-22 NOTE — ED NOTES
Pt sitting up in bed on cardiac, bp and o2 monitor. RR 20 even and unlabored. Pt provided with mouth swabs. Daughter at bedside.

## 2019-01-22 NOTE — ASSESSMENT & PLAN NOTE
Cr/BUN 2.7/74 on admit  Cr/BUN 1.6/59 a few weeks ago  Likely pre-renal vs cardio-renal    Improving w/ IVFs  Will continue to monitor

## 2019-01-22 NOTE — HOSPITAL COURSE
Pt admitted 1/21/19 with cardiogenic shock vs septic shock in the setting of acute on chronic CHF due to systolic and diastolic dysfunction. On admit, BNP elevated and pulmonary congestion on CXR. However, clinically, Pt appeared euvolemic and history of recent poor oral intake. Septic shock suspected > cardiac shock and fluids and broad spectrum Abx initiated in addition to dobutamine and Epi. 1/22/19 Epi weaned off and fluids d'c'ed 1/23. Lasix IV 80mg given.   PICC placement done , Blood Cx NGTD, CVP~ 12, lasix changed to 80 mg IV, continue antibiotics.

## 2019-01-22 NOTE — ED NOTES
Spoke with [facility] in regards to a diet. Family is aware that I spoke with the team. Awaiting diet orders. Pt is resting in bed on cardiac, bp and o2 monitor.

## 2019-01-23 PROBLEM — R31.0 GROSS HEMATURIA: Status: ACTIVE | Noted: 2019-01-23

## 2019-01-23 LAB
ALBUMIN SERPL BCP-MCNC: 2.2 G/DL
ALP SERPL-CCNC: 99 U/L
ALT SERPL W/O P-5'-P-CCNC: 210 U/L
ANION GAP SERPL CALC-SCNC: 10 MMOL/L
ANION GAP SERPL CALC-SCNC: 10 MMOL/L
ANION GAP SERPL CALC-SCNC: 12 MMOL/L
ANION GAP SERPL CALC-SCNC: 14 MMOL/L
ANION GAP SERPL CALC-SCNC: 14 MMOL/L
APTT BLDCRRT: 30.1 SEC
AST SERPL-CCNC: 63 U/L
BACTERIA #/AREA URNS AUTO: ABNORMAL /HPF
BASOPHILS # BLD AUTO: 0.01 K/UL
BASOPHILS NFR BLD: 0.1 %
BILIRUB SERPL-MCNC: 1.5 MG/DL
BILIRUB UR QL STRIP: NEGATIVE
BUN SERPL-MCNC: 54 MG/DL
BUN SERPL-MCNC: 55 MG/DL
CALCIUM SERPL-MCNC: 7.9 MG/DL
CALCIUM SERPL-MCNC: 7.9 MG/DL
CALCIUM SERPL-MCNC: 8 MG/DL
CHLORIDE SERPL-SCNC: 104 MMOL/L
CHLORIDE SERPL-SCNC: 107 MMOL/L
CHLORIDE SERPL-SCNC: 107 MMOL/L
CLARITY UR REFRACT.AUTO: ABNORMAL
CO2 SERPL-SCNC: 19 MMOL/L
CO2 SERPL-SCNC: 21 MMOL/L
CO2 SERPL-SCNC: 21 MMOL/L
COLOR UR AUTO: ABNORMAL
CREAT SERPL-MCNC: 2.1 MG/DL
CREAT SERPL-MCNC: 2.4 MG/DL
DIFFERENTIAL METHOD: ABNORMAL
EOSINOPHIL # BLD AUTO: 0 K/UL
EOSINOPHIL NFR BLD: 0.5 %
ERYTHROCYTE [DISTWIDTH] IN BLOOD BY AUTOMATED COUNT: 17 %
EST. GFR  (AFRICAN AMERICAN): 27.6 ML/MIN/1.73 M^2
EST. GFR  (AFRICAN AMERICAN): 32.4 ML/MIN/1.73 M^2
EST. GFR  (NON AFRICAN AMERICAN): 23.9 ML/MIN/1.73 M^2
EST. GFR  (NON AFRICAN AMERICAN): 28.1 ML/MIN/1.73 M^2
GLUCOSE SERPL-MCNC: 103 MG/DL
GLUCOSE SERPL-MCNC: 103 MG/DL
GLUCOSE SERPL-MCNC: 119 MG/DL
GLUCOSE SERPL-MCNC: 81 MG/DL
GLUCOSE SERPL-MCNC: 98 MG/DL
GLUCOSE UR QL STRIP: NEGATIVE
HCT VFR BLD AUTO: 32.8 %
HGB BLD-MCNC: 10.4 G/DL
HGB UR QL STRIP: ABNORMAL
HYALINE CASTS UR QL AUTO: 0 /LPF
IMM GRANULOCYTES # BLD AUTO: 0.03 K/UL
IMM GRANULOCYTES NFR BLD AUTO: 0.4 %
INR PPP: 1.4
KETONES UR QL STRIP: NEGATIVE
LACTATE SERPL-SCNC: 2.7 MMOL/L
LEUKOCYTE ESTERASE UR QL STRIP: ABNORMAL
LYMPHOCYTES # BLD AUTO: 0.8 K/UL
LYMPHOCYTES NFR BLD: 9 %
MAGNESIUM SERPL-MCNC: 2.3 MG/DL
MCH RBC QN AUTO: 29.5 PG
MCHC RBC AUTO-ENTMCNC: 31.7 G/DL
MCV RBC AUTO: 93 FL
MICROSCOPIC COMMENT: ABNORMAL
MONOCYTES # BLD AUTO: 0.9 K/UL
MONOCYTES NFR BLD: 10.4 %
NEUTROPHILS # BLD AUTO: 6.8 K/UL
NEUTROPHILS NFR BLD: 79.6 %
NITRITE UR QL STRIP: NEGATIVE
NRBC BLD-RTO: 0 /100 WBC
PH UR STRIP: 6 [PH] (ref 5–8)
PLATELET # BLD AUTO: 107 K/UL
PMV BLD AUTO: 12.7 FL
POTASSIUM SERPL-SCNC: 3.1 MMOL/L
POTASSIUM SERPL-SCNC: 3.6 MMOL/L
POTASSIUM SERPL-SCNC: 3.6 MMOL/L
POTASSIUM SERPL-SCNC: 4.2 MMOL/L
POTASSIUM SERPL-SCNC: 4.8 MMOL/L
PROT SERPL-MCNC: 5 G/DL
PROT UR QL STRIP: ABNORMAL
PROTHROMBIN TIME: 13.6 SEC
RBC # BLD AUTO: 3.53 M/UL
RBC #/AREA URNS AUTO: >100 /HPF (ref 0–4)
SODIUM SERPL-SCNC: 136 MMOL/L
SODIUM SERPL-SCNC: 137 MMOL/L
SODIUM SERPL-SCNC: 138 MMOL/L
SP GR UR STRIP: 1.02 (ref 1–1.03)
SQUAMOUS #/AREA URNS AUTO: 5 /HPF
URN SPEC COLLECT METH UR: ABNORMAL
VANCOMYCIN TROUGH SERPL-MCNC: 13.7 UG/ML
WBC # BLD AUTO: 8.54 K/UL
WBC #/AREA URNS AUTO: 9 /HPF (ref 0–5)

## 2019-01-23 PROCEDURE — 87086 URINE CULTURE/COLONY COUNT: CPT | Mod: HCNC

## 2019-01-23 PROCEDURE — 99232 SBSQ HOSP IP/OBS MODERATE 35: CPT | Mod: HCNC,GC,, | Performed by: INTERNAL MEDICINE

## 2019-01-23 PROCEDURE — 25000003 PHARM REV CODE 250: Mod: HCNC | Performed by: STUDENT IN AN ORGANIZED HEALTH CARE EDUCATION/TRAINING PROGRAM

## 2019-01-23 PROCEDURE — 63600175 PHARM REV CODE 636 W HCPCS: Mod: HCNC | Performed by: STUDENT IN AN ORGANIZED HEALTH CARE EDUCATION/TRAINING PROGRAM

## 2019-01-23 PROCEDURE — 80053 COMPREHEN METABOLIC PANEL: CPT | Mod: HCNC

## 2019-01-23 PROCEDURE — 80048 BASIC METABOLIC PNL TOTAL CA: CPT | Mod: HCNC

## 2019-01-23 PROCEDURE — 80202 ASSAY OF VANCOMYCIN: CPT | Mod: HCNC

## 2019-01-23 PROCEDURE — 20000000 HC ICU ROOM: Mod: HCNC

## 2019-01-23 PROCEDURE — 63600175 PHARM REV CODE 636 W HCPCS: Mod: HCNC | Performed by: INTERNAL MEDICINE

## 2019-01-23 PROCEDURE — 81001 URINALYSIS AUTO W/SCOPE: CPT | Mod: HCNC

## 2019-01-23 PROCEDURE — 85730 THROMBOPLASTIN TIME PARTIAL: CPT | Mod: HCNC

## 2019-01-23 PROCEDURE — 85025 COMPLETE CBC W/AUTO DIFF WBC: CPT | Mod: HCNC

## 2019-01-23 PROCEDURE — 83735 ASSAY OF MAGNESIUM: CPT | Mod: HCNC

## 2019-01-23 PROCEDURE — 83605 ASSAY OF LACTIC ACID: CPT | Mod: HCNC

## 2019-01-23 PROCEDURE — 25000003 PHARM REV CODE 250: Mod: HCNC

## 2019-01-23 PROCEDURE — 85610 PROTHROMBIN TIME: CPT | Mod: HCNC

## 2019-01-23 PROCEDURE — 94761 N-INVAS EAR/PLS OXIMETRY MLT: CPT | Mod: HCNC

## 2019-01-23 PROCEDURE — 99232 PR SUBSEQUENT HOSPITAL CARE,LEVL II: ICD-10-PCS | Mod: HCNC,GC,, | Performed by: INTERNAL MEDICINE

## 2019-01-23 PROCEDURE — 80048 BASIC METABOLIC PNL TOTAL CA: CPT | Mod: 91,HCNC

## 2019-01-23 PROCEDURE — 63600175 PHARM REV CODE 636 W HCPCS: Mod: HCNC

## 2019-01-23 PROCEDURE — 25000003 PHARM REV CODE 250: Mod: HCNC | Performed by: INTERNAL MEDICINE

## 2019-01-23 RX ORDER — POTASSIUM CHLORIDE 20 MEQ/1
60 TABLET, EXTENDED RELEASE ORAL ONCE
Status: COMPLETED | OUTPATIENT
Start: 2019-01-23 | End: 2019-01-23

## 2019-01-23 RX ORDER — POTASSIUM CHLORIDE 7.45 MG/ML
10 INJECTION INTRAVENOUS
Status: COMPLETED | OUTPATIENT
Start: 2019-01-23 | End: 2019-01-23

## 2019-01-23 RX ORDER — FUROSEMIDE 10 MG/ML
80 INJECTION INTRAMUSCULAR; INTRAVENOUS ONCE
Status: COMPLETED | OUTPATIENT
Start: 2019-01-23 | End: 2019-01-23

## 2019-01-23 RX ORDER — FUROSEMIDE 40 MG/1
80 TABLET ORAL 2 TIMES DAILY
Status: DISCONTINUED | OUTPATIENT
Start: 2019-01-23 | End: 2019-01-24

## 2019-01-23 RX ORDER — SODIUM CHLORIDE 9 MG/ML
INJECTION, SOLUTION INTRAVENOUS CONTINUOUS
Status: DISCONTINUED | OUTPATIENT
Start: 2019-01-23 | End: 2019-01-23

## 2019-01-23 RX ADMIN — FUROSEMIDE 80 MG: 40 TABLET ORAL at 07:01

## 2019-01-23 RX ADMIN — CITALOPRAM HYDROBROMIDE 10 MG: 10 TABLET ORAL at 09:01

## 2019-01-23 RX ADMIN — BUPROPION HYDROCHLORIDE 300 MG: 150 TABLET, EXTENDED RELEASE ORAL at 09:01

## 2019-01-23 RX ADMIN — OMEGA-3-ACID ETHYL ESTERS 2 G: 1 CAPSULE, LIQUID FILLED ORAL at 09:01

## 2019-01-23 RX ADMIN — PAROXETINE 10 MG: 10 TABLET, FILM COATED ORAL at 11:01

## 2019-01-23 RX ADMIN — APIXABAN 2.5 MG: 2.5 TABLET, FILM COATED ORAL at 09:01

## 2019-01-23 RX ADMIN — DOBUTAMINE HYDROCHLORIDE 2.5 MCG/KG/MIN: 200 INJECTION INTRAVENOUS at 04:01

## 2019-01-23 RX ADMIN — POTASSIUM CHLORIDE 10 MEQ: 7.46 INJECTION, SOLUTION INTRAVENOUS at 02:01

## 2019-01-23 RX ADMIN — SODIUM CHLORIDE: 0.9 INJECTION, SOLUTION INTRAVENOUS at 11:01

## 2019-01-23 RX ADMIN — VANCOMYCIN HYDROCHLORIDE 1000 MG: 1 INJECTION, POWDER, FOR SOLUTION INTRAVENOUS at 07:01

## 2019-01-23 RX ADMIN — POTASSIUM CHLORIDE 10 MEQ: 7.46 INJECTION, SOLUTION INTRAVENOUS at 06:01

## 2019-01-23 RX ADMIN — POTASSIUM CHLORIDE 10 MEQ: 7.46 INJECTION, SOLUTION INTRAVENOUS at 01:01

## 2019-01-23 RX ADMIN — PIPERACILLIN AND TAZOBACTAM 4.5 G: 4; .5 INJECTION, POWDER, LYOPHILIZED, FOR SOLUTION INTRAVENOUS; PARENTERAL at 07:01

## 2019-01-23 RX ADMIN — POTASSIUM CHLORIDE 60 MEQ: 1500 TABLET, EXTENDED RELEASE ORAL at 01:01

## 2019-01-23 RX ADMIN — POTASSIUM CHLORIDE 10 MEQ: 7.46 INJECTION, SOLUTION INTRAVENOUS at 04:01

## 2019-01-23 RX ADMIN — FUROSEMIDE 80 MG: 10 INJECTION, SOLUTION INTRAMUSCULAR; INTRAVENOUS at 12:01

## 2019-01-23 RX ADMIN — ASPIRIN 81 MG: 81 TABLET, COATED ORAL at 09:01

## 2019-01-23 RX ADMIN — PIPERACILLIN AND TAZOBACTAM 4.5 G: 4; .5 INJECTION, POWDER, LYOPHILIZED, FOR SOLUTION INTRAVENOUS; PARENTERAL at 09:01

## 2019-01-23 NOTE — ASSESSMENT & PLAN NOTE
Labs indicative of multiple organ injury (BUN 79, Cr 3, Trop 1.445, lactate 5.6, , ).   BNP elevated to 3676 compared to 1636 a few weeks ago.  Likely 2/2 decreased perfusion 2/2 cardiogenic shock vs septic shock.  CXR w/ central vascular congestion and small effusions.  However, Pt euvolemic clinically.     Plan:  S/p Continuous IVF; scheduling 1x lasix  Broad spectrum abx: vanc, zosyn  Blood cultures pending: NGTD  Pt on dobutamine  Epi weaned off  Strict I/Os  Hold home BB, ARB, statin  TTE stable compared to previous

## 2019-01-23 NOTE — ASSESSMENT & PLAN NOTE
K 6.2 on exam  EKG: Afib; no changes to prior    Plan:  S/p Shifting w/ kayexalate and insulin/dextrose  Normalized  Will continue to monitor

## 2019-01-23 NOTE — PLAN OF CARE
01/23/19 0923   Discharge Assessment   Assessment Type Discharge Planning Assessment   Confirmed/corrected address and phone number on facesheet? Yes   Assessment information obtained from? Patient   Expected Length of Stay (days) 3   Communicated expected length of stay with patient/caregiver yes   Prior to hospitilization cognitive status: Alert/Oriented  (pt very hard of hearing)   Prior to hospitalization functional status: Assistive Equipment   Current cognitive status: Alert/Oriented   Current Functional Status: Needs Assistance   Facility Arrived From: home   Lives With child(amy), adult  (daughter, Galina, [phone])   Able to Return to Prior Arrangements yes   Is patient able to care for self after discharge? No   Who are your caregiver(s) and their phone number(s)? alejandra Mojica, [phone]   Patient's perception of discharge disposition admitted as an inpatient   Readmission Within the Last 30 Days no previous admission in last 30 days   Patient currently being followed by outpatient case management? No   Patient currently receives any other outside agency services? No   Equipment Currently Used at Home (walker, wheelchair)   Do you have any problems affording any of your prescribed medications? No   Is the patient taking medications as prescribed? yes   Does the patient have transportation home? Yes   Transportation Anticipated family or friend will provide   Dialysis Name and Scheduled days no   Does the patient receive services at the [facility]? No   Discharge Plan A Home Health   Discharge Plan B Skilled Nursing Facility   DME Needed Upon Discharge  none   Patient/Family in Agreement with Plan yes     Extended Emergency Contact Information  Primary Emergency Contact: Galina Bradshaw   [place]  Mobile Phone: [phone]  Relation: Daughter  Secondary Emergency Contact: Caleb Bradshaw   [place]  Mobile Phone: [phone]  Relation: Son      [facility] -  [place], [place] - [address]  [address]  [place] [address]  Phone: [phone] Fax: [phone]    [facility] [address] [place] [address] [address] AT [facility] [address]  [address] [facility] [address]  Phone: [phone] Fax: [phone]      Rocio Casas MD

## 2019-01-23 NOTE — PROGRESS NOTES
[facility]  Cardiology  Progress Note    Patient Name: Jeremie Bradshaw  MRN: [id number]  Admission Date: 1/21/2019  Hospital Length of Stay: 2 days  Code Status: DNR   Attending Physician: Zabrina Davila MD   Primary Care Physician: Rocio Casas MD  Expected Discharge Date:   Principal Problem:Septic shock    Subjective:     Hospital Course:   Pt admitted 1/21/19 with cardiogenic shock vs septic shock in the setting of acute on chronic CHF due to systolic and diastolic dysfunction. On admit, BNP elevated and pulmonary congestion on CXR. However, clinically, Pt appeared euvolemic and history of recent poor oral intake. Septic shock suspected > cardiac shock and fluids and broad spectrum Abx initiated in addition to dobutamine and Epi. 1/22/19 Epi weaned off and fluids d'c'ed 1/23. Lasix IV 80mg given.   PICC placement pending.   Blood Cx NGTD    Interval History:   Pt has no complaints this AM of CP, SOB. He endorses feeling much better.   He is resting comfortably and endorses good sleep.     Review of Systems   Constitution: Negative for chills and fever.   HENT: Negative for congestion.    Eyes: Negative for visual disturbance.   Cardiovascular: Negative for chest pain and palpitations.   Respiratory: Positive for cough. Negative for shortness of breath and wheezing.    Gastrointestinal: Negative for abdominal pain, nausea and vomiting.   Neurological: Negative for headaches.   Psychiatric/Behavioral: Negative for altered mental status.     Objective:     Vital Signs (Most Recent):  Temp: 97.4 °F (36.3 °C) (01/23/19 0300)  Pulse: 86 (01/23/19 0600)  Resp: (!) 21 (01/23/19 0600)  BP: 92/60 (01/23/19 0600)  SpO2: 98 % (01/23/19 0600) Vital Signs (24h Range):  Temp:  [97.2 °F (36.2 °C)-98 °F (36.7 °C)] 97.4 °F (36.3 °C)  Pulse:  [70-88] 86  Resp:  [12-30] 21  SpO2:  [94 %-100 %] 98 %  BP: ()/() 92/60     Weight: 70.8 kg (156 lb)  Body mass index is 23.72 kg/m².     SpO2: 98  %  O2 Device (Oxygen Therapy): room air      Intake/Output Summary (Last 24 hours) at 1/23/2019 0845  Last data filed at 1/23/2019 0630  Gross per 24 hour   Intake 1857.49 ml   Output 907 ml   Net 950.49 ml       Lines/Drains/Airways     Drain                 Urethral Catheter 01/21/19 1205 Non-latex 1 day          Peripheral Intravenous Line                 Peripheral IV - Single Lumen 01/21/19 0000 Right Antecubital 2 days         Peripheral IV - Single Lumen 01/21/19 0729 Left Forearm 2 days                Physical Exam   Constitutional: No distress.   Resting supine in bed, comfortably   HENT:   Head: Normocephalic and atraumatic.   Nose: Nose normal.   Mouth/Throat: Oropharynx is clear and moist. No oropharyngeal exudate.   Eyes: No scleral icterus.   Neck: Normal range of motion. Neck supple. No JVD present.   Cardiovascular: Normal rate, regular rhythm and intact distal pulses.   Murmur heard.  Pulmonary/Chest: Effort normal. No respiratory distress. He has no wheezes. He has rales. He exhibits no tenderness.   Abdominal: Soft. Bowel sounds are normal. There is no tenderness. There is no guarding.   Musculoskeletal: He exhibits edema (minimal edema around ankles, unchanged).   Lymphadenopathy:     He has no cervical adenopathy.   Neurological: He is alert.   Skin: Skin is warm and dry. He is not diaphoretic.   Nursing note and vitals reviewed.      Significant Labs:   Recent Lab Results       01/23/19  0421   01/23/19  0011   01/22/19  1951   01/22/19  1950   01/22/19  1614        Immature Granulocytes 0.4             Immature Grans (Abs) 0.03  Comment:  Mild elevation in immature granulocytes is non specific and   can be seen in a variety of conditions including stress response,   acute inflammation, trauma and pregnancy. Correlation with other   laboratory and clinical findings is essential.               Albumin 2.2             Alkaline Phosphatase 99                          Anion Gap 14 12   13 11       14             Ascending aorta               Ao peak carmen               Ao VTI               AST 63             AV valve area               AV mean gradient               AV peak gradient               AV Velocity Ratio               Baso # 0.01             Basophil% 0.1             Total Bilirubin 1.5  Comment:  For infants and newborns, interpretation of results should be based  on gestational age, weight and in agreement with clinical  observations.  Premature Infant recommended reference ranges:  Up to 24 hours.............<8.0 mg/dL  Up to 48 hours............<12.0 mg/dL  3-5 days..................<15.0 mg/dL  6-29 days.................<15.0 mg/dL               BUN, Bld 55 55   60 58      55             Calcium 7.9 8.0   7.8 7.6      7.9             Chloride 104 104   105 105      104             CO2 19 21   20 18      19             Creatinine 2.1 2.4   2.4 2.5      2.1             Left Ventricle Relative Wall Thickness               Differential Method Automated             AV index (prosthetic)               eGFR if  32.4 27.6   27.6 26.3      32.4             eGFR if non  28.1  Comment:  Calculation used to obtain the estimated glomerular filtration  rate (eGFR) is the CKD-EPI equation.    23.9  Comment:  Calculation used to obtain the estimated glomerular filtration  rate (eGFR) is the CKD-EPI equation.      23.9  Comment:  Calculation used to obtain the estimated glomerular filtration  rate (eGFR) is the CKD-EPI equation.    22.7  Comment:  Calculation used to obtain the estimated glomerular filtration  rate (eGFR) is the CKD-EPI equation.         28.1  Comment:  Calculation used to obtain the estimated glomerular filtration  rate (eGFR) is the CKD-EPI equation.                Eos # 0.0             Eosinophil% 0.5             FS               Glucose 103 119   123 175      103             Gran # (ANC) 6.8             Gran% 79.6             Hematocrit 32.8              Hemoglobin 10.4             IVS               LA WIDTH               Left Atrium Major Axis               Left Atrium Minor Axis               LA size               LA volume               LA Volume Index               LVOT area               LV Diastolic Volume               LV Diastolic Volume Index               LVIDD               LVIDS               LV mass               LV Mass Index               LVOT diameter               LVOT peak silver               LVOT stroke volume               LVOT peak VTI               LV Systolic Volume               LV Systolic Volume Index               Lymph # 0.8             Lymph% 9.0             Magnesium 2.3             MCH 29.5             MCHC 31.7             MCV 93             Mean e'               Mono # 0.9             Mono% 10.4             MPV 12.7             nRBC 0             Platelets 107             POCT Glucose     123         Potassium 3.6 3.1   3.2 4.0  Comment:  *Result may be interfered by visible hemolysis      3.6             Total Protein 5.0             PW               Right Atrial Pressure (from IVC)               RA Major Axis               RA Width               RBC 3.53             RDW 17.0             RVDD               Sinus               Sodium 137 137   138 134      137             STJ               TAPSE               TDI SEPTAL               TDI LATERAL               Triscuspid Valve Regurgitation Peak Gradient               TR Max Silver               TV rest pulmonary artery pressure               WBC 8.54                              01/22/19  1241   01/22/19  0936   01/22/19  0934        Immature Granulocytes           Immature Grans (Abs)           Albumin           Alkaline Phosphatase           ALT           Anion Gap 12   13     Ascending aorta   3.32       Ao peak silver   2.17       Ao VTI   40.76       AST           AV valve area   1.00       AV mean gradient   12.34       AV peak gradient   18.84       AV Velocity Ratio   0.32       Baso  #           Basophil%           Total Bilirubin           BUN, Bld 66   67     Calcium 8.2   8.0     Chloride 102   102     CO2 20   20     Creatinine 2.6   2.6     Left Ventricle Relative Wall Thickness   0.28       Differential Method           AV index (prosthetic)   0.28       eGFR if  25.1   25.1     eGFR if non  21.7  Comment:  Calculation used to obtain the estimated glomerular filtration  rate (eGFR) is the CKD-EPI equation.      21.7  Comment:  Calculation used to obtain the estimated glomerular filtration  rate (eGFR) is the CKD-EPI equation.        Eos #           Eosinophil%           FS   7       Glucose 137   147     Gran # (ANC)           Gran%           Hematocrit           Hemoglobin           IVS   0.80       LA WIDTH   5.86       Left Atrium Major Axis   6.70       Left Atrium Minor Axis   6.42       LA size   4.38       LA volume   143.05       LA Volume Index   77.8       LVOT area   3.53       LV Diastolic Volume   250.64       LV Diastolic Volume Index   136.29       LVIDD   6.94       LVIDS   6.47       LV mass   274.16       LV Mass Index   149.1       LVOT diameter   2.12       LVOT peak silver   [id number]       LVOT stroke volume   40.89       LVOT peak VTI   11.59       LV Systolic Volume   214.09       LV Systolic Volume Index   116.4       Lymph #           Lymph%           Magnesium           MCH           MCHC           MCV           Mean e'   0.08       Mono #           Mono%           MPV           nRBC           Platelets           POCT Glucose           Potassium 3.4   3.4     Total Protein           PW   0.98       Right Atrial Pressure (from IVC)   15       RA Major Axis   5.84       RA Width   3.28       RBC           RDW           RVDD   3.73       Sinus   3.23       Sodium 134   135     STJ   3.17       TAPSE   1.57       TDI SEPTAL   0.06       TDI LATERAL   0.09       Triscuspid Valve Regurgitation Peak Gradient   58.37       TR Max Silver    3.82       TV rest pulmonary artery pressure   73       WBC                 Significant Imaging: Reviewed for past 24 hours    Assessment and Plan:       * Septic shock    Labs indicative of multiple organ injury (BUN 79, Cr 3, Trop 1.445, lactate 5.6, , ).   BNP elevated to 3676 compared to 1636 a few weeks ago.  Likely 2/2 decreased perfusion 2/2 cardiogenic shock vs septic shock.  CXR w/ central vascular congestion and small effusions.  However, Pt euvolemic clinically.     Plan:  S/p Continuous IVF; scheduling 1x lasix  Broad spectrum abx: vanc, zosyn  Blood cultures pending: NGTD  Pt on dobutamine  Epi weaned off  Strict I/Os  Hold home BB, ARB, statin  TTE stable compared to previous     Cardiogenic shock    See septic shock     Gross hematuria    Suspect 2/2 to traumatic cath placement in setting of apixiban  PMH also significant for BPH with urinary obstruction  UA pending  Will continue to monitor       Hyperkalemia    K 6.2 on exam  EKG: Afib; no changes to prior    Plan:  S/p Shifting w/ kayexalate and insulin/dextrose  Normalized  Will continue to monitor     Transaminitis    See cardiogenic shock  See septic shock     GALEN (acute kidney injury)    Cr/BUN 2.7/74 on admit  Cr/BUN 1.6/59 a few weeks ago  Likely pre-renal vs cardio-renal    Improved w/ IVFs  Will continue to monitor     Hypotension    See cardiogenic shock  See septic shock     Thrombocytopenia    Suspect related to sepsis  See septic shock  Improving  Will continue to monitor     Paroxysmal atrial fibrillation    EKG: Afib; no changes to prior  Rate controlled    Plan:  Continue home apixiban 2.5mg BID  Continue to monitor rates     Chronic combined systolic and diastolic congestive heart failure    Stable compared to previous:  1/22/19 TTE  · Severe left ventricular enlargement.  · Severely decreased left ventricular systolic function. The estimated ejection fraction is 20%  · Septal wall has abnormal motion consistent with  left bundle branch block.  · Moderate global hypokinetic wall motion.  · Eccentric left ventricular hypertrophy.  · Indeterminate left ventricular diastolic function.  · Normal right ventricular systolic function.  · Severe left atrial enlargement.  · Moderate mitral regurgitation.  · Mild tricuspid regurgitation.  · Mild aortic regurgitation.  · The estimated PA systolic pressure is 73 mm Hg  · Elevated central venous pressure (15 mm Hg).     Benign prostatic hyperplasia    Hold home tamsulosin      Depression    Continue home paroxetine, citalopram, wellbutrin     Hyperlipidemia    Holding statin  See cardiogenic shock  See septic shock     Coronary artery disease involving native coronary artery of native heart without angina pectoris    Continue home ASA  Holding BB, Statin, Ace-I  See cardiogenic shock  See septic shock         VTE Risk Mitigation (From admission, onward)        Ordered     apixaban tablet 2.5 mg  2 times daily      01/21/19 0657     IP VTE HIGH RISK PATIENT  Once      01/21/19 0657          Evette Ibarra MD  IM, PGY1  [facility]

## 2019-01-23 NOTE — ASSESSMENT & PLAN NOTE
Stable compared to previous:  1/22/19 TTE  · Severe left ventricular enlargement.  · Severely decreased left ventricular systolic function. The estimated ejection fraction is 20%  · Septal wall has abnormal motion consistent with left bundle branch block.  · Moderate global hypokinetic wall motion.  · Eccentric left ventricular hypertrophy.  · Indeterminate left ventricular diastolic function.  · Normal right ventricular systolic function.  · Severe left atrial enlargement.  · Moderate mitral regurgitation.  · Mild tricuspid regurgitation.  · Mild aortic regurgitation.  · The estimated PA systolic pressure is 73 mm Hg  · Elevated central venous pressure (15 mm Hg).

## 2019-01-23 NOTE — ASSESSMENT & PLAN NOTE
Suspect 2/2 to traumatic cath placement in setting of apixiban  PMH also significant for BPH with urinary obstruction  UA pending  Will continue to monitor

## 2019-01-23 NOTE — PLAN OF CARE
CM name and number placed on the board at the Bedside at this time. Son is present at the Bedside visiting with the patient. He was instructed to call the CM on the number provided with any D/C needs or questions. Understanding verbalized.

## 2019-01-23 NOTE — SUBJECTIVE & OBJECTIVE
Interval History:   Pt has no complaints this AM of CP, SOB. He endorses feeling much better.   He is resting comfortably and endorses good sleep.     Review of Systems   Constitution: Negative for chills and fever.   HENT: Negative for congestion.    Eyes: Negative for visual disturbance.   Cardiovascular: Negative for chest pain and palpitations.   Respiratory: Positive for cough. Negative for shortness of breath and wheezing.    Gastrointestinal: Negative for abdominal pain, nausea and vomiting.   Neurological: Negative for headaches.   Psychiatric/Behavioral: Negative for altered mental status.     Objective:     Vital Signs (Most Recent):  Temp: 97.4 °F (36.3 °C) (01/23/19 0300)  Pulse: 86 (01/23/19 0600)  Resp: (!) 21 (01/23/19 0600)  BP: 92/60 (01/23/19 0600)  SpO2: 98 % (01/23/19 0600) Vital Signs (24h Range):  Temp:  [97.2 °F (36.2 °C)-98 °F (36.7 °C)] 97.4 °F (36.3 °C)  Pulse:  [70-88] 86  Resp:  [12-30] 21  SpO2:  [94 %-100 %] 98 %  BP: ()/() 92/60     Weight: 70.8 kg (156 lb)  Body mass index is 23.72 kg/m².     SpO2: 98 %  O2 Device (Oxygen Therapy): room air      Intake/Output Summary (Last 24 hours) at 1/23/2019 0845  Last data filed at 1/23/2019 0630  Gross per 24 hour   Intake 1857.49 ml   Output 907 ml   Net 950.49 ml       Lines/Drains/Airways     Drain                 Urethral Catheter 01/21/19 1205 Non-latex 1 day          Peripheral Intravenous Line                 Peripheral IV - Single Lumen 01/21/19 0000 Right Antecubital 2 days         Peripheral IV - Single Lumen 01/21/19 0729 Left Forearm 2 days                Physical Exam   Constitutional: No distress.   Resting supine in bed, comfortably   HENT:   Head: Normocephalic and atraumatic.   Nose: Nose normal.   Mouth/Throat: Oropharynx is clear and moist. No oropharyngeal exudate.   Eyes: No scleral icterus.   Neck: Normal range of motion. Neck supple. No JVD present.   Cardiovascular: Normal rate, regular rhythm and intact  distal pulses.   Murmur heard.  Pulmonary/Chest: Effort normal. No respiratory distress. He has no wheezes. He has rales. He exhibits no tenderness.   Abdominal: Soft. Bowel sounds are normal. There is no tenderness. There is no guarding.   Musculoskeletal: He exhibits edema (minimal edema around ankles, unchanged).   Lymphadenopathy:     He has no cervical adenopathy.   Neurological: He is alert.   Skin: Skin is warm and dry. He is not diaphoretic.   Nursing note and vitals reviewed.      Significant Labs:   Recent Lab Results       01/23/19  0421   01/23/19  0011   01/22/19  1951   01/22/19  1950   01/22/19  1614        Immature Granulocytes 0.4             Immature Grans (Abs) 0.03  Comment:  Mild elevation in immature granulocytes is non specific and   can be seen in a variety of conditions including stress response,   acute inflammation, trauma and pregnancy. Correlation with other   laboratory and clinical findings is essential.               Albumin 2.2             Alkaline Phosphatase 99                          Anion Gap 14 12   13 11      14             Ascending aorta               Ao peak carmen               Ao VTI               AST 63             AV valve area               AV mean gradient               AV peak gradient               AV Velocity Ratio               Baso # 0.01             Basophil% 0.1             Total Bilirubin 1.5  Comment:  For infants and newborns, interpretation of results should be based  on gestational age, weight and in agreement with clinical  observations.  Premature Infant recommended reference ranges:  Up to 24 hours.............<8.0 mg/dL  Up to 48 hours............<12.0 mg/dL  3-5 days..................<15.0 mg/dL  6-29 days.................<15.0 mg/dL               BUN, Bld 55 55   60 58      55             Calcium 7.9 8.0   7.8 7.6      7.9             Chloride 104 104   105 105      104             CO2 19 21   20 18      19             Creatinine 2.1 2.4   2.4  2.5      2.1             Left Ventricle Relative Wall Thickness               Differential Method Automated             AV index (prosthetic)               eGFR if  32.4 27.6   27.6 26.3      32.4             eGFR if non  28.1  Comment:  Calculation used to obtain the estimated glomerular filtration  rate (eGFR) is the CKD-EPI equation.    23.9  Comment:  Calculation used to obtain the estimated glomerular filtration  rate (eGFR) is the CKD-EPI equation.      23.9  Comment:  Calculation used to obtain the estimated glomerular filtration  rate (eGFR) is the CKD-EPI equation.    22.7  Comment:  Calculation used to obtain the estimated glomerular filtration  rate (eGFR) is the CKD-EPI equation.         28.1  Comment:  Calculation used to obtain the estimated glomerular filtration  rate (eGFR) is the CKD-EPI equation.                Eos # 0.0             Eosinophil% 0.5             FS               Glucose 103 119   123 175      103             Gran # (ANC) 6.8             Gran% 79.6             Hematocrit 32.8             Hemoglobin 10.4             IVS               LA WIDTH               Left Atrium Major Axis               Left Atrium Minor Axis               LA size               LA volume               LA Volume Index               LVOT area               LV Diastolic Volume               LV Diastolic Volume Index               LVIDD               LVIDS               LV mass               LV Mass Index               LVOT diameter               LVOT peak carmen               LVOT stroke volume               LVOT peak VTI               LV Systolic Volume               LV Systolic Volume Index               Lymph # 0.8             Lymph% 9.0             Magnesium 2.3             MCH 29.5             MCHC 31.7             MCV 93             Mean e'               Mono # 0.9             Mono% 10.4             MPV 12.7             nRBC 0             Platelets 107             POCT Glucose      123         Potassium 3.6 3.1   3.2 4.0  Comment:  *Result may be interfered by visible hemolysis      3.6             Total Protein 5.0             PW               Right Atrial Pressure (from IVC)               RA Major Axis               RA Width               RBC 3.53             RDW 17.0             RVDD               Sinus               Sodium 137 137   138 134      137             STJ               TAPSE               TDI SEPTAL               TDI LATERAL               Triscuspid Valve Regurgitation Peak Gradient               TR Max Silver               TV rest pulmonary artery pressure               WBC 8.54                              01/22/19  1241   01/22/19  0936   01/22/19  0934        Immature Granulocytes           Immature Grans (Abs)           Albumin           Alkaline Phosphatase           ALT           Anion Gap 12   13     Ascending aorta   3.32       Ao peak silver   2.17       Ao VTI   40.76       AST           AV valve area   1.00       AV mean gradient   12.34       AV peak gradient   18.84       AV Velocity Ratio   0.32       Baso #           Basophil%           Total Bilirubin           BUN, Bld 66   67     Calcium 8.2   8.0     Chloride 102   102     CO2 20   20     Creatinine 2.6   2.6     Left Ventricle Relative Wall Thickness   0.28       Differential Method           AV index (prosthetic)   0.28       eGFR if  25.1   25.1     eGFR if non  21.7  Comment:  Calculation used to obtain the estimated glomerular filtration  rate (eGFR) is the CKD-EPI equation.      21.7  Comment:  Calculation used to obtain the estimated glomerular filtration  rate (eGFR) is the CKD-EPI equation.        Eos #           Eosinophil%           FS   7       Glucose 137   147     Gran # (ANC)           Gran%           Hematocrit           Hemoglobin           IVS   0.80       LA WIDTH   5.86       Left Atrium Major Axis   6.70       Left Atrium Minor Axis   6.42       LA size   4.38        LA volume   143.05       LA Volume Index   77.8       LVOT area   3.53       LV Diastolic Volume   250.64       LV Diastolic Volume Index   136.29       LVIDD   6.94       LVIDS   6.47       LV mass   274.16       LV Mass Index   149.1       LVOT diameter   2.12       LVOT peak silver   [id number]       LVOT stroke volume   40.89       LVOT peak VTI   11.59       LV Systolic Volume   214.09       LV Systolic Volume Index   116.4       Lymph #           Lymph%           Magnesium           MCH           MCHC           MCV           Mean e'   0.08       Mono #           Mono%           MPV           nRBC           Platelets           POCT Glucose           Potassium 3.4   3.4     Total Protein           PW   0.98       Right Atrial Pressure (from IVC)   15       RA Major Axis   5.84       RA Width   3.28       RBC           RDW           RVDD   3.73       Sinus   3.23       Sodium 134   135     STJ   3.17       TAPSE   1.57       TDI SEPTAL   0.06       TDI LATERAL   0.09       Triscuspid Valve Regurgitation Peak Gradient   58.37       TR Max Silver   3.82       TV rest pulmonary artery pressure   73       WBC                 Significant Imaging: Reviewed for past 24 hours

## 2019-01-23 NOTE — PROCEDURES
"Jeremie Bradshaw is a 84 y.o. male patient.    Temp: 98.2 °F (36.8 °C) (01/23/19 1700)  Pulse: 84 (01/23/19 1700)  Resp: 16 (01/23/19 1700)  BP: (!) 104/59 (01/23/19 1700)  SpO2: 100 % (01/23/19 1700)  Weight: 70.8 kg (156 lb) (01/21/19 1138)  Height: 5' 8" (172.7 cm) (01/21/19 1137)    PICC  Date/Time: 1/23/2019 5:29 PM  Performed by: Andi Soriano RN  Consent Done: Yes  Time out: Immediately prior to procedure a time out was called to verify the correct patient, procedure, equipment, support staff and site/side marked as required  Indications: med administration and vascular access  Anesthesia: local infiltration  Local anesthetic: lidocaine 1% without epinephrine  Anesthetic Total (mL): 2  Preparation: skin prepped with ChloraPrep  Skin prep agent dried: skin prep agent completely dried prior to procedure  Sterile barriers: all five maximum sterile barriers used - cap, mask, sterile gown, sterile gloves, and large sterile sheet  Hand hygiene: hand hygiene performed prior to central venous catheter insertion  Location details: left basilic  Catheter type: double lumen  Catheter size: 5 Fr  Catheter Length: 40cm    Ultrasound guidance: yes  Vessel Caliber: medium and patent, compressibility normal  Vascular Doppler: not done  Needle advanced into vessel with real time Ultrasound guidance.  Guidewire confirmed in vessel.  Image recorded and saved.  Sterile sheath used.  Manometry: esophageal manometry  Number of attempts: 1  Post-procedure: blood return through all ports, chlorhexidine patch and sterile dressing applied  Specimens: No  Implants: No  Assessment: placement verified by x-ray  Complications: none          Desiree Reynaga  1/23/2019  "

## 2019-01-23 NOTE — PLAN OF CARE
Problem: Fall Injury Risk  Goal: Absence of Fall and Fall-Related Injury    Intervention: Promote Injury-Free Environment    No acute events throughout day. See vital signs and assessments in flowsheets. See below for updates on today's progress.     Pulmonary: Pt on room air.  O2 sats 96-98%; bilateral fine crackles present posteriorly.    Cardiovascular:  Pt in atrial fibrillation with a controlled rate in the 80's.  SBP 90's to 120's with map greater than 65.      Neurological:  Pt is AAO X 3.  Pt is deaf in left ear and [place] in right.     Gastrointestinal: Had a BM today.  Appetite is poor    Genitourinary: De La Torre catheter intact and draining clear pink tinged/yung urine.  Received Lasix 80 mg IV today    Endocrine:     Integumentary/Other: Skin intact/ no breakdown.  Mepilex to heels and sacrum      Infusions: Dobutamine at 2 mcg/kgmin.    Patient progressing towards goals as tolerated, plan of care communicated and reviewed with Jeremie Bradshaw and family. All concerns addressed. Will continue to monitor.

## 2019-01-23 NOTE — CONSULTS
Double lumen PICC placed to L-BASILIC vein.   40 cm in length, 0 cm exposed, and 29 cm arm circumference.  Lot # [id number]

## 2019-01-23 NOTE — PLAN OF CARE
Problem: Adult Inpatient Plan of Care  Goal: Plan of Care Review  Outcome: Ongoing (interventions implemented as appropriate)  No acute events overnight. Vitals stable. Remains on 0.02 mcg/kg/min of EPI (unable to wean off)  and 2.5 mcg/kg/min of . No BM. De La Torre remains to gravity. Urine output 40-45 cc/hr. K+ replaced with 60 mEq KCL PO and 40 mEq KCL IV. NPO since midnight. Plan to PICC line today. Plan of care reviewed with Mr. Bradshaw. All questions and concerns addressed. Will continue to monitor.

## 2019-01-23 NOTE — ASSESSMENT & PLAN NOTE
Cr/BUN 2.7/74 on admit  Cr/BUN 1.6/59 a few weeks ago  Likely pre-renal vs cardio-renal    Improved w/ IVFs  Will continue to monitor

## 2019-01-24 LAB
ALBUMIN SERPL BCP-MCNC: 2.2 G/DL
ALP SERPL-CCNC: 98 U/L
ALT SERPL W/O P-5'-P-CCNC: 171 U/L
ANION GAP SERPL CALC-SCNC: 10 MMOL/L
ANION GAP SERPL CALC-SCNC: 10 MMOL/L
ANION GAP SERPL CALC-SCNC: 13 MMOL/L
AST SERPL-CCNC: 46 U/L
BACTERIA UR CULT: NO GROWTH
BASOPHILS # BLD AUTO: 0.01 K/UL
BASOPHILS NFR BLD: 0.1 %
BILIRUB SERPL-MCNC: 1.9 MG/DL
BUN SERPL-MCNC: 50 MG/DL
BUN SERPL-MCNC: 50 MG/DL
BUN SERPL-MCNC: 54 MG/DL
CALCIUM SERPL-MCNC: 8.4 MG/DL
CALCIUM SERPL-MCNC: 8.4 MG/DL
CALCIUM SERPL-MCNC: 8.5 MG/DL
CHLORIDE SERPL-SCNC: 105 MMOL/L
CO2 SERPL-SCNC: 18 MMOL/L
CO2 SERPL-SCNC: 21 MMOL/L
CO2 SERPL-SCNC: 21 MMOL/L
CREAT SERPL-MCNC: 2.1 MG/DL
DIFFERENTIAL METHOD: ABNORMAL
EOSINOPHIL # BLD AUTO: 0.1 K/UL
EOSINOPHIL NFR BLD: 1.6 %
ERYTHROCYTE [DISTWIDTH] IN BLOOD BY AUTOMATED COUNT: 17.3 %
EST. GFR  (AFRICAN AMERICAN): 32.4 ML/MIN/1.73 M^2
EST. GFR  (NON AFRICAN AMERICAN): 28.1 ML/MIN/1.73 M^2
GLUCOSE SERPL-MCNC: 102 MG/DL
GLUCOSE SERPL-MCNC: 102 MG/DL
GLUCOSE SERPL-MCNC: 151 MG/DL
HCT VFR BLD AUTO: 33 %
HGB BLD-MCNC: 10.4 G/DL
IMM GRANULOCYTES # BLD AUTO: 0.04 K/UL
IMM GRANULOCYTES NFR BLD AUTO: 0.5 %
LYMPHOCYTES # BLD AUTO: 1.2 K/UL
LYMPHOCYTES NFR BLD: 15.5 %
MAGNESIUM SERPL-MCNC: 2 MG/DL
MAGNESIUM SERPL-MCNC: 2.2 MG/DL
MCH RBC QN AUTO: 29 PG
MCHC RBC AUTO-ENTMCNC: 31.5 G/DL
MCV RBC AUTO: 92 FL
MONOCYTES # BLD AUTO: 0.9 K/UL
MONOCYTES NFR BLD: 10.8 %
NEUTROPHILS # BLD AUTO: 5.7 K/UL
NEUTROPHILS NFR BLD: 71.5 %
NRBC BLD-RTO: 0 /100 WBC
PHOSPHATE SERPL-MCNC: 3.3 MG/DL
PLATELET # BLD AUTO: 99 K/UL
PMV BLD AUTO: 12.8 FL
POTASSIUM SERPL-SCNC: 3.8 MMOL/L
POTASSIUM SERPL-SCNC: 3.8 MMOL/L
POTASSIUM SERPL-SCNC: 3.9 MMOL/L
PROT SERPL-MCNC: 5.2 G/DL
RBC # BLD AUTO: 3.59 M/UL
SODIUM SERPL-SCNC: 136 MMOL/L
WBC # BLD AUTO: 7.94 K/UL

## 2019-01-24 PROCEDURE — 99232 SBSQ HOSP IP/OBS MODERATE 35: CPT | Mod: HCNC,GC,, | Performed by: INTERNAL MEDICINE

## 2019-01-24 PROCEDURE — 84100 ASSAY OF PHOSPHORUS: CPT | Mod: HCNC

## 2019-01-24 PROCEDURE — 63600175 PHARM REV CODE 636 W HCPCS: Mod: HCNC | Performed by: STUDENT IN AN ORGANIZED HEALTH CARE EDUCATION/TRAINING PROGRAM

## 2019-01-24 PROCEDURE — 93005 ELECTROCARDIOGRAM TRACING: CPT | Mod: HCNC

## 2019-01-24 PROCEDURE — 25000003 PHARM REV CODE 250: Mod: HCNC | Performed by: STUDENT IN AN ORGANIZED HEALTH CARE EDUCATION/TRAINING PROGRAM

## 2019-01-24 PROCEDURE — A4216 STERILE WATER/SALINE, 10 ML: HCPCS | Mod: HCNC | Performed by: STUDENT IN AN ORGANIZED HEALTH CARE EDUCATION/TRAINING PROGRAM

## 2019-01-24 PROCEDURE — 36415 COLL VENOUS BLD VENIPUNCTURE: CPT | Mod: HCNC

## 2019-01-24 PROCEDURE — 25000003 PHARM REV CODE 250: Mod: HCNC | Performed by: INTERNAL MEDICINE

## 2019-01-24 PROCEDURE — 97165 OT EVAL LOW COMPLEX 30 MIN: CPT | Mod: HCNC

## 2019-01-24 PROCEDURE — 80048 BASIC METABOLIC PNL TOTAL CA: CPT | Mod: HCNC

## 2019-01-24 PROCEDURE — 83735 ASSAY OF MAGNESIUM: CPT | Mod: 91,HCNC

## 2019-01-24 PROCEDURE — 93010 ELECTROCARDIOGRAM REPORT: CPT | Mod: HCNC,,, | Performed by: INTERNAL MEDICINE

## 2019-01-24 PROCEDURE — 83735 ASSAY OF MAGNESIUM: CPT | Mod: HCNC

## 2019-01-24 PROCEDURE — 20600001 HC STEP DOWN PRIVATE ROOM: Mod: HCNC

## 2019-01-24 PROCEDURE — 97116 GAIT TRAINING THERAPY: CPT | Mod: HCNC

## 2019-01-24 PROCEDURE — 85025 COMPLETE CBC W/AUTO DIFF WBC: CPT | Mod: HCNC

## 2019-01-24 PROCEDURE — 99232 PR SUBSEQUENT HOSPITAL CARE,LEVL II: ICD-10-PCS | Mod: HCNC,GC,, | Performed by: INTERNAL MEDICINE

## 2019-01-24 PROCEDURE — 97161 PT EVAL LOW COMPLEX 20 MIN: CPT | Mod: HCNC

## 2019-01-24 PROCEDURE — 80053 COMPREHEN METABOLIC PANEL: CPT | Mod: HCNC

## 2019-01-24 PROCEDURE — 93010 EKG 12-LEAD: ICD-10-PCS | Mod: HCNC,,, | Performed by: INTERNAL MEDICINE

## 2019-01-24 RX ORDER — FUROSEMIDE 10 MG/ML
80 INJECTION INTRAMUSCULAR; INTRAVENOUS DAILY
Status: DISCONTINUED | OUTPATIENT
Start: 2019-01-24 | End: 2019-01-24

## 2019-01-24 RX ORDER — FUROSEMIDE 10 MG/ML
80 INJECTION INTRAMUSCULAR; INTRAVENOUS ONCE
Status: COMPLETED | OUTPATIENT
Start: 2019-01-24 | End: 2019-01-24

## 2019-01-24 RX ORDER — TAMSULOSIN HYDROCHLORIDE 0.4 MG/1
0.4 CAPSULE ORAL DAILY
Status: DISCONTINUED | OUTPATIENT
Start: 2019-01-24 | End: 2019-02-01 | Stop reason: HOSPADM

## 2019-01-24 RX ORDER — FUROSEMIDE 10 MG/ML
80 INJECTION INTRAMUSCULAR; INTRAVENOUS 2 TIMES DAILY
Status: DISCONTINUED | OUTPATIENT
Start: 2019-01-24 | End: 2019-01-24

## 2019-01-24 RX ORDER — POTASSIUM CHLORIDE 20 MEQ/15ML
40 SOLUTION ORAL ONCE
Status: COMPLETED | OUTPATIENT
Start: 2019-01-24 | End: 2019-01-24

## 2019-01-24 RX ADMIN — VANCOMYCIN HYDROCHLORIDE 1250 MG: 100 INJECTION, POWDER, LYOPHILIZED, FOR SOLUTION INTRAVENOUS at 09:01

## 2019-01-24 RX ADMIN — TAMSULOSIN HYDROCHLORIDE 0.4 MG: 0.4 CAPSULE ORAL at 04:01

## 2019-01-24 RX ADMIN — APIXABAN 2.5 MG: 2.5 TABLET, FILM COATED ORAL at 09:01

## 2019-01-24 RX ADMIN — Medication 3 ML: at 09:01

## 2019-01-24 RX ADMIN — OMEGA-3-ACID ETHYL ESTERS 2 G: 1 CAPSULE, LIQUID FILLED ORAL at 08:01

## 2019-01-24 RX ADMIN — PAROXETINE 10 MG: 10 TABLET, FILM COATED ORAL at 06:01

## 2019-01-24 RX ADMIN — Medication 3 ML: at 06:01

## 2019-01-24 RX ADMIN — FUROSEMIDE 10 MG/HR: 10 INJECTION, SOLUTION INTRAMUSCULAR; INTRAVENOUS at 04:01

## 2019-01-24 RX ADMIN — FUROSEMIDE 80 MG: 40 TABLET ORAL at 08:01

## 2019-01-24 RX ADMIN — CITALOPRAM HYDROBROMIDE 10 MG: 10 TABLET ORAL at 08:01

## 2019-01-24 RX ADMIN — APIXABAN 2.5 MG: 2.5 TABLET, FILM COATED ORAL at 08:01

## 2019-01-24 RX ADMIN — PIPERACILLIN AND TAZOBACTAM 4.5 G: 4; .5 INJECTION, POWDER, LYOPHILIZED, FOR SOLUTION INTRAVENOUS; PARENTERAL at 07:01

## 2019-01-24 RX ADMIN — BUPROPION HYDROCHLORIDE 300 MG: 150 TABLET, EXTENDED RELEASE ORAL at 06:01

## 2019-01-24 RX ADMIN — FUROSEMIDE 80 MG: 10 INJECTION, SOLUTION INTRAMUSCULAR; INTRAVENOUS at 11:01

## 2019-01-24 RX ADMIN — ASPIRIN 81 MG: 81 TABLET, COATED ORAL at 08:01

## 2019-01-24 RX ADMIN — FUROSEMIDE 80 MG: 10 INJECTION, SOLUTION INTRAVENOUS at 04:01

## 2019-01-24 RX ADMIN — POTASSIUM CHLORIDE 40 MEQ: 20 SOLUTION ORAL at 08:01

## 2019-01-24 NOTE — PT/OT/SLP EVAL
"Occupational Therapy   Evaluation    Name: Jeremie Bradshaw  MRN: [id number]  Admitting Diagnosis:  Septic shock    Pt found unresponsive in the home. Pt with possible syncope with hypotension.  Pt with cardiac vs septic shock with acute on chronic HF  Recommendations:     Discharge Recommendations:  SNF pending progress.       Assessment:     Jeremie Bradshaw is a 84 y.o. male with a medical diagnosis of Septic shock.  . Performance deficits affecting function: weakness, impaired functional mobilty, gait instability, impaired self care skills, impaired endurance, impaired balance.    Pt tolerated session fairly well with good effort and performance. Pt is [place] but remains cooperative during session. Pt is not safe to return home with limited fly support at this time with OT to continue to address and assess his needs.     Rehab Prognosis: Fair; patient would benefit from acute skilled OT services to address these deficits and reach maximum level of function.       Plan:     Patient to be seen 4 x/week to address the above listed problems via self-care/home management, therapeutic activities, therapeutic exercises  · Plan of Care Expires:    · Plan of Care Reviewed with: patient, daughter    Subjective     "I can't hear you" pt reports.     Occupational Profile:  Living Environment: Pt lives with daughter and 2 young grandsons   Previous level of function: pt was independent prior to New Years day 2019. Since then, pt with additional hospital admit and declining functional skills. Pt recently purchased rollator since last hosptial admit.   Equipment Used at Home:   rollator, TTB, cane and w/c.   Assistance upon Discharge: daughter works and has 2 young children. She assists pt, but she is not always home.     Pain/Comfort:  · Pain Rating 1: 0/10    Patients cultural, spiritual, Hinduism conflicts given the current situation:    None stated   Objective:     Communicated with: nsg  prior to session.    Pt found supine in " bed on RA with saturation remaining 88-91%.    General Precautions: Standard,   fall      Occupational Performance:    Bed Mobility:    · Supine>sit with MOD A    Functional Mobility/Transfers:  · Sit>stand with MIN A   · Pt ambulated few feet to chair with MIN A but required MOD A to safely sit in b/s chair.     Activities of Daily Living:  · Feeding and g/h seated with set-up  LE dressing: TOTAL A     Cognitive/Visual Perceptual  Pt alert and oriented to self and place.  Pt follows commands and demo appropriate mood/affect. Pt is hard of hearing.     Physical Exam  Pt is right hand dominant and demo WFL UE's except limited ROM right shoulder due to old RTC.  Pt with good B hand     AMPAC 6 Click ADL:  AMPAC Total Score: 13    Treatment & Education:  Pt demo Fair to Fair + sitting balance. Once in stand, pt with posterior lean needing MIN A for balance and cues for midline orientation.  Education provided re: safety with functional mobility/ADL skills and OT POC. Education provided to pt/daughter re: having pt perform basic self care skills as independently as able.   Education:    Patient left up in chair with all lines intact, call button in reach, nsg notified and daughter present    GOALS:   Multidisciplinary Problems     Occupational Therapy Goals        Problem: Occupational Therapy Goal    Goal Priority Disciplines Outcome Interventions   Occupational Therapy Goal     OT, PT/OT     Description:  Goals to be met by: 7 days 1/31/19     Patient will increase functional independence with ADLs by performing:    Pt to completed standing g/h skills with SBA  Pt to complete t/f to BSC with SBA  Pt to completed toielting with SBA  Pt to complete UE dressing with set-up  Pt to complete LE dressing with JOSEFINA                       History:     Past Medical History:   Diagnosis Date    Anemia     Basal cell carcinoma 7/2014    left medial canthus    Basal cell carcinoma 3/9/2015    right forehead    Basal cell  "carcinoma 2016    left neck (scoop shave)    Basal cell carcinoma 2016    left preauricular    BPH (benign prostatic hypertrophy) 2012    CAD (coronary artery disease) 2012    Cervical spine fracture 2012    CHF (congestive heart failure) 2012    Depression 2012    GERD (gastroesophageal reflux disease) 3/27/2014    Heart attack     History of atrial fibrillation 2012    Hyperlipidemia     Hypertension     Kidney stones 2012    Memory loss     Myocardial infarction     Osteoarthritis of lumbar spine 2012    Shoulder pain 2012    Stroke 2012       Past Surgical History:   Procedure Laterality Date    basal cell carcinoma left ear      CARDIAC SURGERY      CATARACT EXTRACTION W/  INTRAOCULAR LENS IMPLANT Bilateral     CATARACT EXTRACTION, BILATERAL      CORONARY ARTERY BYPASS GRAFT  1990    x1    ECHOCARDIOGRAM-TRANSESOPHAGEAL N/A 2015    Performed by Theresa Surgeon at [facility]    TONSILLECTOMY         Clinical Decision Makin.  OT Low:  "Pt evaluation falls under low complexity for evaluation coding due to performance deficits noted in 1-3 areas as stated above and 0 co-morbities affecting current functional status. Data obtained from problem focused assessments. No modifications or assistance was required for completion of evaluation. Only brief occupational profile and history review completed."     Time Tracking:     OT Date of Treatment: 19  OT Start Time: 935  OT Stop Time: 956  OT Total Time (min): 21 min    Billable Minutes:Evaluation 21    JOSE Leach  2019    "

## 2019-01-24 NOTE — PT/OT/SLP EVAL
"Physical Therapy Evaluation    Patient Name:  Jeremie Bradshaw   MRN:  [id number]    Co-Eval with OT     Recommendations:     Discharge Recommendations:  nursing facility, skilled   Discharge Equipment Recommendations: none   Barriers to discharge: Decreased caregiver support at current functional level     Assessment:     Jeremie Bradshaw is a 84 y.o. male admitted with a medical diagnosis of Septic shock.  He presents with the following impairments/functional limitations:  weakness, impaired endurance, gait instability, impaired balance, impaired functional mobilty, impaired self care skills, impaired cardiopulmonary response to activity. Pt mobility primarily limited by generalized weakness, posterior lean, as well as impairments with motor sequencing. Pt requires frequent cuing for safety with mobility. Upon discharge, pt would benefit from continued skilled therapy intervention at [facility] nursing facility to progress toward more independent mobility, given pt is not able to receive 24 hr assistance. At this time, pt would continue to benefit from acute skilled therapy intervention to address deficits and progress toward prior level of function.       Rehab Prognosis: Good; patient would benefit from acute skilled PT services to address these deficits and reach maximum level of function.    Recent Surgery: * No surgery found *      Plan:     During this hospitalization, patient to be seen 4 x/week to address the identified rehab impairments via gait training, therapeutic activities, therapeutic exercises, neuromuscular re-education and progress toward the following goals:    · Plan of Care Expires:  02/24/19    Subjective     Chief Complaint: Pt states "I guess I will do whatever you want me to do"   Patient/Family Comments/goals: to get better and return home   Pain/Comfort:  · Pain Rating 1: (pt reporting pain in shoulders, does not quantify )  · Pain Addressed 1: Reposition, Distraction, Cessation of " Activity  · Pain Rating Post-Intervention 1: (pt did not report )    Patients cultural, spiritual, Lutheran conflicts given the current situation: no    Living Environment:  Pt lives with daughter and 2 grandchildren in a H with 4 JOSE with B HR.   Prior to admission, patients level of function: Prior to the new year, pt was independent with all mobility and ADLs. Since pt got sick ~one month ago, pt ambulates without assistance, but carries a standard walker as needed, requires assistance with ADLs. Pt recently obtained a rollator.  Equipment used at home: walker, rolling, TTB, SPC, w/c.  DME owned (not currently used): none.  Upon discharge, patient will have assistance from daughter, who works during the day. Pt will be alone during day.    Objective:     Communicated with RN prior to session.  Patient found HOB elevated, with  telemetry, pulse ox (continuous), blood pressure cuff, fernandez catheter, peripheral IV  upon PT entry to room.    General Precautions: Standard, fall   Orthopedic Precautions:N/A   Braces: N/A     Exams:  · Cognitive Exam:  Patient is AAOx4, followed all commands, communicates clearly and fluently. Pt hard of hearing, requires speaking into R ear   · Gross Motor Coordination:  WFL  · RLE ROM: WFL  · RLE Strength: WFL  · LLE ROM: WFL  · LLE Strength: WFL    Functional Mobility:  · Bed Mobility:     · Supine to Sit: moderate assistance for navigation of B LE, ascent of trunk and shoulders, motor sequencing   · Transfers:     · Sit to Stand:  minimum assistance with hand-held assist with slight posterior lean   · Bed to Chair: minimum assistance with  hand-held assist  using  Step Transfer, pt required moderate assistance during turn to sit in chair 2/2 pt attempting to sit prematurely, required moderate assistance for navigation of hips, controlled descent to sitting in chair   · Gait: pt took 6 steps from bed to sit in chair with min assist and HHA. Pt demo'd decreased bautista, decreased  step size, facilitation required to initiate lateral weight shift. Pt demo'd posterior lean, cuing required for sequencing, step initiation, and step placement.       Therapeutic Activities and Exercises:   Pt educated on role of PT/POC. Pt verbalized understanding.   Pt educated on seated exercises to perform 50x/day. Exercises included bilateral LAQ, marching, ankle DF/PF  Pt encouraged to only perform OOB mobility with assistance from nursing/therapy. Pt verbalized understanding.       AM-PAC 6 CLICK MOBILITY  Total Score:17     Patient left up in chair with all lines intact, call button in reach, RN  notified and family  present.    GOALS:   Multidisciplinary Problems     Physical Therapy Goals        Problem: Physical Therapy Goal    Goal Priority Disciplines Outcome Goal Variances Interventions   Physical Therapy Goal     PT, PT/OT Ongoing (interventions implemented as appropriate)     Description:  Goals to be met by: 2019     Patient will increase functional independence with mobility by performin. Supine to sit with Set-up [place]  2. Sit to stand transfer with Stand-by Assistance  3. Bed to chair transfer with Contact Guard Assistance using Rolling walker or no AD   4. Gait  x 50 feet with Contact Guard Assistance using Rolling Walker or no AD.   5. Ascend/descend 4 stair with left Handrails Minimal Assistance using no AD.                       History:     Past Medical History:   Diagnosis Date    Anemia     Basal cell carcinoma 2014    left medial canthus    Basal cell carcinoma 3/9/2015    right forehead    Basal cell carcinoma 2016    left neck (scoop shave)    Basal cell carcinoma 2016    left preauricular    BPH (benign prostatic hypertrophy) 2012    CAD (coronary artery disease) 2012    Cervical spine fracture 2012    CHF (congestive heart failure) 2012    Depression 2012    GERD (gastroesophageal reflux disease) 3/27/2014    Heart  attack     History of atrial fibrillation 8/17/2012    Hyperlipidemia     Hypertension     Kidney stones 8/17/2012    Memory loss     Myocardial infarction     Osteoarthritis of lumbar spine 8/17/2012    Shoulder pain 8/17/2012    Stroke 8/17/2012       Past Surgical History:   Procedure Laterality Date    basal cell carcinoma left ear      CARDIAC SURGERY      CATARACT EXTRACTION W/  INTRAOCULAR LENS IMPLANT Bilateral     CATARACT EXTRACTION, BILATERAL      CORONARY ARTERY BYPASS GRAFT  1990    x1    ECHOCARDIOGRAM-TRANSESOPHAGEAL N/A 6/2/2015    Performed by [facility] at [facility]    TONSILLECTOMY         Clinical Decision Making:     History  Co-morbidities and personal factors that may impact the plan of care Examination  Body Structures and Functions, activity limitations and participation restrictions that may impact the plan of care Clinical Presentation   Decision Making/ Complexity Score   Co-morbidities:   [] Time since onset of injury / illness / exacerbation  [] Status of current condition  []Patient's cognitive status and safety concerns    [] Multiple Medical Problems (see med hx)  Personal Factors:   [] Patient's age  [] Prior Level of function   [] Patient's home situation (environment and family support)  [] Patient's level of motivation  [] Expected progression of patient      HISTORY:(criteria)    [] 57598 - no personal factors/history    [] 95172 - has 1-2 personal factor/comorbidity     [] 41010 - has >3 personal factor/comorbidity     Body Regions:  [] Objective examination findings  [] Head     []  Neck  [] Trunk   [] Upper Extremity  [] Lower Extremity    Body Systems:  [] For communication ability, affect, cognition, language, and learning style: the assessment of the ability to make needs known, consciousness, orientation (person, place, and time), expected emotional /behavioral responses, and learning preferences (eg, learning barriers, education  needs)  [] For the  neuromuscular system: a general assessment of gross coordinated movement (eg, balance, gait, locomotion, transfers, and transitions) and motor function  (motor control and motor learning)  [] For the musculoskeletal system: the assessment of gross symmetry, gross range of motion, gross strength, height, and weight  [] For the integumentary system: the assessment of pliability(texture), presence of scar formation, skin color, and skin integrity  [] For cardiovascular/pulmonary system: the assessment of heart rate, respiratory rate, blood pressure, and edema     Activity limitations:    [] Patient's cognitive status and saf ety concerns          [] Status of current condition      [] Weight bearing restriction  [] Cardiopulmunary Restriction    Participation Restrictions:   [] Goals and goal agreement with the patient     [] Rehab potential (prognosis) and probable outcome      Examination of Body System: (criteria)    [] 15725 - addressing 1-2 elements    [] 79810 - addressing a total of 3 or more elements     [] 11110 -  Addressing a total of 4 or more elements         Clinical Presentation: (criteria)  Choose one     On examination of body system using standardized tests and measures patient presents with 1-2 elements from any of the following: body structures and functions, activity limitations, and/or participation restrictions.  Leading to a clinical presentation that is considered stable and/or uncomplicated                              Clinical Decision Making  (Eval Complexity):  Low- 27090     Time Tracking:     PT Received On: 01/24/19  PT Start Time: 0935     PT Stop Time: 0955  PT Total Time (min): 20 min     Billable Minutes: Evaluation 10 mins  and Gait Training 10 mins       Nicci Lozada, PT  01/24/2019

## 2019-01-24 NOTE — PROGRESS NOTES
[facility]  Cardiology  Progress Note    Patient Name: Jeremie Bradshaw  MRN: [id number]  Admission Date: 1/21/2019  Hospital Length of Stay: 3 days  Code Status: DNR   Attending Physician: Zabrina Davila MD   Primary Care Physician: Rocio Casas MD  Expected Discharge Date:   Principal Problem:Septic shock    Subjective:     Hospital Course:   Pt admitted 1/21/19 with cardiogenic shock vs septic shock in the setting of acute on chronic CHF due to systolic and diastolic dysfunction. On admit, BNP elevated and pulmonary congestion on CXR. However, clinically, Pt appeared euvolemic and history of recent poor oral intake. Septic shock suspected > cardiac shock and fluids and broad spectrum Abx initiated in addition to dobutamine and Epi. 1/22/19 Epi weaned off and fluids d'c'ed 1/23. Lasix IV 80mg given.   PICC placement done , Blood Cx NGTD, CVP~ 12, lasix changed to 80 mg IV, continue antibiotics.    Interval History:  NEON, did not require any pressors overnight.    Review of Systems   Constitution: Negative for chills and fever.   HENT: Negative for congestion.    Eyes: Negative for visual disturbance.   Cardiovascular: Negative for chest pain and palpitations.   Respiratory: Positive for cough. Negative for shortness of breath and wheezing.    Gastrointestinal: Negative for abdominal pain, nausea and vomiting.   Neurological: Negative for headaches.   Psychiatric/Behavioral: Negative for altered mental status.     Objective:     Vital Signs (Most Recent):  Temp: 97.4 °F (36.3 °C) (01/24/19 1100)  Pulse: 87 (01/24/19 1200)  Resp: (!) 23 (01/24/19 1200)  BP: (!) 90/58 (01/24/19 1200)  SpO2: 100 % (01/24/19 1200) Vital Signs (24h Range):  Temp:  [97.2 °F (36.2 °C)-98.3 °F (36.8 °C)] 97.4 °F (36.3 °C)  Pulse:  [] 87  Resp:  [16-31] 23  SpO2:  [86 %-100 %] 100 %  BP: ()/(49-77) 90/58     Weight: 72.1 kg (158 lb 15.2 oz)  Body mass index is 24.17 kg/m².     SpO2: 100 %  O2 Device  (Oxygen Therapy): nasal cannula      Intake/Output Summary (Last 24 hours) at 1/24/2019 1229  Last data filed at 1/24/2019 1200  Gross per 24 hour   Intake 1222.6 ml   Output 1965 ml   Net -742.4 ml       Lines/Drains/Airways     Peripherally Inserted Central Catheter Line                 PICC Double Lumen 01/23/19 1731 left basilic less than 1 day          Drain                 Urethral Catheter 01/21/19 1205 Non-latex 3 days          Peripheral Intravenous Line                 Peripheral IV - Single Lumen 01/21/19 0729 Left Forearm 3 days                Physical Exam   Constitutional: No distress.   Resting supine in bed, comfortably   HENT:   Head: Normocephalic and atraumatic.   Nose: Nose normal.   Mouth/Throat: Oropharynx is clear and moist. No oropharyngeal exudate.   Eyes: No scleral icterus.   Neck: Normal range of motion. Neck supple. No JVD present.   Cardiovascular: Normal rate, regular rhythm and intact distal pulses.   Murmur heard.  Pulmonary/Chest: Effort normal. No respiratory distress. He has no wheezes. He has rales. He exhibits no tenderness.   Abdominal: Soft. Bowel sounds are normal. There is no tenderness. There is no guarding.   Musculoskeletal: He exhibits edema (minimal edema around ankles, unchanged).   Lymphadenopathy:     He has no cervical adenopathy.   Neurological: He is alert.   Skin: Skin is warm and dry. He is not diaphoretic.   Nursing note and vitals reviewed.      Significant Labs:   Recent Lab Results       01/24/19  0350   01/23/19  1842   01/23/19  1257        Immature Granulocytes 0.5         Immature Grans (Abs) 0.04  Comment:  Mild elevation in immature granulocytes is non specific and   can be seen in a variety of conditions including stress response,   acute inflammation, trauma and pregnancy. Correlation with other   laboratory and clinical findings is essential.           Albumin 2.2         Alkaline Phosphatase 98                  Anion Gap 10   10      10          AST 46         Baso # 0.01         Basophil% 0.1         Total Bilirubin 1.9  Comment:  For infants and newborns, interpretation of results should be based  on gestational age, weight and in agreement with clinical  observations.  Premature Infant recommended reference ranges:  Up to 24 hours.............<8.0 mg/dL  Up to 48 hours............<12.0 mg/dL  3-5 days..................<15.0 mg/dL  6-29 days.................<15.0 mg/dL           BUN, Bld 50   55      50         Calcium 8.4   8.0      8.4         Chloride 105   107      105         CO2 21   21      21         Creatinine 2.1   2.1      2.1         Differential Method Automated         eGFR if  32.4   32.4      32.4         eGFR if non  28.1  Comment:  Calculation used to obtain the estimated glomerular filtration  rate (eGFR) is the CKD-EPI equation.      28.1  Comment:  Calculation used to obtain the estimated glomerular filtration  rate (eGFR) is the CKD-EPI equation.         28.1  Comment:  Calculation used to obtain the estimated glomerular filtration  rate (eGFR) is the CKD-EPI equation.            Eos # 0.1         Eosinophil% 1.6         Glucose 102   81      102         Gran # (ANC) 5.7         Gran% 71.5         Hematocrit 33.0         Hemoglobin 10.4         Lymph # 1.2         Lymph% 15.5         Magnesium 2.2         MCH 29.0         MCHC 31.5         MCV 92         Mono # 0.9         Mono% 10.8         MPV 12.8         nRBC 0         Platelets 99         Potassium 3.8   4.2      3.8         Total Protein 5.2         RBC 3.59         RDW 17.3         Sodium 136   138      136         Vancomycin-Trough   13.7       WBC 7.94               Significant Imaging: Reviewed for past 24 hours    Assessment and Plan:       * Septic shock    Labs indicative of multiple organ injury (BUN 79, Cr 3, Trop 1.445, lactate 5.6, , ).   BNP elevated to 3676 compared to 1636 a few weeks ago.  Likely 2/2 decreased  perfusion 2/2 cardiogenic shock vs septic shock.  CXR w/ central vascular congestion and small effusions.  - Infection source possibale HCAP.      Plan:  S/p IVF; scheduling 1x lasix  Broad spectrum abx: vanc, zosyn for total of 7 days.  Blood cultures: NGTD  Pt on dobutamine  Epi weaned off  Strict I/Os  Hold home BB, ARB, statin  TTE stable compared to previous     Gross hematuria    Suspect 2/2 to traumatic cath placement in setting of apixiban  PMH also significant for BPH with urinary obstruction  Will continue to monitor       Hyperkalemia    K 6.2 on exam  EKG: Afib; no changes to prior    Plan:  S/p Shifting w/ kayexalate and insulin/dextrose  Normalized  Will continue to monitor     Transaminitis    See cardiogenic shock  See septic shock     GALEN (acute kidney injury)    Cr/BUN 2.7/74 on admit  Cr/BUN 1.6/59 a few weeks ago  Likely pre-renal vs cardio-renal    Improved w/ IVFs  Will continue to monitor     Cardiogenic shock    See septic shock     Hypotension    See cardiogenic shock  See septic shock     Thrombocytopenia    Suspect related to sepsis  See septic shock  Improving  Will continue to monitor     Paroxysmal atrial fibrillation    EKG: Afib; no changes to prior  Rate controlled    Plan:  Continue home apixiban 2.5mg BID  Continue to monitor rates     Chronic combined systolic and diastolic congestive heart failure    Stable compared to previous:  1/22/19 TTE  · Severe left ventricular enlargement.  · Severely decreased left ventricular systolic function. The estimated ejection fraction is 20%  · Septal wall has abnormal motion consistent with left bundle branch block.  · Moderate global hypokinetic wall motion.  · Eccentric left ventricular hypertrophy.  · Indeterminate left ventricular diastolic function.  · Normal right ventricular systolic function.  · Severe left atrial enlargement.  · Moderate mitral regurgitation.  · Mild tricuspid regurgitation.  · Mild aortic regurgitation.  · The  estimated PA systolic pressure is 73 mm Hg  · Elevated central venous pressure (15 mm Hg).     Benign prostatic hyperplasia    Hold home tamsulosin      Depression    Continue home paroxetine, citalopram, wellbutrin     Hyperlipidemia    Holding statin  See cardiogenic shock  See septic shock     Coronary artery disease involving native coronary artery of native heart without angina pectoris    Continue home ASA  Holding BB, Statin, Ace-I  See cardiogenic shock  See septic shock         VTE Risk Mitigation (From admission, onward)        Ordered     apixaban tablet 2.5 mg  2 times daily      01/21/19 0657     IP VTE HIGH RISK PATIENT  Once      01/21/19 0657          [facility] Celia Ramirez MD  Cardiology  [facility]

## 2019-01-24 NOTE — SUBJECTIVE & OBJECTIVE
Interval History:  NEON, did not require any pressors overnight.    Review of Systems   Constitution: Negative for chills and fever.   HENT: Negative for congestion.    Eyes: Negative for visual disturbance.   Cardiovascular: Negative for chest pain and palpitations.   Respiratory: Positive for cough. Negative for shortness of breath and wheezing.    Gastrointestinal: Negative for abdominal pain, nausea and vomiting.   Neurological: Negative for headaches.   Psychiatric/Behavioral: Negative for altered mental status.     Objective:     Vital Signs (Most Recent):  Temp: 97.4 °F (36.3 °C) (01/24/19 1100)  Pulse: 87 (01/24/19 1200)  Resp: (!) 23 (01/24/19 1200)  BP: (!) 90/58 (01/24/19 1200)  SpO2: 100 % (01/24/19 1200) Vital Signs (24h Range):  Temp:  [97.2 °F (36.2 °C)-98.3 °F (36.8 °C)] 97.4 °F (36.3 °C)  Pulse:  [] 87  Resp:  [16-31] 23  SpO2:  [86 %-100 %] 100 %  BP: ()/(49-77) 90/58     Weight: 72.1 kg (158 lb 15.2 oz)  Body mass index is 24.17 kg/m².     SpO2: 100 %  O2 Device (Oxygen Therapy): nasal cannula      Intake/Output Summary (Last 24 hours) at 1/24/2019 1229  Last data filed at 1/24/2019 1200  Gross per 24 hour   Intake 1222.6 ml   Output 1965 ml   Net -742.4 ml       Lines/Drains/Airways     Peripherally Inserted Central Catheter Line                 PICC Double Lumen 01/23/19 1731 left basilic less than 1 day          Drain                 Urethral Catheter 01/21/19 1205 Non-latex 3 days          Peripheral Intravenous Line                 Peripheral IV - Single Lumen 01/21/19 0729 Left Forearm 3 days                Physical Exam   Constitutional: No distress.   Resting supine in bed, comfortably   HENT:   Head: Normocephalic and atraumatic.   Nose: Nose normal.   Mouth/Throat: Oropharynx is clear and moist. No oropharyngeal exudate.   Eyes: No scleral icterus.   Neck: Normal range of motion. Neck supple. No JVD present.   Cardiovascular: Normal rate, regular rhythm and intact distal  pulses.   Murmur heard.  Pulmonary/Chest: Effort normal. No respiratory distress. He has no wheezes. He has rales. He exhibits no tenderness.   Abdominal: Soft. Bowel sounds are normal. There is no tenderness. There is no guarding.   Musculoskeletal: He exhibits edema (minimal edema around ankles, unchanged).   Lymphadenopathy:     He has no cervical adenopathy.   Neurological: He is alert.   Skin: Skin is warm and dry. He is not diaphoretic.   Nursing note and vitals reviewed.      Significant Labs:   Recent Lab Results       01/24/19  0350   01/23/19  1842   01/23/19  1257        Immature Granulocytes 0.5         Immature Grans (Abs) 0.04  Comment:  Mild elevation in immature granulocytes is non specific and   can be seen in a variety of conditions including stress response,   acute inflammation, trauma and pregnancy. Correlation with other   laboratory and clinical findings is essential.           Albumin 2.2         Alkaline Phosphatase 98                  Anion Gap 10   10      10         AST 46         Baso # 0.01         Basophil% 0.1         Total Bilirubin 1.9  Comment:  For infants and newborns, interpretation of results should be based  on gestational age, weight and in agreement with clinical  observations.  Premature Infant recommended reference ranges:  Up to 24 hours.............<8.0 mg/dL  Up to 48 hours............<12.0 mg/dL  3-5 days..................<15.0 mg/dL  6-29 days.................<15.0 mg/dL           BUN, Bld 50   55      50         Calcium 8.4   8.0      8.4         Chloride 105   107      105         CO2 21   21      21         Creatinine 2.1   2.1      2.1         Differential Method Automated         eGFR if  32.4   32.4      32.4         eGFR if non  28.1  Comment:  Calculation used to obtain the estimated glomerular filtration  rate (eGFR) is the CKD-EPI equation.      28.1  Comment:  Calculation used to obtain the estimated glomerular  filtration  rate (eGFR) is the CKD-EPI equation.         28.1  Comment:  Calculation used to obtain the estimated glomerular filtration  rate (eGFR) is the CKD-EPI equation.            Eos # 0.1         Eosinophil% 1.6         Glucose 102   81      102         Gran # (ANC) 5.7         Gran% 71.5         Hematocrit 33.0         Hemoglobin 10.4         Lymph # 1.2         Lymph% 15.5         Magnesium 2.2         MCH 29.0         MCHC 31.5         MCV 92         Mono # 0.9         Mono% 10.8         MPV 12.8         nRBC 0         Platelets 99         Potassium 3.8   4.2      3.8         Total Protein 5.2         RBC 3.59         RDW 17.3         Sodium 136   138      136         Vancomycin-Trough   13.7       WBC 7.94               Significant Imaging: Reviewed for past 24 hours

## 2019-01-24 NOTE — ASSESSMENT & PLAN NOTE
Labs indicative of multiple organ injury (BUN 79, Cr 3, Trop 1.445, lactate 5.6, , ).   BNP elevated to 3676 compared to 1636 a few weeks ago.  Likely 2/2 decreased perfusion 2/2 cardiogenic shock vs septic shock.  CXR w/ central vascular congestion and small effusions.  - Infection source possibale HCAP.      Plan:  S/p IVF; scheduling 1x lasix  Broad spectrum abx: vanc, zosyn for total of 7 days.  Blood cultures: NGTD  Pt on dobutamine  Epi weaned off  Strict I/Os  Hold home BB, ARB, statin  TTE stable compared to previous

## 2019-01-24 NOTE — PLAN OF CARE
Problem: Physical Therapy Goal  Goal: Physical Therapy Goal  Goals to be met by: 2019     Patient will increase functional independence with mobility by performin. Supine to sit with Set-up [place]  2. Sit to stand transfer with Stand-by Assistance  3. Bed to chair transfer with Contact Guard Assistance using Rolling walker or no AD   4. Gait  x 50 feet with Contact Guard Assistance using Rolling Walker or no AD.   5. Ascend/descend 4 stair with left Handrails Minimal Assistance using no AD.     Outcome: Ongoing (interventions implemented as appropriate)  Pt evaluated and appropriate goals established.

## 2019-01-24 NOTE — PLAN OF CARE
Problem: Occupational Therapy Goal  Goal: Occupational Therapy Goal  Goals to be met by: 7 days 1/31/19     Patient will increase functional independence with ADLs by performing:    Pt to completed standing g/h skills with SBA  Pt to complete t/f to BSC with SBA  Pt to completed toielting with SBA  Pt to complete UE dressing with set-up  Pt to complete LE dressing with JOSEFINA     Goal and POC established today

## 2019-01-24 NOTE — ASSESSMENT & PLAN NOTE
Suspect 2/2 to traumatic cath placement in setting of apixiban  PMH also significant for BPH with urinary obstruction  Will continue to monitor

## 2019-01-24 NOTE — PLAN OF CARE
Problem: Adult Inpatient Plan of Care  Goal: Plan of Care Review  VSS, Pt.'s rhythm afib, bigeminy, frequent PVCs, pt. Asymptomatic. Remains on Dobutamine, uop 50-60 ml/hr, frequent vitals and assessment per flowsheet, plan of care reviewed with Mr. Bradshaw, questions/concerns addressed, will continue to monitor pt.

## 2019-01-25 PROBLEM — Z51.5 PALLIATIVE CARE ENCOUNTER: Status: ACTIVE | Noted: 2019-01-25

## 2019-01-25 LAB
ALBUMIN SERPL BCP-MCNC: 2.4 G/DL
ALP SERPL-CCNC: 96 U/L
ALT SERPL W/O P-5'-P-CCNC: 148 U/L
ANION GAP SERPL CALC-SCNC: 13 MMOL/L
ANION GAP SERPL CALC-SCNC: 14 MMOL/L
AST SERPL-CCNC: 37 U/L
BASOPHILS # BLD AUTO: 0.01 K/UL
BASOPHILS NFR BLD: 0.1 %
BILIRUB SERPL-MCNC: 2.1 MG/DL
BUN SERPL-MCNC: 54 MG/DL
BUN SERPL-MCNC: 54 MG/DL
CALCIUM SERPL-MCNC: 8.7 MG/DL
CALCIUM SERPL-MCNC: 8.8 MG/DL
CHLORIDE SERPL-SCNC: 105 MMOL/L
CHLORIDE SERPL-SCNC: 105 MMOL/L
CO2 SERPL-SCNC: 19 MMOL/L
CO2 SERPL-SCNC: 19 MMOL/L
CREAT SERPL-MCNC: 2.2 MG/DL
CREAT SERPL-MCNC: 2.3 MG/DL
DIFFERENTIAL METHOD: ABNORMAL
EOSINOPHIL # BLD AUTO: 0.1 K/UL
EOSINOPHIL NFR BLD: 0.7 %
ERYTHROCYTE [DISTWIDTH] IN BLOOD BY AUTOMATED COUNT: 18 %
EST. GFR  (AFRICAN AMERICAN): 29.1 ML/MIN/1.73 M^2
EST. GFR  (AFRICAN AMERICAN): 30.7 ML/MIN/1.73 M^2
EST. GFR  (NON AFRICAN AMERICAN): 25.1 ML/MIN/1.73 M^2
EST. GFR  (NON AFRICAN AMERICAN): 26.5 ML/MIN/1.73 M^2
GLUCOSE SERPL-MCNC: 89 MG/DL
GLUCOSE SERPL-MCNC: 89 MG/DL
HCT VFR BLD AUTO: 35 %
HGB BLD-MCNC: 10.7 G/DL
IMM GRANULOCYTES # BLD AUTO: 0.02 K/UL
IMM GRANULOCYTES NFR BLD AUTO: 0.3 %
LYMPHOCYTES # BLD AUTO: 1.2 K/UL
LYMPHOCYTES NFR BLD: 16.7 %
MAGNESIUM SERPL-MCNC: 2.1 MG/DL
MCH RBC QN AUTO: 28.8 PG
MCHC RBC AUTO-ENTMCNC: 30.6 G/DL
MCV RBC AUTO: 94 FL
MONOCYTES # BLD AUTO: 0.7 K/UL
MONOCYTES NFR BLD: 9.2 %
NEUTROPHILS # BLD AUTO: 5.4 K/UL
NEUTROPHILS NFR BLD: 73 %
NRBC BLD-RTO: 0 /100 WBC
PLATELET # BLD AUTO: 101 K/UL
PMV BLD AUTO: 12.7 FL
POTASSIUM SERPL-SCNC: 3.9 MMOL/L
POTASSIUM SERPL-SCNC: 4 MMOL/L
PROT SERPL-MCNC: 5.4 G/DL
RBC # BLD AUTO: 3.71 M/UL
SODIUM SERPL-SCNC: 137 MMOL/L
SODIUM SERPL-SCNC: 138 MMOL/L
WBC # BLD AUTO: 7.43 K/UL

## 2019-01-25 PROCEDURE — 20600001 HC STEP DOWN PRIVATE ROOM: Mod: HCNC

## 2019-01-25 PROCEDURE — 63600175 PHARM REV CODE 636 W HCPCS: Mod: HCNC | Performed by: HOSPITALIST

## 2019-01-25 PROCEDURE — 63600175 PHARM REV CODE 636 W HCPCS: Mod: HCNC | Performed by: STUDENT IN AN ORGANIZED HEALTH CARE EDUCATION/TRAINING PROGRAM

## 2019-01-25 PROCEDURE — 25000003 PHARM REV CODE 250: Mod: HCNC | Performed by: STUDENT IN AN ORGANIZED HEALTH CARE EDUCATION/TRAINING PROGRAM

## 2019-01-25 PROCEDURE — 80053 COMPREHEN METABOLIC PANEL: CPT | Mod: HCNC

## 2019-01-25 PROCEDURE — 25000003 PHARM REV CODE 250: Mod: HCNC | Performed by: HOSPITALIST

## 2019-01-25 PROCEDURE — 63600175 PHARM REV CODE 636 W HCPCS: Mod: HCNC | Performed by: INTERNAL MEDICINE

## 2019-01-25 PROCEDURE — 25000003 PHARM REV CODE 250: Mod: HCNC | Performed by: INTERNAL MEDICINE

## 2019-01-25 PROCEDURE — A4216 STERILE WATER/SALINE, 10 ML: HCPCS | Mod: HCNC | Performed by: STUDENT IN AN ORGANIZED HEALTH CARE EDUCATION/TRAINING PROGRAM

## 2019-01-25 PROCEDURE — 80048 BASIC METABOLIC PNL TOTAL CA: CPT | Mod: HCNC

## 2019-01-25 PROCEDURE — 85025 COMPLETE CBC W/AUTO DIFF WBC: CPT | Mod: HCNC

## 2019-01-25 PROCEDURE — 83735 ASSAY OF MAGNESIUM: CPT | Mod: HCNC

## 2019-01-25 RX ADMIN — Medication 3 ML: at 09:01

## 2019-01-25 RX ADMIN — OMEGA-3-ACID ETHYL ESTERS 2 G: 1 CAPSULE, LIQUID FILLED ORAL at 08:01

## 2019-01-25 RX ADMIN — PIPERACILLIN AND TAZOBACTAM 4.5 G: 4; .5 INJECTION, POWDER, LYOPHILIZED, FOR SOLUTION INTRAVENOUS; PARENTERAL at 12:01

## 2019-01-25 RX ADMIN — PIPERACILLIN AND TAZOBACTAM 4.5 G: 4; .5 INJECTION, POWDER, LYOPHILIZED, FOR SOLUTION INTRAVENOUS; PARENTERAL at 04:01

## 2019-01-25 RX ADMIN — PIPERACILLIN AND TAZOBACTAM 4.5 G: 4; .5 INJECTION, POWDER, LYOPHILIZED, FOR SOLUTION INTRAVENOUS; PARENTERAL at 11:01

## 2019-01-25 RX ADMIN — PIPERACILLIN AND TAZOBACTAM 4.5 G: 4; .5 INJECTION, POWDER, LYOPHILIZED, FOR SOLUTION INTRAVENOUS; PARENTERAL at 08:01

## 2019-01-25 RX ADMIN — APIXABAN 2.5 MG: 2.5 TABLET, FILM COATED ORAL at 08:01

## 2019-01-25 RX ADMIN — VANCOMYCIN HYDROCHLORIDE 1250 MG: 100 INJECTION, POWDER, LYOPHILIZED, FOR SOLUTION INTRAVENOUS at 09:01

## 2019-01-25 RX ADMIN — TAMSULOSIN HYDROCHLORIDE 0.4 MG: 0.4 CAPSULE ORAL at 08:01

## 2019-01-25 RX ADMIN — APIXABAN 2.5 MG: 2.5 TABLET, FILM COATED ORAL at 09:01

## 2019-01-25 RX ADMIN — ASPIRIN 81 MG: 81 TABLET, COATED ORAL at 08:01

## 2019-01-25 RX ADMIN — FUROSEMIDE 5 MG/HR: 10 INJECTION, SOLUTION INTRAMUSCULAR; INTRAVENOUS at 04:01

## 2019-01-25 RX ADMIN — PAROXETINE 10 MG: 10 TABLET, FILM COATED ORAL at 06:01

## 2019-01-25 RX ADMIN — CITALOPRAM HYDROBROMIDE 10 MG: 10 TABLET ORAL at 08:01

## 2019-01-25 RX ADMIN — BUPROPION HYDROCHLORIDE 300 MG: 150 TABLET, EXTENDED RELEASE ORAL at 06:01

## 2019-01-25 NOTE — SUBJECTIVE & OBJECTIVE
Interval History:     Past Medical History:   Diagnosis Date    Anemia     Basal cell carcinoma 7/2014    left medial canthus    Basal cell carcinoma 3/9/2015    right forehead    Basal cell carcinoma 09/08/2016    left neck (scoop shave)    Basal cell carcinoma 12/09/2016    left preauricular    BPH (benign prostatic hypertrophy) 8/17/2012    CAD (coronary artery disease) 8/17/2012    Cervical spine fracture 11/20/2012    CHF (congestive heart failure) 8/17/2012    Depression 8/17/2012    GERD (gastroesophageal reflux disease) 3/27/2014    Heart attack     History of atrial fibrillation 8/17/2012    Hyperlipidemia     Hypertension     Kidney stones 8/17/2012    Memory loss     Myocardial infarction     Osteoarthritis of lumbar spine 8/17/2012    Shoulder pain 8/17/2012    Stroke 8/17/2012       Past Surgical History:   Procedure Laterality Date    basal cell carcinoma left ear      CARDIAC SURGERY      CATARACT EXTRACTION W/  INTRAOCULAR LENS IMPLANT Bilateral     CATARACT EXTRACTION, BILATERAL      CORONARY ARTERY BYPASS GRAFT  1990    x1    ECHOCARDIOGRAM-TRANSESOPHAGEAL N/A 6/2/2015    [facility] by [facility]    TONSILLECTOMY         Review of patient's allergies indicates:   Allergen Reactions    Prednisone Hallucinations       Medications:  Continuous Infusions:   furosemide (LASIX) 2 mg/mL infusion (non-titrating) 5 mg/hr (01/25/19 0158)     Scheduled Meds:   apixaban  2.5 mg Oral BID    aspirin  81 mg Oral Daily    buPROPion  300 mg Oral QAM    citalopram  10 mg Oral Daily    omega-3 acid ethyl esters  2 g Oral Daily    paroxetine  10 mg Oral QAM    piperacillin-tazobactam (ZOSYN) IVPB  4.5 g Intravenous Q8H    sodium chloride 0.9%  3 mL Intravenous Q8H    tamsulosin  0.4 mg Oral Daily    vancomycin (VANCOCIN) IVPB  1,250 mg Intravenous Q24H     PRN Meds:albuterol, nitroGLYCERIN    Family History     Problem Relation (Age of Onset)    Cancer Father     Heart disease Mother    Heart failure Mother    No Known Problems Sister, Brother, Maternal Aunt, Maternal Uncle, Paternal Aunt, Paternal Uncle, Maternal Grandmother, Maternal Grandfather, Paternal Grandmother, Paternal Grandfather        Tobacco Use    Smoking status: Never Smoker    Smokeless tobacco: Never Used   Substance and Sexual Activity    Alcohol use: No     Comment: social drinker    Drug use: No    Sexual activity: Not Currently       Review of Systems   Respiratory: Positive for cough and shortness of breath.    Cardiovascular: Positive for leg swelling.   Skin: Positive for pallor.   Neurological: Positive for weakness.   Psychiatric/Behavioral: Positive for confusion.     Objective:     Vital Signs (Most Recent):  Temp: 97.5 °F (36.4 °C) (01/25/19 1200)  Pulse: 86 (01/25/19 1243)  Resp: 18 (01/25/19 1200)  BP: 101/69 (01/25/19 0911)  SpO2: 99 % (01/25/19 1200) Vital Signs (24h Range):  Temp:  [96.7 °F (35.9 °C)-97.5 °F (36.4 °C)] 97.5 °F (36.4 °C)  Pulse:  [] 86  Resp:  [16-20] 18  SpO2:  [96 %-100 %] 99 %  BP: ()/(53-69) 101/69     Weight: 76.2 kg (167 lb 15.9 oz)(bed wt)  Body mass index is 25.54 kg/m².    Review of Symptoms  Symptom Assessment (ESAS 0-10 scale)   ESAS 0 1 2 3 4 5 6 7 8 9 10   Pain              Dyspnea              Anxiety              Nausea              Depression               Anorexia              Fatigue              Insomnia              Restlessness               Agitation              CAM / Delirium __ --  ___+   Constipation     __ --  ___+   Diarrhea           __ --  ___+  Bowel Management Plan (BMP): No    Comments: appears fatigued and has some disorientation.  States no pain or discomfort.  Non labored breathing     Pain Assessment:   OME in 24 hours: 0    Performance Status: 30    ECOG Performance Status Grade: 3 - Confined to bed or chair 50% of waking hours    Physical Exam   Constitutional: No distress.   Appears chronically ill    Cardiovascular:    Murmur heard.  Irregular rate, irregular rhythm    Pulmonary/Chest:   Course wet breath sounds, cough    Abdominal: Soft. Bowel sounds are normal.   Neurological: He is alert.   Oriented to person and place    Skin: Skin is warm and dry. There is pallor.   Psychiatric: He has a normal mood and affect. His behavior is normal.       Significant Labs: All pertinent labs within the past 24 hours have been reviewed.  CBC:   Recent Labs   Lab 01/25/19  0805   WBC 7.43   HGB 10.7*   HCT 35.0*   MCV 94   *     BMP:  Recent Labs   Lab 01/25/19  0805   GLU 89  89     138   K 3.9  4.0     105   CO2 19*  19*   BUN 54*  54*   CREATININE 2.2*  2.3*   CALCIUM 8.8  8.7   MG 2.1     LFT:  Lab Results   Component Value Date    AST 37 01/25/2019    ALKPHOS 96 01/25/2019    BILITOT 2.1 (H) 01/25/2019     Albumin:   Albumin   Date Value Ref Range Status   01/25/2019 2.4 (L) 3.5 - 5.2 g/dL Final     Protein:   Total Protein   Date Value Ref Range Status   01/25/2019 5.4 (L) 6.0 - 8.4 g/dL Final     Lactic acid:   Lab Results   Component Value Date    LACTATE 2.7 (H) 01/23/2019    LACTATE 3.1 (H) 01/22/2019       Significant Imaging: I have reviewed all pertinent imaging results/findings within the past 24 hours.    Advanced Directives::  Living Will: No  LaPOST: No  Do Not Resuscitate Status: Yes  Medical Power of : No    Decision-Making Capacity: Patient answered questions    Living Arrangements: Lives with spouse    Psychosocial/Cultural: , three children, 4 grandchildren, retired  for [employer]. [place] served in [employer]         Spiritual:     F- Bianca and Belief: Sikhism     I - Importance:   .  C - Community:     A - Address in Care:  services offered

## 2019-01-25 NOTE — PLAN OF CARE
Problem: Adult Inpatient Plan of Care  Goal: Plan of Care Review  Outcome: Revised  Plan of care discussed with patient. Patient is free of fall/trauma/injury. Denies CP, SOB, or pain/discomfort. IV abx administered this shift.  All questions addressed. Will continue to monitor

## 2019-01-25 NOTE — CARE UPDATE
RN Proactive Rounding Note  Time of Visit: 915    Admit Date: 2019  LOS: 4  Code Status: DNR   Date of Visit: 2019  : 1934  Age: 84 y.o.  Sex: male  Race: White  Bed: [facility] 3092/[facility] 3092 A:   MRN: [id number]  Was the patient discharged from an ICU this admission? yes   Was the patient discharged from a PACU within last 24 hours?  no  Did the patient receive conscious sedation/general anesthesia in last 24 hours?  no  Was the patient in the ED within the past 24 hours?  no  Was the patient started on NIPPV within the past 24 hours?  no  Attending Physician: Darvin Ng MD  Primary Service: [facility] [place] [facility]      ASSESSMENT:     Abnormal Vital Signs: AMS   Clinical Issues: Neuro     INTERVENTIONS/ RECOMMENDATIONS:     PRN respiratory treatments, chest xray, strict I/O    Discussed plan of care with RNKenny.    PHYSICIAN ESCALATION:     Yes/No  Yes    Orders received and case discussed with Dr. Ng .    Disposition: Remain in room 3092.    FOLLOW-UP/CONTINGENCY:     Call back the Rapid Response Nurse at [phone] for additional questions or concerns.

## 2019-01-25 NOTE — PROGRESS NOTES
Progress Note  Hospital Medicine    Provider team:    [facility] CARDIOLOGY CCU  [facility] HOSP MED C  Admit Date: 1/21/2019  Encounter Date: 01/25/2019     SUBJECTIVE:     Follow-up Visit for: Septic shock    HPI (See H&P for complete P,F,SHx):  84M persistent atrial fibrillation on OAC, CAD s/p MI with CABG, ischemic cardiomyopathy (EF of 20-25%), BEAR thrombus, pulmonary HTN (Who Group III) and aortic stenosis who presents via EMS after a syncopal episode. Of note, Pt was admitted from 12/31/18 to 1/6/19 due to CHF with volume overload. He was diuresed and discharged. Family at bedside details that since discharge, Pt has been constipated and straining to move bowels. He has had a couple of episodes of decreased alertness, not complete syncope, when straining. Mental status returns to baseline within a few minutes. This AM family reports that they found the patient lying on his bed unresponsive.  Per EMS, patient was responsive only to pain upon their arrival with SBP 88 and he was given 100ml fluids en route. Upon presentation to the ED, Pt was alert but not amenable to answering questions. Family reports poor oral intake by Pt since most recent discharge. Lasix has been intermittently held. Yesterday, Pt had multiple BMs following use of stool softener. Other than episode of syncope, episodes of near syncope, ongoing chronic cough, and generalized weakness since most recent discharge, family reports no new symptoms such as F/C, wheezing, SOB, productive cough.     ICU course:  Pt admitted 1/21/19 with cardiogenic shock vs septic shock in the setting of acute on chronic CHF due to systolic and diastolic dysfunction. On admit, BNP elevated and pulmonary congestion on CXR. However, clinically, Pt appeared euvolemic and history of recent poor oral intake. Septic shock suspected > cardiac shock and fluids and broad spectrum Abx initiated in addition to dobutamine and Epi. 1/22/19 Epi weaned off and fluids d'c'ed 1/23. Patient  eventually started on lasix with  augmentation.    Interval history:  [place] [facility] [place] with cardiology comanagement following. Met with daughter, Galina, and explained role along with telling her Dr. Wayne would see father given Dr. Davila no longer on service. Went through hospital course. Patient/family appreciative of this review. Overnight the patient had frequent PVCs on monitor and 5-10 beats of VT per Dr. Sagastume.  was stopped. Patient with lower BP this AM that seemed to improve without intervention. The patient is oriented to person and place, as well as year. He offers no complaints. Has to be pressed a bit to answer questions. I introduced concept of palliative care as patient is an excellent candidate for home hospice. Daughter, Galina, was open to speaking with them but reported that it is not a good day due to son having flu. I explained that the team could introduce themselves and not necessarily have long discussion as they could figure a time everyone would be available, even by speaker phone. Galina was pleased with this plan and reported another daughter who is more medically well versed could be available via phone or in person.    TTE 1/22/2019  · Severe left ventricular enlargement.  · Severely decreased left ventricular systolic function. The estimated ejection fraction is 20%  · Septal wall has abnormal motion consistent with left bundle branch block.  · Moderate global hypokinetic wall motion.  · Eccentric left ventricular hypertrophy.  · Indeterminate left ventricular diastolic function.  · Normal right ventricular systolic function.  · Severe left atrial enlargement.  · Moderate mitral regurgitation.  · Mild tricuspid regurgitation.  · Mild aortic regurgitation.  · The estimated PA systolic pressure is 73 mm Hg  · Elevated central venous pressure (15 mm Hg).    Review of Systems:  Review of Systems   Constitutional: Negative for chills and fever.   HENT: Negative  "for congestion and sore throat.    Eyes: Negative for photophobia, pain and discharge.   Respiratory: Negative for cough, hemoptysis, sputum production and shortness of breath.    Cardiovascular: Negative for chest pain, palpitations and leg swelling.   Gastrointestinal: Negative for abdominal pain, diarrhea, nausea and vomiting.   Genitourinary: Negative for dysuria and urgency.   Musculoskeletal: Negative for myalgias and neck pain.   Skin: Negative for itching and rash.   Neurological: Negative for sensory change, focal weakness and headaches.   Endo/Heme/Allergies: Negative for polydipsia. Does not bruise/bleed easily.   Psychiatric/Behavioral: Negative for depression and suicidal ideas.     OBJECTIVE:       Intake/Output Summary (Last 24 hours) at 1/25/2019 0947  Last data filed at 1/25/2019 0900  Gross per 24 hour   Intake 286.5 ml   Output 646 ml   Net -359.5 ml     Vital Signs Range (Last 24H):  Temp:  [96.7 °F (35.9 °C)-97.5 °F (36.4 °C)]   Pulse:  []   Resp:  [16-29]   BP: ()/(53-69)   SpO2:  [96 %-100 %]   Body mass index is 25.54 kg/m².    Objective:  General Appearance:  Comfortable, in no acute distress, not in pain and ill-appearing.    Vital signs: (most recent): Blood pressure (!) 89/54, pulse 85, temperature 98 °F (36.7 °C), temperature source Oral, resp. rate 18, height 5' 8" (1.727 m), weight 76.2 kg (167 lb 15.9 oz), SpO2 100 %.  No fever.    Output: Producing urine and producing stool.    HEENT: Normal HEENT exam.    Lungs:  Normal effort.  Breath sounds clear to auscultation.  He is not in respiratory distress.  There are rales.  No wheezes.    Heart: Normal rate.  Regular rhythm.  S1 normal and S2 normal.  No murmur.   Chest: Symmetric chest wall expansion.   Abdomen: Abdomen is soft.  Bowel sounds are normal.   There is no abdominal tenderness.     Extremities: Normal range of motion.  There is venous stasis and dependent edema.  There is no deformity, effusion or local swelling. "    Pulses: Distal pulses are intact.    Neurological: Patient is alert and oriented to person, place and time.  Normal strength.    Pupils:  Pupils are equal, round, and reactive to light.    Skin:  Warm and dry.      Medications:  Medication list was reviewed in EPIC and changes noted under Assessment/Plan and MAR.    Laboratory:  Recent Labs     01/25/19  0805   WBC 7.43   RBC 3.71*   HGB 10.7*   HCT 35.0*   *   MCV 94   MCH 28.8   MCHC 30.6*   GRAN 73.0  5.4   LYMPH 16.7*  1.2   MONO 9.2  0.7   EOS 0.1      Recent Labs     01/24/19  2311 01/25/19  0805   * 89  89    137  138   K 3.9 3.9  4.0    105  105   CO2 18* 19*  19*   BUN 54* 54*  54*   CREATININE 2.1* 2.2*  2.3*   CALCIUM 8.5* 8.8  8.7   ANIONGAP 13 13  14   MG 2.0 2.1   PHOS 3.3  --        ASSESSMENT/PLAN:     Active Hospital Problems    Diagnosis  POA    *Septic shock [A41.9, R65.21]  Yes    Gross hematuria [R31.0]  No    Hypotension [I95.9]  Yes    Cardiogenic shock [R57.0]  Unknown    GALEN (acute kidney injury) [N17.9]  Unknown    Transaminitis [R74.0]  Unknown    Hyperkalemia [E87.5]  Unknown    Thrombocytopenia [D69.6]  Yes    Paroxysmal atrial fibrillation [I48.0]  Yes     Chronic    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     *Septic shock  Acute on chronic combined heart failure     Labs indicative of multiple organ injury (BUN 79, Cr 3, Trop 1.445, lactate 5.6, , ).   BNP elevated to 3676 compared to 1636 a few weeks ago.  Likely 2/2 decreased perfusion 2/2 cardiogenic shock vs septic shock.  CXR w/ central vascular congestion and  small effusions.  - Infection source possible HCAP.        Plan:  S/p IVF; scheduling 1x lasix, then lasix gtt  Broad spectrum abx: vanc, zosyn for total of 7 days.  Blood cultures: NGTD  Pt off dobutamine  Epi weaned off  Strict I/Os  Hold home BB, ARB, statin, resume as clinically indicated  TTE stable compared to previous      Gross hematuria     Suspect 2/2 to traumatic cath placement in setting of apixiban  PMH also significant for BPH with urinary obstruction  Will continue to monitor  RN to perform bladder scan if unable to void  Will order RP sono                  Elevated transaminases     See cardiogenic shock  See septic shock      GALEN (acute kidney injury)     Cr/BUN 2.7/74 on admit  Cr/BUN 1.6/59 a few weeks ago  Likely pre-renal vs cardio-renal     Improved w/ IVFs  Will continue to monitor now that off                     Thrombocytopenia     Suspect related to sepsis  See septic shock  Improving  Will continue to monitor      Paroxysmal atrial fibrillation     EKG: Afib; no changes to prior  Rate controlled     Plan:  Continue home apixiban 2.5mg BID  Continue to monitor rates             Benign prostatic hyperplasia     Hold home tamsulosin       Depression     Continue home paroxetine, citalopram, wellbutrin      Hyperlipidemia     Holding statin  See cardiogenic shock  See septic shock      Coronary artery disease involving native coronary artery of native heart without angina pectoris     Continue home ASA  Holding BB, Statin, Ace-I  See cardiogenic shock  See septic shock        Anticipated discharge date and disposition:   Pending further stabilization; possible SNF v home with hospice v NH with hospice. Family meeting planned for 10 AM 1/28/2019.  Darvin Ng MD  [facility]

## 2019-01-25 NOTE — SIGNIFICANT EVENT
Notified by nurse patient with  frequent episodes of PVCs on the monitor and 5-10 beats of VT. Remained asymptomatic. Caterina ruiz. Will D/C  2.5 mcg/kg/min and monitor BP off .     Florence Sagastume

## 2019-01-25 NOTE — ASSESSMENT & PLAN NOTE
Palliative care consult received for goals of care - hospice post acute care.  Palliative medicine APRN at bedside. Palliative care introduced.  No family present.     Impression: Mr. Bradshaw is an 83 yo gentleman admitted with septic shock.  He is awake, alert oriented to person and place.  Disoriented to time and situation.  Able to answer simple questions and follow simple commands.  Course breath sounds, cough. Oxygen 2 liters nasal cannula. Sats decrease to low 80's when coughing and rebound to % when not coughing.  Tachycardic with occasional PVC on monitor.  Continuous lasix infusion in progress.     Advanced Care planning   No advanced directives received.   - Patient does not appear able to make complex decisions  - wife is legal next of kin followed by pateints three adult children.   - Patient does not appear to understand current clinical condition  Resuscitation status:  DNR per primary team.     Goals of Care   - no family is present.    -Mr. Bradshaw states he wants to go home  -Appears unable to participate in goals of care conversation.   - Palliative care APRN spoke with giuliana Mojica by telephone. Palliative care introduced, family amenable to conversation.  Brief  Assessment of family's understanding of current condition and goals of care. Daughter was  cooperative and cordial at start of conversation.  Daughter explained this was a bad time for her and the conversation ended abruptly.   - emotional support provided.       Plan/Recommendation  - Palliative care has spoken with alejandra Galina by telephone.  Daughter will be available at hospital on  Mon 1/28/19 at 10 AM to have goals of care meeting. .   - Palliative care will continue to follow.

## 2019-01-25 NOTE — CONSULTS
[facility]  Palliative Medicine  Consult Note    Patient Name: Jeremie Bradshaw  MRN: [id number]  Admission Date: 1/21/2019  Hospital Length of Stay: 4 days  Code Status: DNR   Attending Provider: Darvin Ng MD  Consulting Provider: KARUNA Briones  Primary Care Physician: Rocio Casas MD  Principal Problem:Septic shock    Patient information was obtained from patient, relative(s), past medical records and ER records.      Consults  Assessment/Plan:     Palliative care encounter    Palliative care consult received for goals of care - hospice post acute care.  Palliative medicine APRN at bedside. Palliative care introduced.  No family present.     Impression: Mr. Bradshaw is an 83 yo gentleman admitted with septic shock.  He is awake, alert oriented to person and place.  Disoriented to time and situation.  Able to answer simple questions and follow simple commands.  Course breath sounds, cough. Oxygen 2 liters nasal cannula. Sats decrease to low 80's when coughing and rebound to % when not coughing.  Tachycardic with occasional PVC on monitor.  Continuous lasix infusion in progress.     Advanced Care planning   No advanced directives received.   - Patient does not appear able to make complex decisions  - wife is legal next of kin followed by pateints three adult children.   - Patient does not appear to understand current clinical condition  Resuscitation status:  DNR per primary team.     Goals of Care   - no family is present.    -Mr. Bradshaw states he wants to go home  -Appears unable to participate in goals of care conversation.   - Palliative care APRN spoke with giuliana Mojica by telephone. Palliative care introduced, family amenable to conversation.  Brief  Assessment of family's understanding of current condition and goals of care. Daughter was  cooperative and cordial at start of conversation.  Daughter explained this was a bad time for her and the conversation ended  abruptly.   - emotional support provided.       Plan/Recommendation  - Palliative care has spoken with daughter Galina by telephone.  Daughter will be available at hospital on  Mon 1/28/19 at 10 AM to have goals of care meeting. .   - Palliative care will continue to follow.          Thank you for your consult. I will follow-up with patient. Please contact us if you have any additional questions.    Subjective:     HPI:   Mr. Bradshaw is an 83 yo gentleman with PMH of  Combined systolic and diastolic heart failure, persistent atrial fibrillation on oral anticoagulant,  CAD s/p MI with CABG, ischemic cardiomyopathy (EF of 20-25%), BEAR thrombus, pulmonary HTN (Who Group III) and aortic stenosis. Presented to [facility]  via EMS after a syncopal episode. Admitted with with cardiogenic shock vs septic shock - requiring pressor support and dobutamine. Pressor support and dobutamine weaned. Continuous lasix infusion.             Patient recently admitted  To [facility]  12/31/18 to 1/6/19  with  CHF and  volume overload.         Hospital Course:  No notes on file    Interval History:     Past Medical History:   Diagnosis Date    Anemia     Basal cell carcinoma 7/2014    left medial canthus    Basal cell carcinoma 3/9/2015    right forehead    Basal cell carcinoma 09/08/2016    left neck (scoop shave)    Basal cell carcinoma 12/09/2016    left preauricular    BPH (benign prostatic hypertrophy) 8/17/2012    CAD (coronary artery disease) 8/17/2012    Cervical spine fracture 11/20/2012    CHF (congestive heart failure) 8/17/2012    Depression 8/17/2012    GERD (gastroesophageal reflux disease) 3/27/2014    Heart attack     History of atrial fibrillation 8/17/2012    Hyperlipidemia     Hypertension     Kidney stones 8/17/2012    Memory loss     Myocardial infarction     Osteoarthritis of lumbar spine 8/17/2012    Shoulder pain 8/17/2012    Stroke 8/17/2012       Past Surgical History:   Procedure Laterality Date     basal cell carcinoma left ear      CARDIAC SURGERY      CATARACT EXTRACTION W/  INTRAOCULAR LENS IMPLANT Bilateral     CATARACT EXTRACTION, BILATERAL      CORONARY ARTERY BYPASS GRAFT  1990    x1    ECHOCARDIOGRAM-TRANSESOPHAGEAL N/A 6/2/2015    [facility]formed by [facility]    TONSILLECTOMY         Review of patient's allergies indicates:   Allergen Reactions    Prednisone Hallucinations       Medications:  Continuous Infusions:   furosemide (LASIX) 2 mg/mL infusion (non-titrating) 5 mg/hr (01/25/19 0158)     Scheduled Meds:   apixaban  2.5 mg Oral BID    aspirin  81 mg Oral Daily    buPROPion  300 mg Oral QAM    citalopram  10 mg Oral Daily    omega-3 acid ethyl esters  2 g Oral Daily    paroxetine  10 mg Oral QAM    piperacillin-tazobactam (ZOSYN) IVPB  4.5 g Intravenous Q8H    sodium chloride 0.9%  3 mL Intravenous Q8H    tamsulosin  0.4 mg Oral Daily    vancomycin (VANCOCIN) IVPB  1,250 mg Intravenous Q24H     PRN Meds:albuterol, nitroGLYCERIN    Family History     Problem Relation (Age of Onset)    Cancer Father    Heart disease Mother    Heart failure Mother    No Known Problems Sister, Brother, Maternal Aunt, Maternal Uncle, Paternal Aunt, Paternal Uncle, Maternal Grandmother, Maternal Grandfather, Paternal Grandmother, Paternal Grandfather        Tobacco Use    Smoking status: Never Smoker    Smokeless tobacco: Never Used   Substance and Sexual Activity    Alcohol use: No     Comment: social drinker    Drug use: No    Sexual activity: Not Currently       Review of Systems   Respiratory: Positive for cough and shortness of breath.    Cardiovascular: Positive for leg swelling.   Skin: Positive for pallor.   Neurological: Positive for weakness.   Psychiatric/Behavioral: Positive for confusion.     Objective:     Vital Signs (Most Recent):  Temp: 97.5 °F (36.4 °C) (01/25/19 1200)  Pulse: 86 (01/25/19 1243)  Resp: 18 (01/25/19 1200)  BP: 101/69 (01/25/19 0911)  SpO2: 99 %  (01/25/19 1200) Vital Signs (24h Range):  Temp:  [96.7 °F (35.9 °C)-97.5 °F (36.4 °C)] 97.5 °F (36.4 °C)  Pulse:  [] 86  Resp:  [16-20] 18  SpO2:  [96 %-100 %] 99 %  BP: ()/(53-69) 101/69     Weight: 76.2 kg (167 lb 15.9 oz)(bed wt)  Body mass index is 25.54 kg/m².    Review of Symptoms  Symptom Assessment (ESAS 0-10 scale)   ESAS 0 1 2 3 4 5 6 7 8 9 10   Pain              Dyspnea              Anxiety              Nausea              Depression               Anorexia              Fatigue              Insomnia              Restlessness               Agitation              CAM / Delirium __ --  ___+   Constipation     __ --  ___+   Diarrhea           __ --  ___+  Bowel Management Plan (BMP): No    Comments: appears fatigued and has some disorientation.  States no pain or discomfort.  Non labored breathing     Pain Assessment:   OME in 24 hours: 0    Performance Status: 30    ECOG Performance Status Grade: 3 - Confined to bed or chair 50% of waking hours    Physical Exam   Constitutional: No distress.   Appears chronically ill    Cardiovascular:   Murmur heard.  Irregular rate, irregular rhythm    Pulmonary/Chest:   Course wet breath sounds, cough    Abdominal: Soft. Bowel sounds are normal.   Neurological: He is alert.   Oriented to person and place    Skin: Skin is warm and dry. There is pallor.   Psychiatric: He has a normal mood and affect. His behavior is normal.       Significant Labs: All pertinent labs within the past 24 hours have been reviewed.  CBC:   Recent Labs   Lab 01/25/19  0805   WBC 7.43   HGB 10.7*   HCT 35.0*   MCV 94   *     BMP:  Recent Labs   Lab 01/25/19  0805   GLU 89  89     138   K 3.9  4.0     105   CO2 19*  19*   BUN 54*  54*   CREATININE 2.2*  2.3*   CALCIUM 8.8  8.7   MG 2.1     LFT:  Lab Results   Component Value Date    AST 37 01/25/2019    ALKPHOS 96 01/25/2019    BILITOT 2.1 (H) 01/25/2019     Albumin:   Albumin   Date Value Ref Range Status    01/25/2019 2.4 (L) 3.5 - 5.2 g/dL Final     Protein:   Total Protein   Date Value Ref Range Status   01/25/2019 5.4 (L) 6.0 - 8.4 g/dL Final     Lactic acid:   Lab Results   Component Value Date    LACTATE 2.7 (H) 01/23/2019    LACTATE 3.1 (H) 01/22/2019       Significant Imaging: I have reviewed all pertinent imaging results/findings within the past 24 hours.    Advanced Directives::  Living Will: No  LaPOST: No  Do Not Resuscitate Status: Yes  Medical Power of : No    Decision-Making Capacity: Patient answered questions    Living Arrangements: Lives with spouse    Psychosocial/Cultural: , three children, 4 grandchildren, retired  for [employer].  served in [employer]         Spiritual:     F- Bianca and Belief: Pentecostalism     I - Importance:   .  C - Community:     A - Address in Care:  services offered       > 50% of  min visit spent in chart review, face to face discussion of goals of care,  symptom assessment, coordination of care and emotional support.    Claudia Rubalcava, CNS  Palliative Medicine  [facility]

## 2019-01-25 NOTE — CONSULTS
[facility]  Palliative Medicine  Consult Note    Patient Name: Jeremie Bradshaw  MRN: [id number]  Admission Date: 1/21/2019  Hospital Length of Stay: 4 days  Code Status: DNR   Attending Provider: Darvin Ng MD  Consulting Provider: KARUNA Briones  Primary Care Physician: Rocio Casas MD  Principal Problem:Septic shock        Inpatient consult to Palliative Care  Consult performed by: KARUNA Pedroza  Consult ordered by: Darvin Ng MD  Reason for consult: hospice   Assessment/Recommendations: Palliative care consult received.  Chart reviewed and patient discussed with Dr. Ng.   Full consult to follow.    Thank you for consult and opportunity to participate in Mr. Bradshaw's care.   OMKAR Alan, ACNS-BC

## 2019-01-25 NOTE — PROGRESS NOTES
Pharmacist Renal Dose Adjustment Note    Jeremie Bradshaw is a 84 y.o. male being treated with the medication Piperacillin/Tazobactam     Patient Data:    Vital Signs (Most Recent):  Temp: 97.5 °F (36.4 °C) (01/24/19 1517)  Pulse: 93 (01/24/19 1933)  Resp: 18 (01/24/19 1517)  BP: (!) 98/53 (01/24/19 1515)  SpO2: 96 % (01/24/19 1517)   Vital Signs (72h Range):  Temp:  [97.2 °F (36.2 °C)-98.5 °F (36.9 °C)]   Pulse:  []   Resp:  [12-31]   BP: ()/()   SpO2:  [81 %-100 %]      Recent Labs   Lab 01/23/19  0934 01/23/19  1257 01/24/19  0350   CREATININE 2.1* 2.1* 2.1*  2.1*     Serum creatinine: 2.1 mg/dL (H) 01/24/19 0350  Estimated creatinine clearance: 25.3 mL/min (A)    Medication: Piperacillin-Tazobactam 4.5 mg IV Q12h will be changed to medication: Piperacillin-Tazobactam 4.5 mg IV Q8h    Pharmacist's Name: Chantel Stahl  Pharmacist's Extension: #[phone]

## 2019-01-25 NOTE — HPI
Mr. Bradshaw is an 85 yo gentleman with PMH of  Combined systolic and diastolic heart failure, persistent atrial fibrillation on oral anticoagulant,  CAD s/p MI with CABG, ischemic cardiomyopathy (EF of 20-25%), BEAR thrombus, pulmonary HTN (Who Group III) and aortic stenosis. Presented to [facility]  via EMS after a syncopal episode. Admitted with with cardiogenic shock vs septic shock - requiring pressor support and dobutamine. Pressor support and dobutamine weaned. Continuous lasix infusion.             Patient recently admitted  To [facility]  12/31/18 to 1/6/19  with  CHF and  volume overload.

## 2019-01-25 NOTE — NURSING
Notified Dr. Tanika MD of patient still having frequent PVC's and some runs of V-tach. Patient is asymptomatic with no complaints of chest pain or fluttering. Labs were normal (K 3.9, Mag 2.0, Phos 3.3). Patient is on  gtt at 2.5 mcg/kg and lasix gtt at 10 mg/hr. Dr. Sagastume verbalized to make a note in chart and will notify back with any orders/changes. No new orders given at this time. Will continue to monitor.

## 2019-01-25 NOTE — PLAN OF CARE
Problem: Adult Inpatient Plan of Care  Goal: Plan of Care Review  Outcome: Ongoing (interventions implemented as appropriate)  Patient remained free from falls/injury/trauma throughout shift. No complaints of chest pain, SOB, or discomfort. VSS. Patient did have frequent PVC's and runs of V-tach throughout the night. However, patient was asymptomatic. Electrolytes were stable. MD notified.  gtt discontinued and lasix gtt decreased to 5 mg/hr. IV abx given. Fall risk precautions reviewed and maintained. Plan of care reviewed with patient. Patient verbalized understanding. All questions encouraged and answered. Will continue to monitor.

## 2019-01-25 NOTE — NURSING
Notified by Dr. Tanika MD to discontinue the  gtt and monitor BP. If systolic BP <90, will restart  gtt. Lasix gtt also decreased to 2 mg/hr. Will execute orders and continue to monitor.

## 2019-01-25 NOTE — PLAN OF CARE
Problem: Adult Inpatient Plan of Care  Goal: Patient-Specific Goal (Individualization)  Plan of care discussed with patient. Patient is free of fall/trauma/injury. Denies CP, SOB, or pain/discomfort. All questions addressed. Lasix gtt initiated per order.  gtt maintained per order. Will continue to monitor

## 2019-01-26 LAB
ALBUMIN SERPL BCP-MCNC: 2.2 G/DL
ALP SERPL-CCNC: 106 U/L
ALT SERPL W/O P-5'-P-CCNC: 188 U/L
ANION GAP SERPL CALC-SCNC: 16 MMOL/L
AST SERPL-CCNC: 133 U/L
BACTERIA BLD CULT: NORMAL
BACTERIA BLD CULT: NORMAL
BASOPHILS # BLD AUTO: 0.01 K/UL
BASOPHILS NFR BLD: 0.1 %
BILIRUB SERPL-MCNC: 1.8 MG/DL
BUN SERPL-MCNC: 65 MG/DL
CALCIUM SERPL-MCNC: 8.7 MG/DL
CHLORIDE SERPL-SCNC: 107 MMOL/L
CO2 SERPL-SCNC: 13 MMOL/L
CREAT SERPL-MCNC: 2.8 MG/DL
DIFFERENTIAL METHOD: ABNORMAL
EOSINOPHIL # BLD AUTO: 0 K/UL
EOSINOPHIL NFR BLD: 0.1 %
ERYTHROCYTE [DISTWIDTH] IN BLOOD BY AUTOMATED COUNT: 18.1 %
EST. GFR  (AFRICAN AMERICAN): 22.9 ML/MIN/1.73 M^2
EST. GFR  (NON AFRICAN AMERICAN): 19.8 ML/MIN/1.73 M^2
GLUCOSE SERPL-MCNC: 75 MG/DL
HCT VFR BLD AUTO: 35.5 %
HGB BLD-MCNC: 10.7 G/DL
IMM GRANULOCYTES # BLD AUTO: 0.03 K/UL
IMM GRANULOCYTES NFR BLD AUTO: 0.4 %
LACTATE SERPL-SCNC: 4.3 MMOL/L
LACTATE SERPL-SCNC: 6.3 MMOL/L
LYMPHOCYTES # BLD AUTO: 1 K/UL
LYMPHOCYTES NFR BLD: 13.7 %
MAGNESIUM SERPL-MCNC: 2.6 MG/DL
MCH RBC QN AUTO: 29.1 PG
MCHC RBC AUTO-ENTMCNC: 30.1 G/DL
MCV RBC AUTO: 97 FL
MONOCYTES # BLD AUTO: 0.7 K/UL
MONOCYTES NFR BLD: 9.5 %
NEUTROPHILS # BLD AUTO: 5.6 K/UL
NEUTROPHILS NFR BLD: 76.2 %
NRBC BLD-RTO: 0 /100 WBC
PLATELET # BLD AUTO: 93 K/UL
PMV BLD AUTO: 13.6 FL
POTASSIUM SERPL-SCNC: 5.4 MMOL/L
PROT SERPL-MCNC: 5.4 G/DL
RBC # BLD AUTO: 3.68 M/UL
SODIUM SERPL-SCNC: 136 MMOL/L
VANCOMYCIN TROUGH SERPL-MCNC: 30.6 UG/ML
WBC # BLD AUTO: 7.36 K/UL

## 2019-01-26 PROCEDURE — 80053 COMPREHEN METABOLIC PANEL: CPT | Mod: HCNC

## 2019-01-26 PROCEDURE — 83605 ASSAY OF LACTIC ACID: CPT | Mod: 91,HCNC

## 2019-01-26 PROCEDURE — 25000003 PHARM REV CODE 250: Mod: HCNC | Performed by: INTERNAL MEDICINE

## 2019-01-26 PROCEDURE — 36415 COLL VENOUS BLD VENIPUNCTURE: CPT | Mod: HCNC

## 2019-01-26 PROCEDURE — 25000003 PHARM REV CODE 250: Mod: HCNC | Performed by: HOSPITALIST

## 2019-01-26 PROCEDURE — 83735 ASSAY OF MAGNESIUM: CPT | Mod: HCNC

## 2019-01-26 PROCEDURE — 85025 COMPLETE CBC W/AUTO DIFF WBC: CPT | Mod: HCNC

## 2019-01-26 PROCEDURE — 99233 PR SUBSEQUENT HOSPITAL CARE,LEVL III: ICD-10-PCS | Mod: HCNC,,, | Performed by: HOSPITALIST

## 2019-01-26 PROCEDURE — 80202 ASSAY OF VANCOMYCIN: CPT | Mod: HCNC

## 2019-01-26 PROCEDURE — 25000003 PHARM REV CODE 250: Mod: HCNC | Performed by: STUDENT IN AN ORGANIZED HEALTH CARE EDUCATION/TRAINING PROGRAM

## 2019-01-26 PROCEDURE — 20600001 HC STEP DOWN PRIVATE ROOM: Mod: HCNC

## 2019-01-26 PROCEDURE — 63600175 PHARM REV CODE 636 W HCPCS: Mod: HCNC | Performed by: HOSPITALIST

## 2019-01-26 PROCEDURE — 99233 SBSQ HOSP IP/OBS HIGH 50: CPT | Mod: HCNC,,, | Performed by: HOSPITALIST

## 2019-01-26 PROCEDURE — A4216 STERILE WATER/SALINE, 10 ML: HCPCS | Mod: HCNC | Performed by: STUDENT IN AN ORGANIZED HEALTH CARE EDUCATION/TRAINING PROGRAM

## 2019-01-26 PROCEDURE — 63600175 PHARM REV CODE 636 W HCPCS: Mod: HCNC | Performed by: INTERNAL MEDICINE

## 2019-01-26 RX ORDER — FUROSEMIDE 10 MG/ML
40 INJECTION INTRAMUSCULAR; INTRAVENOUS ONCE
Status: DISCONTINUED | OUTPATIENT
Start: 2019-01-26 | End: 2019-01-27

## 2019-01-26 RX ORDER — DOBUTAMINE HYDROCHLORIDE 400 MG/100ML
2.5 INJECTION, SOLUTION INTRAVENOUS CONTINUOUS
Status: DISCONTINUED | OUTPATIENT
Start: 2019-01-26 | End: 2019-01-26

## 2019-01-26 RX ADMIN — BUPROPION HYDROCHLORIDE 300 MG: 150 TABLET, EXTENDED RELEASE ORAL at 06:01

## 2019-01-26 RX ADMIN — OMEGA-3-ACID ETHYL ESTERS 2 G: 1 CAPSULE, LIQUID FILLED ORAL at 08:01

## 2019-01-26 RX ADMIN — PIPERACILLIN AND TAZOBACTAM 4.5 G: 4; .5 INJECTION, POWDER, LYOPHILIZED, FOR SOLUTION INTRAVENOUS; PARENTERAL at 03:01

## 2019-01-26 RX ADMIN — APIXABAN 2.5 MG: 2.5 TABLET, FILM COATED ORAL at 08:01

## 2019-01-26 RX ADMIN — ASPIRIN 81 MG: 81 TABLET, COATED ORAL at 08:01

## 2019-01-26 RX ADMIN — TAMSULOSIN HYDROCHLORIDE 0.4 MG: 0.4 CAPSULE ORAL at 08:01

## 2019-01-26 RX ADMIN — SODIUM POLYSTYRENE SULFONATE 30 G: 15 SUSPENSION ORAL; RECTAL at 01:01

## 2019-01-26 RX ADMIN — DOBUTAMINE IN DEXTROSE 2.5 MCG/KG/MIN: 200 INJECTION, SOLUTION INTRAVENOUS at 10:01

## 2019-01-26 RX ADMIN — Medication 3 ML: at 02:01

## 2019-01-26 RX ADMIN — PAROXETINE 10 MG: 10 TABLET, FILM COATED ORAL at 06:01

## 2019-01-26 RX ADMIN — CITALOPRAM HYDROBROMIDE 10 MG: 10 TABLET ORAL at 08:01

## 2019-01-26 RX ADMIN — PIPERACILLIN AND TAZOBACTAM 4.5 G: 4; .5 INJECTION, POWDER, LYOPHILIZED, FOR SOLUTION INTRAVENOUS; PARENTERAL at 08:01

## 2019-01-26 NOTE — PLAN OF CARE
Pt is a 84 year old gentleman with cardiomyopathy here with shock likely hypovolemic or septic in nature with GALEN. Was on  however this was stopped and he had worsening lactic acid and decreased urine output, and increased Cr.     Plan  Would recommend continuing    Palliative care hospice

## 2019-01-26 NOTE — NURSING
Contacted at 1620 to evaluate  Jeremie Bradshaw for low BP> Staff physician and Cardiologist at bedside.  Manual BP taken at bedside 76/44. Patient is awake and alert . Patient is currently a DNR status.  Family was contacted by primary team and updated on patient disposition.  Hernandez REYES and cardiologist agreed that it patient status declines nursing should notify them but no escalation of care as he is a DNR.

## 2019-01-26 NOTE — PLAN OF CARE
Problem: Adult Inpatient Plan of Care  Goal: Plan of Care Review  Outcome: Ongoing (interventions implemented as appropriate)  Patient remained free from falls/injury/trauma throughout shift. No complaints of chest pain. Patient does become SOB with movement and de-sats at night. Placed on 2L nasal cannula.  and lasix gtt d/c'd yesterday. IV abx given. Fall risk precautions reviewed and maintained. According to notes, patient's family will be discussing palliative/hospice care. Plan of care reviewed with patient. No questions or concerns at this time. Will continue to monitor.

## 2019-01-26 NOTE — NURSING
Lasix 40 mg IVP once time dose per MD Hernandez. Verified order with MD due to blood pressures running 73-78/50-55, Last blood pressure 123/51 Map of 69. Per MD okay to give Lasix IVP. Will complete order and continue to monitor.     Blood pressure rechecked 76/42 with map of 53. MD Hernandez notified. Per MD hold lasix and . Will complete orders and continue to monitor closely.

## 2019-01-26 NOTE — PLAN OF CARE
Problem: Adult Inpatient Plan of Care  Goal: Plan of Care Review  Outcome: Ongoing (interventions implemented as appropriate)  Patient free of falls/traumas/injuries. IV  gtt restarted. Lactate 6.3 today. IVPB Zosyn and Vanc continued. Skin clean, dry, and intact. Patient educated on plan of care. Patients VS stable and no distress. Will continue to monitor.

## 2019-01-26 NOTE — PROGRESS NOTES
Progress Note  Hospital Medicine    Provider team:    [facility] [place] HOSP MED C  [facility] [place] CARDIOLOGY TEAM A - CARDIOLOGY CONSULT STEPDOWN  Admit Date: 1/21/2019  Encounter Date: 01/26/2019     SUBJECTIVE:     Follow-up Visit for: Septic shock    HPI (See H&P for complete P,F,SHx):  84M persistent atrial fibrillation on OAC, CAD s/p MI with CABG, ischemic cardiomyopathy (EF of 20-25%), BEAR thrombus, pulmonary HTN (Who Group III) and aortic stenosis who presents via EMS after a syncopal episode. Of note, Pt was admitted from 12/31/18 to 1/6/19 due to CHF with volume overload. He was diuresed and discharged. Family at bedside details that since discharge, Pt has been constipated and straining to move bowels. He has had a couple of episodes of decreased alertness, not complete syncope, when straining. Mental status returns to baseline within a few minutes. This AM family reports that they found the patient lying on his bed unresponsive.  Per EMS, patient was responsive only to pain upon their arrival with SBP 88 and he was given 100ml fluids en route. Upon presentation to the ED, Pt was alert but not amenable to answering questions. Family reports poor oral intake by Pt since most recent discharge. Lasix has been intermittently held. Yesterday, Pt had multiple BMs following use of stool softener. Other than episode of syncope, episodes of near syncope, ongoing chronic cough, and generalized weakness since most recent discharge, family reports no new symptoms such as F/C, wheezing, SOB, productive cough.     ICU course:  Pt admitted 1/21/19 with cardiogenic shock vs septic shock in the setting of acute on chronic CHF due to systolic and diastolic dysfunction. On admit, BNP elevated and pulmonary congestion on CXR. However, clinically, Pt appeared euvolemic and history of recent poor oral intake. Septic shock suspected > cardiac shock and fluids and broad spectrum Abx initiated in addition to dobutamine and Epi. 1/22/19 Epi weaned off  and fluids d'c'ed 1/23. Patient eventually started on lasix with  augmentation.    Hospital course:  Patient stepped down to [facility] [place] [place] on 1/25/2019 with cardiology comanagement following. Met with daughter, Galina, and explained role along with telling her Dr. Wayne would see father given Dr. Davila no longer on service. Went through hospital course. Patient/family appreciative of this review. Overnight 1/24-25, the patient had frequent PVCs on monitor and 5-10 beats of VT per Dr. Sagastume.  was stopped. Patient with lower BP that seemed to improve without intervention.  I introduced concept of palliative care as patient is an excellent candidate for home hospice. Plan is for palliative care meeting 1/28 at 10 AM.    Interval history:  Patient less alert but is oriented x 2. Has suprapubic tenderness. Bladder sono pending. RP sono completed without obstructive uropathy; findings c/w medical renal disease, bladder was not distended, and prostatomegaly was noted. The patient's son was at bedside and explained given recent labs showing worsening multiorgan dysfunction to resume . Son aware of poor prognosis and is pleased with plans for the family meeting on Monday.  All questions answered to patient/family satisfaction.    TTE 1/22/2019  · Severe left ventricular enlargement.  · Severely decreased left ventricular systolic function. The estimated ejection fraction is 20%  · Septal wall has abnormal motion consistent with left bundle branch block.  · Moderate global hypokinetic wall motion.  · Eccentric left ventricular hypertrophy.  · Indeterminate left ventricular diastolic function.  · Normal right ventricular systolic function.  · Severe left atrial enlargement.  · Moderate mitral regurgitation.  · Mild tricuspid regurgitation.  · Mild aortic regurgitation.  · The estimated PA systolic pressure is 73 mm Hg  · Elevated central venous pressure (15 mm Hg).    Review of Systems:  Review of Systems   Constitutional:  "Negative for chills and fever.   HENT: Negative for congestion and sore throat.    Eyes: Negative for photophobia, pain and discharge.   Respiratory: Negative for cough, hemoptysis, sputum production and shortness of breath.    Cardiovascular: Negative for chest pain, palpitations and leg swelling.   Gastrointestinal: Negative for abdominal pain, diarrhea, nausea and vomiting.   Genitourinary: Negative for dysuria and urgency.        Suprapubic tenderness noted.   Musculoskeletal: Negative for myalgias and neck pain.   Skin: Negative for itching and rash.   Neurological: Negative for sensory change, focal weakness and headaches.   Endo/Heme/Allergies: Negative for polydipsia. Does not bruise/bleed easily.   Psychiatric/Behavioral: Negative for depression and suicidal ideas.     OBJECTIVE:       Intake/Output Summary (Last 24 hours) at 1/26/2019 0853  Last data filed at 1/26/2019 0600  Gross per 24 hour   Intake 1202.25 ml   Output 1 ml   Net 1201.25 ml     Vital Signs Range (Last 24H):  Temp:  [96.6 °F (35.9 °C)-98 °F (36.7 °C)]   Pulse:  []   Resp:  [16-20]   BP: ()/(53-69)   SpO2:  [93 %-100 %]   Body mass index is 26.98 kg/m².    Objective:  General Appearance:  Comfortable, in no acute distress, not in pain and ill-appearing.    Vital signs: (most recent): Blood pressure 99/61, pulse 85, temperature 97.6 °F (36.4 °C), temperature source Oral, resp. rate 18, height 5' 8" (1.727 m), weight 80.5 kg (177 lb 7.5 oz), SpO2 97 %.  No fever.    Output: Producing urine and producing stool.    HEENT: Normal HEENT exam.    Lungs:  Normal effort.  Breath sounds clear to auscultation.  He is not in respiratory distress.  There are rales.  No wheezes.    Heart: Normal rate.  Regular rhythm.  S1 normal and S2 normal.  No murmur.   Chest: Symmetric chest wall expansion.   Abdomen: Abdomen is soft.  Bowel sounds are normal.   There is no abdominal tenderness.     Extremities: Normal range of motion.  There is venous " stasis and dependent edema.  There is no deformity, effusion or local swelling.    Pulses: Distal pulses are intact.    Neurological: Patient is alert and oriented to person, place and time.  Normal strength.    Pupils:  Pupils are equal, round, and reactive to light.    Skin:  Warm and dry.      Medications:  Medication list was reviewed in EPIC and changes noted under Assessment/Plan and MAR.    Laboratory:  Recent Labs     01/26/19  0432   WBC 7.36   RBC 3.68*   HGB 10.7*   HCT 35.5*   PLT 93*   MCV 97   MCH 29.1   MCHC 30.1*   GRAN 76.2*  5.6   LYMPH 13.7*  1.0   MONO 9.5  0.7   EOS 0.0      Recent Labs     01/24/19  2311  01/26/19  0432   *   < > 75      < > 136   K 3.9   < > 5.4*      < > 107   CO2 18*   < > 13*   BUN 54*   < > 65*   CREATININE 2.1*   < > 2.8*   CALCIUM 8.5*   < > 8.7   ANIONGAP 13   < > 16   MG 2.0   < > 2.6   PHOS 3.3  --   --     < > = values in this interval not displayed.       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         *Septic shock  Acute on chronic combined heart failure     Labs indicative of persistent multiorgan failure  Likely 2/2 decreased perfusion  2/2 cardiogenic shock.  CXR w/ central vascular congestion and small effusions.  - Infection source possible HCAP        Plan:  Check CXR, repeat lactic acid at 1500  Resume   Consider resume lasix IVP or gtt  Broad spectrum abx: vanc, zosyn for total of 7 days.  Vanc trough scheduled for today (will time with LA)  Blood cultures: NGTD  Hold home BB, ARB, statin, resume as clinically indicated  TTE stable compared to previous      Gross hematuria     Suspect 2/2 to traumatic cath placement in setting of apixiban  PMH also significant for BPH with urinary obstruction  Will continue to monitor  Bladder scan  S/p RP sono                  Elevated transaminases     See cardiogenic shock  See septic shock      GALEN (acute kidney injury)  Hyperkalemia     Cr/BUN 2.7/74 on admit  Cr/BUN 1.6/59 a few weeks ago  Likely pre-renal vs cardio-renal     Resume   Proceed with kayexalate                    Thrombocytopenia     Suspect related to sepsis  See septic shock  Improving  Will continue to monitor      Paroxysmal atrial fibrillation     EKG: Afib; no changes to prior  Rate controlled     Plan:  Continue home apixiban 2.5mg BID  Continue to monitor rates             Benign prostatic hyperplasia     Hold home tamsulosin       Depression     Continue home paroxetine, citalopram, wellbutrin      Hyperlipidemia     Holding statin  See cardiogenic shock  See septic shock      Coronary artery disease involving native coronary artery of native heart without angina pectoris     Continue home ASA  Holding BB, Statin, Ace-I  See cardiogenic shock  See septic shock        Anticipated discharge date and disposition:   Pending further stabilization; possible SNF v home with hospice v NH with hospice. Family meeting planned for 10 AM 1/28/2019.  Darvin Ng MD  [facility] Medicine

## 2019-01-26 NOTE — PROGRESS NOTES
01/26/19 1600 01/26/19 1608   Vital Signs   BP (!) 68/39 (!) 75/45   MAP (mmHg) 48 55   BP Location Right arm Right arm   BP Method Automatic Automatic   Patient Position Lying Lying   notified MD Hernandez orders to be put in.

## 2019-01-27 LAB
ALBUMIN SERPL BCP-MCNC: 2.2 G/DL
ALP SERPL-CCNC: 123 U/L
ALT SERPL W/O P-5'-P-CCNC: 292 U/L
ANION GAP SERPL CALC-SCNC: 14 MMOL/L
AST SERPL-CCNC: 212 U/L
BASOPHILS # BLD AUTO: 0 K/UL
BASOPHILS NFR BLD: 0 %
BILIRUB SERPL-MCNC: 1.9 MG/DL
BUN SERPL-MCNC: 75 MG/DL
CALCIUM SERPL-MCNC: 8.7 MG/DL
CHLORIDE SERPL-SCNC: 105 MMOL/L
CO2 SERPL-SCNC: 21 MMOL/L
CREAT SERPL-MCNC: 3.1 MG/DL
DIFFERENTIAL METHOD: ABNORMAL
EOSINOPHIL # BLD AUTO: 0 K/UL
EOSINOPHIL NFR BLD: 0 %
ERYTHROCYTE [DISTWIDTH] IN BLOOD BY AUTOMATED COUNT: 17.9 %
EST. GFR  (AFRICAN AMERICAN): 20.3 ML/MIN/1.73 M^2
EST. GFR  (NON AFRICAN AMERICAN): 17.5 ML/MIN/1.73 M^2
GLUCOSE SERPL-MCNC: 96 MG/DL
HCT VFR BLD AUTO: 31.4 %
HGB BLD-MCNC: 10 G/DL
IMM GRANULOCYTES # BLD AUTO: 0.03 K/UL
IMM GRANULOCYTES NFR BLD AUTO: 0.4 %
LYMPHOCYTES # BLD AUTO: 0.9 K/UL
LYMPHOCYTES NFR BLD: 12.3 %
MAGNESIUM SERPL-MCNC: 2.3 MG/DL
MCH RBC QN AUTO: 29.9 PG
MCHC RBC AUTO-ENTMCNC: 31.8 G/DL
MCV RBC AUTO: 94 FL
MONOCYTES # BLD AUTO: 0.7 K/UL
MONOCYTES NFR BLD: 8.9 %
NEUTROPHILS # BLD AUTO: 5.9 K/UL
NEUTROPHILS NFR BLD: 78.4 %
NRBC BLD-RTO: 1 /100 WBC
PLATELET # BLD AUTO: 82 K/UL
PMV BLD AUTO: 12.9 FL
POTASSIUM SERPL-SCNC: 3.5 MMOL/L
PROT SERPL-MCNC: 5 G/DL
RBC # BLD AUTO: 3.35 M/UL
SODIUM SERPL-SCNC: 140 MMOL/L
VANCOMYCIN SERPL-MCNC: 29.3 UG/ML
WBC # BLD AUTO: 7.57 K/UL

## 2019-01-27 PROCEDURE — 80202 ASSAY OF VANCOMYCIN: CPT | Mod: HCNC

## 2019-01-27 PROCEDURE — 85025 COMPLETE CBC W/AUTO DIFF WBC: CPT | Mod: HCNC

## 2019-01-27 PROCEDURE — 80053 COMPREHEN METABOLIC PANEL: CPT | Mod: HCNC

## 2019-01-27 PROCEDURE — 25000003 PHARM REV CODE 250: Mod: HCNC | Performed by: STUDENT IN AN ORGANIZED HEALTH CARE EDUCATION/TRAINING PROGRAM

## 2019-01-27 PROCEDURE — 36415 COLL VENOUS BLD VENIPUNCTURE: CPT | Mod: HCNC

## 2019-01-27 PROCEDURE — 63600175 PHARM REV CODE 636 W HCPCS: Mod: HCNC | Performed by: INTERNAL MEDICINE

## 2019-01-27 PROCEDURE — 83735 ASSAY OF MAGNESIUM: CPT | Mod: HCNC

## 2019-01-27 PROCEDURE — 25000003 PHARM REV CODE 250: Mod: HCNC | Performed by: HOSPITALIST

## 2019-01-27 PROCEDURE — 20600001 HC STEP DOWN PRIVATE ROOM: Mod: HCNC

## 2019-01-27 PROCEDURE — 99233 SBSQ HOSP IP/OBS HIGH 50: CPT | Mod: HCNC,,, | Performed by: HOSPITALIST

## 2019-01-27 PROCEDURE — 63600175 PHARM REV CODE 636 W HCPCS: Mod: HCNC | Performed by: HOSPITALIST

## 2019-01-27 PROCEDURE — 99233 PR SUBSEQUENT HOSPITAL CARE,LEVL III: ICD-10-PCS | Mod: HCNC,,, | Performed by: HOSPITALIST

## 2019-01-27 PROCEDURE — 25000003 PHARM REV CODE 250: Mod: HCNC | Performed by: INTERNAL MEDICINE

## 2019-01-27 PROCEDURE — A4216 STERILE WATER/SALINE, 10 ML: HCPCS | Mod: HCNC | Performed by: STUDENT IN AN ORGANIZED HEALTH CARE EDUCATION/TRAINING PROGRAM

## 2019-01-27 RX ADMIN — APIXABAN 2.5 MG: 2.5 TABLET, FILM COATED ORAL at 09:01

## 2019-01-27 RX ADMIN — PIPERACILLIN AND TAZOBACTAM 4.5 G: 4; .5 INJECTION, POWDER, LYOPHILIZED, FOR SOLUTION INTRAVENOUS; PARENTERAL at 09:01

## 2019-01-27 RX ADMIN — Medication 3 ML: at 09:01

## 2019-01-27 RX ADMIN — PIPERACILLIN AND TAZOBACTAM 4.5 G: 4; .5 INJECTION, POWDER, LYOPHILIZED, FOR SOLUTION INTRAVENOUS; PARENTERAL at 01:01

## 2019-01-27 RX ADMIN — OMEGA-3-ACID ETHYL ESTERS 2 G: 1 CAPSULE, LIQUID FILLED ORAL at 08:01

## 2019-01-27 RX ADMIN — ASPIRIN 81 MG: 81 TABLET, COATED ORAL at 08:01

## 2019-01-27 RX ADMIN — APIXABAN 2.5 MG: 2.5 TABLET, FILM COATED ORAL at 08:01

## 2019-01-27 RX ADMIN — CITALOPRAM HYDROBROMIDE 10 MG: 10 TABLET ORAL at 08:01

## 2019-01-27 RX ADMIN — PAROXETINE 10 MG: 10 TABLET, FILM COATED ORAL at 06:01

## 2019-01-27 RX ADMIN — PIPERACILLIN AND TAZOBACTAM 4.5 G: 4; .5 INJECTION, POWDER, LYOPHILIZED, FOR SOLUTION INTRAVENOUS; PARENTERAL at 08:01

## 2019-01-27 RX ADMIN — BUPROPION HYDROCHLORIDE 300 MG: 150 TABLET, EXTENDED RELEASE ORAL at 06:01

## 2019-01-27 RX ADMIN — TAMSULOSIN HYDROCHLORIDE 0.4 MG: 0.4 CAPSULE ORAL at 08:01

## 2019-01-27 NOTE — PROGRESS NOTES
01/26/19 2306   Critical Value Communication   Notified Physician/Designee BERNARDINO Garza MD   Date Result Received 01/26/19   Time Result Received 2305   Resulting Department of Critical Value Lab   Who communicated critical value from resulting department? Lab   Critical Test #1 Vanc trough   Critical Test #1 Result 30.6   Date Notified 01/26/19   Time Notified 2314   Date of 1st Attempt 01/26/19   Time of 1st Attempt 2306   Physician Directive Hold 3rd dose

## 2019-01-27 NOTE — PLAN OF CARE
Problem: Adult Inpatient Plan of Care  Goal: Plan of Care Review  Outcome: Ongoing (interventions implemented as appropriate)  Patient free of falls/traumas/injuries. IVPB ABXs continued.  discontinued due to patient becoming hypotensive. Skin clean, dry, and intact. Patient educated on plan of care and verbalized understanding. Patients VS stable and no distress. Will continue to monitor.

## 2019-01-27 NOTE — PLAN OF CARE
Vancomycin trough of 30, will hold PM dose for elevated level    Will order random vancomycin level for tomorrow AM

## 2019-01-27 NOTE — PLAN OF CARE
Problem: Adult Inpatient Plan of Care  Goal: Patient-Specific Goal (Individualization)  Outcome: Ongoing (interventions implemented as appropriate)  Patient remained free from falls/injury/trauma throughout shift. No complaints of chest pain. Patient does become SOB at night especially with exertion. 2L nasal cannula maintained. O2 sats %. BP improved throughout the night. 3rd dose of IV vancomycin held due to critical vanc trough of 30.6. Next trough due at 0800. Patient had multiple BM's throughout night (from kayexalate given for increased potassium). Fall risk precautions reviewed and maintained. Plan of care reviewed with patient. No questions at this time. Will continue to monitor.

## 2019-01-27 NOTE — PROGRESS NOTES
Pharmacist Renal Dose Adjustment Note    Jeremie Bradshaw is a 84 y.o. male being treated with the medication piperacillin-tazobactam    Patient Data:    Vital Signs (Most Recent):  Temp: 96.8 °F (36 °C) (01/27/19 0716)  Pulse: 95 (01/27/19 1000)  Resp: 18 (01/27/19 0716)  BP: 100/62 (01/27/19 0716)  SpO2: (!) 94 % (01/27/19 0716)   Vital Signs (72h Range):  Temp:  [96.6 °F (35.9 °C)-98 °F (36.7 °C)]   Pulse:  []   Resp:  [16-26]   BP: ()/(39-69)   SpO2:  [91 %-100 %]      Recent Labs   Lab 01/25/19  0805 01/26/19  0432 01/27/19  0448   CREATININE 2.2*  2.3* 2.8* 3.1*     Serum creatinine: 3.1 mg/dL (H) 01/27/19 0448  Estimated creatinine clearance: 17.2 mL/min (A)    Medication: piperacillin-tazobactam dose: 4.5 g frequency q8h will be changed to medication: piperacillin-tazobactam dose: 4.5 g frequency: q12h    Pharmacist's Name: Katty Mckinney  Pharmacist's Extension: [phone]

## 2019-01-27 NOTE — PT/OT/SLP PROGRESS
Physical Therapy      Patient Name:  Jeremie Bradshaw   MRN:  [id number]    Patient not seen today secondary to Patient on hold today due to low blood pressure. Will follow-up on next scheduled visit.    David Mendez PTA

## 2019-01-27 NOTE — PROGRESS NOTES
Progress Note  Hospital Medicine    Provider team:    [facility] [place] HOSP MED C  [facility] [place] CARDIOLOGY TEAM A - CARDIOLOGY CONSULT STEPDOWN  Admit Date: 1/21/2019  Encounter Date: 01/27/2019     SUBJECTIVE:     Follow-up Visit for: Septic shock    HPI (See H&P for complete P,F,SHx):  84M persistent atrial fibrillation on OAC, CAD s/p MI with CABG, ischemic cardiomyopathy (EF of 20-25%), BEAR thrombus, pulmonary HTN (Who Group III) and aortic stenosis who presents via EMS after a syncopal episode. Of note, Pt was admitted from 12/31/18 to 1/6/19 due to CHF with volume overload. He was diuresed and discharged. Family at bedside details that since discharge, Pt has been constipated and straining to move bowels. He has had a couple of episodes of decreased alertness, not complete syncope, when straining. Mental status returns to baseline within a few minutes. This AM family reports that they found the patient lying on his bed unresponsive.  Per EMS, patient was responsive only to pain upon their arrival with SBP 88 and he was given 100ml fluids en route. Upon presentation to the ED, Pt was alert but not amenable to answering questions. Family reports poor oral intake by Pt since most recent discharge. Lasix has been intermittently held. Yesterday, Pt had multiple BMs following use of stool softener. Other than episode of syncope, episodes of near syncope, ongoing chronic cough, and generalized weakness since most recent discharge, family reports no new symptoms such as F/C, wheezing, SOB, productive cough.     ICU course:  Pt admitted 1/21/19 with cardiogenic shock vs septic shock in the setting of acute on chronic CHF due to systolic and diastolic dysfunction. On admit, BNP elevated and pulmonary congestion on CXR. However, clinically, Pt appeared euvolemic and history of recent poor oral intake. Septic shock suspected > cardiac shock and fluids and broad spectrum Abx initiated in addition to dobutamine and Epi. 1/22/19 Epi weaned off  and fluids d'c'ed 1/23. Patient eventually started on lasix with  augmentation.    Hospital course:  Patient stepped down to [facility] [place] on 1/25/2019 with cardiology comanagement following. Met with daughter, Galina, and explained role along with telling her Dr. Wayne would see father given Dr. Davila no longer on service. Went through hospital course. Patient/family appreciative of this review. Overnight 1/24-25, the patient had frequent PVCs on monitor and 5-10 beats of VT per Dr. Sagastume.  was stopped. Patient with lower BP that seemed to improve without intervention.  I introduced concept of palliative care as patient is an excellent candidate for home hospice. Plan is for palliative care meeting 1/28 at 10 AM.    Interval history:  Patient more alert, had to be prompted for orientation however. Has suprapubic tenderness. RP sono completed without obstructive uropathy; findings c/w medical renal disease, bladder was not distended, and prostatomegaly was noted. Bladder sono should continue. Faimly aware of poor prognosis and is pleased with plans for the family meeting on Monday.  All questions answered to patient/family satisfaction.  May want to stop blood draws but will continue until meeting.    TTE 1/22/2019  · Severe left ventricular enlargement.  · Severely decreased left ventricular systolic function. The estimated ejection fraction is 20%  · Septal wall has abnormal motion consistent with left bundle branch block.  · Moderate global hypokinetic wall motion.  · Eccentric left ventricular hypertrophy.  · Indeterminate left ventricular diastolic function.  · Normal right ventricular systolic function.  · Severe left atrial enlargement.  · Moderate mitral regurgitation.  · Mild tricuspid regurgitation.  · Mild aortic regurgitation.  · The estimated PA systolic pressure is 73 mm Hg  · Elevated central venous pressure (15 mm Hg).    Review of Systems:  Review of Systems   Constitutional: Negative for chills and  "fever.   HENT: Negative for congestion and sore throat.    Eyes: Negative for photophobia, pain and discharge.   Respiratory: Negative for cough, hemoptysis, sputum production and shortness of breath.    Cardiovascular: Negative for chest pain, palpitations and leg swelling.   Gastrointestinal: Negative for abdominal pain, diarrhea, nausea and vomiting.   Genitourinary: Negative for dysuria and urgency.        Suprapubic tenderness noted.   Musculoskeletal: Negative for myalgias and neck pain.   Skin: Negative for itching and rash.   Neurological: Negative for sensory change, focal weakness and headaches.   Endo/Heme/Allergies: Negative for polydipsia. Does not bruise/bleed easily.   Psychiatric/Behavioral: Negative for depression and suicidal ideas.     OBJECTIVE:       Intake/Output Summary (Last 24 hours) at 1/27/2019 0953  Last data filed at 1/27/2019 0600  Gross per 24 hour   Intake 672.2 ml   Output 150 ml   Net 522.2 ml     Vital Signs Range (Last 24H):  Temp:  [96.8 °F (36 °C)-97.6 °F (36.4 °C)]   Pulse:  []   Resp:  [18-20]   BP: ()/(39-66)   SpO2:  [91 %-100 %]   Body mass index is 26.78 kg/m².    Objective:  General Appearance:  Comfortable, in no acute distress, not in pain and ill-appearing.    Vital signs: (most recent): Blood pressure 100/62, pulse 98, temperature 96.8 °F (36 °C), temperature source Oral, resp. rate 18, height 5' 8" (1.727 m), weight 79.9 kg (176 lb 2.4 oz), SpO2 (!) 94 %.  No fever.    Output: Producing urine and producing stool.    HEENT: Normal HEENT exam.    Lungs:  Normal effort.  Breath sounds clear to auscultation.  He is not in respiratory distress.  There are rales.  No wheezes.    Heart: Normal rate.  Regular rhythm.  S1 normal and S2 normal.  No murmur.   Chest: Symmetric chest wall expansion.   Abdomen: Abdomen is soft.  Bowel sounds are normal.   There is no abdominal tenderness.     Extremities: Normal range of motion.  There is venous stasis and dependent " edema.  There is no deformity, effusion or local swelling.    Pulses: Distal pulses are intact.    Neurological: Patient is alert and oriented to person, place and time.  Normal strength.    Pupils:  Pupils are equal, round, and reactive to light.    Skin:  Warm and dry.      Medications:  Medication list was reviewed in EPIC and changes noted under Assessment/Plan and MAR.    Laboratory:  Recent Labs     01/27/19  0448   WBC 7.57   RBC 3.35*   HGB 10.0*   HCT 31.4*   PLT 82*   MCV 94   MCH 29.9   MCHC 31.8*   GRAN 78.4*  5.9   LYMPH 12.3*  0.9*   MONO 8.9  0.7   EOS 0.0      Recent Labs     01/24/19  2311  01/27/19  0448   *   < > 96      < > 140   K 3.9   < > 3.5      < > 105   CO2 18*   < > 21*   BUN 54*   < > 75*   CREATININE 2.1*   < > 3.1*   CALCIUM 8.5*   < > 8.7   ANIONGAP 13   < > 14   MG 2.0   < > 2.3   PHOS 3.3  --   --     < > = values in this interval not displayed.       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         *Septic shock  Acute on chronic combined heart failure     Labs indicative of persistent multiorgan failure  Likely 2/2 decreased perfusion 2/2 cardiogenic  shock.  CXR w/ central vascular congestion and small effusions.  - Infection source possible HCAP        Plan:   potentially causing hypoTN; will hold for now  Consider resume lasix IVP or gtt  Broad spectrum abx: vanc, zosyn for total of 7 days.  Hold vanc due to elevated trough  Blood cultures: NGTD  Hold home BB, ARB, statin, resume as clinically indicated  TTE stable compared to previous      Gross hematuria     Suspect 2/2 to traumatic cath placement in setting of apixiban  PMH also significant for BPH with urinary obstruction  Will continue to monitor  Bladder scan  S/p RP sono                  Elevated transaminases     See cardiogenic shock  See septic shock      GALEN (acute kidney injury)  Hyperkalemia     Cr/BUN 2.7/74 on admit  Cr/BUN 1.6/59 a few weeks ago  Likely pre-renal vs cardio-renal     Resume  if tolerates  Proceed with kayexalate as needed                    Thrombocytopenia     Suspect related to sepsis  See septic shock  Improving  Will continue to monitor      Paroxysmal atrial fibrillation     EKG: Afib; no changes to prior  Rate controlled     Plan:  Continue home apixiban 2.5mg BID  Continue to monitor rates             Benign prostatic hyperplasia     Hold home tamsulosin       Depression     Continue home paroxetine, citalopram, wellbutrin      Hyperlipidemia     Holding statin  See cardiogenic shock  See septic shock      Coronary artery disease involving native coronary artery of native heart without angina pectoris     Continue home ASA  Holding BB, Statin, Ace-I  See cardiogenic shock  See septic shock        Anticipated discharge date and disposition:   Pending further stabilization; possible home with hospice v NH with hospice. Family meeting planned for 10 AM 1/28/2019.  Darvin Ng MD  [facility] Medicine

## 2019-01-28 LAB
ALBUMIN SERPL BCP-MCNC: 2.1 G/DL
ALP SERPL-CCNC: 128 U/L
ALT SERPL W/O P-5'-P-CCNC: 442 U/L
ANION GAP SERPL CALC-SCNC: 18 MMOL/L
ANION GAP SERPL CALC-SCNC: 18 MMOL/L
AST SERPL-CCNC: 330 U/L
BASOPHILS # BLD AUTO: 0.01 K/UL
BASOPHILS NFR BLD: 0.2 %
BILIRUB SERPL-MCNC: 2.6 MG/DL
BUN SERPL-MCNC: 86 MG/DL
BUN SERPL-MCNC: 86 MG/DL
CALCIUM SERPL-MCNC: 8.5 MG/DL
CALCIUM SERPL-MCNC: 8.5 MG/DL
CHLORIDE SERPL-SCNC: 103 MMOL/L
CHLORIDE SERPL-SCNC: 103 MMOL/L
CO2 SERPL-SCNC: 19 MMOL/L
CO2 SERPL-SCNC: 19 MMOL/L
CREAT SERPL-MCNC: 3.4 MG/DL
CREAT SERPL-MCNC: 3.4 MG/DL
DIFFERENTIAL METHOD: ABNORMAL
EOSINOPHIL # BLD AUTO: 0 K/UL
EOSINOPHIL NFR BLD: 0 %
ERYTHROCYTE [DISTWIDTH] IN BLOOD BY AUTOMATED COUNT: 18.4 %
EST. GFR  (AFRICAN AMERICAN): 18.1 ML/MIN/1.73 M^2
EST. GFR  (AFRICAN AMERICAN): 18.1 ML/MIN/1.73 M^2
EST. GFR  (NON AFRICAN AMERICAN): 15.7 ML/MIN/1.73 M^2
EST. GFR  (NON AFRICAN AMERICAN): 15.7 ML/MIN/1.73 M^2
GLUCOSE SERPL-MCNC: 113 MG/DL
GLUCOSE SERPL-MCNC: 113 MG/DL
HCT VFR BLD AUTO: 31.1 %
HGB BLD-MCNC: 10 G/DL
IMM GRANULOCYTES # BLD AUTO: 0.03 K/UL
IMM GRANULOCYTES NFR BLD AUTO: 0.5 %
LYMPHOCYTES # BLD AUTO: 0.7 K/UL
LYMPHOCYTES NFR BLD: 11.7 %
MAGNESIUM SERPL-MCNC: 2.3 MG/DL
MCH RBC QN AUTO: 29.7 PG
MCHC RBC AUTO-ENTMCNC: 32.2 G/DL
MCV RBC AUTO: 92 FL
MONOCYTES # BLD AUTO: 0.5 K/UL
MONOCYTES NFR BLD: 8.2 %
NEUTROPHILS # BLD AUTO: 5 K/UL
NEUTROPHILS NFR BLD: 79.4 %
NRBC BLD-RTO: 1 /100 WBC
PLATELET # BLD AUTO: 64 K/UL
PMV BLD AUTO: 12.9 FL
POTASSIUM SERPL-SCNC: 3.1 MMOL/L
POTASSIUM SERPL-SCNC: 3.1 MMOL/L
PROT SERPL-MCNC: 4.9 G/DL
RBC # BLD AUTO: 3.37 M/UL
SODIUM SERPL-SCNC: 140 MMOL/L
SODIUM SERPL-SCNC: 140 MMOL/L
VANCOMYCIN SERPL-MCNC: 25.6 UG/ML
WBC # BLD AUTO: 6.31 K/UL

## 2019-01-28 PROCEDURE — 97530 THERAPEUTIC ACTIVITIES: CPT | Mod: HCNC

## 2019-01-28 PROCEDURE — 99233 SBSQ HOSP IP/OBS HIGH 50: CPT | Mod: HCNC,,, | Performed by: HOSPITALIST

## 2019-01-28 PROCEDURE — 80202 ASSAY OF VANCOMYCIN: CPT | Mod: HCNC

## 2019-01-28 PROCEDURE — 63600175 PHARM REV CODE 636 W HCPCS: Mod: HCNC | Performed by: HOSPITALIST

## 2019-01-28 PROCEDURE — 20600001 HC STEP DOWN PRIVATE ROOM: Mod: HCNC

## 2019-01-28 PROCEDURE — 25000003 PHARM REV CODE 250: Mod: HCNC | Performed by: STUDENT IN AN ORGANIZED HEALTH CARE EDUCATION/TRAINING PROGRAM

## 2019-01-28 PROCEDURE — 99223 PR INITIAL HOSPITAL CARE,LEVL III: ICD-10-PCS | Mod: HCNC,,, | Performed by: CLINICAL NURSE SPECIALIST

## 2019-01-28 PROCEDURE — 80053 COMPREHEN METABOLIC PANEL: CPT | Mod: HCNC

## 2019-01-28 PROCEDURE — 99233 PR SUBSEQUENT HOSPITAL CARE,LEVL III: ICD-10-PCS | Mod: HCNC,,, | Performed by: HOSPITALIST

## 2019-01-28 PROCEDURE — 25000003 PHARM REV CODE 250: Mod: HCNC | Performed by: HOSPITALIST

## 2019-01-28 PROCEDURE — 99223 1ST HOSP IP/OBS HIGH 75: CPT | Mod: HCNC,,, | Performed by: CLINICAL NURSE SPECIALIST

## 2019-01-28 PROCEDURE — 83735 ASSAY OF MAGNESIUM: CPT | Mod: HCNC

## 2019-01-28 PROCEDURE — 85025 COMPLETE CBC W/AUTO DIFF WBC: CPT | Mod: HCNC

## 2019-01-28 PROCEDURE — A4216 STERILE WATER/SALINE, 10 ML: HCPCS | Mod: HCNC | Performed by: STUDENT IN AN ORGANIZED HEALTH CARE EDUCATION/TRAINING PROGRAM

## 2019-01-28 RX ORDER — OXYCODONE HYDROCHLORIDE 5 MG/1
5 CAPSULE ORAL EVERY 4 HOURS PRN
Qty: 30 CAPSULE | Refills: 0 | Status: SHIPPED | OUTPATIENT
Start: 2019-01-28 | End: 2019-02-01 | Stop reason: SDUPTHER

## 2019-01-28 RX ORDER — POTASSIUM CHLORIDE 7.45 MG/ML
10 INJECTION INTRAVENOUS
Status: COMPLETED | OUTPATIENT
Start: 2019-01-28 | End: 2019-01-28

## 2019-01-28 RX ORDER — MORPHINE SULFATE 2 MG/ML
1 INJECTION, SOLUTION INTRAMUSCULAR; INTRAVENOUS EVERY 4 HOURS PRN
Status: DISCONTINUED | OUTPATIENT
Start: 2019-01-28 | End: 2019-02-01 | Stop reason: HOSPADM

## 2019-01-28 RX ADMIN — POTASSIUM CHLORIDE 10 MEQ: 10 INJECTION, SOLUTION INTRAVENOUS at 05:01

## 2019-01-28 RX ADMIN — Medication 3 ML: at 02:01

## 2019-01-28 RX ADMIN — CITALOPRAM HYDROBROMIDE 10 MG: 10 TABLET ORAL at 11:01

## 2019-01-28 RX ADMIN — APIXABAN 2.5 MG: 2.5 TABLET, FILM COATED ORAL at 09:01

## 2019-01-28 RX ADMIN — APIXABAN 2.5 MG: 2.5 TABLET, FILM COATED ORAL at 11:01

## 2019-01-28 RX ADMIN — PIPERACILLIN AND TAZOBACTAM 4.5 G: 4; .5 INJECTION, POWDER, LYOPHILIZED, FOR SOLUTION INTRAVENOUS; PARENTERAL at 09:01

## 2019-01-28 RX ADMIN — BUPROPION HYDROCHLORIDE 300 MG: 150 TABLET, EXTENDED RELEASE ORAL at 06:01

## 2019-01-28 RX ADMIN — TAMSULOSIN HYDROCHLORIDE 0.4 MG: 0.4 CAPSULE ORAL at 11:01

## 2019-01-28 RX ADMIN — OMEGA-3-ACID ETHYL ESTERS 2 G: 1 CAPSULE, LIQUID FILLED ORAL at 11:01

## 2019-01-28 RX ADMIN — Medication 0.5 MG: at 05:01

## 2019-01-28 RX ADMIN — PIPERACILLIN AND TAZOBACTAM 4.5 G: 4; .5 INJECTION, POWDER, LYOPHILIZED, FOR SOLUTION INTRAVENOUS; PARENTERAL at 10:01

## 2019-01-28 RX ADMIN — Medication 3 ML: at 06:01

## 2019-01-28 RX ADMIN — PAROXETINE 10 MG: 10 TABLET, FILM COATED ORAL at 06:01

## 2019-01-28 NOTE — PLAN OF CARE
Problem: Occupational Therapy Goal  Goal: Occupational Therapy Goal  Goals to be met by: 7 days 1/31/19     Patient will increase functional independence with ADLs by performing:    Pt to completed seated g/h skills with minimum assistance.  Pt to complete t/f to BSC with minimum assistance  Pt to completed toielting with moderate assistance   Pt to complete UE dressing with set-up  Pt to complete LE dressing with moderate assistance.     Outcome: Ongoing (interventions implemented as appropriate)  Goals reviewed and remain appropriate.

## 2019-01-28 NOTE — PROGRESS NOTES
Progress Note  Hospital Medicine    Provider team:    [facility] HOSP MED C  [facility] CARDIOLOGY TEAM A - CARDIOLOGY CONSULT STEPDOWN  Admit Date: 1/21/2019  Encounter Date: 01/28/2019     SUBJECTIVE:     Follow-up Visit for: Septic shock    HPI (See H&P for complete P,F,SHx):  84M persistent atrial fibrillation on OAC, CAD s/p MI with CABG, ischemic cardiomyopathy (EF of 20-25%), BEAR thrombus, pulmonary HTN (Who Group III) and aortic stenosis who presents via EMS after a syncopal episode. Of note, Pt was admitted from 12/31/18 to 1/6/19 due to CHF with volume overload. He was diuresed and discharged. Family at bedside details that since discharge, Pt has been constipated and straining to move bowels. He has had a couple of episodes of decreased alertness, not complete syncope, when straining. Mental status returns to baseline within a few minutes. This AM family reports that they found the patient lying on his bed unresponsive.  Per EMS, patient was responsive only to pain upon their arrival with SBP 88 and he was given 100ml fluids en route. Upon presentation to the ED, Pt was alert but not amenable to answering questions. Family reports poor oral intake by Pt since most recent discharge. Lasix has been intermittently held. Yesterday, Pt had multiple BMs following use of stool softener. Other than episode of syncope, episodes of near syncope, ongoing chronic cough, and generalized weakness since most recent discharge, family reports no new symptoms such as F/C, wheezing, SOB, productive cough.     ICU course:  Pt admitted 1/21/19 with cardiogenic shock vs septic shock in the setting of acute on chronic CHF due to systolic and diastolic dysfunction. On admit, BNP elevated and pulmonary congestion on CXR. However, clinically, Pt appeared euvolemic and history of recent poor oral intake. Septic shock suspected > cardiac shock and fluids and broad spectrum Abx initiated in addition to dobutamine and Epi. 1/22/19 Epi weaned off  and fluids d'c'ed 1/23. Patient eventually started on lasix with  augmentation.    Hospital course:  Patient stepped down to [facility] on 1/25/2019 with cardiology comanagement following. Met with daughter, Galina, and explained role along with telling her Dr. Wayne would see father given Dr. Davila no longer on service. Went through hospital course. Patient/family appreciative of this review. Overnight 1/24-25, the patient had frequent PVCs on monitor and 5-10 beats of VT per Dr. Sagastume.  was stopped. Patient with lower BP that seemed to improve without intervention.  I introduced concept of palliative care as patient is an excellent candidate for hospice. Plan is for palliative care meeting 1/28 at 10 AM, which was cancelled in favor of meeting 1/29 at 1:30 PM.    Interval history:  Patient lethargic.  He wants to be left un-bothered and to sleep. He denies any complaints. Family at bedside request palliative meeting 1/29. Hospice is the plan and the question is whether needs can be met at home, which is most ideal in terms of patient wishes.  Family requests to stop blood draws. Can further discuss cessation of other medications at meeting 1/29.    TTE 1/22/2019  · Severe left ventricular enlargement.  · Severely decreased left ventricular systolic function. The estimated ejection fraction is 20%  · Septal wall has abnormal motion consistent with left bundle branch block.  · Moderate global hypokinetic wall motion.  · Eccentric left ventricular hypertrophy.  · Indeterminate left ventricular diastolic function.  · Normal right ventricular systolic function.  · Severe left atrial enlargement.  · Moderate mitral regurgitation.  · Mild tricuspid regurgitation.  · Mild aortic regurgitation.  · The estimated PA systolic pressure is 73 mm Hg  · Elevated central venous pressure (15 mm Hg).    Review of Systems:  Review of Systems   Constitutional: Negative for chills and fever.   HENT: Negative for congestion and sore  "throat.    Eyes: Negative for photophobia, pain and discharge.   Respiratory: Negative for cough, hemoptysis, sputum production and shortness of breath.    Cardiovascular: Negative for chest pain, palpitations and leg swelling.   Gastrointestinal: Negative for abdominal pain, diarrhea, nausea and vomiting.   Genitourinary: Negative for dysuria and urgency.        Suprapubic tenderness not present today.   Musculoskeletal: Negative for myalgias and neck pain.   Skin: Negative for itching and rash.   Neurological: Negative for sensory change, focal weakness and headaches.   Endo/Heme/Allergies: Negative for polydipsia. Does not bruise/bleed easily.   Psychiatric/Behavioral: Negative for depression and suicidal ideas.     OBJECTIVE:       Intake/Output Summary (Last 24 hours) at 1/28/2019 0940  Last data filed at 1/27/2019 1811  Gross per 24 hour   Intake 517 ml   Output --   Net 517 ml     Vital Signs Range (Last 24H):  Temp:  [96.6 °F (35.9 °C)-97.7 °F (36.5 °C)]   Pulse:  []   Resp:  [16-18]   BP: ()/(50-69)   SpO2:  [94 %-99 %]   Body mass index is 27.02 kg/m².    Objective:  General Appearance:  Comfortable, in no acute distress, not in pain and ill-appearing.    Vital signs: (most recent): Blood pressure (!) 94/53, pulse (!) 120, temperature 97.4 °F (36.3 °C), temperature source Axillary, resp. rate 16, height 5' 8" (1.727 m), weight 80.6 kg (177 lb 11.1 oz), SpO2 (!) 94 %.  No fever.    Output: Producing urine and producing stool.    HEENT: Normal HEENT exam.    Lungs:  Normal effort.  Breath sounds clear to auscultation.  He is not in respiratory distress.  There are rales.  No wheezes.    Heart: Normal rate.  Regular rhythm.  S1 normal and S2 normal.  No murmur.   Chest: Symmetric chest wall expansion.   Abdomen: Abdomen is soft.  Bowel sounds are normal.   There is no abdominal tenderness.     Extremities: Normal range of motion.  There is venous stasis and dependent edema.  There is no deformity, " effusion or local swelling.    Pulses: Distal pulses are intact.    Neurological: Patient is alert and oriented to person, place and time.  Normal strength.    Pupils:  Pupils are equal, round, and reactive to light.    Skin:  Warm and dry.      Medications:  Medication list was reviewed in EPIC and changes noted under Assessment/Plan and MAR.    Laboratory:  Recent Labs     01/28/19  0508   WBC 6.31   RBC 3.37*   HGB 10.0*   HCT 31.1*   PLT 64*   MCV 92   MCH 29.7   MCHC 32.2   GRAN 79.4*  5.0   LYMPH 11.7*  0.7*   MONO 8.2  0.5   EOS 0.0      Recent Labs     01/28/19  0508   *  113*     140   K 3.1*  3.1*     103   CO2 19*  19*   BUN 86*  86*   CREATININE 3.4*  3.4*   CALCIUM 8.5*  8.5*   ANIONGAP 18*  18*   MG 2.3       ASSESSMENT/PLAN:     Active Hospital Problems    Diagnosis  POA    *Septic shock [A41.9, R65.21]  Yes    Palliative care encounter [Z51.5]  Not Applicable    Gross hematuria [R31.0]  No    Hypotension [I95.9]  Yes    Cardiogenic shock [R57.0]  Yes    GALEN (acute kidney injury) [N17.9]  Yes    Transaminitis [R74.0]  Yes    Hyperkalemia [E87.5]  Yes    Thrombocytopenia [D69.6]  Yes    Paroxysmal atrial fibrillation [I48.0]  Yes     Chronic    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         *Septic shock  Acute on chronic combined heart failure     Labs indicative of persistent multiorgan failure  Likely 2/2 decreased perfusion 2/2 cardiogenic shock.  CXR w/ central vascular congestion and small effusions.  - Infection source possible HCAP        Plan:   potentially causing hypoTN; will hold for  now  Consider resume lasix IVP or gtt  Broad spectrum abx: vanc, zosyn for total of 7 days.  Hold vanc due to elevated trough  Blood cultures: NGTD  Hold home BB, ARB, statin, resume as clinically indicated  TTE stable compared to previous      Gross hematuria     Suspect 2/2 to traumatic cath placement in setting of apixiban  PMH also significant for BPH with urinary obstruction  Will continue to monitor  Bladder scan  S/p RP sono                  Elevated transaminases     See cardiogenic shock  See septic shock  Hold statin      GALEN (acute kidney injury)  Hyperkalemia     Cr/BUN 2.7/74 on admit  Cr/BUN 1.6/59 a few weeks ago  Likely pre-renal vs cardio-renal     Resume  if tolerates  Proceed with kayexalate as needed for hyperK                    Thrombocytopenia     Suspect related to sepsis  See septic shock  Improving  Will continue to monitor      Paroxysmal atrial fibrillation     EKG: Afib; no changes to prior  Rate controlled     Plan:  Continue home apixiban 2.5mg BID  Continue to monitor rates             Benign prostatic hyperplasia     Hold home tamsulosin       Depression     Continue home paroxetine, citalopram, wellbutrin      Hyperlipidemia     Holding statin  See cardiogenic shock  See septic shock      Coronary artery disease involving native coronary artery of native heart without angina pectoris     Continue home ASA  Holding BB, Statin, Ace-I  See cardiogenic shock  See septic shock        Anticipated discharge date and disposition:   Patient needs appropriate post-discharge plan, whether that be home hospice or inpatient hospice. Meeting planned for 1/29 at 1:30 PM.  Will relay above information to oncoming provider.  Darvin Ng MD  [facility] Medicine

## 2019-01-28 NOTE — PLAN OF CARE
met with pts daughter at the bedside.  Family will consider inpatient hospice.  Pt lives with his daughter.  He has very good family support from adult children.  Family will meet with team tomorrow and make decision.  Will follow.

## 2019-01-28 NOTE — PROGRESS NOTES
[facility]  Palliative Medicine  Progress Note    Patient Name: Jeremie Bradshaw  MRN: [id number]  Admission Date: 1/21/2019  Hospital Length of Stay: 7 days  Code Status: DNR   Attending Provider: Darvin Ng MD  Consulting Provider: KARUNA Briones  Primary Care Physician: Rocio Casas MD  Principal Problem:Septic shock    Patient information was obtained from relative(s), past medical records and ER records.      Assessment/Plan:     Palliative care encounter    Palliative care follow up to goals of care.  Cecile Mojica unable to keep scheduled family meeting.  Cecile Michelle at bedside.      Impression: Mr. Bradshaw is an 85 yo gentleman admitted with septic shock.  He is awake, alert oriented to person and place.  Disoriented to time and situation.  Able to answer simple questions and follow simple commands.  Course breath sounds, cough. Oxygen 2 liters nasal cannula. Sats decrease to low 80's when coughing and rebound to % when not coughing.  Tachycardic with occasional PVC on monitor.  Complains of discomfort when urinating.     Advanced Care planning   No advanced directives received.   - Patient does not appear able to make complex decisions  -  legal next of kin pateints three adult children. Decisions will require greater than 50% agreement with plan of care.   - Patient does not appear to understand current clinical condition  Resuscitation status:  DNR per primary team.     Goals of Care   - Cecile Michelle at bedside.  States understanding of current clinical condition  -Mr. Bradshaw   does not appear able to make complex medical decisions.  Repeatedly  states he wants to go home.   - Lisa states the family is amenable to palliative care goals of care conversation. Meeting has been postponed to 1/29/19 after 9 AM.  Family unable to commit to time.   - emotional support provided.       Plan/Recommendation  - Palliative care will continue to follow.   - emotional  support          I will follow-up with patient. Please contact us if you have any additional questions.    Subjective:     Chief Complaint:   Chief Complaint   Patient presents with    Hypotension     Pt arrives via EMS for hypotension. BP 99/50 in triage.       HPI:   Mr. Bradshaw is an 85 yo gentleman with PMH of  Combined systolic and diastolic heart failure, persistent atrial fibrillation on oral anticoagulant,  CAD s/p MI with CABG, ischemic cardiomyopathy (EF of 20-25%), BEAR thrombus, pulmonary HTN (Who Group III) and aortic stenosis. Presented to [facility]  via EMS after a syncopal episode. Admitted with with cardiogenic shock vs septic shock - requiring pressor support and dobutamine. Pressor support and dobutamine weaned. Continuous lasix infusion.             Patient recently admitted  To [facility]  12/31/18 to 1/6/19  with  CHF and  volume overload.         Hospital Course:  No notes on file    Interval History:     Past Medical History:   Diagnosis Date    Anemia     Basal cell carcinoma 7/2014    left medial canthus    Basal cell carcinoma 3/9/2015    right forehead    Basal cell carcinoma 09/08/2016    left neck (scoop shave)    Basal cell carcinoma 12/09/2016    left preauricular    BPH (benign prostatic hypertrophy) 8/17/2012    CAD (coronary artery disease) 8/17/2012    Cervical spine fracture 11/20/2012    CHF (congestive heart failure) 8/17/2012    Depression 8/17/2012    GERD (gastroesophageal reflux disease) 3/27/2014    Heart attack     History of atrial fibrillation 8/17/2012    Hyperlipidemia     Hypertension     Kidney stones 8/17/2012    Memory loss     Myocardial infarction     Osteoarthritis of lumbar spine 8/17/2012    Shoulder pain 8/17/2012    Stroke 8/17/2012       Past Surgical History:   Procedure Laterality Date    basal cell carcinoma left ear      CARDIAC SURGERY      CATARACT EXTRACTION W/  INTRAOCULAR LENS IMPLANT Bilateral     CATARACT EXTRACTION,  BILATERAL      CORONARY ARTERY BYPASS GRAFT  1990    x1    ECHOCARDIOGRAM-TRANSESOPHAGEAL N/A 6/2/2015    Performed by [facility]    TONSILLECTOMY         Review of patient's allergies indicates:   Allergen Reactions    Prednisone Hallucinations       Medications:  Continuous Infusions:    Scheduled Meds:   apixaban  2.5 mg Oral BID    aspirin  81 mg Oral Daily    buPROPion  300 mg Oral QAM    citalopram  10 mg Oral Daily    omega-3 acid ethyl esters  2 g Oral Daily    paroxetine  10 mg Oral QAM    piperacillin-tazobactam (ZOSYN) IVPB  4.5 g Intravenous Q12H    sodium chloride 0.9%  3 mL Intravenous Q8H    tamsulosin  0.4 mg Oral Daily     PRN Meds:albuterol, nitroGLYCERIN    Family History     Problem Relation (Age of Onset)    Cancer Father    Heart disease Mother    Heart failure Mother    No Known Problems Sister, Brother, Maternal Aunt, Maternal Uncle, Paternal Aunt, Paternal Uncle, Maternal Grandmother, Maternal Grandfather, Paternal Grandmother, Paternal Grandfather        Tobacco Use    Smoking status: Never Smoker    Smokeless tobacco: Never Used   Substance and Sexual Activity    Alcohol use: No     Comment: social drinker    Drug use: No    Sexual activity: Not Currently       Review of Systems   Respiratory: Positive for cough and shortness of breath.    Cardiovascular: Positive for leg swelling.   Skin: Positive for pallor.   Neurological: Positive for weakness.   Psychiatric/Behavioral: Positive for confusion.     Objective:     Vital Signs (Most Recent):  Temp: 97.4 °F (36.3 °C) (01/28/19 0352)  Pulse: (!) 117 (01/28/19 1200)  Resp: 16 (01/28/19 0842)  BP: (!) 97/54 (01/28/19 1143)  SpO2: 96 % (01/28/19 1143) Vital Signs (24h Range):  Temp:  [96.6 °F (35.9 °C)-97.7 °F (36.5 °C)] 97.4 °F (36.3 °C)  Pulse:  [] 117  Resp:  [16] 16  SpO2:  [94 %-100 %] 96 %  BP: ()/(50-69) 97/54     Weight: 80.6 kg (177 lb 11.1 oz)  Body mass index is 27.02 kg/m².    Review of  Symptoms  Symptom Assessment (ESAS 0-10 scale)   ESAS 0 1 2 3 4 5 6 7 8 9 10   Pain              Dyspnea              Anxiety              Nausea              Depression               Anorexia              Fatigue              Insomnia              Restlessness               Agitation              CAM / Delirium __ --  ___+   Constipation     __ --  ___+   Diarrhea           __ --  ___+  Bowel Management Plan (BMP): No    Comments: appears fatigued and has some disorientation.  States no pain or discomfort.  Non labored breathing     Pain Assessment:   OME in 24 hours: 0    Performance Status: 30    ECOG Performance Status Grade: 3 - Confined to bed or chair 50% of waking hours    Physical Exam   Constitutional: No distress.   Appears chronically ill    Cardiovascular:   Murmur heard.  Irregular rate, irregular rhythm    Pulmonary/Chest:   Course wet breath sounds, cough    Abdominal: Soft. Bowel sounds are normal.   Neurological: He is alert.   Oriented to person and place    Skin: Skin is warm and dry. There is pallor.   Psychiatric: He has a normal mood and affect. His behavior is normal.       Significant Labs: All pertinent labs within the past 24 hours have been reviewed.  CBC:   Recent Labs   Lab 01/28/19  0508   WBC 6.31   HGB 10.0*   HCT 31.1*   MCV 92   PLT 64*     BMP:  Recent Labs   Lab 01/28/19  0508   *  113*     140   K 3.1*  3.1*     103   CO2 19*  19*   BUN 86*  86*   CREATININE 3.4*  3.4*   CALCIUM 8.5*  8.5*   MG 2.3     LFT:  Lab Results   Component Value Date     (H) 01/28/2019    ALKPHOS 128 01/28/2019    BILITOT 2.6 (H) 01/28/2019     Albumin:   Albumin   Date Value Ref Range Status   01/28/2019 2.1 (L) 3.5 - 5.2 g/dL Final     Protein:   Total Protein   Date Value Ref Range Status   01/28/2019 4.9 (L) 6.0 - 8.4 g/dL Final     Lactic acid:   Lab Results   Component Value Date    LACTATE 4.3 (HH) 01/26/2019    LACTATE 6.3 (HH) 01/26/2019       Significant  Imaging: I have reviewed all pertinent imaging results/findings within the past 24 hours.    Advanced Directives::  Living Will: No  LaPOST: No  Do Not Resuscitate Status: Yes  Medical Power of : No    Decision-Making Capacity: Patient answered questions    Living Arrangements: Lives with spouse    Psychosocial/Cultural: , three children, 4 grandchildren, retired  for [employer]. [place] served in [employer]         Spiritual:     F- Bianca and Belief: Christianity     I - Importance:   .  C - Community:     A - Address in Care:  services offere          > 50% of 35  min visit spent in chart review, face to face discussion of goals of care,  symptom assessment, coordination of care and emotional support.    OMKAR Alan, ACNS-BC  Palliative Medicine  [facility]

## 2019-01-28 NOTE — PT/OT/SLP PROGRESS
Occupational Therapy   Treatment    Name: Jeremie Bradshaw  MRN: [id number]  Admitting Diagnosis:  Septic shock       Recommendations:     Discharge Recommendations: nursing facility, skilled  Discharge Equipment Recommendations:  (TBD pending further progression)  Barriers to discharge:  Decreased caregiver support(at current functional level)    Assessment:     Jeremie Bradshaw is a 84 y.o. male with a medical diagnosis of Septic shock. Performance deficits affecting function are weakness, impaired endurance, impaired self care skills, impaired functional mobilty, impaired cardiopulmonary response to activity, impaired cognition, impaired balance, decreased safety awareness. Pt tolerated session poorly d/t low BP with positional changes, limiting further mobility or engagement in ADLs. Pt requiring mod-max A for bed mobility, CGA-min A for sitting balance, and min A for feeding.     Rehab Prognosis:  Fair; patient would benefit from acute skilled OT services to address these deficits and reach maximum level of function.       Plan:     Patient to be seen 4 x/week to address the above listed problems via self-care/home management, therapeutic exercises, therapeutic activities, neuromuscular re-education  · Plan of Care Expires: 02/23/19  · Plan of Care Reviewed with: patient, daughter    Subjective     Pain/Comfort:  · Pain Rating 1: 0/10  · Pain Rating Post-Intervention 1: 0/10    Objective:     Communicated with: RN prior to session.  Patient found HOB elevated with telemetry, blood pressure cuff, pulse ox (continuous), peripheral IV upon OT entry to room.    General Precautions: Standard, fall   Orthopedic Precautions:N/A   Braces: N/A     Occupational Performance:    Bed Mobility:   · Rolling: to R with minimum assistance     · Supine>Sit: moderate assistance    · HOB slightly elevated   · Assist with clearing BLE's off EOB and trunk elevation from side-lying   · Scooting to EOB: moderate assistance    · Sit>Supine:  maximum assistance       Transfers:   · Not appropriate at this time 2/2 low BP    Activities of Daily Living:  · Feeding: minimum assistance   · Pt able to manipulate fork with dominant RUE and bring food to mouth   · Supported provided during further feeding 2/2 impaired activity tolerance       Therapeutic Intervention & Education:  · Blood pressure readings:  · Startin/53 (RN reporting this has been pt's baseline and ok for EOB)  · Seated at EOB: 73/83  · Further sitting at EOB: 82/53  · Ending in supine: 74/51 with   · RN notified and aware   · Cognition: Pt with decreased arousal requiring max vc's to remain eyes in open position and with ability to orient x3   · Communicated OT POC  · Updated communication board  · Educated on importance of OOB mobility with assist, sitting up in chair, and maximizing independence with ADLs  · Daughter present during session    [facility] 6 Click ADL: 8    Patient left supine with all lines intact, call button in reach, RN notified and daughter presentEducation:      GOALS:   Multidisciplinary Problems     Occupational Therapy Goals        Problem: Occupational Therapy Goal    Goal Priority Disciplines Outcome Interventions   Occupational Therapy Goal     OT, PT/OT Ongoing (interventions implemented as appropriate)    Description:  Goals to be met by: 7 days 19     Patient will increase functional independence with ADLs by performing:    Pt to completed seated g/h skills with minimum assistance.  Pt to complete t/f to BSC with minimum assistance  Pt to completed toielting with moderate assistance   Pt to complete UE dressing with set-up  Pt to complete LE dressing with moderate assistance.                       Time Tracking:     OT Date of Treatment: 19  OT Start Time: 838  OT Stop Time: 856  OT Total Time (min): 18 min    Billable Minutes:Therapeutic Activity 18 minutes    Monserrat De Guzman OT  2019

## 2019-01-28 NOTE — PLAN OF CARE
01/28/19 0817   Discharge Reassessment   Assessment Type Discharge Planning Reassessment   Do you have any problems affording any of your prescribed medications? No   Discharge Plan A Skilled Nursing Facility   Discharge Plan B Hospice/home

## 2019-01-28 NOTE — ASSESSMENT & PLAN NOTE
Palliative care follow up to goals of care.  Cecile Mojica unable to keep scheduled family meeting.  Cecile Michelle at bedside.      Impression: Mr. Bradshaw is an 83 yo gentleman admitted with septic shock.  He is awake, alert oriented to person and place.  Disoriented to time and situation.  Able to answer simple questions and follow simple commands.  Course breath sounds, cough. Oxygen 2 liters nasal cannula. Sats decrease to low 80's when coughing and rebound to % when not coughing.  Tachycardic with occasional PVC on monitor.  Complains of discomfort when urinating.     Advanced Care planning   No advanced directives received.   - Patient does not appear able to make complex decisions  -  legal next of kin pateints three adult children. Decisions will require greater than 50% agreement with plan of care.   - Patient does not appear to understand current clinical condition  Resuscitation status:  DNR per primary team.     Goals of Care   - Cecile Michelle at bedside.  States understanding of current clinical condition  -Mr. Bradshaw   does not appear able to make complex medical decisions.  Repeatedly  states he wants to go home.   - Lisa states the family is amenable to palliative care goals of care conversation. Meeting has been postponed to 1/29/19 after 9 AM.  Family unable to commit to time.   - emotional support provided.       Plan/Recommendation  - Palliative care will continue to follow.   - emotional support

## 2019-01-28 NOTE — PLAN OF CARE
[facility]  Department of Hospital Medicine  [address] [place] [address]  [phone] after hours  [phone] fax    HOSPICE  ORDERS    01/30/2019    Admit to Hospice: Home Service    Diagnoses:   Active Hospital Problems    Diagnosis  POA    *Septic shock [A41.9, R65.21]  Yes    Goals of care, counseling/discussion [Z71.89]  Not Applicable    Advanced care planning/counseling discussion [Z71.89]  Not Applicable    Palliative care encounter [Z51.5]  Not Applicable    Gross hematuria [R31.0]  No    Hypotension [I95.9]  Yes    Cardiogenic shock [R57.0]  Yes    GALEN (acute kidney injury) [N17.9]  Yes    Transaminitis [R74.0]  Yes    Hyperkalemia [E87.5]  Yes    Thrombocytopenia [D69.6]  Yes    Paroxysmal atrial fibrillation [I48.0]  Yes     Chronic    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       Hospice Qualifying Diagnoses: End-stage Heart Failure       Patient has a life expectancy < 6 months due to:  1) Primary Hospice Diagnosis:    a. End stage heart failure  2) Comorbid Conditions Contributing to Decline:    a. Concomitant renal failure and liver failure secondary to above  b. Multiorgan dysfunction    Vital Signs: Routine per Hospice Protocol.    Code Status: DNR    Allergies:   Review of patient's allergies indicates:   Allergen Reactions    Prednisone Hallucinations       Diet: Cardiac with nutrition supplements (Boost) as tolerated    Activities: As tolerated    Nursing: Per Hospice Routine.       Fernandez care: Place fernandez if significant urinary retention on bladder scan (as dictated by  patient's symptoms)    Routine Skin for Bedridden Patients: Apply moisture barrier cream to all skin folds and   wet areas in perineal area daily and after baths and all bowel movements.    Oxygen: 2L per nasal canal as needed to maintain O2 sat >88% or for comfort.      Medications:   Jeremie Bradshaw   Home Medication Instructions EILEEN:[id number]    Printed on:01/28/19 2673   Medication Information                      albuterol (PROVENTIL/VENTOLIN HFA) 90 mcg/actuation inhaler  Inhale 2 puffs into the lungs every 6 (six) hours as needed for Wheezing or Shortness of Breath. Rescue             apixaban (ELIQUIS) 2.5 mg Tab  Take 1 tablet (2.5 mg total) by mouth 2 (two) times daily.             aspirin (ECOTRIN) 81 MG EC tablet  Take 81 mg by mouth. Pt taken every other day with potasium             buPROPion (WELLBUTRIN XL) 300 MG 24 hr tablet  Take 1 tablet (300 mg total) by mouth every morning.             citalopram (CELEXA) 10 MG tablet  Take 1 tablet (10 mg total) by mouth once daily.             cyanocobalamin (VITAMIN B-12) 1000 MCG tablet  Take 1 tablet (1,000 mcg total) by mouth once daily.             desonide (DESOWEN) 0.05 % lotion  APPLY EXTERNALLY TO THE AFFECTED AREA TWICE DAILY AS NEEDED FOR REDNESS AND ITCHING             nitroGLYCERIN (NITROSTAT) 0.4 MG SL tablet  Place 1 tablet (0.4 mg total) under the tongue every 5 (five) minutes as needed.             omega-3 acid ethyl esters (LOVAZA) 1 gram capsule  Take 2 capsules (2 g total) by mouth once daily.             oxyCODONE (OXY-IR) 5 mg Cap  Take 1 capsule (5 mg total) by mouth every 4 (four) hours as needed for Pain.             paroxetine (PAXIL) 10 MG tablet  Take 10 mg by mouth every morning.             polyethylene glycol (GLYCOLAX) 17 gram/dose powder  Take 17 g by mouth once daily.             tamsulosin (FLOMAX) 0.4 mg Cap  Take 1 capsule (0.4 mg total) by mouth every evening.               Future Orders:  Hospice Medical Director may  dictate new orders for comfortable care measures & sign death certificate.        _________________________________  Donal St MD  01/30/2019

## 2019-01-28 NOTE — CARE UPDATE
RN Proactive Rounding Note  Time of Visit: 1006    Admit Date: 2019  LOS: 7  Code Status: DNR   Date of Visit: 2019  : 1934  Age: 84 y.o.  Sex: male  Race: White  Bed: [facility] 3092/[facility] 3092 A:   MRN: [id number]  Was the patient discharged from an ICU this admission? yes   Was the patient discharged from a PACU within last 24 hours?  no  Did the patient receive conscious sedation/general anesthesia in last 24 hours?  no  Was the patient in the ED within the past 24 hours?  no  Was the patient started on NIPPV within the past 24 hours?  no  Attending Physician: Darvin Ng MD  Primary Service: [facility]       ASSESSMENT:     Abnormal Vital Signs:  Clinical Issues: Circulatory     INTERVENTIONS/ RECOMMENDATIONS:     UT performed for 's. Pt resting comfortably in bed. . /69. RR 16. SpO2 98 on 3 LNC. Labs indicative of multiorgan failure. Pt on broad spectrum abx for possible HCAP. Family aware of pt's condition. Plan for family meeting w/ palliative care today, may d/c home w/ hospice. Pt currently DNR. Monitor VS, notify primary team of changes in pt's condition.    Discussed plan of care with Sydney OMALLEY.    PHYSICIAN ESCALATION:     Yes/No  yes    Orders received and case discussed with Dr. Ng .    Disposition: Remain in room 3092 A.    FOLLOW-UP/CONTINGENCY:     Call back the Rapid Response Nurse at x [phone] for additional questions or concerns.

## 2019-01-28 NOTE — PLAN OF CARE
Problem: Adult Inpatient Plan of Care  Goal: Plan of Care Review  Outcome: Ongoing (interventions implemented as appropriate)  Patient remained free from falls/injury/trauma throughout shift. No complaints of chest pain. Fall risk precautions reviewed and maintained. VSS. IV zosyn given. Patient did have a good night. Patient verbalizes that he is ready to go home. Patient has discomfort when urinating. Palliative/hospice care will be discussed with family today. No questions or concerns at this time. Will continue to monitor.

## 2019-01-29 PROCEDURE — 99233 SBSQ HOSP IP/OBS HIGH 50: CPT | Mod: HCNC,,, | Performed by: HOSPITALIST

## 2019-01-29 PROCEDURE — 25000003 PHARM REV CODE 250: Mod: HCNC | Performed by: STUDENT IN AN ORGANIZED HEALTH CARE EDUCATION/TRAINING PROGRAM

## 2019-01-29 PROCEDURE — 99233 PR SUBSEQUENT HOSPITAL CARE,LEVL III: ICD-10-PCS | Mod: HCNC,,, | Performed by: HOSPITALIST

## 2019-01-29 PROCEDURE — 99233 SBSQ HOSP IP/OBS HIGH 50: CPT | Mod: HCNC,,, | Performed by: CLINICAL NURSE SPECIALIST

## 2019-01-29 PROCEDURE — 99233 PR SUBSEQUENT HOSPITAL CARE,LEVL III: ICD-10-PCS | Mod: HCNC,,, | Performed by: CLINICAL NURSE SPECIALIST

## 2019-01-29 PROCEDURE — 20600001 HC STEP DOWN PRIVATE ROOM: Mod: HCNC

## 2019-01-29 PROCEDURE — 63600175 PHARM REV CODE 636 W HCPCS: Mod: HCNC | Performed by: HOSPITALIST

## 2019-01-29 RX ADMIN — OMEGA-3-ACID ETHYL ESTERS 2 G: 1 CAPSULE, LIQUID FILLED ORAL at 09:01

## 2019-01-29 RX ADMIN — BUPROPION HYDROCHLORIDE 300 MG: 150 TABLET, EXTENDED RELEASE ORAL at 06:01

## 2019-01-29 RX ADMIN — TAMSULOSIN HYDROCHLORIDE 0.4 MG: 0.4 CAPSULE ORAL at 09:01

## 2019-01-29 RX ADMIN — PAROXETINE 10 MG: 10 TABLET, FILM COATED ORAL at 06:01

## 2019-01-29 RX ADMIN — ASPIRIN 81 MG: 81 TABLET, COATED ORAL at 09:01

## 2019-01-29 RX ADMIN — APIXABAN 2.5 MG: 2.5 TABLET, FILM COATED ORAL at 08:01

## 2019-01-29 RX ADMIN — CITALOPRAM HYDROBROMIDE 10 MG: 10 TABLET ORAL at 09:01

## 2019-01-29 RX ADMIN — APIXABAN 2.5 MG: 2.5 TABLET, FILM COATED ORAL at 09:01

## 2019-01-29 NOTE — CARE UPDATE
RN Proactive Rounding Note  Time of Visit: 1245    Admit Date: 2019  LOS: 8  Code Status: DNR   Date of Visit: 2019  : 1934  Age: 84 y.o.  Sex: male  Race: White  Bed: [facility] 3092/[facility] 3092 A:   MRN: [id number]  Was the patient discharged from an ICU this admission? yes   Was the patient discharged from a PACU within last 24 hours?  no  Did the patient receive conscious sedation/general anesthesia in last 24 hours?  no  Was the patient in the ED within the past 24 hours?  no  Was the patient started on NIPPV within the past 24 hours?  no  Attending Physician: Donal St, *  Primary Service: [facility]      ASSESSMENT:     Abnormal Vital Signs: hypotension  Clinical Issues: Circulatory     INTERVENTIONS/ RECOMMENDATIONS:     Noted in patient chart review that last blood pressure was 66/49. Confirmed with Sydney OMALLEY. Per RN, pt is a DNR and has hospice orders. Dr. St paged. Upon return call, MD states that he will come to bedside to discuss pt plan of care. MD came to bedside. According to MD, family meeting to be held with palliative care around 130pm. Pt already has hospice orders. Will implement comfort care orders at this time.   Discussed plan of care with Sydney OMALLEY, Charge LYSS[place].    PHYSICIAN ESCALATION:     Yes/No  yes    Orders received and case discussed with Dr. St .    Disposition: Remain in room 3092.    FOLLOW-UP/CONTINGENCY:     Call back the Rapid Response Nurse at x [phone] for additional questions or concerns.

## 2019-01-29 NOTE — PLAN OF CARE
Plan of care reviewed with pt and daughter. VS stable, ectopy on tele. Daughter requesting VS only be completed once per shift. Zosyn given as ordered. No acute events at this time. No complaints. Pt remains free from falls or injury. Bed low and locked, call light and personal belongings within reach. Will continue to monitor. Evette Palacios RN

## 2019-01-29 NOTE — ASSESSMENT & PLAN NOTE
Palliative care follow up to goals of care.  Daughter Viviana Mojica and son Caleb at bedside.       Impression: Mr. Bradshaw is an 83 yo gentleman admitted with septic shock.  He more somnolent today, arouses to continuous verbal and tactile stimuli and drifts off quickly.  Oriented to person and place.  Disoriented to time and situation.  Does not answer questions or  follow simple commands. Course breath sounds, cough appears to be having difficulty managing oral secretions.  Hypotensive 60-60/ 30-50 . Oxygen 2 liters nasal cannula. Sats decrease to low 80's when coughing and rebound to 90% Tachycardic with occasional PVC on monitor.  Does not complain of pain - facial grimacing and moaning.      Advanced Care planning   No advanced directives received.   - Patient does not appear able to make complex decisions  -  legal next of kin pateints three adult children. Decisions will require greater than 50% agreement with plan of care.   - Patient does not appear to understand current clinical condition  Resuscitation status:  DNR per primary team.     Goals of Care   - Daughters Viviana  And Galina at bedside.  Noticeable tension between daughters.  Galina appears uncooperative.   -Mr. Bradshaw   does not appear able to make complex medical decisions.  Repeatedly  states he wants to go home.   - Family meeting has been scheduled for 1:30 PM.    - emotional support provided.       Family meeting: held per Dr. St with primary team RN case manager. Janie and palliative care APRN. Clinical updates provided by Dr. St with emphasis placed on comfort and quality of life.  Dr. St's recommendation is inpatient hospice as he is concerned about patient's stability for transport.  Family is amenable.  Dr. St to place consult for general inpatient bed.  The family's alternative choice is home hospice if able to be transported. Recommended home hospice with access to inpatient hospice.  Family has adequate  support for home hospice   Hospice education reinforced.  Family provided with list of hospice agencies as well as brochures for inpatient hospice hospitals.   General inpatient bed unavailable at this time.  Family will be making decision for home hospice.      Plan/Recommendation  - Palliative care will continue to follow.   - Anticipate consult for -  hospice -   - emotional support

## 2019-01-29 NOTE — PROGRESS NOTES
Pt's family requesting that VS only be done at beginning of shifts. Stated that they just want their father to rest. MD on call notified, ok to hold off on VS tonight, but inform day RN to inform primary team in AM.

## 2019-01-29 NOTE — SUBJECTIVE & OBJECTIVE
Interval History:     Past Medical History:   Diagnosis Date    Anemia     Basal cell carcinoma 7/2014    left medial canthus    Basal cell carcinoma 3/9/2015    right forehead    Basal cell carcinoma 09/08/2016    left neck (scoop shave)    Basal cell carcinoma 12/09/2016    left preauricular    BPH (benign prostatic hypertrophy) 8/17/2012    CAD (coronary artery disease) 8/17/2012    Cervical spine fracture 11/20/2012    CHF (congestive heart failure) 8/17/2012    Depression 8/17/2012    GERD (gastroesophageal reflux disease) 3/27/2014    Heart attack     History of atrial fibrillation 8/17/2012    Hyperlipidemia     Hypertension     Kidney stones 8/17/2012    Memory loss     Myocardial infarction     Osteoarthritis of lumbar spine 8/17/2012    Shoulder pain 8/17/2012    Stroke 8/17/2012       Past Surgical History:   Procedure Laterality Date    basal cell carcinoma left ear      CARDIAC SURGERY      CATARACT EXTRACTION W/  INTRAOCULAR LENS IMPLANT Bilateral     CATARACT EXTRACTION, BILATERAL      CORONARY ARTERY BYPASS GRAFT  1990    x1    ECHOCARDIOGRAM-TRANSESOPHAGEAL N/A 6/2/2015    [facility] at [facility]    TONSILLECTOMY         Review of patient's allergies indicates:   Allergen Reactions    Prednisone Hallucinations       Medications:  Continuous Infusions:    Scheduled Meds:   apixaban  2.5 mg Oral BID    aspirin  81 mg Oral Daily    buPROPion  300 mg Oral QAM    citalopram  10 mg Oral Daily    omega-3 acid ethyl esters  2 g Oral Daily    paroxetine  10 mg Oral QAM    sodium chloride 0.9%  3 mL Intravenous Q8H    tamsulosin  0.4 mg Oral Daily     PRN Meds:albuterol, morphine, nitroGLYCERIN    Family History     Problem Relation (Age of Onset)    Cancer Father    Heart disease Mother    Heart failure Mother    No Known Problems Sister, Brother, Maternal Aunt, Maternal Uncle, Paternal Aunt, Paternal Uncle, Maternal Grandmother, Maternal Grandfather,  Paternal Grandmother, Paternal Grandfather        Tobacco Use    Smoking status: Never Smoker    Smokeless tobacco: Never Used   Substance and Sexual Activity    Alcohol use: No     Comment: social drinker    Drug use: No    Sexual activity: Not Currently       Review of Systems   Respiratory: Positive for cough and shortness of breath.    Cardiovascular: Positive for leg swelling.   Skin: Positive for pallor.   Neurological: Positive for weakness.   Psychiatric/Behavioral: Positive for confusion.     Objective:     Vital Signs (Most Recent):  Temp: 97.3 °F (36.3 °C) (01/29/19 0502)  Pulse: 87 (01/29/19 1135)  Resp: 20 (01/29/19 1000)  BP: (!) 66/40 (01/29/19 1001)  SpO2: (!) 90 % (01/29/19 1000) Vital Signs (24h Range):  Temp:  [97.3 °F (36.3 °C)-97.4 °F (36.3 °C)] 97.3 °F (36.3 °C)  Pulse:  [] 87  Resp:  [16-20] 20  SpO2:  [90 %-99 %] 90 %  BP: ()/(32-54) 66/40     Weight: 81.8 kg (180 lb 5.4 oz)  Body mass index is 27.42 kg/m².    Review of Symptoms  Symptom Assessment (ESAS 0-10 scale)   ESAS 0 1 2 3 4 5 6 7 8 9 10   Pain              Dyspnea              Anxiety              Nausea              Depression               Anorexia              Fatigue              Insomnia              Restlessness               Agitation              CAM / Delirium __ --  ___+   Constipation     __ --  ___+   Diarrhea           __ --  ___+  Bowel Management Plan (BMP): No    Comments: appears fatigued and has some disorientation.  States no pain or discomfort.  Non labored breathing     Pain Assessment:   OME in 24 hours: 0    Performance Status: 30    ECOG Performance Status Grade: 3 - Confined to bed or chair 50% of waking hours    Physical Exam   Constitutional: No distress.   Appears chronically ill    Cardiovascular:   Murmur heard.  Irregular rate, irregular rhythm    Pulmonary/Chest:   Course wet breath sounds, cough    Abdominal: Soft. Bowel sounds are normal.   Neurological: He is alert.   Oriented to  person and place    Skin: Skin is warm and dry. There is pallor.   Psychiatric: He has a normal mood and affect. His behavior is normal.       Significant Labs: All pertinent labs within the past 24 hours have been reviewed.  CBC:   Recent Labs   Lab 01/28/19  0508   WBC 6.31   HGB 10.0*   HCT 31.1*   MCV 92   PLT 64*     BMP:  No results for input(s): GLU, NA, K, CL, CO2, BUN, CREATININE, CALCIUM, MG in the last 24 hours.  LFT:  Lab Results   Component Value Date     (H) 01/28/2019    ALKPHOS 128 01/28/2019    BILITOT 2.6 (H) 01/28/2019     Albumin:   Albumin   Date Value Ref Range Status   01/28/2019 2.1 (L) 3.5 - 5.2 g/dL Final     Protein:   Total Protein   Date Value Ref Range Status   01/28/2019 4.9 (L) 6.0 - 8.4 g/dL Final     Lactic acid:   Lab Results   Component Value Date    LACTATE 4.3 (HH) 01/26/2019    LACTATE 6.3 (HH) 01/26/2019       Significant Imaging: I have reviewed all pertinent imaging results/findings within the past 24 hours.    Advanced Directives::  Living Will: No  LaPOST: No  Do Not Resuscitate Status: Yes  Medical Power of : No    Decision-Making Capacity: Patient answered questions    Living Arrangements: Lives with spouse    Psychosocial/Cultural: , three children, 4 grandchildren, retired  for [employer]. [place] in [employer]         Spiritual:     F- Bianca and Belief: [employer]     I - Importance:   .  C - Community:     A - Address in Care:  services offere  Interval History:

## 2019-01-29 NOTE — PT/OT/SLP PROGRESS
Physical Therapy Discharge      Patient Name:  Jeremie Bradshaw   MRN:  [id number]    Upon PT arrival, a family member informed therapy that they no longer wish for the pt to receive acute therapy services 2nd to pt  transitioning to hospice. D/c acute PT per family request.     Mis Barksdale, PT, DPT  1/29/2019  [phone]

## 2019-01-29 NOTE — PT/OT/SLP PROGRESS
Occupational Therapy      Patient Name:  Jeremie Bradshaw   MRN:  [id number]      Upon OT arrival, a family member informed therapy that they no longer wish for the pt to receive acute therapy services 2nd to pt  transitioning to hospice. D/c acute OT per family request.     Elizabeth Leyva, OT  1/29/2019

## 2019-01-29 NOTE — PROGRESS NOTES
Progress Note  Hospital Medicine    Provider team:    [facility] HOSP MED C  [facility] CARDIOLOGY TEAM A - CARDIOLOGY CONSULT STEPDOWN  Admit Date: 1/21/2019  Encounter Date: 01/29/2019     SUBJECTIVE:     Follow-up Visit for: Septic shock    HPI (See H&P for complete P,F,SHx):  84M persistent atrial fibrillation on OAC, CAD s/p MI with CABG, ischemic cardiomyopathy (EF of 20-25%), BEAR thrombus, pulmonary HTN (Who Group III) and aortic stenosis who presents via EMS after a syncopal episode. Of note, Pt was admitted from 12/31/18 to 1/6/19 due to CHF with volume overload. He was diuresed and discharged. Family at bedside details that since discharge, Pt has been constipated and straining to move bowels. He has had a couple of episodes of decreased alertness, not complete syncope, when straining. Mental status returns to baseline within a few minutes. This AM family reports that they found the patient lying on his bed unresponsive.  Per EMS, patient was responsive only to pain upon their arrival with SBP 88 and he was given 100ml fluids en route. Upon presentation to the ED, Pt was alert but not amenable to answering questions. Family reports poor oral intake by Pt since most recent discharge. Lasix has been intermittently held. Yesterday, Pt had multiple BMs following use of stool softener. Other than episode of syncope, episodes of near syncope, ongoing chronic cough, and generalized weakness since most recent discharge, family reports no new symptoms such as F/C, wheezing, SOB, productive cough.     ICU course:  Pt admitted 1/21/19 with cardiogenic shock vs septic shock in the setting of acute on chronic CHF due to systolic and diastolic dysfunction. On admit, BNP elevated and pulmonary congestion on CXR. However, clinically, Pt appeared euvolemic and history of recent poor oral intake. Septic shock suspected > cardiac shock and fluids and broad spectrum Abx initiated in addition to dobutamine and Epi. 1/22/19 Epi weaned off  and fluids d'c'ed . Patient eventually started on lasix with  augmentation.    Hospital course:  Patient stepped down to [facility] on [place] with cardiology comanagement following. Met with daughter, Galina, and explained role along with telling her Dr. Wayne would see father given Dr. Davila no longer on service. Went through hospital course. Patient/family appreciative of this review. Overnight , the patient had frequent PVCs on monitor and 5-10 beats of VT per Dr. Sagastume.  was stopped. Patient with lower BP that seemed to improve without intervention.  Dr. Ng introduced concept of palliative care as patient is an excellent candidate for hospice. On , patient became progressively more hypotensive, although largely asymptomatic and comfortable. Palliative care meeting with son and daughters on at 1330 on  with patient made comfort care with planned transition to inpatient hospice.    Interval history:  Hypotensive throughout day, progressively worsening, but patient overall comfortable and still somewhat conversant. Family meeting with Palliative Care at 1330 with decision to make patient comfort care and transition to hospice. Discussed inpatient vs home hospice options; elected for inpatient hospice at this time, [place] [facility] [place] given his labile vitals and likelihood that patient would  in near future.    TTE 2019  · Severe left ventricular enlargement.  · Severely decreased left ventricular systolic function. The estimated ejection fraction is 20%  · Septal wall has abnormal motion consistent with left bundle branch block.  · Moderate global hypokinetic wall motion.  · Eccentric left ventricular hypertrophy.  · Indeterminate left ventricular diastolic function.  · Normal right ventricular systolic function.  · Severe left atrial enlargement.  · Moderate mitral regurgitation.  · Mild tricuspid regurgitation.  · Mild aortic regurgitation.  · The estimated PA systolic pressure  "is 73 mm Hg  · Elevated central venous pressure (15 mm Hg).    Review of Systems:  Review of Systems   Constitutional: Negative for chills and fever.   HENT: Negative for congestion and sore throat.    Eyes: Negative for photophobia, pain and discharge.   Respiratory: Negative for cough, hemoptysis, sputum production and shortness of breath.    Cardiovascular: Negative for chest pain, palpitations and leg swelling.   Gastrointestinal: Negative for abdominal pain, diarrhea, nausea and vomiting.   Genitourinary: Negative for dysuria and urgency.        Suprapubic tenderness not present today.   Musculoskeletal: Negative for myalgias and neck pain.   Skin: Negative for itching and rash.   Neurological: Negative for sensory change, focal weakness and headaches.   Endo/Heme/Allergies: Negative for polydipsia. Does not bruise/bleed easily.   Psychiatric/Behavioral: Negative for depression and suicidal ideas.     OBJECTIVE:       Intake/Output Summary (Last 24 hours) at 1/29/2019 1603  Last data filed at 1/29/2019 1400  Gross per 24 hour   Intake 660 ml   Output 0 ml   Net 660 ml     Vital Signs Range (Last 24H):  Temp:  [97.3 °F (36.3 °C)-97.4 °F (36.3 °C)]   Pulse:  []   Resp:  [12-20]   BP: ()/(32-54)   SpO2:  [90 %-99 %]   Body mass index is 27.42 kg/m².    Objective:  General Appearance:  Comfortable, in no acute distress, not in pain and ill-appearing.    Vital signs: (most recent): Blood pressure (!) 66/40, pulse 92, temperature 97.3 °F (36.3 °C), temperature source Axillary, resp. rate 12, height 5' 8" (1.727 m), weight 81.8 kg (180 lb 5.4 oz), SpO2 (!) 90 %.  No fever.    Output: Producing urine and producing stool.    HEENT: Normal HEENT exam.    Lungs:  Normal effort.  Breath sounds clear to auscultation.  He is not in respiratory distress.  There are rales.  No wheezes.    Heart: Normal rate.  Regular rhythm.  S1 normal and S2 normal.  No murmur.   Chest: Symmetric chest wall expansion.   Abdomen: " Abdomen is soft.  Bowel sounds are normal.   There is no abdominal tenderness.     Extremities: Normal range of motion.  There is venous stasis and dependent edema.  There is no deformity, effusion or local swelling.    Pulses: Distal pulses are intact.    Neurological: Patient is alert and oriented to person, place and time.  Normal strength.    Pupils:  Pupils are equal, round, and reactive to light.    Skin:  Warm and dry.      Medications:  Medication list was reviewed in EPIC and changes noted under Assessment/Plan and MAR.    Laboratory:  Recent Labs     01/28/19  0508   WBC 6.31   RBC 3.37*   HGB 10.0*   HCT 31.1*   PLT 64*   MCV 92   MCH 29.7   MCHC 32.2   GRAN 79.4*  5.0   LYMPH 11.7*  0.7*   MONO 8.2  0.5   EOS 0.0      Recent Labs     01/28/19  0508   *  113*     140   K 3.1*  3.1*     103   CO2 19*  19*   BUN 86*  86*   CREATININE 3.4*  3.4*   CALCIUM 8.5*  8.5*   ANIONGAP 18*  18*   MG 2.3       ASSESSMENT/PLAN:     Active Hospital Problems    Diagnosis  POA    *Septic shock [A41.9, R65.21]  Yes    Goals of care, counseling/discussion [Z71.89]  Not Applicable    Advanced care planning/counseling discussion [Z71.89]  Not Applicable    Palliative care encounter [Z51.5]  Not Applicable    Gross hematuria [R31.0]  No    Hypotension [I95.9]  Yes    Cardiogenic shock [R57.0]  Yes    GALEN (acute kidney injury) [N17.9]  Yes    Transaminitis [R74.0]  Yes    Hyperkalemia [E87.5]  Yes    Thrombocytopenia [D69.6]  Yes    Paroxysmal atrial fibrillation [I48.0]  Yes     Chronic    Chronic combined systolic and diastolic congestive heart failure [I50.42]  Yes     Chronic    Benign prostatic hyperplasia [N40.0]  Yes    Coronary artery disease involving native coronary artery of native heart without angina pectoris [I25.10]  Yes     Chronic    Hyperlipidemia [E78.5]  Yes     Chronic    Depression [F32.9]  Yes      Resolved Hospital Problems   No resolved problems to  display.         *Septic shock  Acute on chronic combined heart failure     Labs indicative of persistent multiorgan failure  Likely 2/2 decreased perfusion 2/2 cardiogenic shock.  CXR w/ central vascular congestion and small effusions.  - Infection source possible HCAP     Plan:   potentially causing hypoTN/arrhythmia earlier in stay and did not improve symptoms, still with hypotension today  Broad spectrum abx: vanc, zosyn for total of 7 days completed  Blood cultures: NGTD  Hold home BB, ARB, statin  TTE stable compared to previous  Transitioning to inpatient hospice care today with comfort care orders      Gross hematuria     Suspect 2/2 to traumatic cath placement in setting of apixiban  PMH also significant for BPH with urinary obstruction  Will continue to monitor  S/p RP sono             Elevated transaminases     See cardiogenic shock  See septic shock  Hold statin      GALEN (acute kidney injury)  Hyperkalemia     Cr/BUN 2.7/74 on admit  Cr/BUN 1.6/59 a few weeks ago  Likely pre-renal vs cardio-renal     - no longer monitoring labs at patient request; he is comfort care                  Thrombocytopenia     Suspect related to sepsis  See septic shock  Improving  - no longer monitoring as comfort care      Paroxysmal atrial fibrillation     EKG: Afib; no changes to prior  Rate controlled     Plan:  Continue home apixiban 2.5mg BID             Benign prostatic hyperplasia     Hold home tamsulosin       Depression     Continue home paroxetine, citalopram, wellbutrin      Hyperlipidemia     Holding statin  See cardiogenic shock  See septic shock      Coronary artery disease involving native coronary artery of native heart without angina pectoris     Continue home ASA  Holding BB, Statin, Ace-I  See cardiogenic shock  See septic shock        Anticipated discharge date and disposition: planning for [place] [facility] [place]; if not, would consider home hospice per family request. Given  hypotension with inability to tolerate dobutamine, would not expect patient to live much longer. Comfort care.    Donal St MD  [facility]

## 2019-01-29 NOTE — PROGRESS NOTES
[facility]  Palliative Medicine  Progress Note    Patient Name: Jeremie Bradshaw  MRN: [id number]  Admission Date: 1/21/2019  Hospital Length of Stay: 8 days  Code Status: DNR   Attending Provider: Donal St, *  Consulting Provider: KARUNA Briones  Primary Care Physician: Rocio Casas MD  Principal Problem:Septic shock    Patient information was obtained from relative(s).      Assessment/Plan:     Palliative care encounter    Palliative care follow up to goals of care.  Daughter Galina, Viviana and son Caleb at bedside.       Impression: Mr. Bradshaw is an 83 yo gentleman admitted with septic shock.  He more somnolent today, arouses to continuous verbal and tactile stimuli and drifts off quickly.  Oriented to person and place.  Disoriented to time and situation.  Does not answer questions or  follow simple commands. Course breath sounds, cough appears to be having difficulty managing oral secretions.  Hypotensive 60-60/ 30-50 . Oxygen 2 liters nasal cannula. Sats decrease to low 80's when coughing and rebound to 90% Tachycardic with occasional PVC on monitor.  Does not complain of pain - facial grimacing and moaning.      Advanced Care planning   No advanced directives received.   - Patient does not appear able to make complex decisions  -  legal next of kin pateints three adult children. Decisions will require greater than 50% agreement with plan of care.   - Patient does not appear to understand current clinical condition  Resuscitation status:  DNR per primary team.     Goals of Care   - Daughters Viviana  And Galina at bedside.  Noticeable tension between daughters.  Galina appears uncooperative.   -Mr. Bradshaw   does not appear able to make complex medical decisions.  Repeatedly  states he wants to go home.   - Family meeting has been scheduled for 1:30 PM.    - emotional support provided.       Family meeting: held per Dr. St with primary team RN case manager. Janie  and palliative care APRN. Clinical updates provided by Dr. St with emphasis placed on comfort and quality of life.  Dr. St's recommendation is inpatient hospice as he is concerned about patient's stability for transport.  Family is amenable.  Dr. St to place consult for general inpatient bed.  The family's alternative choice is home hospice if able to be transported. Recommended home hospice with access to inpatient hospice.  Family has adequate support for home hospice   Hospice education reinforced.  Family provided with list of hospice agencies as well as brochures for inpatient hospice hospitals.   General inpatient bed unavailable at this time.  Family will be making decision for home hospice.      Plan/Recommendation  - Palliative care will continue to follow.   - Anticipate consult for -  hospice -   - emotional support          I will follow-up with patient. Please contact us if you have any additional questions.    Subjective:     Chief Complaint:   Chief Complaint   Patient presents with    Hypotension     Pt arrives via EMS for hypotension. BP 99/50 in triage.       HPI:   Mr. Bradshaw is an 83 yo gentleman with PMH of  Combined systolic and diastolic heart failure, persistent atrial fibrillation on oral anticoagulant,  CAD s/p MI with CABG, ischemic cardiomyopathy (EF of 20-25%), BEAR thrombus, pulmonary HTN (Who Group III) and aortic stenosis. Presented to [facility]  via EMS after a syncopal episode. Admitted with with cardiogenic shock vs septic shock - requiring pressor support and dobutamine. Pressor support and dobutamine weaned. Continuous lasix infusion.             Patient recently admitted  To [facility]  12/31/18 to 1/6/19  with  CHF and  volume overload.         Hospital Course:  No notes on file    Interval History:     Past Medical History:   Diagnosis Date    Anemia     Basal cell carcinoma 7/2014    left medial canthus    Basal cell carcinoma 3/9/2015     right forehead    Basal cell carcinoma 09/08/2016    left neck (scoop shave)    Basal cell carcinoma 12/09/2016    left preauricular    BPH (benign prostatic hypertrophy) 8/17/2012    CAD (coronary artery disease) 8/17/2012    Cervical spine fracture 11/20/2012    CHF (congestive heart failure) 8/17/2012    Depression 8/17/2012    GERD (gastroesophageal reflux disease) 3/27/2014    Heart attack     History of atrial fibrillation 8/17/2012    Hyperlipidemia     Hypertension     Kidney stones 8/17/2012    Memory loss     Myocardial infarction     Osteoarthritis of lumbar spine 8/17/2012    Shoulder pain 8/17/2012    Stroke 8/17/2012       Past Surgical History:   Procedure Laterality Date    basal cell carcinoma left ear      CARDIAC SURGERY      CATARACT EXTRACTION W/  INTRAOCULAR LENS IMPLANT Bilateral     CATARACT EXTRACTION, BILATERAL      CORONARY ARTERY BYPASS GRAFT  1990    x1    ECHOCARDIOGRAM-TRANSESOPHAGEAL N/A 6/2/2015    Performed by [facility]    TONSILLECTOMY         Review of patient's allergies indicates:   Allergen Reactions    Prednisone Hallucinations       Medications:  Continuous Infusions:    Scheduled Meds:   apixaban  2.5 mg Oral BID    aspirin  81 mg Oral Daily    buPROPion  300 mg Oral QAM    citalopram  10 mg Oral Daily    omega-3 acid ethyl esters  2 g Oral Daily    paroxetine  10 mg Oral QAM    sodium chloride 0.9%  3 mL Intravenous Q8H    tamsulosin  0.4 mg Oral Daily     PRN Meds:albuterol, morphine, nitroGLYCERIN    Family History     Problem Relation (Age of Onset)    Cancer Father    Heart disease Mother    Heart failure Mother    No Known Problems Sister, Brother, Maternal Aunt, Maternal Uncle, Paternal Aunt, Paternal Uncle, Maternal Grandmother, Maternal Grandfather, Paternal Grandmother, Paternal Grandfather        Tobacco Use    Smoking status: Never Smoker    Smokeless tobacco: Never Used   Substance and Sexual Activity     Alcohol use: No     Comment: social drinker    Drug use: No    Sexual activity: Not Currently       Review of Systems   Respiratory: Positive for cough and shortness of breath.    Cardiovascular: Positive for leg swelling.   Skin: Positive for pallor.   Neurological: Positive for weakness.   Psychiatric/Behavioral: Positive for confusion.     Objective:     Vital Signs (Most Recent):  Temp: 97.3 °F (36.3 °C) (01/29/19 0502)  Pulse: 87 (01/29/19 1135)  Resp: 20 (01/29/19 1000)  BP: (!) 66/40 (01/29/19 1001)  SpO2: (!) 90 % (01/29/19 1000) Vital Signs (24h Range):  Temp:  [97.3 °F (36.3 °C)-97.4 °F (36.3 °C)] 97.3 °F (36.3 °C)  Pulse:  [] 87  Resp:  [16-20] 20  SpO2:  [90 %-99 %] 90 %  BP: ()/(32-54) 66/40     Weight: 81.8 kg (180 lb 5.4 oz)  Body mass index is 27.42 kg/m².    Review of Symptoms  Symptom Assessment (ESAS 0-10 scale)   ESAS 0 1 2 3 4 5 6 7 8 9 10   Pain              Dyspnea              Anxiety              Nausea              Depression               Anorexia              Fatigue              Insomnia              Restlessness               Agitation              CAM / Delirium __ --  ___+   Constipation     __ --  ___+   Diarrhea           __ --  ___+  Bowel Management Plan (BMP): No    Comments: appears fatigued and has some disorientation.  States no pain or discomfort.  Non labored breathing     Pain Assessment:   OME in 24 hours: 0    Performance Status: 30    ECOG Performance Status Grade: 3 - Confined to bed or chair 50% of waking hours    Physical Exam   Constitutional: No distress.   Appears chronically ill    Cardiovascular:   Murmur heard.  Irregular rate, irregular rhythm    Pulmonary/Chest:   Course wet breath sounds, cough    Abdominal: Soft. Bowel sounds are normal.   Neurological: He is alert.   Oriented to person and place    Skin: Skin is warm and dry. There is pallor.   Psychiatric: He has a normal mood and affect. His behavior is normal.       Significant Labs: All  pertinent labs within the past 24 hours have been reviewed.  CBC:   Recent Labs   Lab 01/28/19  0508   WBC 6.31   HGB 10.0*   HCT 31.1*   MCV 92   PLT 64*     BMP:  No results for input(s): GLU, NA, K, CL, CO2, BUN, CREATININE, CALCIUM, MG in the last 24 hours.  LFT:  Lab Results   Component Value Date     (H) 01/28/2019    ALKPHOS 128 01/28/2019    BILITOT 2.6 (H) 01/28/2019     Albumin:   Albumin   Date Value Ref Range Status   01/28/2019 2.1 (L) 3.5 - 5.2 g/dL Final     Protein:   Total Protein   Date Value Ref Range Status   01/28/2019 4.9 (L) 6.0 - 8.4 g/dL Final     Lactic acid:   Lab Results   Component Value Date    LACTATE 4.3 (HH) 01/26/2019    LACTATE 6.3 (HH) 01/26/2019       Significant Imaging: I have reviewed all pertinent imaging results/findings within the past 24 hours.    Advanced Directives::  Living Will: No  LaPOST: No  Do Not Resuscitate Status: Yes  Medical Power of : No    Decision-Making Capacity: Patient answered questions    Living Arrangements: Lives with spouse    Psychosocial/Cultural: , three children, 4 grandchildren, retired  for [employer]. [place] served in [employer]         Spiritual:     F- Bianca and Belief: Sikh     I - Importance:   .  C - Community:     A - Address in Care:  services offere  Interval History:           > 50% of 35  min visit spent in chart review, face to face discussion of goals of care,  symptom assessment, coordination of care and emotional support.    OMKAR Alan, ACNS-BC, OCN   Palliative Medicine  [facility]

## 2019-01-29 NOTE — PT/OT/SLP PROGRESS
Physical Therapy      Patient Name:  Jeremie Bradshaw   MRN:  [id number]    Patient not seen today secondary to family politely requesting no therapy treatment today as Patient had a rough night and was Hypotensive when working with O.T. earlier. Will follow-up on next scheduled visit.    David Mendez, PTA

## 2019-01-30 PROCEDURE — A4216 STERILE WATER/SALINE, 10 ML: HCPCS | Mod: HCNC | Performed by: STUDENT IN AN ORGANIZED HEALTH CARE EDUCATION/TRAINING PROGRAM

## 2019-01-30 PROCEDURE — 99233 SBSQ HOSP IP/OBS HIGH 50: CPT | Mod: HCNC,,, | Performed by: HOSPITALIST

## 2019-01-30 PROCEDURE — 25000003 PHARM REV CODE 250: Mod: HCNC | Performed by: STUDENT IN AN ORGANIZED HEALTH CARE EDUCATION/TRAINING PROGRAM

## 2019-01-30 PROCEDURE — 20600001 HC STEP DOWN PRIVATE ROOM: Mod: HCNC

## 2019-01-30 PROCEDURE — 63600175 PHARM REV CODE 636 W HCPCS: Mod: HCNC | Performed by: HOSPITALIST

## 2019-01-30 PROCEDURE — 99233 PR SUBSEQUENT HOSPITAL CARE,LEVL III: ICD-10-PCS | Mod: HCNC,,, | Performed by: HOSPITALIST

## 2019-01-30 RX ADMIN — Medication 3 ML: at 06:01

## 2019-01-30 RX ADMIN — TAMSULOSIN HYDROCHLORIDE 0.4 MG: 0.4 CAPSULE ORAL at 08:01

## 2019-01-30 RX ADMIN — ASPIRIN 81 MG: 81 TABLET, COATED ORAL at 08:01

## 2019-01-30 RX ADMIN — CITALOPRAM HYDROBROMIDE 10 MG: 10 TABLET ORAL at 08:01

## 2019-01-30 RX ADMIN — APIXABAN 2.5 MG: 2.5 TABLET, FILM COATED ORAL at 08:01

## 2019-01-30 RX ADMIN — BUPROPION HYDROCHLORIDE 300 MG: 150 TABLET, EXTENDED RELEASE ORAL at 06:01

## 2019-01-30 RX ADMIN — Medication: at 08:01

## 2019-01-30 RX ADMIN — PAROXETINE 10 MG: 10 TABLET, FILM COATED ORAL at 06:01

## 2019-01-30 RX ADMIN — Medication 3 ML: at 02:01

## 2019-01-30 NOTE — PROGRESS NOTES
Progress Note  Hospital Medicine    Provider team:    [facility] HOSP MED C  [facility] CARDIOLOGY TEAM A - CARDIOLOGY CONSULT STEPDOWN  Admit Date: 1/21/2019  Encounter Date: 01/30/2019     SUBJECTIVE:     Follow-up Visit for: Septic shock    HPI (See H&P for complete P,F,SHx):  84M persistent atrial fibrillation on OAC, CAD s/p MI with CABG, ischemic cardiomyopathy (EF of 20-25%), BEAR thrombus, pulmonary HTN (Who Group III) and aortic stenosis who presents via EMS after a syncopal episode. Of note, Pt was admitted from 12/31/18 to 1/6/19 due to CHF with volume overload. He was diuresed and discharged. Family at bedside details that since discharge, Pt has been constipated and straining to move bowels. He has had a couple of episodes of decreased alertness, not complete syncope, when straining. Mental status returns to baseline within a few minutes. This AM family reports that they found the patient lying on his bed unresponsive.  Per EMS, patient was responsive only to pain upon their arrival with SBP 88 and he was given 100ml fluids en route. Upon presentation to the ED, Pt was alert but not amenable to answering questions. Family reports poor oral intake by Pt since most recent discharge. Lasix has been intermittently held. Yesterday, Pt had multiple BMs following use of stool softener. Other than episode of syncope, episodes of near syncope, ongoing chronic cough, and generalized weakness since most recent discharge, family reports no new symptoms such as F/C, wheezing, SOB, productive cough.     ICU course:  Pt admitted 1/21/19 with cardiogenic shock vs septic shock in the setting of acute on chronic CHF due to systolic and diastolic dysfunction. On admit, BNP elevated and pulmonary congestion on CXR. However, clinically, Pt appeared euvolemic and history of recent poor oral intake. Septic shock suspected > cardiac shock and fluids and broad spectrum Abx initiated in addition to dobutamine and Epi. 1/22/19 Epi weaned off  and fluids d'c'ed 1/23. Patient eventually started on lasix with  augmentation.    Hospital course:  Patient stepped down to [facility] on 1/25/2019 with cardiology comanagement following. Met with daughter, Galina, and explained role along with telling her Dr. Wayne would see father given Dr. Davila no longer on service. Went through hospital course. Patient/family appreciative of this review. Overnight 1/24-25, the patient had frequent PVCs on monitor and 5-10 beats of VT per Dr. Sagastume.  was stopped. Patient with lower BP that seemed to improve without intervention.  Dr. Ng introduced concept of palliative care as patient is an excellent candidate for hospice. On 1/29, patient became progressively more hypotensive, although largely asymptomatic and comfortable. Palliative care meeting with son and daughters on at 1330 on 1/29 with patient made comfort care with planned transition to inpatient hospice.    Interval history:  Hypotensive, but improved a bit from yesterday. Spoke with daughter and will plan on home hospice tomorrow once family has prepared home to take him. Otherwise, is comfortable following administration of morphine.    TTE 1/22/2019  · Severe left ventricular enlargement.  · Severely decreased left ventricular systolic function. The estimated ejection fraction is 20%  · Septal wall has abnormal motion consistent with left bundle branch block.  · Moderate global hypokinetic wall motion.  · Eccentric left ventricular hypertrophy.  · Indeterminate left ventricular diastolic function.  · Normal right ventricular systolic function.  · Severe left atrial enlargement.  · Moderate mitral regurgitation.  · Mild tricuspid regurgitation.  · Mild aortic regurgitation.  · The estimated PA systolic pressure is 73 mm Hg  · Elevated central venous pressure (15 mm Hg).    Review of Systems:  Review of Systems   Constitutional: Negative for chills and fever.   HENT: Negative for congestion and sore throat.   "  Eyes: Negative for photophobia, pain and discharge.   Respiratory: Negative for cough, hemoptysis, sputum production and shortness of breath.    Cardiovascular: Negative for chest pain, palpitations and leg swelling.   Gastrointestinal: Negative for abdominal pain, diarrhea, nausea and vomiting.   Genitourinary: Negative for dysuria and urgency.        Suprapubic tenderness not present today.   Musculoskeletal: Negative for myalgias and neck pain.   Skin: Negative for itching and rash.   Neurological: Negative for sensory change, focal weakness and headaches.   Endo/Heme/Allergies: Negative for polydipsia. Does not bruise/bleed easily.   Psychiatric/Behavioral: Negative for depression and suicidal ideas.     OBJECTIVE:       Intake/Output Summary (Last 24 hours) at 1/30/2019 1538  Last data filed at 1/30/2019 0600  Gross per 24 hour   Intake 205 ml   Output --   Net 205 ml     Vital Signs Range (Last 24H):  Temp:  [96.1 °F (35.6 °C)-96.5 °F (35.8 °C)]   Pulse:  []   Resp:  [14-18]   BP: ()/(47-53)   SpO2:  [90 %-98 %]   Body mass index is 27.42 kg/m².    Objective:  General Appearance:  Comfortable, in no acute distress, not in pain and ill-appearing.    Vital signs: (most recent): Blood pressure (!) 84/50, pulse 90, temperature 96.1 °F (35.6 °C), temperature source Oral, resp. rate 18, height 5' 8" (1.727 m), weight 81.8 kg (180 lb 5.4 oz), SpO2 (!) 90 %.  No fever.    Output: Producing urine and producing stool.    HEENT: Normal HEENT exam.    Lungs:  Normal effort.  Breath sounds clear to auscultation.  He is not in respiratory distress.  There are rales.  No wheezes.    Heart: Normal rate.  Regular rhythm.  S1 normal and S2 normal.  No murmur.   Chest: Symmetric chest wall expansion.   Abdomen: Abdomen is soft.  Bowel sounds are normal.   There is no abdominal tenderness.     Extremities: Normal range of motion.  There is venous stasis and dependent edema.  There is no deformity, effusion or local " swelling.    Pulses: Distal pulses are intact.    Neurological: Patient is alert and oriented to person, place and time.  Normal strength.    Pupils:  Pupils are equal, round, and reactive to light.    Skin:  Warm and dry.      Medications:  Medication list was reviewed in EPIC and changes noted under Assessment/Plan and MAR.    Laboratory:  Recent Labs     01/28/19  0508   WBC 6.31   RBC 3.37*   HGB 10.0*   HCT 31.1*   PLT 64*   MCV 92   MCH 29.7   MCHC 32.2   GRAN 79.4*  5.0   LYMPH 11.7*  0.7*   MONO 8.2  0.5   EOS 0.0      Recent Labs     01/28/19  0508   *  113*     140   K 3.1*  3.1*     103   CO2 19*  19*   BUN 86*  86*   CREATININE 3.4*  3.4*   CALCIUM 8.5*  8.5*   ANIONGAP 18*  18*   MG 2.3       ASSESSMENT/PLAN:     Active Hospital Problems    Diagnosis  POA    *Septic shock [A41.9, R65.21]  Yes    Goals of care, counseling/discussion [Z71.89]  Not Applicable    Advanced care planning/counseling discussion [Z71.89]  Not Applicable    Palliative care encounter [Z51.5]  Not Applicable    Gross hematuria [R31.0]  No    Hypotension [I95.9]  Yes    Cardiogenic shock [R57.0]  Yes    GALEN (acute kidney injury) [N17.9]  Yes    Transaminitis [R74.0]  Yes    Hyperkalemia [E87.5]  Yes    Thrombocytopenia [D69.6]  Yes    Paroxysmal atrial fibrillation [I48.0]  Yes     Chronic    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         *Septic shock  Acute on chronic combined heart failure     Labs indicative of persistent multiorgan failure  Likely 2/2 decreased perfusion 2/2 cardiogenic shock.  CXR w/ central vascular  congestion and small effusions.  - Infection source possible HCAP     Plan:   potentially causing hypoTN/arrhythmia earlier in stay and did not improve symptoms, still with hypotension today  Broad spectrum abx: vanc, zosyn for total of 7 days completed  Blood cultures: NGTD  Hold home BB, ARB, statin  TTE stable compared to previous  Transitioned to comfort care; hypotension a little improved today, will attempt to get patient home tomorrow once home ready      Gross hematuria     Suspect 2/2 to traumatic cath placement in setting of apixiban  PMH also significant for BPH with urinary obstruction  Will continue to monitor  S/p RP sono             Elevated transaminases     See cardiogenic shock  See septic shock  Hold statin      GALEN (acute kidney injury)  Hyperkalemia     Cr/BUN 2.7/74 on admit  Cr/BUN 1.6/59 a few weeks ago  Likely pre-renal vs cardio-renal     - no longer monitoring labs at patient request; he is comfort care                  Thrombocytopenia     Suspect related to sepsis  See septic shock  Improving  - no longer monitoring as comfort care      Paroxysmal atrial fibrillation     EKG: Afib; no changes to prior  Rate controlled     Plan:  Continue home apixiban 2.5mg BID             Benign prostatic hyperplasia     Hold home tamsulosin       Depression     Continue home paroxetine, citalopram, wellbutrin      Hyperlipidemia     Holding statin  See cardiogenic shock  See septic shock      Coronary artery disease involving native coronary artery of native heart without angina pectoris     Continue home ASA  Holding BB, Statin, Ace-I  See cardiogenic shock  See septic shock        Anticipated discharge date and disposition: somewhat improved BP today and clinically appears comfortable with morphine use. Will plan for home hospice as per patient's wishes with [facility] (has available inpatient option if needed) if remains stable enough to transport tomorrow    Donal St MD  [facility]  Medicine

## 2019-01-30 NOTE — PLAN OF CARE
received consult to refer to [facility] per palliative care.   attempted to contact pts daughter but there was no answer.   will make referral to [facility] hosp[facility] via [facility] care.     01/30/19 1602   Post-Acute Status   Post-Acute Authorization Home Health/Hospice   Home Health/Hospice Status Referrals Sent

## 2019-01-30 NOTE — CARE UPDATE
Palliative care update to goals of care.  Mr. Bradshaw appears stable for transport to home hospice. Primary team  coordinating hospice transfer.

## 2019-01-30 NOTE — PLAN OF CARE
CM spoke to Dr St who ststes the pt's BP has now improved and may be able to transport home for home hospice. Family states they need until tomorrow to ready the house for him to come home. CM left  for Kavitha ALBERTS to assist in Home Hospice arrangements.

## 2019-01-30 NOTE — PLAN OF CARE
Problem: Adult Inpatient Plan of Care  Goal: Plan of Care Review  Outcome: Ongoing (interventions implemented as appropriate)  Plan of care reviewed with pt and daughter. Bps soft. VS only be completed once per shift for comfort care. No acute events at this time. Pt remains free from falls or injury. Bed low and locked, call light and personal belongings within reach. Will continue to monitor.

## 2019-01-30 NOTE — CARE UPDATE
RRT Chart Check:  Chart check completed. Patient hypotensive, made DNR yesterday. Plans for transition to hospice, palliative following. Will sign off, please call if needed. RRT RN [id number]

## 2019-01-30 NOTE — PLAN OF CARE
Problem: Adult Inpatient Plan of Care  Goal: Plan of Care Review  Outcome: Ongoing (interventions implemented as appropriate)  Pt remains free from falls and injury. Plan of care reviewed with pt. Pt understands plan. Pt denies pain, VSS. Plans for home hospice was discussed. Will continue to monitor.

## 2019-01-31 PROCEDURE — 25000003 PHARM REV CODE 250: Mod: HCNC | Performed by: STUDENT IN AN ORGANIZED HEALTH CARE EDUCATION/TRAINING PROGRAM

## 2019-01-31 PROCEDURE — A4216 STERILE WATER/SALINE, 10 ML: HCPCS | Mod: HCNC | Performed by: STUDENT IN AN ORGANIZED HEALTH CARE EDUCATION/TRAINING PROGRAM

## 2019-01-31 PROCEDURE — 63600175 PHARM REV CODE 636 W HCPCS: Mod: HCNC | Performed by: HOSPITALIST

## 2019-01-31 PROCEDURE — 99233 PR SUBSEQUENT HOSPITAL CARE,LEVL III: ICD-10-PCS | Mod: HCNC,,, | Performed by: HOSPITALIST

## 2019-01-31 PROCEDURE — 99233 SBSQ HOSP IP/OBS HIGH 50: CPT | Mod: HCNC,,, | Performed by: HOSPITALIST

## 2019-01-31 PROCEDURE — 20600001 HC STEP DOWN PRIVATE ROOM: Mod: HCNC

## 2019-01-31 RX ADMIN — Medication 1 MG: at 11:01

## 2019-01-31 RX ADMIN — CITALOPRAM HYDROBROMIDE 10 MG: 10 TABLET ORAL at 09:01

## 2019-01-31 RX ADMIN — BUPROPION HYDROCHLORIDE 150 MG: 150 TABLET, EXTENDED RELEASE ORAL at 05:01

## 2019-01-31 RX ADMIN — Medication 3 ML: at 02:01

## 2019-01-31 RX ADMIN — APIXABAN 2.5 MG: 2.5 TABLET, FILM COATED ORAL at 09:01

## 2019-01-31 RX ADMIN — TAMSULOSIN HYDROCHLORIDE 0.4 MG: 0.4 CAPSULE ORAL at 09:01

## 2019-01-31 RX ADMIN — APIXABAN 2.5 MG: 2.5 TABLET, FILM COATED ORAL at 08:01

## 2019-01-31 RX ADMIN — ASPIRIN 81 MG: 81 TABLET, COATED ORAL at 09:01

## 2019-01-31 NOTE — PLAN OF CARE
Problem: Adult Inpatient Plan of Care  Goal: Plan of Care Review  Outcome: Ongoing (interventions implemented as appropriate)  Pt remains free from falls and injury. Plan of care reviewed with pt. Pt understands plan. Pt denies pain, VSS. Plans for home hospice tomorrow. Will continue to monitor.

## 2019-01-31 NOTE — HPI
84M persistent atrial fibrillation on OAC, CAD s/p MI with CABG, ischemic cardiomyopathy (EF of 20-25%), BEAR thrombus, pulmonary HTN (Who Group III) and aortic stenosis who presents via EMS after a syncopal episode. Of note, Pt was admitted from 12/31/18 to 1/6/19 due to CHF with volume overload. He was diuresed and discharged. Family at bedside details that since discharge, Pt has been constipated and straining to move bowels. He has had a couple of episodes of decreased alertness, not complete syncope, when straining. Mental status returns to baseline within a few minutes. This AM family reports that they found the patient lying on his bed unresponsive.  Per EMS, patient was responsive only to pain upon their arrival with SBP 88 and he was given 100ml fluids en route. Upon presentation to the ED, Pt was alert but not amenable to answering questions. Family reports poor oral intake by Pt since most recent discharge. Lasix has been intermittently held. Yesterday, Pt had multiple BMs following use of stool softener. Other than episode of syncope, episodes of near syncope, ongoing chronic cough, and generalized weakness since most recent discharge, family reports no new symptoms such as F/C, wheezing, SOB, productive cough.

## 2019-01-31 NOTE — HOSPITAL COURSE
ICU course:  Pt admitted 1/21/19 with cardiogenic shock vs septic shock in the setting of acute on chronic CHF due to systolic and diastolic dysfunction. On admit, BNP elevated and pulmonary congestion on CXR. However, clinically, Pt appeared euvolemic and history of recent poor oral intake. Septic shock suspected > cardiac shock and fluids and broad spectrum Abx initiated in addition to dobutamine and Epi. 1/22/19 Epi weaned off and fluids d'c'ed 1/23. Patient eventually started on lasix with  augmentation.     Hospital course:  Patient stepped down to [facility] on 1/25/2019 with cardiology comanagement following. Dr. Ng met with daughter, Galina, and explained role along with telling her Dr. Wayne would see father given Dr. Davila no longer on service. Went through hospital course. Patient/family appreciative of this review. Overnight 1/24-25, the patient had frequent PVCs on monitor and 5-10 beats of VT per Dr. Sagastume.  was stopped. Patient with lower BP that seemed to improve without intervention.  He introduced concept of palliative care as patient is an excellent candidate for hospice. Palliative care meeting with son and daughters on at 1330 on 1/29 with patient made comfort care with planned transition to inpatient hospice given low blood pressures. Blood pressures improved somewhat, and plans were made to use home hospice with [facility] (which has inpatient hospice available) instead. Problem based course below.    Septic shock  Acute on chronic combined heart failure     Labs indicative of persistent multiorgan failure  Likely 2/2 decreased perfusion 2/2 cardiogenic shock.  CXR w/ central vascular congestion and small effusions.  - Infection source possible HCAP     Plan:   potentially causing hypoTN/arrhythmia earlier in stay and did not improve symptoms, still with hypotension today  Broad spectrum abx: vanc, zosyn for total of 7 days completed  Blood cultures: NGTD  Hold home BB, ARB, statin  TTE  stable compared to previous  Transitioned to comfort care; hypotension a little improved today  Morphine IV prn while in house for pain/air hunger; will continue with oxycodone at home      Gross hematuria     Suspect 2/2 to traumatic cath placement in setting of apixiban  PMH also significant for BPH with urinary obstruction  Will continue to monitor  S/p RP sono                Elevated transaminases     See cardiogenic shock  See septic shock  Hold statin      GALEN (acute kidney injury)  Hyperkalemia     Cr/BUN 2.7/74 on admit  Cr/BUN 1.6/59 a few weeks ago  Likely pre-renal vs cardio-renal     - no longer monitoring labs at patient request; he is comfort care                        Thrombocytopenia     Suspect related to sepsis  See septic shock  Improving  - no longer monitoring as comfort care      Paroxysmal atrial fibrillation     EKG: Afib; no changes to prior  Rate controlled     Plan:  Continue home apixiban 2.5mg BID                Benign prostatic hyperplasia     Continue home tamsulosin       Depression     Continue home paroxetine, citalopram, wellbutrin      Hyperlipidemia     Holding statin  See cardiogenic shock  See septic shock      Coronary artery disease involving native coronary artery of native heart without angina pectoris     Continue home ASA  Holding BB, Statin, Ace-I  See cardiogenic shock  See septic shock     Vitals:    02/01/19 0400 02/01/19 0600 02/01/19 0746 02/01/19 0849   BP:    (!) 66/46   BP Location:    Right arm   Patient Position:    Lying   Pulse: 82 77 70    Resp:    18   Temp:    96.1 °F (35.6 °C)   TempSrc:    Axillary   SpO2:    (!) 92%   Weight:       Height:         General Appearance:  Comfortable, in no acute distress, not in pain and ill-appearing.     Output: Producing urine and producing stool.    HEENT: Normal HEENT exam.    Lungs:  Normal effort.  Breath sounds clear to auscultation.  He is not in respiratory distress.  There are rales.  No wheezes.    Heart:  Normal rate.  Regular rhythm.  S1 normal and S2 normal.  No murmur.   Chest: Symmetric chest wall expansion.   Abdomen: Abdomen is soft.  Bowel sounds are normal.   There is no abdominal tenderness.     Extremities: Normal range of motion.  There is venous stasis and dependent edema.  There is no deformity, effusion or local swelling.    Pulses: Distal pulses are intact.    Neurological: Patient is alert and oriented to person, place and time.  Normal strength.    Pupils:  Pupils are equal, round, and reactive to light.    Skin:  Warm and dry

## 2019-01-31 NOTE — PLAN OF CARE
Problem: Adult Inpatient Plan of Care  Goal: Plan of Care Review  Outcome: Ongoing (interventions implemented as appropriate)  Pt remains free from falls and injury. Plan of care reviewed with pt and family. Pt understands plan. Pt denies pain, vitals obtained once a shift. Comfort care maintained. Plans for home hospice tomorrow. Will continue to monitor.

## 2019-01-31 NOTE — PROGRESS NOTES
Progress Note  Hospital Medicine    Provider team:    [facility] HOSP MED C  [facility] CARDIOLOGY TEAM A - CARDIOLOGY CONSULT STEPDOWN  Admit Date: 1/21/2019  Encounter Date: 01/31/2019     SUBJECTIVE:     Follow-up Visit for: Septic shock    HPI (See H&P for complete P,F,SHx):  84M persistent atrial fibrillation on OAC, CAD s/p MI with CABG, ischemic cardiomyopathy (EF of 20-25%), BEAR thrombus, pulmonary HTN (Who Group III) and aortic stenosis who presents via EMS after a syncopal episode. Of note, Pt was admitted from 12/31/18 to 1/6/19 due to CHF with volume overload. He was diuresed and discharged. Family at bedside details that since discharge, Pt has been constipated and straining to move bowels. He has had a couple of episodes of decreased alertness, not complete syncope, when straining. Mental status returns to baseline within a few minutes. This AM family reports that they found the patient lying on his bed unresponsive.  Per EMS, patient was responsive only to pain upon their arrival with SBP 88 and he was given 100ml fluids en route. Upon presentation to the ED, Pt was alert but not amenable to answering questions. Family reports poor oral intake by Pt since most recent discharge. Lasix has been intermittently held. Yesterday, Pt had multiple BMs following use of stool softener. Other than episode of syncope, episodes of near syncope, ongoing chronic cough, and generalized weakness since most recent discharge, family reports no new symptoms such as F/C, wheezing, SOB, productive cough.     ICU course:  Pt admitted 1/21/19 with cardiogenic shock vs septic shock in the setting of acute on chronic CHF due to systolic and diastolic dysfunction. On admit, BNP elevated and pulmonary congestion on CXR. However, clinically, Pt appeared euvolemic and history of recent poor oral intake. Septic shock suspected > cardiac shock and fluids and broad spectrum Abx initiated in addition to dobutamine and Epi. 1/22/19 Epi weaned off  and fluids d'c'ed 1/23. Patient eventually started on lasix with  augmentation.    Hospital course:  [place] [facility] [place] with cardiology comanagement following. Met with daughter, Galina, and explained role along with telling her Dr. Wayne would see father given Dr. Davila no longer on service. Went through hospital course. Patient/family appreciative of this review. Overnight 1/24-25, the patient had frequent PVCs on monitor and 5-10 beats of VT per Dr. Sagastume.  was stopped. Patient with lower BP that seemed to improve without intervention.  Dr. Ng introduced concept of palliative care as patient is an excellent candidate for hospice. On 1/29, patient became progressively more hypotensive, although largely asymptomatic and comfortable. Palliative care meeting with son and daughters on at 1330 on 1/29 with patient made comfort care with planned transition to inpatient hospice.    Interval history:  Essentially unchanged. Pain/SOB improves with morphine and he is comfortable, expressing desire to return home. Per daughter, still preparing home for discharge but planning for him to return tomorrow am. Hospice to deliver DME today.    TTE 1/22/2019  · Severe left ventricular enlargement.  · Severely decreased left ventricular systolic function. The estimated ejection fraction is 20%  · Septal wall has abnormal motion consistent with left bundle branch block.  · Moderate global hypokinetic wall motion.  · Eccentric left ventricular hypertrophy.  · Indeterminate left ventricular diastolic function.  · Normal right ventricular systolic function.  · Severe left atrial enlargement.  · Moderate mitral regurgitation.  · Mild tricuspid regurgitation.  · Mild aortic regurgitation.  · The estimated PA systolic pressure is 73 mm Hg  · Elevated central venous pressure (15 mm Hg).    Review of Systems:  Review of Systems   Constitutional: Negative for chills and fever.   HENT: Negative for congestion  "and sore throat.    Eyes: Negative for photophobia, pain and discharge.   Respiratory: Negative for cough, hemoptysis, sputum production and shortness of breath.    Cardiovascular: Negative for chest pain, palpitations and leg swelling.   Gastrointestinal: Negative for abdominal pain, diarrhea, nausea and vomiting.   Genitourinary: Negative for dysuria and urgency.        Suprapubic tenderness not present today.   Musculoskeletal: Negative for myalgias and neck pain.   Skin: Negative for itching and rash.   Neurological: Negative for sensory change, focal weakness and headaches.   Endo/Heme/Allergies: Negative for polydipsia. Does not bruise/bleed easily.   Psychiatric/Behavioral: Negative for depression and suicidal ideas.     OBJECTIVE:       Intake/Output Summary (Last 24 hours) at 1/31/2019 1616  Last data filed at 1/31/2019 1400  Gross per 24 hour   Intake 0 ml   Output 2 ml   Net -2 ml     Vital Signs Range (Last 24H):  Temp:  [96.1 °F (35.6 °C)]   Pulse:  [75-88]   Resp:  [18-22]   BP: (67-94)/(34-53)   SpO2:  [87 %-92 %]   Body mass index is 27.42 kg/m².    Objective:  General Appearance:  Comfortable, in no acute distress, not in pain and ill-appearing.    Vital signs: (most recent): Blood pressure (!) 87/48, pulse 75, temperature 96.1 °F (35.6 °C), temperature source Oral, resp. rate (!) 22, height 5' 8" (1.727 m), weight 81.8 kg (180 lb 5.4 oz), SpO2 (!) 92 %.  No fever.    Output: Producing urine and producing stool.    HEENT: Normal HEENT exam.    Lungs:  Normal effort.  Breath sounds clear to auscultation.  He is not in respiratory distress.  There are rales.  No wheezes.    Heart: Normal rate.  Regular rhythm.  S1 normal and S2 normal.  No murmur.   Chest: Symmetric chest wall expansion.   Abdomen: Abdomen is soft.  Bowel sounds are normal.   There is no abdominal tenderness.     Extremities: Normal range of motion.  There is venous stasis and dependent edema.  There is no deformity, effusion or local " swelling.    Pulses: Distal pulses are intact.    Neurological: Patient is alert and oriented to person, place and time.  Normal strength.    Pupils:  Pupils are equal, round, and reactive to light.    Skin:  Warm and dry.      Medications:  Medication list was reviewed in EPIC and changes noted under Assessment/Plan and MAR.    Laboratory:  No results for input(s): WBC, RBC, HGB, HCT, PLT, MCV, MCH, MCHC, GRAN, BAND, LYMPH, MONO, EOS in the last 72 hours.   No results for input(s): GLU, NA, K, CL, CO2, BUN, CREATININE, CALCIUM, ANIONGAP, MG, PHOS in the last 72 hours.    Invalid input(s): CA    ASSESSMENT/PLAN:     Active Hospital Problems    Diagnosis  POA    *Septic shock [A41.9, R65.21]  Yes    Goals of care, counseling/discussion [Z71.89]  Not Applicable    Advanced care planning/counseling discussion [Z71.89]  Not Applicable    Palliative care encounter [Z51.5]  Not Applicable    Gross hematuria [R31.0]  No    Hypotension [I95.9]  Yes    Cardiogenic shock [R57.0]  Yes    GALEN (acute kidney injury) [N17.9]  Yes    Transaminitis [R74.0]  Yes    Hyperkalemia [E87.5]  Yes    Thrombocytopenia [D69.6]  Yes    Paroxysmal atrial fibrillation [I48.0]  Yes     Chronic    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         *Septic shock  Acute on chronic combined heart failure     Labs indicative of persistent multiorgan failure  Likely 2/2 decreased perfusion 2/2 cardiogenic shock.  CXR w/ central vascular congestion and small effusions.  - Infection source possible HCAP     Plan:   potentially causing hypoTN/arrhythmia earlier in stay and did not  improve symptoms, still with hypotension today  Broad spectrum abx: vanc, zosyn for total of 7 days completed  Blood cultures: NGTD  Hold home BB, ARB, statin  TTE stable compared to previous  Transitioned to comfort care; hypotension stable. Will discharge home tomorrow      Gross hematuria     Suspect 2/2 to traumatic cath placement in setting of apixiban  PMH also significant for BPH with urinary obstruction  Will continue to monitor  S/p RP sono             Elevated transaminases     See cardiogenic shock  See septic shock  Hold statin      GALEN (acute kidney injury)  Hyperkalemia     Cr/BUN 2.7/74 on admit  Cr/BUN 1.6/59 a few weeks ago  Likely pre-renal vs cardio-renal     - no longer monitoring labs at patient request; he is comfort care                  Thrombocytopenia     Suspect related to sepsis  See septic shock  Improving  - no longer monitoring as comfort care      Paroxysmal atrial fibrillation     EKG: Afib; no changes to prior  Rate controlled     Plan:  Continue home apixiban 2.5mg BID             Benign prostatic hyperplasia     Hold home tamsulosin       Depression     Continue home paroxetine, citalopram, wellbutrin      Hyperlipidemia     Holding statin  See cardiogenic shock  See septic shock      Coronary artery disease involving native coronary artery of native heart without angina pectoris     Continue home ASA  Holding BB, Statin, Ace-I  See cardiogenic shock  See septic shock        Anticipated discharge date and disposition: appears comfortable with morphine use. Will plan for home hospice as per patient's wishes with [facility] (has available inpatient option if needed). Family deferring discharge today due to concerns about readiness of home but say they will definitely be ready tomorrow; anticipate am discharge    Donal St MD  [facility]

## 2019-01-31 NOTE — PLAN OF CARE
Referral sent yesterday to [facility] hospice for home hospice.  sw met with pts daughter, Funmi [phone] in room today.  Hospice representative met with pt and pts daughter to sign paperwork.  Equipment to be delivered today.   will setup transportation with the patient flow center once dme arrives to home.  Pt will discharge to his home with adult children.     01/31/19 1302   Post-Acute Status   Post-Acute Authorization Home Health/Hospice   Home Health/Hospice Status Referrals Sent

## 2019-02-01 ENCOUNTER — OUTPATIENT CASE MANAGEMENT (OUTPATIENT)
Dept: ADMINISTRATIVE | Facility: OTHER | Age: 84
End: 2019-02-01

## 2019-02-01 VITALS
OXYGEN SATURATION: 92 % | SYSTOLIC BLOOD PRESSURE: 66 MMHG | WEIGHT: 180.31 LBS | BODY MASS INDEX: 27.33 KG/M2 | DIASTOLIC BLOOD PRESSURE: 46 MMHG | HEART RATE: 70 BPM | TEMPERATURE: 96 F | HEIGHT: 68 IN | RESPIRATION RATE: 18 BRPM

## 2019-02-01 PROBLEM — Z51.5 COMFORT MEASURES ONLY STATUS: Status: ACTIVE | Noted: 2019-02-01

## 2019-02-01 PROCEDURE — 99238 PR HOSPITAL DISCHARGE DAY,<30 MIN: ICD-10-PCS | Mod: HCNC,,, | Performed by: HOSPITALIST

## 2019-02-01 PROCEDURE — 99238 HOSP IP/OBS DSCHRG MGMT 30/<: CPT | Mod: HCNC,,, | Performed by: HOSPITALIST

## 2019-02-01 PROCEDURE — 63600175 PHARM REV CODE 636 W HCPCS: Mod: HCNC | Performed by: HOSPITALIST

## 2019-02-01 RX ORDER — OXYCODONE HYDROCHLORIDE 5 MG/1
5 CAPSULE ORAL EVERY 4 HOURS PRN
Qty: 30 CAPSULE | Refills: 0 | Status: SHIPPED | OUTPATIENT
Start: 2019-02-01

## 2019-02-01 RX ADMIN — Medication 1 MG: at 01:02

## 2019-02-01 RX ADMIN — Medication 1 MG: at 08:02

## 2019-02-01 NOTE — CARE UPDATE
Rapid Response Nurse Chart Check:    Chart check completed. Abnormal VS noted.   Pt to be discharged home w/ hospice.   Please contact Rapid Response Team w/ any questions/concerns @ x [phone].

## 2019-02-01 NOTE — PLAN OF CARE
02/01/19 0737   Discharge Reassessment   Assessment Type Discharge Planning Reassessment   Do you have any problems affording any of your prescribed medications? No   Discharge Plan A Hospice/home

## 2019-02-01 NOTE — DISCHARGE SUMMARY
[facility] Hospital Medicine  Discharge Summary      Patient Name: Jeremie Bradshaw  MRN: [id number]  Admission Date: 1/21/2019  Hospital Length of Stay: 11 days  Discharge Date and Time:  02/01/2019 11:37 AM  Attending Physician: Donal St, *   Discharging Provider: Donal St MD  Primary Care Provider: Rocio Casas MD  [facility] Hospital Medicine Team: [facility] HOSP MED C Donal St MD    HPI:   84M persistent atrial fibrillation on OAC, CAD s/p MI with CABG, ischemic cardiomyopathy (EF of 20-25%), BEAR thrombus, pulmonary HTN (Who Group III) and aortic stenosis who presents via EMS after a syncopal episode. Of note, Pt was admitted from 12/31/18 to 1/6/19 due to CHF with volume overload. He was diuresed and discharged. Family at bedside details that since discharge, Pt has been constipated and straining to move bowels. He has had a couple of episodes of decreased alertness, not complete syncope, when straining. Mental status returns to baseline within a few minutes. This AM family reports that they found the patient lying on his bed unresponsive.  Per EMS, patient was responsive only to pain upon their arrival with SBP 88 and he was given 100ml fluids en route. Upon presentation to the ED, Pt was alert but not amenable to answering questions. Family reports poor oral intake by Pt since most recent discharge. Lasix has been intermittently held. Yesterday, Pt had multiple BMs following use of stool softener. Other than episode of syncope, episodes of near syncope, ongoing chronic cough, and generalized weakness since most recent discharge, family reports no new symptoms such as F/C, wheezing, SOB, productive cough.    * No surgery found *      Hospital Course:   ICU course:  Pt admitted 1/21/19 with cardiogenic shock vs septic shock in the setting of acute on chronic CHF due to systolic and diastolic dysfunction. On admit, BNP elevated and pulmonary congestion on CXR.  However, clinically, Pt appeared euvolemic and history of recent poor oral intake. Septic shock suspected > cardiac shock and fluids and broad spectrum Abx initiated in addition to dobutamine and Epi. 1/22/19 Epi weaned off and fluids d'c'ed 1/23. Patient eventually started on lasix with  augmentation.     Hospital course:  Patient stepped down to [facility] on 1/25/2019 with cardiology comanagement following. Dr. Ng met with daughter, Galina, and explained role along with telling her Dr. Wayne would see father given Dr. Davila no longer on service. Went through hospital course. Patient/family appreciative of this review. Overnight 1/24-25, the patient had frequent PVCs on monitor and 5-10 beats of VT per Dr. Sagastume.  was stopped. Patient with lower BP that seemed to improve without intervention.   He introduced concept of palliative care as patient is an excellent candidate for hospice. Palliative care meeting with son and daughters on at 1330 on 1/29 with patient made comfort care with planned transition to inpatient hospice given low blood pressures. Blood pressures improved somewhat, and plans were made to use home hospice with [facility] (which has inpatient hospice available) instead. Problem based course below.    Septic shock  Acute on chronic combined heart failure     Labs indicative of persistent multiorgan failure  Likely 2/2 decreased perfusion 2/2 cardiogenic shock.  CXR w/ central vascular congestion and small effusions.  - Infection source possible HCAP     Plan:   potentially causing hypoTN/arrhythmia earlier in stay and did not improve symptoms, still with hypotension today  Broad spectrum abx: vanc, zosyn for total of 7 days completed  Blood cultures: NGTD  Hold home BB, ARB, statin  TTE stable compared to previous  Transitioned to comfort care; hypotension a little improved today  Morphine IV prn while in house for pain/air hunger; will continue with oxycodone at home      Gross hematuria      Suspect 2/2 to traumatic cath placement in setting of apixiban  PMH also significant for BPH with urinary obstruction  Will continue to monitor  S/p RP sono                Elevated transaminases     See cardiogenic shock  See septic shock  Hold statin      GALEN (acute kidney injury)  Hyperkalemia     Cr/BUN 2.7/74 on admit  Cr/BUN 1.6/59 a few weeks ago  Likely pre-renal vs cardio-renal     - no longer monitoring labs at patient request; he is comfort care                        Thrombocytopenia     Suspect related to sepsis  See septic shock  Improving  - no longer monitoring as comfort care      Paroxysmal atrial fibrillation     EKG: Afib; no changes to prior  Rate controlled     Plan:  Continue home apixiban 2.5mg BID                Benign prostatic hyperplasia     Continue home tamsulosin       Depression     Continue home paroxetine, citalopram, wellbutrin      Hyperlipidemia     Holding statin  See cardiogenic shock  See septic shock      Coronary artery disease involving native coronary artery of native heart without angina pectoris     Continue home ASA  Holding BB, Statin, Ace-I  See cardiogenic shock  See septic shock     Vitals:    02/01/19 0400 02/01/19 0600 02/01/19 0746 02/01/19 0849   BP:    (!) 66/46   BP Location:    Right arm   Patient Position:    Lying   Pulse: 82 77 70    Resp:    18   Temp:    96.1 °F (35.6 °C)   TempSrc:    Axillary   SpO2:    (!) 92%   Weight:       Height:         General Appearance:  Comfortable, in no acute distress, not in pain and ill-appearing.     Output: Producing urine and producing stool.    HEENT: Normal HEENT exam.    Lungs:  Normal effort.  Breath sounds clear to auscultation.  He is not in respiratory distress.  There are rales.  No wheezes.    Heart: Normal rate.  Regular rhythm.  S1 normal and S2 normal.  No murmur.   Chest: Symmetric chest wall expansion.   Abdomen: Abdomen is soft.  Bowel sounds are normal.   There is no abdominal tenderness.      Extremities: Normal range of motion.  There is venous stasis and dependent edema.  There is no deformity, effusion or local swelling.    Pulses: Distal pulses are intact.    Neurological: Patient is alert and oriented to person, place and time.  Normal strength.    Pupils:  Pupils are equal, round, and reactive to light.    Skin:  Warm and dry     Consults:   Consults (From admission, onward)        Status Ordering Provider     Inpatient consult to Hospice  Once     Provider:  (Not yet assigned)    Acknowledged SCAR ROBERTS     Inpatient consult to Palliative Care  Once     Provider:  (Not yet assigned)    Completed HEIDI HINKLE     [facility]  Once     Provider:  (Not yet assigned)    Completed CAM HENRY     Inpatient consult to PICC team ([facility])  Once     Provider:  (Not yet assigned)    Completed PARTH GUERRERO          No new Assessment & Plan notes have been filed under this hospital service since the last note was generated.  Service: Hospital Medicine    Final Active Diagnoses:    Diagnosis Date Noted POA    PRINCIPAL PROBLEM:  Septic shock [A41.9, R65.21] 01/22/2019 Yes    Comfort measures only status [Z51.5] 02/01/2019 Not Applicable    Goals of care, counseling/discussion [Z71.89]  Not Applicable    Advanced care planning/counseling discussion [Z71.89]  Not Applicable    Palliative care encounter [Z51.5] 01/25/2019 Not Applicable    Gross hematuria [R31.0] 01/23/2019 No    Hypotension [I95.9] 01/21/2019 Yes    Cardiogenic shock [R57.0] 01/21/2019 Yes    GALEN (acute kidney injury) [N17.9] 01/21/2019 Yes    Transaminitis [R74.0] 01/21/2019 Yes    Hyperkalemia [E87.5] 01/21/2019 Yes    Thrombocytopenia [D69.6] 01/04/2017 Yes    Paroxysmal atrial fibrillation [I48.0] 06/01/2015 Yes     Chronic    Chronic combined systolic and diastolic congestive heart failure [I50.42] 11/20/2012 Yes     Chronic    Benign prostatic hyperplasia [N40.0]  08/17/2012 Yes    Coronary artery disease involving native coronary artery of native heart without angina pectoris [I25.10] 08/17/2012 Yes     Chronic    Hyperlipidemia [E78.5] 08/17/2012 Yes     Chronic    Depression [F32.9] 08/17/2012 Yes      Problems Resolved During this Admission:       Discharged Condition: poor    Disposition: Hospice/Home    Follow Up:  Follow-up Information     Rocio Casas MD In 1 week.    Specialty:  Internal Medicine  Contact information:  [address]  [facility] [id number] [phone]                 Patient Instructions:      Ambulatory referral to Outpatient Case Management   Referral Priority: Routine Referral Type: Consultation   Referral Reason: Specialty Services Required   Number of Visits Requested: 1     Diet Cardiac     Activity as tolerated       Significant Diagnostic Studies: Labs:   BMP: No results for input(s): GLU, NA, K, CL, CO2, BUN, CREATININE, CALCIUM, MG in the last 48 hours., CMP No results for input(s): NA, K, CL, CO2, GLU, BUN, CREATININE, CALCIUM, PROT, ALBUMIN, BILITOT, ALKPHOS, AST, ALT, ANIONGAP, ESTGFRAFRICA, EGFRNONAA in the last 48 hours., CBC No results for input(s): WBC, HGB, HCT, PLT in the last 48 hours., INR   Lab Results   Component Value Date    INR 1.4 (H) 01/23/2019    INR 1.5 (H) 01/01/2019    INR 1.2 04/22/2018   , Lipid Panel   Lab Results   Component Value Date    CHOL 82 (L) 01/01/2019    HDL 31 (L) 01/01/2019    LDLCALC 40.0 (L) 01/01/2019    TRIG 55 01/01/2019    CHOLHDL 37.8 01/01/2019   , Troponin No results for input(s): TROPONINI in the last 168 hours., A1C:   Recent Labs   Lab 01/01/19  0917   HGBA1C 5.3    and All labs within the past 24 hours have been reviewed  Microbiology:   Blood Culture   Lab Results   Component Value Date    LABBLOO No growth after 5 days. 01/21/2019    and Urine Culture    Lab Results   Component Value Date    LABURIN No growth 01/23/2019     Cardiac Graphics: Echocardiogram:   Transthoracic  echo (TTE) complete (Cupid Only):   Results for orders placed or performed during the hospital encounter of 01/21/19   Transthoracic echo (TTE) complete (Cupid Only)   Result Value Ref Range    Ascending aorta 3.32 cm    STJ 3.17 cm    AV mean gradient 12.34 mmHg    Ao peak silver 2.17 m/s    Ao VTI 40.76 cm    IVS 0.80 0.6 - 1.1 cm    LA size 4.38 cm    Left Atrium Major Axis 6.70 cm    Left Atrium Minor Axis 6.42 cm    LVIDD 6.94 (A) 3.5 - 6.0 cm    LVIDS 6.47 (A) 2.1 - 4.0 cm    LVOT diameter 2.12 cm    LVOT peak VTI 11.59 cm    PW 0.98 0.6 - 1.1 cm    RA Major Axis 5.84 cm    RA Width 3.28 cm    RVDD 3.73 cm    Sinus 3.23 cm    TAPSE 1.57 cm    TR Max Silver 3.82 m/s    TDI LATERAL 0.09     TDI SEPTAL 0.06     LA WIDTH 5.86 cm    LV Diastolic Volume 250.64 mL    LV Systolic Volume 214.09 mL    LVOT peak silver 0.637791080935907 m/s    FS 7 %    LA volume 143.05 cm3    LV mass 274.16 g    Left Ventricle Relative Wall Thickness 0.28 cm    AV valve area 1.00 cm2    AV Velocity Ratio 0.32     AV index (prosthetic) 0.28     Mean e' 0.08     LVOT area 3.53 cm2    LVOT stroke volume 40.89 cm3    AV peak gradient 18.84 mmHg    LV Systolic Volume Index 116.4 mL/m2    LV Diastolic Volume Index 136.29 mL/m2    LA Volume Index 77.8 mL/m2    LV Mass Index 149.1 g/m2    Triscuspid Valve Regurgitation Peak Gradient 58.37 mmHg    Right Atrial Pressure (from IVC) 15 mmHg    TV rest pulmonary artery pressure 73 mmHg       Pending Diagnostic Studies:     None         Medications:  Reconciled Home Medications:      Medication List      START taking these medications    oxyCODONE 5 mg Cap  Commonly known as:  OXY-IR  Take 1 capsule (5 mg total) by mouth every 4 (four) hours as needed for Pain.        CHANGE how you take these medications    albuterol 90 mcg/actuation inhaler  Commonly known as:  PROVENTIL/VENTOLIN HFA  Inhale 2 puffs into the lungs every 6 (six) hours as needed for Wheezing or Shortness of Breath. Rescue  What changed:   Another medication with the same name was removed. Continue taking this medication, and follow the directions you see here.        CONTINUE taking these medications    aspirin 81 MG EC tablet  Commonly known as:  ECOTRIN  Take 81 mg by mouth. Pt taken every other day with potasium     buPROPion 300 MG 24 hr tablet  Commonly known as:  WELLBUTRIN XL  Take 1 tablet (300 mg total) by mouth every morning.     citalopram 10 MG tablet  Commonly known as:  CELEXA  Take 1 tablet (10 mg total) by mouth once daily.     desonide 0.05 % lotion  Commonly known as:  DESOWEN  APPLY EXTERNALLY TO THE AFFECTED AREA TWICE DAILY AS NEEDED FOR REDNESS AND ITCHING     ELIQUIS 2.5 mg Tab  Generic drug:  apixaban  Take 1 tablet (2.5 mg total) by mouth 2 (two) times daily.     nitroGLYCERIN 0.4 MG SL tablet  Commonly known as:  NITROSTAT  Place 1 tablet (0.4 mg total) under the tongue every 5 (five) minutes as needed.     paroxetine 10 MG tablet  Commonly known as:  PAXIL  Take 10 mg by mouth every morning.     polyethylene glycol 17 gram/dose powder  Commonly known as:  GLYCOLAX  Take 17 g by mouth once daily.     tamsulosin 0.4 mg Cap  Commonly known as:  FLOMAX  Take 1 capsule (0.4 mg total) by mouth every evening.        STOP taking these medications    atorvastatin 40 MG tablet  Commonly known as:  LIPITOR     benzonatate 100 MG capsule  Commonly known as:  TESSALON PERLES     cyanocobalamin 1000 MCG tablet  Commonly known as:  VITAMIN B-12     furosemide 40 MG tablet  Commonly known as:  LASIX     levocetirizine 5 MG tablet  Commonly known as:  XYZAL     losartan 25 MG tablet  Commonly known as:  COZAAR     metoprolol succinate 25 MG 24 hr tablet  Commonly known as:  TOPROL-XL     omega-3 acid ethyl esters 1 gram capsule  Commonly known as:  LOVAZA     potassium chloride 10 MEQ Cpsr  Commonly known as:  MICRO-K            Indwelling Lines/Drains at time of discharge:   Lines/Drains/Airways          None          Time spent on the  discharge of patient: 20 minutes  Patient was seen and examined on the date of discharge and determined to be suitable for discharge.         Donal St MD  Department of Hospital Medicine  [facility]

## 2019-02-01 NOTE — PLAN OF CARE
02/01/19 1344   Final Note   Assessment Type Final Discharge Note   Anticipated Discharge Disposition [facility] Follow Up  Appt(s) scheduled? No

## 2019-02-01 NOTE — PLAN OF CARE
Problem: Adult Inpatient Plan of Care  Goal: Plan of Care Review  Outcome: Ongoing (interventions implemented as appropriate)  Pt remained free from falls/trauma/injuries. No complaints of CP/SOB/discomfort. Pt is on comfort measures, will go home on Hospice. Family at bedside. Morphine 1mg for pain. Remains on 2L NC. VSS. Fall bundle in place. POC explained.

## 2019-02-01 NOTE — PLAN OF CARE
Ambulance arranged with the patient flow center to  pt for 11:00am.  Pt will go home with family.  Hospice to be provided by [facility].  Family in agreement with arrangements.     02/01/19 0956   Post-Acute Status   Post-Acute Authorization Home Health/Hospice   Home Health/Hospice Status Referrals Sent

## 2019-02-01 NOTE — PROGRESS NOTES
Thank you for the referral. The following patient has been assigned to Beth Martinez RN with Outpatient Complex Care Management for high risk screening.    Reason for referral: Hypotension    Please contact [facility] at [phone] with any questions.    Thank you,    Eliza Chong, [facility]  Outpatient Care Mgmt.  [phone]

## 2019-02-04 ENCOUNTER — PES CALL (OUTPATIENT)
Dept: ADMINISTRATIVE | Facility: CLINIC | Age: 84
End: 2019-02-04

## 2019-02-05 ENCOUNTER — OUTPATIENT CASE MANAGEMENT (OUTPATIENT)
Dept: ADMINISTRATIVE | Facility: OTHER | Age: 84
End: 2019-02-05

## 2019-02-05 ENCOUNTER — TELEPHONE (OUTPATIENT)
Dept: INTERNAL MEDICINE | Facility: CLINIC | Age: 84
End: 2019-02-05

## 2019-02-05 NOTE — TELEPHONE ENCOUNTER
----- Message from Beth Martinez RN sent at 2/5/2019 10:09 AM CST -----  Contact: Jacqui Martinez RN OPCM  Good Morning,  The above pt was referred to OPCM after D/C last Friday.  The chart stated that pt was  Discharged to [facility] hospice.  Sadly when calling to confirm that Mr. Bradshaw was indeed a [facility] pt. I was informed by Alexsandra that the pt was passed away on Saturday 2-2-19.      This is FYI -to inform you why pt was not enrolled in OPCM    Best Regards,    Jacqui ([place]) Juan SARABIA BSN RN   Outpatient Care Management  [phone]

## 2019-02-05 NOTE — PROGRESS NOTES
Summary:  Chart review revealed that pt was D/C from hospital to home hospice with [facility]    Interventions:  Called [facility] hospice to confirm pt was admitted to their care - Spoke with Alexsandra who infomred OPCM RN that pt had been with them but passed away on Saturday 2-2-19  Messaged PCP to [facility] that pt had not been enrolled in OPCM and to notify why    Plan:  Case closed

## 2019-08-14 ENCOUNTER — PES CALL (OUTPATIENT)
Dept: ADMINISTRATIVE | Facility: CLINIC | Age: 84
End: 2019-08-14

## 2019-09-27 ENCOUNTER — DOCUMENTATION ONLY (OUTPATIENT)
Dept: AUDIOLOGY | Facility: CLINIC | Age: 84
End: 2019-09-27

## 2019-09-27 NOTE — PROGRESS NOTES
Widex Unique 220 Fusion w/ Flex Soft Shell  Winter Silver  Invoice Date: 9/26/16  Rt SN [id number]  : M3  Warranty Exp 10/26/19

## 2020-01-27 NOTE — NURSING
Notified Dr. Juan Diego MD of patient experiencing frequent PVC's on telemetry. Patient received a one time dose of lasix IV push 80 mg as well as started on at lasix gtt this afternoon. Ordered BMP, Mag, and phosphorus. Will continue monitor.    SENT THRU INTERFACE

## 2020-07-01 NOTE — ASSESSMENT & PLAN NOTE
See cardiogenic shock  See septic shock   Pt will ambulate with RW independently 150 feet in 3-4 sessions . Negotiate 15 steps with 1 handrail and cane independently WBAT  LE in 3-4 sessions

## 2020-10-05 ENCOUNTER — PATIENT MESSAGE (OUTPATIENT)
Dept: ADMINISTRATIVE | Facility: HOSPITAL | Age: 85
End: 2020-10-05

## 2021-01-04 ENCOUNTER — PATIENT MESSAGE (OUTPATIENT)
Dept: ADMINISTRATIVE | Facility: HOSPITAL | Age: 86
End: 2021-01-04

## 2021-04-05 ENCOUNTER — PATIENT MESSAGE (OUTPATIENT)
Dept: ADMINISTRATIVE | Facility: HOSPITAL | Age: 86
End: 2021-04-05

## 2021-07-06 ENCOUNTER — PATIENT MESSAGE (OUTPATIENT)
Dept: ADMINISTRATIVE | Facility: HOSPITAL | Age: 86
End: 2021-07-06

## 2021-10-04 ENCOUNTER — PATIENT MESSAGE (OUTPATIENT)
Dept: ADMINISTRATIVE | Facility: HOSPITAL | Age: 86
End: 2021-10-04